# Patient Record
Sex: FEMALE | Race: WHITE | NOT HISPANIC OR LATINO | Employment: FULL TIME | URBAN - METROPOLITAN AREA
[De-identification: names, ages, dates, MRNs, and addresses within clinical notes are randomized per-mention and may not be internally consistent; named-entity substitution may affect disease eponyms.]

---

## 2017-02-08 ENCOUNTER — OFFICE VISIT (OUTPATIENT)
Dept: INTERNAL MEDICINE | Facility: CLINIC | Age: 37
End: 2017-02-08
Payer: COMMERCIAL

## 2017-02-08 VITALS
HEART RATE: 62 BPM | TEMPERATURE: 98 F | WEIGHT: 115.5 LBS | RESPIRATION RATE: 16 BRPM | SYSTOLIC BLOOD PRESSURE: 110 MMHG | BODY MASS INDEX: 22.68 KG/M2 | DIASTOLIC BLOOD PRESSURE: 70 MMHG | HEIGHT: 60 IN

## 2017-02-08 DIAGNOSIS — H92.03 OTALGIA OF BOTH EARS: ICD-10-CM

## 2017-02-08 DIAGNOSIS — Z00.00 ANNUAL PHYSICAL EXAM: Primary | ICD-10-CM

## 2017-02-08 DIAGNOSIS — R05.9 COUGH: ICD-10-CM

## 2017-02-08 DIAGNOSIS — K21.9 LARYNGOPHARYNGEAL REFLUX (LPR): ICD-10-CM

## 2017-02-08 PROCEDURE — 99999 PR PBB SHADOW E&M-EST. PATIENT-LVL III: CPT | Mod: PBBFAC,,, | Performed by: INTERNAL MEDICINE

## 2017-02-08 PROCEDURE — 99385 PREV VISIT NEW AGE 18-39: CPT | Mod: S$GLB,,, | Performed by: INTERNAL MEDICINE

## 2017-02-08 RX ORDER — OMEPRAZOLE 40 MG/1
40 CAPSULE, DELAYED RELEASE ORAL
Qty: 30 CAPSULE | Refills: 6 | Status: SHIPPED | OUTPATIENT
Start: 2017-02-08 | End: 2019-02-05

## 2017-02-08 NOTE — PROGRESS NOTES
Subjective:       Patient ID: Tasha Cornejo is a 37 y.o. female.    Chief Complaint: Annual Exam and Otalgia (only with certains sounds, started a month ago)    Patient is a 37 y.o.female who presents today for annual.    Cholesterol: due now  Vaccines: Influenza done); Tetanus (up to date) ;   Gyn exam: dr. Perea; [facility]; pap smear up to date  Exercise: 3-4 times per week  Diet: healthy  LMP: regular      For one month, if she hears a certain noise, it sounds like she is underwater; muffled noise. Notices when  speaks loudly; possible more consistent with deeper tones; more so with male voices; happens on a daily basis; also noticeable when inverted during yoga poses    Consistent cough; she was advised to avoid caffeine. The cough is dry; occurs throughout. She has seen ent in the past and was diagnosed with silent reflux.    History reviewed. No pertinent past medical history.  History reviewed. No pertinent past surgical history.  Social History     Social History    Marital status:      Spouse name: N/A    Number of children: N/A    Years of education: N/A     Occupational History          Social History Main Topics    Smoking status: Never Smoker    Smokeless tobacco: Not on file    Alcohol use Yes      Comment: 3-5 glasses per week    Drug use: No    Sexual activity: Not on file     Other Topics Concern    Not on file     Social History Narrative    No narrative on file     Review of patient's allergies indicates:   Allergen Reactions    Aleve [naproxen sodium] Other (See Comments)     Throat swelling     Ms. Cornejo had no medications administered during this visit.    Review of Systems   Constitutional: Negative for appetite change, chills, diaphoresis, fatigue and fever.   HENT: Positive for ear pain. Negative for congestion, dental problem, ear discharge, hearing loss, postnasal drip, sinus pressure and sore throat.    Eyes: Negative for discharge,  redness and itching.   Respiratory: Positive for cough. Negative for chest tightness, shortness of breath and wheezing.    Cardiovascular: Negative for chest pain, palpitations and leg swelling.   Gastrointestinal: Negative for abdominal pain, constipation, diarrhea, nausea and vomiting.   Endocrine: Negative for cold intolerance and heat intolerance.   Genitourinary: Negative for difficulty urinating, frequency, hematuria and urgency.   Musculoskeletal: Negative for arthralgias, back pain, gait problem, myalgias and neck pain.   Skin: Negative for color change and rash.   Neurological: Negative for dizziness, syncope and headaches.   Hematological: Negative for adenopathy.   Psychiatric/Behavioral: Negative for behavioral problems and sleep disturbance. The patient is not nervous/anxious.        Objective:      Physical Exam   Constitutional: She is oriented to person, place, and time. She appears well-developed and well-nourished. No distress.   HENT:   Head: Normocephalic and atraumatic.   Right Ear: Tympanic membrane and external ear normal.   Left Ear: Tympanic membrane and external ear normal.   Nose: Nose normal. No mucosal edema or rhinorrhea.   Mouth/Throat: Uvula is midline, oropharynx is clear and moist and mucous membranes are normal. No oropharyngeal exudate, posterior oropharyngeal edema, posterior oropharyngeal erythema or tonsillar abscesses.   Eyes: Conjunctivae and EOM are normal. Pupils are equal, round, and reactive to light. Right eye exhibits no discharge. Left eye exhibits no discharge. No scleral icterus.   Neck: Normal range of motion. Neck supple. No JVD present. No thyromegaly present.   Cardiovascular: Normal rate, regular rhythm, normal heart sounds and intact distal pulses.  Exam reveals no gallop and no friction rub.    No murmur heard.  Pulmonary/Chest: Effort normal and breath sounds normal. No stridor. No respiratory distress. She has no wheezes. She has no rales. She exhibits no  tenderness.   Abdominal: Soft. Bowel sounds are normal. She exhibits no distension. There is no tenderness. There is no rebound.   Musculoskeletal: Normal range of motion. She exhibits no edema or tenderness.   Lymphadenopathy:     She has no cervical adenopathy.   Neurological: She is alert and oriented to person, place, and time.   Skin: Skin is warm. No rash noted. She is not diaphoretic. No erythema.   Psychiatric: She has a normal mood and affect. Her behavior is normal.   Nursing note and vitals reviewed.      Assessment and Plan:       1. Annual physical exam  - CBC auto differential; Future  - Comprehensive metabolic panel; Future  - TSH; Future  - Lipid panel; Future  - Vitamin D; Future  - Urinalysis; Future    2. Cough  - asthma vs gerd vs allergies  - trial of omeprazole 40 mg po daily x 2weeks  - if that does not improve her symptom, will start an anti- histamine daily    3. Otalgia of both ears  - Ambulatory consult to ENT    4. Laryngopharyngeal reflux (LPR)  - omeprazole (PRILOSEC) 40 MG capsule; Take 1 capsule (40 mg total) by mouth before dinner.  Dispense: 30 capsule; Refill: 6        No Follow-up on file.

## 2017-02-09 ENCOUNTER — LAB VISIT (OUTPATIENT)
Dept: LAB | Facility: HOSPITAL | Age: 37
End: 2017-02-09
Attending: INTERNAL MEDICINE
Payer: COMMERCIAL

## 2017-02-09 DIAGNOSIS — Z00.00 ANNUAL PHYSICAL EXAM: ICD-10-CM

## 2017-02-09 LAB
25(OH)D3+25(OH)D2 SERPL-MCNC: 39 NG/ML
ALBUMIN SERPL BCP-MCNC: 4 G/DL
ALP SERPL-CCNC: 42 U/L
ALT SERPL W/O P-5'-P-CCNC: 16 U/L
ANION GAP SERPL CALC-SCNC: 7 MMOL/L
AST SERPL-CCNC: 30 U/L
BASOPHILS # BLD AUTO: 0.04 K/UL
BASOPHILS NFR BLD: 0.8 %
BILIRUB SERPL-MCNC: 0.3 MG/DL
BUN SERPL-MCNC: 15 MG/DL
CALCIUM SERPL-MCNC: 9.2 MG/DL
CHLORIDE SERPL-SCNC: 105 MMOL/L
CHOLEST/HDLC SERPL: 2.9 {RATIO}
CO2 SERPL-SCNC: 28 MMOL/L
CREAT SERPL-MCNC: 0.8 MG/DL
DIFFERENTIAL METHOD: NORMAL
EOSINOPHIL # BLD AUTO: 0.1 K/UL
EOSINOPHIL NFR BLD: 2.1 %
ERYTHROCYTE [DISTWIDTH] IN BLOOD BY AUTOMATED COUNT: 13.2 %
EST. GFR  (AFRICAN AMERICAN): >60 ML/MIN/1.73 M^2
EST. GFR  (NON AFRICAN AMERICAN): >60 ML/MIN/1.73 M^2
GLUCOSE SERPL-MCNC: 89 MG/DL
HCT VFR BLD AUTO: 40.1 %
HDL/CHOLESTEROL RATIO: 34.6 %
HDLC SERPL-MCNC: 179 MG/DL
HDLC SERPL-MCNC: 62 MG/DL
HGB BLD-MCNC: 13.2 G/DL
LDLC SERPL CALC-MCNC: 105.4 MG/DL
LYMPHOCYTES # BLD AUTO: 1.7 K/UL
LYMPHOCYTES NFR BLD: 33 %
MCH RBC QN AUTO: 29.9 PG
MCHC RBC AUTO-ENTMCNC: 32.9 %
MCV RBC AUTO: 91 FL
MONOCYTES # BLD AUTO: 0.5 K/UL
MONOCYTES NFR BLD: 10.1 %
NEUTROPHILS # BLD AUTO: 2.8 K/UL
NEUTROPHILS NFR BLD: 54 %
NONHDLC SERPL-MCNC: 117 MG/DL
PLATELET # BLD AUTO: 279 K/UL
PMV BLD AUTO: 10.6 FL
POTASSIUM SERPL-SCNC: 4 MMOL/L
PROT SERPL-MCNC: 7.2 G/DL
RBC # BLD AUTO: 4.42 M/UL
SODIUM SERPL-SCNC: 140 MMOL/L
TRIGL SERPL-MCNC: 58 MG/DL
TSH SERPL DL<=0.005 MIU/L-ACNC: 2.55 UIU/ML
WBC # BLD AUTO: 5.25 K/UL

## 2017-02-09 PROCEDURE — 80053 COMPREHEN METABOLIC PANEL: CPT

## 2017-02-09 PROCEDURE — 82306 VITAMIN D 25 HYDROXY: CPT

## 2017-02-09 PROCEDURE — 84443 ASSAY THYROID STIM HORMONE: CPT

## 2017-02-09 PROCEDURE — 36415 COLL VENOUS BLD VENIPUNCTURE: CPT | Mod: PO

## 2017-02-09 PROCEDURE — 80061 LIPID PANEL: CPT

## 2017-02-09 PROCEDURE — 85025 COMPLETE CBC W/AUTO DIFF WBC: CPT

## 2017-02-15 ENCOUNTER — CLINICAL SUPPORT (OUTPATIENT)
Dept: AUDIOLOGY | Facility: CLINIC | Age: 37
End: 2017-02-15
Payer: COMMERCIAL

## 2017-02-15 ENCOUNTER — OFFICE VISIT (OUTPATIENT)
Dept: OTOLARYNGOLOGY | Facility: CLINIC | Age: 37
End: 2017-02-15
Payer: COMMERCIAL

## 2017-02-15 VITALS
DIASTOLIC BLOOD PRESSURE: 88 MMHG | WEIGHT: 116.38 LBS | HEART RATE: 68 BPM | BODY MASS INDEX: 22.73 KG/M2 | TEMPERATURE: 99 F | SYSTOLIC BLOOD PRESSURE: 138 MMHG

## 2017-02-15 DIAGNOSIS — K21.9 LARYNGOPHARYNGEAL REFLUX: Primary | ICD-10-CM

## 2017-02-15 DIAGNOSIS — H93.13 TINNITUS OF BOTH EARS: Primary | ICD-10-CM

## 2017-02-15 DIAGNOSIS — J30.9 ALLERGIC RHINITIS, UNSPECIFIED ALLERGIC RHINITIS TRIGGER, UNSPECIFIED RHINITIS SEASONALITY: ICD-10-CM

## 2017-02-15 PROCEDURE — 92557 COMPREHENSIVE HEARING TEST: CPT | Mod: S$GLB,,, | Performed by: AUDIOLOGIST

## 2017-02-15 PROCEDURE — 1160F RVW MEDS BY RX/DR IN RCRD: CPT | Mod: S$GLB,,, | Performed by: OTOLARYNGOLOGY

## 2017-02-15 PROCEDURE — 99999 PR PBB SHADOW E&M-EST. PATIENT-LVL III: CPT | Mod: PBBFAC,,, | Performed by: OTOLARYNGOLOGY

## 2017-02-15 PROCEDURE — 92567 TYMPANOMETRY: CPT | Mod: S$GLB,,, | Performed by: AUDIOLOGIST

## 2017-02-15 PROCEDURE — 99213 OFFICE O/P EST LOW 20 MIN: CPT | Mod: S$GLB,,, | Performed by: OTOLARYNGOLOGY

## 2017-02-15 NOTE — PROGRESS NOTES
2/15/2017    AUDIOLOGICAL EVALUATION:    Tasha Cornejo was seen for an audiological evaluation on 2/15/2017 with a complaint of noise in her ears.    Pure tone threshold testing revealed normal hearing sensitivity bilaterally.  Speech reception thresholds were obtained at 5dBHL for the right ear and 5dBHL for the left ear.  Speech discrimination scores were obtained at 100% for the right ear and 100% for the left ear.    Tympanometry was within normal limits bilaterally indicating normal middle ear function.    Recommend:  1.  Otologic evaluation.  2.  Follow up audio as needed.

## 2017-02-15 NOTE — PROGRESS NOTES
"Subjective:       Patient ID: Tasha Cornejo is a 37 y.o. female.    Chief Complaint: Consult (muffling sounds in both ears at times, itchy throat, feels swollen)    HPI     Tasha Cornejo is a 37 year old female who [facility] complaints. 1 month ago, she began to feel like she was "underwater." The problem occurs with change in positions or when listening deeper voice tones (such as on TV).  The sensation will self resolve.There is no dizziness,vertigo, or tinnitus. She also reports a globus sensation, swollen/itchy throat, and itchy eyes. She does have a history of seasonal allergies. She recently increased her Flonase to BID with no relied of symptoms. She has not tried any OTC decongestants. She recently started Prilosec for LPR.    History reviewed. No pertinent past medical history.    History reviewed. No pertinent past surgical history.      Current Outpatient Prescriptions:     fluticasone (FLONASE) 50 mcg/actuation nasal spray, SPRAY TWICE IN EACH NOSTRIL EVERY DAY, Disp: 1 Bottle, Rfl: 11    MAGNESIUM GLYCINATE ORAL, Take 400 mg by mouth once daily., Disp: , Rfl:     omeprazole (PRILOSEC) 40 MG capsule, Take 1 capsule (40 mg total) by mouth before dinner., Disp: 30 capsule, Rfl: 6    Review of patient's allergies indicates:   Allergen Reactions    Aleve [naproxen sodium] Other (See Comments)     Throat swelling       Social History     Social History    Marital status:      Spouse name: N/A    Number of children: N/A    Years of education: N/A     Occupational History          Social History Main Topics    Smoking status: Never Smoker    Smokeless tobacco: Not on file    Alcohol use Yes      Comment: 3-5 glasses per week    Drug use: No    Sexual activity: Not on file     Other Topics Concern    Not on file     Social History Narrative       Family History   Problem Relation Age of Onset    Heart failure Paternal Grandmother     " Albinism Paternal Grandmother     Alzheimer's disease Paternal Grandmother     Diabetes Paternal Grandfather     Heart failure Paternal Grandfather        Review of Systems   Constitutional: Negative for appetite change, chills, diaphoresis, fatigue, fever and unexpected weight change.   HENT: Positive for congestion. Negative for dental problem, drooling, ear discharge, ear pain, facial swelling, hearing loss, mouth sores, nosebleeds, postnasal drip, rhinorrhea, sinus pressure, sneezing, sore throat, tinnitus, trouble swallowing and voice change.         Ear pressure   Eyes: Positive for itching. Negative for pain, discharge and redness.   Respiratory: Positive for cough. Negative for shortness of breath.    Cardiovascular: Negative for chest pain.   Gastrointestinal: Negative for abdominal distention, abdominal pain, diarrhea, nausea and vomiting.   Endocrine: Negative for cold intolerance and heat intolerance.   Genitourinary: Negative for difficulty urinating.   Musculoskeletal: Negative for neck pain and neck stiffness.   Skin: Negative for rash.   Neurological: Negative for dizziness, weakness and headaches.   Hematological: Negative for adenopathy.       Objective:      Physical Exam   Constitutional: She is oriented to person, place, and time. She appears well-developed and well-nourished. She is cooperative. She does not appear ill. No distress.   HENT:   Head: Normocephalic and atraumatic.   Right Ear: Hearing, tympanic membrane, external ear and ear canal normal.   Left Ear: Hearing, external ear and ear canal normal. A middle ear effusion (serous) is present.   Nose: Mucosal edema and rhinorrhea present. No nasal deformity. No epistaxis.  No foreign bodies. Right sinus exhibits no maxillary sinus tenderness and no frontal sinus tenderness. Left sinus exhibits no maxillary sinus tenderness and no frontal sinus tenderness.   Mouth/Throat: Uvula is midline, oropharynx is clear and moist and mucous  membranes are normal. Mucous membranes are not pale, not dry and not cyanotic. She does not have dentures. No oral lesions. No trismus in the jaw. Normal dentition. No uvula swelling or dental caries. No oropharyngeal exudate, posterior oropharyngeal edema, posterior oropharyngeal erythema or tonsillar abscesses.   Eyes: Conjunctivae are normal. Right eye exhibits no discharge. Left eye exhibits no discharge. No scleral icterus.   Neck: Trachea normal, normal range of motion and phonation normal. Neck supple. No JVD present. No tracheal tenderness present. No tracheal deviation present. No thyroid mass and no thyromegaly present.   Salivary glands - there are no lesions or asymmetric findings in the submandibular or parotid glands     Cardiovascular: Normal rate.    Pulmonary/Chest: Effort normal. No stridor. No respiratory distress.   Lymphadenopathy:        Head (right side): No submental, no submandibular, no tonsillar and no preauricular adenopathy present.        Head (left side): No submental, no submandibular, no tonsillar and no preauricular adenopathy present.     She has no cervical adenopathy.   Neurological: She is alert and oriented to person, place, and time. No cranial nerve deficit.   Skin: Skin is warm and dry. No rash noted. She is not diaphoretic. No erythema. No pallor.   Psychiatric: She has a normal mood and affect. Her behavior is normal. Thought content normal.   Vitals reviewed.      Studies reviewed:   Audio today 2/15/17:  WNL AU, tymps normal bilaterally    Assessment:       1. Laryngopharyngeal reflux    2. Allergic rhinitis, unspecified allergic rhinitis trigger, unspecified rhinitis seasonality        Plan:       Ms. Cornejo is a 37 year old female with allergic rhinitis and LPR, both can be causing nasopharyngeal irritation and subsequent eustachian tube dysfunction. Recommend she continue current regimen of Flonase and Prilosec. Audio normal today. Also recommend nasal saline spray  for improved nasal mucociliary clearance and prn Afrin (no longer than 3 days) for nasal congestion.

## 2017-02-15 NOTE — LETTER
February 21, 2017      Dinah Arias, DO  [address] [facility] [place] [address]           [facility]  [address]  [facility] [address]  Phone: [phone]  Fax: [phone]          Patient: Tasha Cornejo   MR Number: [id number]   YOB: 1980   Date of Visit: 2/15/2017       Dear Dr. Dinah Arias:    Thank you for referring Tasha Cornejo to me for evaluation. Attached you will find relevant portions of my assessment and plan of care.    If you have questions, please do not hesitate to call me. I look forward to following Tasha Cornejo along with you.    Sincerely,    Shonna Krishnamurthy MD    Enclosure  CC:  No Recipients    If you would like to receive this communication electronically, please contact [email or web address] or [phone] to request more information on [employer] access.    For providers and/or their staff who would like to refer a patient to [facility], please contact us through our one-stop-shop provider referral line, [facility], at [phone].    If you feel you have received this communication in error or would no longer like to receive these types of communications, please e-mail [email or web address]

## 2017-09-11 ENCOUNTER — OFFICE VISIT (OUTPATIENT)
Dept: OPTOMETRY | Facility: CLINIC | Age: 37
End: 2017-09-11
Payer: COMMERCIAL

## 2017-09-11 DIAGNOSIS — H00.12 CHALAZION OF RIGHT LOWER EYELID: Primary | ICD-10-CM

## 2017-09-11 PROCEDURE — 92004 COMPRE OPH EXAM NEW PT 1/>: CPT | Mod: S$GLB,,, | Performed by: OPTOMETRIST

## 2017-09-11 PROCEDURE — 99999 PR PBB SHADOW E&M-EST. PATIENT-LVL II: CPT | Mod: PBBFAC,,, | Performed by: OPTOMETRIST

## 2017-09-11 RX ORDER — LORATADINE 10 MG/1
10 TABLET ORAL DAILY
COMMUNITY
End: 2019-02-05

## 2017-09-11 NOTE — PROGRESS NOTES
HPI     Concerns About Ocular Health    Additional comments: swelling RLL           Comments   Patient's last dilated exam was: apprx 2 years   Pt c/o swelling RLL x 14 days. Treating with wc x 1 week for apprx 10-20   minutes TID. Has decreased in size minimally. No change in vision, however   she can see it when she squints        Last edited by LATOYA Tolentino on 9/11/2017  1:32 PM.   (History)            Assessment /Plan     For exam results, see Encounter Report.    Chalazion of right lower eyelid          1.  Educated pt on chalazion.  Warm compresses with digital massage 3x/day.  If condition does not resolve in the the next 3-4 weeks will refer to Dr. Dias.

## 2017-10-09 RX ORDER — FLUTICASONE PROPIONATE 50 MCG
SPRAY, SUSPENSION (ML) NASAL
Qty: 1 BOTTLE | Refills: 0 | Status: SHIPPED | OUTPATIENT
Start: 2017-10-09 | End: 2019-02-05

## 2019-02-04 NOTE — PROGRESS NOTES
Subjective:       Patient ID: Tasha Cornejo is a 39 y.o. female.    Chief Complaint: Cough and Fatigue    Patient is a 39 y.o.female who presents today for cough. She started with symptoms in November. She was treated by an ENT twice with antibiotics and steroids in nov and December. Not sure which abx it was but will find out.    Cough: lingering; sometimes productive; generalized fatigue, heaviness in chest. She notes sob with walking/ normal daily activity. When the cough is productive, it is white and thick. No fever or chills. No wheezing. No chest pain.    She is sleeping okay.    She is currently taking flonase off and on and a multivitamin 2 days ago.    Review of Systems   Constitutional: Negative for appetite change, chills, diaphoresis, fatigue and fever.   HENT: Negative for congestion, dental problem, ear discharge, ear pain, hearing loss, postnasal drip, sinus pressure and sore throat.    Eyes: Negative for discharge, redness and itching.   Respiratory: Negative for cough, chest tightness, shortness of breath and wheezing.    Cardiovascular: Negative for chest pain, palpitations and leg swelling.   Gastrointestinal: Negative for abdominal pain, constipation, diarrhea, nausea and vomiting.   Endocrine: Negative for cold intolerance and heat intolerance.   Genitourinary: Negative for difficulty urinating, frequency, hematuria and urgency.   Musculoskeletal: Negative for arthralgias, back pain, gait problem, myalgias and neck pain.   Skin: Negative for color change and rash.   Neurological: Negative for dizziness, syncope and headaches.   Hematological: Negative for adenopathy.   Psychiatric/Behavioral: Negative for behavioral problems and sleep disturbance. The patient is not nervous/anxious.        Objective:      Physical Exam   Constitutional: She is oriented to person, place, and time. She appears well-developed and well-nourished. No distress.   HENT:   Head: Normocephalic and atraumatic.   Right  Ear: External ear normal.   Left Ear: External ear normal.   Nose: Nose normal.   Mouth/Throat: Oropharynx is clear and moist. No oropharyngeal exudate.   Eyes: Conjunctivae and EOM are normal. Pupils are equal, round, and reactive to light. Right eye exhibits no discharge. Left eye exhibits no discharge. No scleral icterus.   Neck: Normal range of motion. Neck supple. No JVD present. No thyromegaly present.   Cardiovascular: Normal rate, regular rhythm, normal heart sounds and intact distal pulses. Exam reveals no gallop and no friction rub.   No murmur heard.  Pulmonary/Chest: Effort normal and breath sounds normal. No stridor. No respiratory distress. She has no wheezes. She has no rales. She exhibits no tenderness.   Abdominal: Soft. Bowel sounds are normal. She exhibits no distension. There is no tenderness. There is no rebound.   Musculoskeletal: Normal range of motion. She exhibits no edema or tenderness.   Lymphadenopathy:     She has no cervical adenopathy.   Neurological: She is alert and oriented to person, place, and time. No cranial nerve deficit.   Skin: Skin is warm and dry. No rash noted. She is not diaphoretic. No erythema.   Psychiatric: She has a normal mood and affect. Her behavior is normal.   Nursing note and vitals reviewed.      Assessment and Plan:       1. Cough  - urine pregnancy prior to xray  - X-Ray Chest PA And Lateral; Future  - CBC auto differential; Future  - Comprehensive metabolic panel; Future  - HETEROPHILE AB SCREEN; Future    2. SOB (shortness of breath)    - X-Ray Chest PA And Lateral; Future  - CBC auto differential; Future  - Comprehensive metabolic panel; Future  - HETEROPHILE AB SCREEN; Future  - EKG 12-lead    3. Fatigue, unspecified type  - X-Ray Chest PA And Lateral; Future  - CBC auto differential; Future  - Comprehensive metabolic panel; Future  - HETEROPHILE AB SCREEN; Future      Notify clinic if symptoms change, worsen or do not improve        No Follow-up on  file.

## 2019-02-05 ENCOUNTER — HOSPITAL ENCOUNTER (OUTPATIENT)
Dept: RADIOLOGY | Facility: HOSPITAL | Age: 39
Discharge: HOME | End: 2019-02-05
Attending: INTERNAL MEDICINE
Payer: COMMERCIAL

## 2019-02-05 ENCOUNTER — OFFICE VISIT (OUTPATIENT)
Dept: INTERNAL MEDICINE | Facility: CLINIC | Age: 39
End: 2019-02-05
Payer: COMMERCIAL

## 2019-02-05 ENCOUNTER — PATIENT MESSAGE (OUTPATIENT)
Dept: INTERNAL MEDICINE | Facility: CLINIC | Age: 39
End: 2019-02-05

## 2019-02-05 VITALS
WEIGHT: 123 LBS | TEMPERATURE: 98 F | HEART RATE: 69 BPM | OXYGEN SATURATION: 98 % | RESPIRATION RATE: 16 BRPM | DIASTOLIC BLOOD PRESSURE: 88 MMHG | HEIGHT: 60 IN | SYSTOLIC BLOOD PRESSURE: 140 MMHG | BODY MASS INDEX: 24.15 KG/M2

## 2019-02-05 DIAGNOSIS — R05.9 COUGH: ICD-10-CM

## 2019-02-05 DIAGNOSIS — R06.02 SOB (SHORTNESS OF BREATH): ICD-10-CM

## 2019-02-05 DIAGNOSIS — R53.83 FATIGUE, UNSPECIFIED TYPE: ICD-10-CM

## 2019-02-05 DIAGNOSIS — R05.9 COUGH: Primary | ICD-10-CM

## 2019-02-05 PROCEDURE — 71046 XR CHEST PA AND LATERAL: ICD-10-PCS | Mod: 26,,, | Performed by: RADIOLOGY

## 2019-02-05 PROCEDURE — 71046 X-RAY EXAM CHEST 2 VIEWS: CPT | Mod: TC,PO

## 2019-02-05 PROCEDURE — 99999 PR PBB SHADOW E&M-EST. PATIENT-LVL III: ICD-10-PCS | Mod: PBBFAC,,, | Performed by: INTERNAL MEDICINE

## 2019-02-05 PROCEDURE — 99214 PR OFFICE/OUTPT VISIT, EST, LEVL IV, 30-39 MIN: ICD-10-PCS | Mod: S$GLB,,, | Performed by: INTERNAL MEDICINE

## 2019-02-05 PROCEDURE — 93000 ELECTROCARDIOGRAM COMPLETE: CPT | Mod: S$GLB,,, | Performed by: INTERNAL MEDICINE

## 2019-02-05 PROCEDURE — 93000 EKG 12-LEAD: ICD-10-PCS | Mod: S$GLB,,, | Performed by: INTERNAL MEDICINE

## 2019-02-05 PROCEDURE — 3008F BODY MASS INDEX DOCD: CPT | Mod: CPTII,S$GLB,, | Performed by: INTERNAL MEDICINE

## 2019-02-05 PROCEDURE — 3008F PR BODY MASS INDEX (BMI) DOCUMENTED: ICD-10-PCS | Mod: CPTII,S$GLB,, | Performed by: INTERNAL MEDICINE

## 2019-02-05 PROCEDURE — 99999 PR PBB SHADOW E&M-EST. PATIENT-LVL III: CPT | Mod: PBBFAC,,, | Performed by: INTERNAL MEDICINE

## 2019-02-05 PROCEDURE — 71046 X-RAY EXAM CHEST 2 VIEWS: CPT | Mod: 26,,, | Performed by: RADIOLOGY

## 2019-02-05 PROCEDURE — 99214 OFFICE O/P EST MOD 30 MIN: CPT | Mod: S$GLB,,, | Performed by: INTERNAL MEDICINE

## 2019-02-06 ENCOUNTER — PATIENT MESSAGE (OUTPATIENT)
Dept: INTERNAL MEDICINE | Facility: CLINIC | Age: 39
End: 2019-02-06

## 2019-02-06 DIAGNOSIS — R05.3 COUGH, PERSISTENT: ICD-10-CM

## 2019-02-07 NOTE — TELEPHONE ENCOUNTER
CT chest ordered; pt will need to contact finance to determine her copay. Number is [phone]. If too expensive, we can refer her to pulmonology

## 2019-02-07 NOTE — TELEPHONE ENCOUNTER
Pt is concerned about cost of CT. Needs CPT code to find out cost. Need to know what kind of CT will be ordered first.

## 2019-04-18 NOTE — PROGRESS NOTES
Subjective:       Patient ID: Tasha Cornejo is a 39 y.o. female.    Chief Complaint: Annual Exam (needs labs, pt fasting)    Patient is a 39 y.o.female who presents today for annual    Cholesterol: due now  Vaccines: Influenza done); Tetanus (up to date) ;   Gyn exam: dr. Perea; [facility]; pap smear up to date  Exercise: 3-4 times per week  Diet: healthy  LMP: regular    After running, she had an episode of BRBPR. It happened a while back when she would run long distances.    She notes some recent indigestion at times and bloating during her period   Review of Systems   Constitutional: Negative for appetite change, chills, diaphoresis, fatigue and fever.   HENT: Negative for congestion, dental problem, ear discharge, ear pain, hearing loss, postnasal drip, sinus pressure and sore throat.    Eyes: Negative for discharge, redness and itching.   Respiratory: Negative for cough, chest tightness, shortness of breath and wheezing.    Cardiovascular: Negative for chest pain, palpitations and leg swelling.   Gastrointestinal: Negative for abdominal pain, constipation, diarrhea, nausea and vomiting.   Endocrine: Negative for cold intolerance and heat intolerance.   Genitourinary: Negative for difficulty urinating, frequency, hematuria and urgency.   Musculoskeletal: Negative for arthralgias, back pain, gait problem, myalgias and neck pain.   Skin: Negative for color change and rash.   Neurological: Negative for dizziness, syncope and headaches.   Hematological: Negative for adenopathy.   Psychiatric/Behavioral: Negative for behavioral problems and sleep disturbance. The patient is not nervous/anxious.        Objective:      Physical Exam   Constitutional: She is oriented to person, place, and time. She appears well-developed and well-nourished. No distress.   HENT:   Head: Normocephalic and atraumatic.   Right Ear: External ear normal.   Left Ear: External ear normal.   Nose: Nose normal.   Mouth/Throat: Oropharynx is  clear and moist. No oropharyngeal exudate.   Eyes: Pupils are equal, round, and reactive to light. Conjunctivae and EOM are normal. Right eye exhibits no discharge. Left eye exhibits no discharge. No scleral icterus.   Neck: Normal range of motion. Neck supple. No JVD present. No thyromegaly present.   Cardiovascular: Normal rate, regular rhythm, normal heart sounds and intact distal pulses. Exam reveals no gallop and no friction rub.   No murmur heard.  Pulmonary/Chest: Effort normal and breath sounds normal. No stridor. No respiratory distress. She has no wheezes. She has no rales. She exhibits no tenderness.   Abdominal: Soft. Bowel sounds are normal. She exhibits no distension. There is no tenderness. There is no rebound.   Musculoskeletal: Normal range of motion. She exhibits no edema or tenderness.   Lymphadenopathy:     She has no cervical adenopathy.   Neurological: She is alert and oriented to person, place, and time. No cranial nerve deficit.   Skin: Skin is warm and dry. No rash noted. She is not diaphoretic. No erythema.   Psychiatric: She has a normal mood and affect. Her behavior is normal.   Nursing note and vitals reviewed.      Assessment and Plan:       1. Annual physical exam  - CBC auto differential; Future  - Comprehensive metabolic panel; Future  - Lipid panel; Future  - Vitamin D; Future  - Urinalysis; Future  - TSH; Future    2. BRBPR (bright red blood per rectum)  - Fecal Immunochemical Test (iFOBT); Future  - Iron and TIBC; Future  - Ferritin; Future          No follow-ups on file.

## 2019-05-02 ENCOUNTER — OFFICE VISIT (OUTPATIENT)
Dept: INTERNAL MEDICINE | Facility: CLINIC | Age: 39
End: 2019-05-02
Payer: COMMERCIAL

## 2019-05-02 ENCOUNTER — LAB VISIT (OUTPATIENT)
Dept: LAB | Facility: HOSPITAL | Age: 39
End: 2019-05-02
Attending: INTERNAL MEDICINE
Payer: COMMERCIAL

## 2019-05-02 VITALS
WEIGHT: 120.81 LBS | DIASTOLIC BLOOD PRESSURE: 82 MMHG | SYSTOLIC BLOOD PRESSURE: 136 MMHG | RESPIRATION RATE: 18 BRPM | HEIGHT: 62 IN | OXYGEN SATURATION: 98 % | HEART RATE: 98 BPM | BODY MASS INDEX: 22.23 KG/M2 | TEMPERATURE: 98 F

## 2019-05-02 DIAGNOSIS — K62.5 BRBPR (BRIGHT RED BLOOD PER RECTUM): ICD-10-CM

## 2019-05-02 DIAGNOSIS — Z00.00 ANNUAL PHYSICAL EXAM: Primary | ICD-10-CM

## 2019-05-02 DIAGNOSIS — Z00.00 ANNUAL PHYSICAL EXAM: ICD-10-CM

## 2019-05-02 LAB
25(OH)D3+25(OH)D2 SERPL-MCNC: 37 NG/ML (ref 30–96)
ALBUMIN SERPL BCP-MCNC: 4.4 G/DL (ref 3.5–5.2)
ALP SERPL-CCNC: 51 U/L (ref 55–135)
ALT SERPL W/O P-5'-P-CCNC: 11 U/L (ref 10–44)
ANION GAP SERPL CALC-SCNC: 8 MMOL/L (ref 8–16)
AST SERPL-CCNC: 21 U/L (ref 10–40)
BASOPHILS # BLD AUTO: 0.06 K/UL (ref 0–0.2)
BASOPHILS NFR BLD: 1.2 % (ref 0–1.9)
BILIRUB SERPL-MCNC: 0.5 MG/DL (ref 0.1–1)
BUN SERPL-MCNC: 13 MG/DL (ref 6–20)
CALCIUM SERPL-MCNC: 9.7 MG/DL (ref 8.7–10.5)
CHLORIDE SERPL-SCNC: 105 MMOL/L (ref 95–110)
CHOLEST SERPL-MCNC: 189 MG/DL (ref 120–199)
CHOLEST/HDLC SERPL: 3.1 {RATIO} (ref 2–5)
CO2 SERPL-SCNC: 28 MMOL/L (ref 23–29)
CREAT SERPL-MCNC: 0.8 MG/DL (ref 0.5–1.4)
DIFFERENTIAL METHOD: ABNORMAL
EOSINOPHIL # BLD AUTO: 0.2 K/UL (ref 0–0.5)
EOSINOPHIL NFR BLD: 3.5 % (ref 0–8)
ERYTHROCYTE [DISTWIDTH] IN BLOOD BY AUTOMATED COUNT: 12.7 % (ref 11.5–14.5)
EST. GFR  (AFRICAN AMERICAN): >60 ML/MIN/1.73 M^2
EST. GFR  (NON AFRICAN AMERICAN): >60 ML/MIN/1.73 M^2
FERRITIN SERPL-MCNC: 48 NG/ML (ref 20–300)
GLUCOSE SERPL-MCNC: 81 MG/DL (ref 70–110)
HCT VFR BLD AUTO: 44.7 % (ref 37–48.5)
HDLC SERPL-MCNC: 61 MG/DL (ref 40–75)
HDLC SERPL: 32.3 % (ref 20–50)
HGB BLD-MCNC: 14 G/DL (ref 12–16)
IMM GRANULOCYTES # BLD AUTO: 0.01 K/UL (ref 0–0.04)
IMM GRANULOCYTES NFR BLD AUTO: 0.2 % (ref 0–0.5)
IRON SERPL-MCNC: 111 UG/DL (ref 30–160)
LDLC SERPL CALC-MCNC: 115 MG/DL (ref 63–159)
LYMPHOCYTES # BLD AUTO: 1.6 K/UL (ref 1–4.8)
LYMPHOCYTES NFR BLD: 33.1 % (ref 18–48)
MCH RBC QN AUTO: 28.8 PG (ref 27–31)
MCHC RBC AUTO-ENTMCNC: 31.3 G/DL (ref 32–36)
MCV RBC AUTO: 92 FL (ref 82–98)
MONOCYTES # BLD AUTO: 0.5 K/UL (ref 0.3–1)
MONOCYTES NFR BLD: 11.1 % (ref 4–15)
NEUTROPHILS # BLD AUTO: 2.5 K/UL (ref 1.8–7.7)
NEUTROPHILS NFR BLD: 50.9 % (ref 38–73)
NONHDLC SERPL-MCNC: 128 MG/DL
NRBC BLD-RTO: 0 /100 WBC
PLATELET # BLD AUTO: 300 K/UL (ref 150–350)
PMV BLD AUTO: 10.6 FL (ref 9.2–12.9)
POTASSIUM SERPL-SCNC: 4.5 MMOL/L (ref 3.5–5.1)
PROT SERPL-MCNC: 7.8 G/DL (ref 6–8.4)
RBC # BLD AUTO: 4.86 M/UL (ref 4–5.4)
SATURATED IRON: 31 % (ref 20–50)
SODIUM SERPL-SCNC: 141 MMOL/L (ref 136–145)
TOTAL IRON BINDING CAPACITY: 357 UG/DL (ref 250–450)
TRANSFERRIN SERPL-MCNC: 241 MG/DL (ref 200–375)
TRIGL SERPL-MCNC: 65 MG/DL (ref 30–150)
TSH SERPL DL<=0.005 MIU/L-ACNC: 1.75 UIU/ML (ref 0.4–4)
WBC # BLD AUTO: 4.86 K/UL (ref 3.9–12.7)

## 2019-05-02 PROCEDURE — 85025 COMPLETE CBC W/AUTO DIFF WBC: CPT

## 2019-05-02 PROCEDURE — 36415 COLL VENOUS BLD VENIPUNCTURE: CPT | Mod: PO

## 2019-05-02 PROCEDURE — 99999 PR PBB SHADOW E&M-EST. PATIENT-LVL III: CPT | Mod: PBBFAC,,, | Performed by: INTERNAL MEDICINE

## 2019-05-02 PROCEDURE — 84443 ASSAY THYROID STIM HORMONE: CPT

## 2019-05-02 PROCEDURE — 99395 PREV VISIT EST AGE 18-39: CPT | Mod: S$GLB,,, | Performed by: INTERNAL MEDICINE

## 2019-05-02 PROCEDURE — 82728 ASSAY OF FERRITIN: CPT

## 2019-05-02 PROCEDURE — 83540 ASSAY OF IRON: CPT

## 2019-05-02 PROCEDURE — 99395 PR PREVENTIVE VISIT,EST,18-39: ICD-10-PCS | Mod: S$GLB,,, | Performed by: INTERNAL MEDICINE

## 2019-05-02 PROCEDURE — 80053 COMPREHEN METABOLIC PANEL: CPT

## 2019-05-02 PROCEDURE — 80061 LIPID PANEL: CPT

## 2019-05-02 PROCEDURE — 82306 VITAMIN D 25 HYDROXY: CPT

## 2019-05-02 PROCEDURE — 99999 PR PBB SHADOW E&M-EST. PATIENT-LVL III: ICD-10-PCS | Mod: PBBFAC,,, | Performed by: INTERNAL MEDICINE

## 2019-05-02 RX ORDER — FLUTICASONE PROPIONATE 50 MCG
1 SPRAY, SUSPENSION (ML) NASAL DAILY PRN
COMMUNITY

## 2019-05-06 ENCOUNTER — LAB VISIT (OUTPATIENT)
Dept: LAB | Facility: HOSPITAL | Age: 39
End: 2019-05-06
Attending: INTERNAL MEDICINE
Payer: COMMERCIAL

## 2019-05-06 DIAGNOSIS — K62.5 BRBPR (BRIGHT RED BLOOD PER RECTUM): ICD-10-CM

## 2019-05-06 PROCEDURE — 82274 ASSAY TEST FOR BLOOD FECAL: CPT

## 2019-05-09 LAB — HEMOCCULT STL QL IA: NEGATIVE

## 2021-01-04 ENCOUNTER — PATIENT MESSAGE (OUTPATIENT)
Dept: ADMINISTRATIVE | Facility: HOSPITAL | Age: 41
End: 2021-01-04

## 2021-01-20 ENCOUNTER — TELEPHONE (OUTPATIENT)
Dept: INTERNAL MEDICINE | Facility: CLINIC | Age: 41
End: 2021-01-20

## 2021-01-20 DIAGNOSIS — Z00.00 ANNUAL PHYSICAL EXAM: Primary | ICD-10-CM

## 2021-02-03 DIAGNOSIS — Z12.31 OTHER SCREENING MAMMOGRAM: ICD-10-CM

## 2021-04-05 ENCOUNTER — PATIENT MESSAGE (OUTPATIENT)
Dept: ADMINISTRATIVE | Facility: HOSPITAL | Age: 41
End: 2021-04-05

## 2021-06-03 ENCOUNTER — OFFICE VISIT (OUTPATIENT)
Dept: OPTOMETRY | Facility: CLINIC | Age: 41
End: 2021-06-03
Payer: COMMERCIAL

## 2021-06-03 DIAGNOSIS — H52.4 PRESBYOPIA: ICD-10-CM

## 2021-06-03 DIAGNOSIS — H02.886 MEIBOMIAN GLAND DYSFUNCTION (MGD) OF BOTH EYES: Primary | ICD-10-CM

## 2021-06-03 DIAGNOSIS — H02.883 MEIBOMIAN GLAND DYSFUNCTION (MGD) OF BOTH EYES: Primary | ICD-10-CM

## 2021-06-03 PROCEDURE — 99999 PR PBB SHADOW E&M-EST. PATIENT-LVL II: ICD-10-PCS | Mod: PBBFAC,,, | Performed by: OPTOMETRIST

## 2021-06-03 PROCEDURE — 1126F PR PAIN SEVERITY QUANTIFIED, NO PAIN PRESENT: ICD-10-PCS | Mod: S$GLB,,, | Performed by: OPTOMETRIST

## 2021-06-03 PROCEDURE — 92004 COMPRE OPH EXAM NEW PT 1/>: CPT | Mod: S$GLB,,, | Performed by: OPTOMETRIST

## 2021-06-03 PROCEDURE — 1126F AMNT PAIN NOTED NONE PRSNT: CPT | Mod: S$GLB,,, | Performed by: OPTOMETRIST

## 2021-06-03 PROCEDURE — 92004 PR EYE EXAM, NEW PATIENT,COMPREHESV: ICD-10-PCS | Mod: S$GLB,,, | Performed by: OPTOMETRIST

## 2021-06-03 PROCEDURE — 99999 PR PBB SHADOW E&M-EST. PATIENT-LVL II: CPT | Mod: PBBFAC,,, | Performed by: OPTOMETRIST

## 2021-06-03 RX ORDER — VALERIAN ROOT 500 MG
CAPSULE ORAL DAILY PRN
COMMUNITY
End: 2024-01-03

## 2021-06-03 RX ORDER — LORATADINE 10 MG/1
10 TABLET ORAL
COMMUNITY
End: 2021-06-03

## 2021-06-03 RX ORDER — ST. JOHN'S WORT 300 MG
CAPSULE ORAL
COMMUNITY
End: 2023-02-08

## 2021-06-03 RX ORDER — MINERAL OIL
180 ENEMA (ML) RECTAL DAILY
Status: ON HOLD | COMMUNITY
End: 2023-06-02 | Stop reason: HOSPADM

## 2021-06-17 ENCOUNTER — PATIENT MESSAGE (OUTPATIENT)
Dept: OPTOMETRY | Facility: CLINIC | Age: 41
End: 2021-06-17

## 2021-07-09 ENCOUNTER — LAB VISIT (OUTPATIENT)
Dept: LAB | Facility: HOSPITAL | Age: 41
End: 2021-07-09
Attending: INTERNAL MEDICINE
Payer: COMMERCIAL

## 2021-07-09 DIAGNOSIS — Z00.00 ANNUAL PHYSICAL EXAM: ICD-10-CM

## 2021-07-09 LAB
25(OH)D3+25(OH)D2 SERPL-MCNC: 25 NG/ML (ref 30–96)
ALBUMIN SERPL BCP-MCNC: 3.9 G/DL (ref 3.5–5.2)
ALP SERPL-CCNC: 39 U/L (ref 55–135)
ALT SERPL W/O P-5'-P-CCNC: 12 U/L (ref 10–44)
ANION GAP SERPL CALC-SCNC: 8 MMOL/L (ref 8–16)
AST SERPL-CCNC: 22 U/L (ref 10–40)
BASOPHILS # BLD AUTO: 0.07 K/UL (ref 0–0.2)
BASOPHILS NFR BLD: 1.2 % (ref 0–1.9)
BILIRUB SERPL-MCNC: 0.5 MG/DL (ref 0.1–1)
BUN SERPL-MCNC: 13 MG/DL (ref 6–20)
CALCIUM SERPL-MCNC: 9 MG/DL (ref 8.7–10.5)
CHLORIDE SERPL-SCNC: 108 MMOL/L (ref 95–110)
CHOLEST SERPL-MCNC: 162 MG/DL (ref 120–199)
CHOLEST/HDLC SERPL: 2.6 {RATIO} (ref 2–5)
CO2 SERPL-SCNC: 24 MMOL/L (ref 23–29)
CREAT SERPL-MCNC: 0.9 MG/DL (ref 0.5–1.4)
DIFFERENTIAL METHOD: NORMAL
EOSINOPHIL # BLD AUTO: 0.2 K/UL (ref 0–0.5)
EOSINOPHIL NFR BLD: 4 % (ref 0–8)
ERYTHROCYTE [DISTWIDTH] IN BLOOD BY AUTOMATED COUNT: 13.3 % (ref 11.5–14.5)
EST. GFR  (AFRICAN AMERICAN): >60 ML/MIN/1.73 M^2
EST. GFR  (NON AFRICAN AMERICAN): >60 ML/MIN/1.73 M^2
GLUCOSE SERPL-MCNC: 85 MG/DL (ref 70–110)
HCT VFR BLD AUTO: 40.5 % (ref 37–48.5)
HDLC SERPL-MCNC: 62 MG/DL (ref 40–75)
HDLC SERPL: 38.3 % (ref 20–50)
HGB BLD-MCNC: 13.1 G/DL (ref 12–16)
IMM GRANULOCYTES # BLD AUTO: 0.01 K/UL (ref 0–0.04)
IMM GRANULOCYTES NFR BLD AUTO: 0.2 % (ref 0–0.5)
LDLC SERPL CALC-MCNC: 91.2 MG/DL (ref 63–159)
LYMPHOCYTES # BLD AUTO: 1.7 K/UL (ref 1–4.8)
LYMPHOCYTES NFR BLD: 29.2 % (ref 18–48)
MCH RBC QN AUTO: 28.9 PG (ref 27–31)
MCHC RBC AUTO-ENTMCNC: 32.3 G/DL (ref 32–36)
MCV RBC AUTO: 89 FL (ref 82–98)
MONOCYTES # BLD AUTO: 0.7 K/UL (ref 0.3–1)
MONOCYTES NFR BLD: 11.1 % (ref 4–15)
NEUTROPHILS # BLD AUTO: 3.2 K/UL (ref 1.8–7.7)
NEUTROPHILS NFR BLD: 54.3 % (ref 38–73)
NONHDLC SERPL-MCNC: 100 MG/DL
NRBC BLD-RTO: 0 /100 WBC
PLATELET # BLD AUTO: 257 K/UL (ref 150–450)
PMV BLD AUTO: 10.5 FL (ref 9.2–12.9)
POTASSIUM SERPL-SCNC: 4.5 MMOL/L (ref 3.5–5.1)
PROT SERPL-MCNC: 7.1 G/DL (ref 6–8.4)
RBC # BLD AUTO: 4.53 M/UL (ref 4–5.4)
SODIUM SERPL-SCNC: 140 MMOL/L (ref 136–145)
TRIGL SERPL-MCNC: 44 MG/DL (ref 30–150)
TSH SERPL DL<=0.005 MIU/L-ACNC: 2.48 UIU/ML (ref 0.4–4)
WBC # BLD AUTO: 5.93 K/UL (ref 3.9–12.7)

## 2021-07-09 PROCEDURE — 82306 VITAMIN D 25 HYDROXY: CPT | Performed by: INTERNAL MEDICINE

## 2021-07-09 PROCEDURE — 80061 LIPID PANEL: CPT | Performed by: INTERNAL MEDICINE

## 2021-07-09 PROCEDURE — 80053 COMPREHEN METABOLIC PANEL: CPT | Performed by: INTERNAL MEDICINE

## 2021-07-09 PROCEDURE — 84443 ASSAY THYROID STIM HORMONE: CPT | Performed by: INTERNAL MEDICINE

## 2021-07-09 PROCEDURE — 36415 COLL VENOUS BLD VENIPUNCTURE: CPT | Mod: PO | Performed by: INTERNAL MEDICINE

## 2021-07-09 PROCEDURE — 85025 COMPLETE CBC W/AUTO DIFF WBC: CPT | Performed by: INTERNAL MEDICINE

## 2021-07-15 ENCOUNTER — OFFICE VISIT (OUTPATIENT)
Dept: INTERNAL MEDICINE | Facility: CLINIC | Age: 41
End: 2021-07-15
Payer: COMMERCIAL

## 2021-07-15 VITALS
HEART RATE: 64 BPM | BODY MASS INDEX: 22.42 KG/M2 | SYSTOLIC BLOOD PRESSURE: 116 MMHG | TEMPERATURE: 98 F | HEIGHT: 60 IN | WEIGHT: 114.19 LBS | RESPIRATION RATE: 16 BRPM | DIASTOLIC BLOOD PRESSURE: 74 MMHG

## 2021-07-15 DIAGNOSIS — Z00.00 ANNUAL PHYSICAL EXAM: Primary | ICD-10-CM

## 2021-07-15 DIAGNOSIS — L65.9 HAIR LOSS: ICD-10-CM

## 2021-07-15 DIAGNOSIS — Z12.11 SCREEN FOR COLON CANCER: ICD-10-CM

## 2021-07-15 DIAGNOSIS — E55.9 VITAMIN D DEFICIENCY: ICD-10-CM

## 2021-07-15 PROCEDURE — 3008F PR BODY MASS INDEX (BMI) DOCUMENTED: ICD-10-PCS | Mod: CPTII,S$GLB,, | Performed by: INTERNAL MEDICINE

## 2021-07-15 PROCEDURE — 99999 PR PBB SHADOW E&M-EST. PATIENT-LVL III: ICD-10-PCS | Mod: PBBFAC,,, | Performed by: INTERNAL MEDICINE

## 2021-07-15 PROCEDURE — 99396 PREV VISIT EST AGE 40-64: CPT | Mod: S$GLB,,, | Performed by: INTERNAL MEDICINE

## 2021-07-15 PROCEDURE — 3008F BODY MASS INDEX DOCD: CPT | Mod: CPTII,S$GLB,, | Performed by: INTERNAL MEDICINE

## 2021-07-15 PROCEDURE — 1126F PR PAIN SEVERITY QUANTIFIED, NO PAIN PRESENT: ICD-10-PCS | Mod: S$GLB,,, | Performed by: INTERNAL MEDICINE

## 2021-07-15 PROCEDURE — 1126F AMNT PAIN NOTED NONE PRSNT: CPT | Mod: S$GLB,,, | Performed by: INTERNAL MEDICINE

## 2021-07-15 PROCEDURE — 99396 PR PREVENTIVE VISIT,EST,40-64: ICD-10-PCS | Mod: S$GLB,,, | Performed by: INTERNAL MEDICINE

## 2021-07-15 PROCEDURE — 99999 PR PBB SHADOW E&M-EST. PATIENT-LVL III: CPT | Mod: PBBFAC,,, | Performed by: INTERNAL MEDICINE

## 2021-07-16 ENCOUNTER — PATIENT MESSAGE (OUTPATIENT)
Dept: INTERNAL MEDICINE | Facility: CLINIC | Age: 41
End: 2021-07-16

## 2021-10-04 ENCOUNTER — PATIENT MESSAGE (OUTPATIENT)
Dept: ADMINISTRATIVE | Facility: HOSPITAL | Age: 41
End: 2021-10-04

## 2021-10-06 ENCOUNTER — PATIENT OUTREACH (OUTPATIENT)
Dept: ADMINISTRATIVE | Facility: HOSPITAL | Age: 41
End: 2021-10-06

## 2022-07-11 NOTE — PROGRESS NOTES
Subjective:       Patient ID: Tasha Cornejo is a 42 y.o. female.    Chief Complaint: Annual Exam    Patient is a 42 y.o.female who presents today for annual  Cholesterol: due now  Vaccines: Influenza done); Tetanus (up to date) ;   Gyn exam: dr. Perea; [facility]; pap smear up to date  Mammogram: scheduled in august; will swap to [facility] from [facility]  Exercise: 3-4 times per week  Diet: healthy  LMP: regular       Increase concern over BP; will check BP when anxious and diastolic is higher 80s and systolic is over 120.   Review of Systems   Constitutional: Negative for appetite change, chills, diaphoresis and fever.   HENT: Negative for congestion, ear discharge, ear pain, postnasal drip, tinnitus, trouble swallowing and voice change.    Eyes: Negative for discharge, redness and itching.   Respiratory: Negative for cough, chest tightness, shortness of breath and wheezing.    Cardiovascular: Negative for chest pain, palpitations and leg swelling.   Gastrointestinal: Negative for abdominal pain, constipation, diarrhea, nausea and vomiting.   Endocrine: Negative for cold intolerance and heat intolerance.   Genitourinary: Negative for difficulty urinating, flank pain, hematuria and urgency.   Musculoskeletal: Negative for arthralgias, gait problem, myalgias and neck stiffness.   Skin: Negative for color change and rash.   Neurological: Negative for dizziness, seizures, syncope and headaches.   Hematological: Negative for adenopathy.   Psychiatric/Behavioral: Negative for agitation, behavioral problems, confusion and sleep disturbance.       Objective:      Physical Exam  Vitals and nursing note reviewed.   Constitutional:       General: She is not in acute distress.     Appearance: She is well-developed. She is not diaphoretic.   HENT:      Head: Normocephalic and atraumatic.      Right Ear: External ear normal.      Left Ear: External ear normal.      Nose: Nose normal.      Mouth/Throat:      Pharynx: No  oropharyngeal exudate.   Eyes:      General: No scleral icterus.        Right eye: No discharge.         Left eye: No discharge.      Conjunctiva/sclera: Conjunctivae normal.      Pupils: Pupils are equal, round, and reactive to light.   Neck:      Thyroid: No thyromegaly.      Vascular: No JVD.      Trachea: No tracheal deviation.   Cardiovascular:      Rate and Rhythm: Normal rate.      Heart sounds: Normal heart sounds. No murmur heard.    No friction rub. No gallop.   Pulmonary:      Effort: Pulmonary effort is normal. No respiratory distress.      Breath sounds: Normal breath sounds. No stridor. No wheezing or rales.   Chest:      Chest wall: No tenderness.   Abdominal:      General: Bowel sounds are normal. There is no distension.      Palpations: Abdomen is soft.      Tenderness: There is no abdominal tenderness. There is no rebound.   Musculoskeletal:         General: No tenderness.      Cervical back: Neck supple.   Lymphadenopathy:      Cervical: No cervical adenopathy.   Skin:     General: Skin is warm and dry.      Findings: No erythema or rash.   Neurological:      Mental Status: She is alert and oriented to person, place, and time.   Psychiatric:         Behavior: Behavior normal.         Assessment and Plan:       1. Annual physical exam    - CBC Auto Differential; Future  - Comprehensive Metabolic Panel; Future  - Hemoglobin A1C; Future  - Lipid Panel; Future  - TSH; Future  - Urinalysis; Future  - Vitamin D; Future    2. Visit for screening mammogram  Pt tearful as at previous location her mammograms were painful; will change location to [facility] to see if she has a better experience  - Mammo Digital Screening Bilat w/ [facility]; Future    3. Situational anxiety  Monitor BP for 1-2 weeks at rest  Trial of increased exercise, meditation, breathing techniques; pt prefers to not take medication          No follow-ups on file.

## 2022-07-21 ENCOUNTER — LAB VISIT (OUTPATIENT)
Dept: LAB | Facility: HOSPITAL | Age: 42
End: 2022-07-21
Attending: INTERNAL MEDICINE
Payer: COMMERCIAL

## 2022-07-21 ENCOUNTER — OFFICE VISIT (OUTPATIENT)
Dept: INTERNAL MEDICINE | Facility: CLINIC | Age: 42
End: 2022-07-21
Payer: COMMERCIAL

## 2022-07-21 VITALS
HEIGHT: 60 IN | TEMPERATURE: 98 F | BODY MASS INDEX: 23.32 KG/M2 | DIASTOLIC BLOOD PRESSURE: 72 MMHG | HEART RATE: 60 BPM | OXYGEN SATURATION: 99 % | SYSTOLIC BLOOD PRESSURE: 126 MMHG | WEIGHT: 118.81 LBS | RESPIRATION RATE: 16 BRPM

## 2022-07-21 DIAGNOSIS — Z00.00 ANNUAL PHYSICAL EXAM: ICD-10-CM

## 2022-07-21 DIAGNOSIS — Z12.31 VISIT FOR SCREENING MAMMOGRAM: ICD-10-CM

## 2022-07-21 DIAGNOSIS — F41.8 SITUATIONAL ANXIETY: ICD-10-CM

## 2022-07-21 DIAGNOSIS — Z00.00 ANNUAL PHYSICAL EXAM: Primary | ICD-10-CM

## 2022-07-21 LAB
25(OH)D3+25(OH)D2 SERPL-MCNC: 37 NG/ML (ref 30–96)
ALBUMIN SERPL BCP-MCNC: 4.4 G/DL (ref 3.5–5.2)
ALP SERPL-CCNC: 43 U/L (ref 55–135)
ALT SERPL W/O P-5'-P-CCNC: 17 U/L (ref 10–44)
ANION GAP SERPL CALC-SCNC: 7 MMOL/L (ref 8–16)
AST SERPL-CCNC: 22 U/L (ref 10–40)
BASOPHILS # BLD AUTO: 0.06 K/UL (ref 0–0.2)
BASOPHILS NFR BLD: 1.4 % (ref 0–1.9)
BILIRUB SERPL-MCNC: 0.5 MG/DL (ref 0.1–1)
BUN SERPL-MCNC: 14 MG/DL (ref 6–20)
CALCIUM SERPL-MCNC: 9.4 MG/DL (ref 8.7–10.5)
CHLORIDE SERPL-SCNC: 105 MMOL/L (ref 95–110)
CHOLEST SERPL-MCNC: 177 MG/DL (ref 120–199)
CHOLEST/HDLC SERPL: 2.9 {RATIO} (ref 2–5)
CO2 SERPL-SCNC: 26 MMOL/L (ref 23–29)
CREAT SERPL-MCNC: 0.8 MG/DL (ref 0.5–1.4)
DIFFERENTIAL METHOD: ABNORMAL
EOSINOPHIL # BLD AUTO: 0.2 K/UL (ref 0–0.5)
EOSINOPHIL NFR BLD: 4.7 % (ref 0–8)
ERYTHROCYTE [DISTWIDTH] IN BLOOD BY AUTOMATED COUNT: 13.2 % (ref 11.5–14.5)
EST. GFR  (AFRICAN AMERICAN): >60 ML/MIN/1.73 M^2
EST. GFR  (NON AFRICAN AMERICAN): >60 ML/MIN/1.73 M^2
ESTIMATED AVG GLUCOSE: 103 MG/DL (ref 68–131)
GLUCOSE SERPL-MCNC: 86 MG/DL (ref 70–110)
HBA1C MFR BLD: 5.2 % (ref 4–5.6)
HCT VFR BLD AUTO: 42.7 % (ref 37–48.5)
HDLC SERPL-MCNC: 62 MG/DL (ref 40–75)
HDLC SERPL: 35 % (ref 20–50)
HGB BLD-MCNC: 13.4 G/DL (ref 12–16)
IMM GRANULOCYTES # BLD AUTO: 0 K/UL (ref 0–0.04)
IMM GRANULOCYTES NFR BLD AUTO: 0 % (ref 0–0.5)
LDLC SERPL CALC-MCNC: 102.2 MG/DL (ref 63–159)
LYMPHOCYTES # BLD AUTO: 1.5 K/UL (ref 1–4.8)
LYMPHOCYTES NFR BLD: 33.9 % (ref 18–48)
MCH RBC QN AUTO: 28.8 PG (ref 27–31)
MCHC RBC AUTO-ENTMCNC: 31.4 G/DL (ref 32–36)
MCV RBC AUTO: 92 FL (ref 82–98)
MONOCYTES # BLD AUTO: 0.5 K/UL (ref 0.3–1)
MONOCYTES NFR BLD: 10.2 % (ref 4–15)
NEUTROPHILS # BLD AUTO: 2.2 K/UL (ref 1.8–7.7)
NEUTROPHILS NFR BLD: 49.8 % (ref 38–73)
NONHDLC SERPL-MCNC: 115 MG/DL
NRBC BLD-RTO: 0 /100 WBC
PLATELET # BLD AUTO: 295 K/UL (ref 150–450)
PMV BLD AUTO: 10.2 FL (ref 9.2–12.9)
POTASSIUM SERPL-SCNC: 4.3 MMOL/L (ref 3.5–5.1)
PROT SERPL-MCNC: 7.6 G/DL (ref 6–8.4)
RBC # BLD AUTO: 4.66 M/UL (ref 4–5.4)
SODIUM SERPL-SCNC: 138 MMOL/L (ref 136–145)
TRIGL SERPL-MCNC: 64 MG/DL (ref 30–150)
TSH SERPL DL<=0.005 MIU/L-ACNC: 2.15 UIU/ML (ref 0.4–4)
WBC # BLD AUTO: 4.43 K/UL (ref 3.9–12.7)

## 2022-07-21 PROCEDURE — 80053 COMPREHEN METABOLIC PANEL: CPT | Performed by: INTERNAL MEDICINE

## 2022-07-21 PROCEDURE — 99999 PR PBB SHADOW E&M-EST. PATIENT-LVL IV: ICD-10-PCS | Mod: PBBFAC,,, | Performed by: INTERNAL MEDICINE

## 2022-07-21 PROCEDURE — 3078F PR MOST RECENT DIASTOLIC BLOOD PRESSURE < 80 MM HG: ICD-10-PCS | Mod: CPTII,S$GLB,, | Performed by: INTERNAL MEDICINE

## 2022-07-21 PROCEDURE — 80061 LIPID PANEL: CPT | Performed by: INTERNAL MEDICINE

## 2022-07-21 PROCEDURE — 1159F MED LIST DOCD IN RCRD: CPT | Mod: CPTII,S$GLB,, | Performed by: INTERNAL MEDICINE

## 2022-07-21 PROCEDURE — 1159F PR MEDICATION LIST DOCUMENTED IN MEDICAL RECORD: ICD-10-PCS | Mod: CPTII,S$GLB,, | Performed by: INTERNAL MEDICINE

## 2022-07-21 PROCEDURE — 3008F BODY MASS INDEX DOCD: CPT | Mod: CPTII,S$GLB,, | Performed by: INTERNAL MEDICINE

## 2022-07-21 PROCEDURE — 83036 HEMOGLOBIN GLYCOSYLATED A1C: CPT | Performed by: INTERNAL MEDICINE

## 2022-07-21 PROCEDURE — 3078F DIAST BP <80 MM HG: CPT | Mod: CPTII,S$GLB,, | Performed by: INTERNAL MEDICINE

## 2022-07-21 PROCEDURE — 84443 ASSAY THYROID STIM HORMONE: CPT | Performed by: INTERNAL MEDICINE

## 2022-07-21 PROCEDURE — 3074F PR MOST RECENT SYSTOLIC BLOOD PRESSURE < 130 MM HG: ICD-10-PCS | Mod: CPTII,S$GLB,, | Performed by: INTERNAL MEDICINE

## 2022-07-21 PROCEDURE — 85025 COMPLETE CBC W/AUTO DIFF WBC: CPT | Performed by: INTERNAL MEDICINE

## 2022-07-21 PROCEDURE — 82306 VITAMIN D 25 HYDROXY: CPT | Performed by: INTERNAL MEDICINE

## 2022-07-21 PROCEDURE — 3074F SYST BP LT 130 MM HG: CPT | Mod: CPTII,S$GLB,, | Performed by: INTERNAL MEDICINE

## 2022-07-21 PROCEDURE — 3008F PR BODY MASS INDEX (BMI) DOCUMENTED: ICD-10-PCS | Mod: CPTII,S$GLB,, | Performed by: INTERNAL MEDICINE

## 2022-07-21 PROCEDURE — 1160F RVW MEDS BY RX/DR IN RCRD: CPT | Mod: CPTII,S$GLB,, | Performed by: INTERNAL MEDICINE

## 2022-07-21 PROCEDURE — 1160F PR REVIEW ALL MEDS BY PRESCRIBER/CLIN PHARMACIST DOCUMENTED: ICD-10-PCS | Mod: CPTII,S$GLB,, | Performed by: INTERNAL MEDICINE

## 2022-07-21 PROCEDURE — 99999 PR PBB SHADOW E&M-EST. PATIENT-LVL IV: CPT | Mod: PBBFAC,,, | Performed by: INTERNAL MEDICINE

## 2022-07-21 PROCEDURE — 99396 PREV VISIT EST AGE 40-64: CPT | Mod: S$GLB,,, | Performed by: INTERNAL MEDICINE

## 2022-07-21 PROCEDURE — 99396 PR PREVENTIVE VISIT,EST,40-64: ICD-10-PCS | Mod: S$GLB,,, | Performed by: INTERNAL MEDICINE

## 2022-07-21 PROCEDURE — 36415 COLL VENOUS BLD VENIPUNCTURE: CPT | Mod: PO | Performed by: INTERNAL MEDICINE

## 2022-07-21 RX ORDER — MULTIVITAMIN
1 TABLET ORAL DAILY
COMMUNITY

## 2022-08-05 ENCOUNTER — PATIENT MESSAGE (OUTPATIENT)
Dept: INTERNAL MEDICINE | Facility: CLINIC | Age: 42
End: 2022-08-05
Payer: COMMERCIAL

## 2022-08-25 ENCOUNTER — HOSPITAL ENCOUNTER (OUTPATIENT)
Dept: RADIOLOGY | Facility: HOSPITAL | Age: 42
Discharge: HOME | End: 2022-08-25
Attending: INTERNAL MEDICINE
Payer: COMMERCIAL

## 2022-08-25 DIAGNOSIS — Z12.31 VISIT FOR SCREENING MAMMOGRAM: ICD-10-CM

## 2022-08-25 PROCEDURE — 77067 SCR MAMMO BI INCL CAD: CPT | Mod: TC

## 2022-08-25 PROCEDURE — 77067 MAMMO DIGITAL SCREENING BILAT WITH TOMO: ICD-10-PCS | Mod: 26,,, | Performed by: RADIOLOGY

## 2022-08-25 PROCEDURE — 77063 BREAST TOMOSYNTHESIS BI: CPT | Mod: TC

## 2022-08-25 PROCEDURE — 77063 BREAST TOMOSYNTHESIS BI: CPT | Mod: 26,,, | Performed by: RADIOLOGY

## 2022-08-25 PROCEDURE — 77063 MAMMO DIGITAL SCREENING BILAT WITH TOMO: ICD-10-PCS | Mod: 26,,, | Performed by: RADIOLOGY

## 2022-08-25 PROCEDURE — 77067 SCR MAMMO BI INCL CAD: CPT | Mod: 26,,, | Performed by: RADIOLOGY

## 2023-02-08 ENCOUNTER — OFFICE VISIT (OUTPATIENT)
Dept: INTERNAL MEDICINE | Facility: CLINIC | Age: 43
End: 2023-02-08
Payer: COMMERCIAL

## 2023-02-08 VITALS
HEIGHT: 60 IN | WEIGHT: 117.5 LBS | OXYGEN SATURATION: 97 % | DIASTOLIC BLOOD PRESSURE: 58 MMHG | TEMPERATURE: 98 F | HEART RATE: 67 BPM | BODY MASS INDEX: 23.07 KG/M2 | SYSTOLIC BLOOD PRESSURE: 110 MMHG

## 2023-02-08 DIAGNOSIS — J01.90 ACUTE SINUSITIS, RECURRENCE NOT SPECIFIED, UNSPECIFIED LOCATION: Primary | ICD-10-CM

## 2023-02-08 PROCEDURE — 3074F PR MOST RECENT SYSTOLIC BLOOD PRESSURE < 130 MM HG: ICD-10-PCS | Mod: CPTII,S$GLB,, | Performed by: INTERNAL MEDICINE

## 2023-02-08 PROCEDURE — 3078F PR MOST RECENT DIASTOLIC BLOOD PRESSURE < 80 MM HG: ICD-10-PCS | Mod: CPTII,S$GLB,, | Performed by: INTERNAL MEDICINE

## 2023-02-08 PROCEDURE — 1160F RVW MEDS BY RX/DR IN RCRD: CPT | Mod: CPTII,S$GLB,, | Performed by: INTERNAL MEDICINE

## 2023-02-08 PROCEDURE — 1160F PR REVIEW ALL MEDS BY PRESCRIBER/CLIN PHARMACIST DOCUMENTED: ICD-10-PCS | Mod: CPTII,S$GLB,, | Performed by: INTERNAL MEDICINE

## 2023-02-08 PROCEDURE — 3078F DIAST BP <80 MM HG: CPT | Mod: CPTII,S$GLB,, | Performed by: INTERNAL MEDICINE

## 2023-02-08 PROCEDURE — 99213 PR OFFICE/OUTPT VISIT, EST, LEVL III, 20-29 MIN: ICD-10-PCS | Mod: S$GLB,,, | Performed by: INTERNAL MEDICINE

## 2023-02-08 PROCEDURE — 3074F SYST BP LT 130 MM HG: CPT | Mod: CPTII,S$GLB,, | Performed by: INTERNAL MEDICINE

## 2023-02-08 PROCEDURE — 1159F PR MEDICATION LIST DOCUMENTED IN MEDICAL RECORD: ICD-10-PCS | Mod: CPTII,S$GLB,, | Performed by: INTERNAL MEDICINE

## 2023-02-08 PROCEDURE — 99999 PR PBB SHADOW E&M-EST. PATIENT-LVL III: CPT | Mod: PBBFAC,,, | Performed by: INTERNAL MEDICINE

## 2023-02-08 PROCEDURE — 1159F MED LIST DOCD IN RCRD: CPT | Mod: CPTII,S$GLB,, | Performed by: INTERNAL MEDICINE

## 2023-02-08 PROCEDURE — 99213 OFFICE O/P EST LOW 20 MIN: CPT | Mod: S$GLB,,, | Performed by: INTERNAL MEDICINE

## 2023-02-08 PROCEDURE — 3008F BODY MASS INDEX DOCD: CPT | Mod: CPTII,S$GLB,, | Performed by: INTERNAL MEDICINE

## 2023-02-08 PROCEDURE — 3008F PR BODY MASS INDEX (BMI) DOCUMENTED: ICD-10-PCS | Mod: CPTII,S$GLB,, | Performed by: INTERNAL MEDICINE

## 2023-02-08 PROCEDURE — 99999 PR PBB SHADOW E&M-EST. PATIENT-LVL III: ICD-10-PCS | Mod: PBBFAC,,, | Performed by: INTERNAL MEDICINE

## 2023-02-08 RX ORDER — AZITHROMYCIN 250 MG/1
TABLET, FILM COATED ORAL
Qty: 6 TABLET | Refills: 0 | Status: SHIPPED | OUTPATIENT
Start: 2023-02-08 | End: 2023-02-13

## 2023-02-08 NOTE — PROGRESS NOTES
CC:  Runny nose, chest congestion and sore throat  HPI:  The patient is a 43 y.o. year old female who presents to the office for runny nose, chest congestion and sore throat.  Her symptoms started last Friday.  She also reports sore throat resolved after 3 days.  Cough is productive of thick sputum.  She has been doing nasal irrigation with mostly clear drainage, but some yellow- green drainage as well.   The patient has taken Sudafed and mucinex, as well as her normal allegra and flonase.  She has also taken ibuprofen for headache.  COVID test was negative.    PAST MEDICAL HISTORY:  Past Medical History:   Diagnosis Date    [facility]        SURGICAL HISTORY:  No past surgical history on file.    MEDS:  Medcard reviewed and updated    ALLERGIES: Allergy Card reviewed and updated    SOCIAL HISTORY:   The patient is a nonsmoker.    PE:   APPEARANCE: Well nourished, well developed, in no acute distress.    EYES: Sclerae anicteric. PERRL. EOMI.      EARS: TM's intact. No retraction or perforation.    NOSE: Mucosa pink. Airway clear.  MOUTH & THROAT: No tonsillar enlargement. No pharyngeal erythema or exudate. No stridor.  NODES: Positive left submandibular lymph node enlargement.  CHEST: Lungs clear to auscultation with unlabored respirations.  CARDIOVASCULAR: Normal S1, S2. No murmurs. No carotid bruits. No pedal edema.  ABDOMEN: Bowel sounds normal. Not distended. Soft. No tenderness or masses.   PSYCHIATRIC: The patient is oriented to person, place, and time and has a pleasant affect.        ASSESSMENT/PLAN:  Tasha was seen today for sore throat, cough, nasal congestion and headache.    Diagnoses and all orders for this visit:    Acute sinusitis, recurrence not specified, unspecified location  -     prescribe Z-Shannan     Other orders  -     azithromycin (Z-SHANNAN) 250 MG tablet; Take 2 tablets by mouth on day 1; Take 1 tablet by mouth on days 2-5

## 2023-05-22 ENCOUNTER — OFFICE VISIT (OUTPATIENT)
Dept: GASTROENTEROLOGY | Facility: CLINIC | Age: 43
End: 2023-05-22
Payer: COMMERCIAL

## 2023-05-22 ENCOUNTER — LAB VISIT (OUTPATIENT)
Dept: LAB | Facility: HOSPITAL | Age: 43
End: 2023-05-22
Attending: INTERNAL MEDICINE
Payer: COMMERCIAL

## 2023-05-22 ENCOUNTER — TELEPHONE (OUTPATIENT)
Dept: ENDOSCOPY | Facility: HOSPITAL | Age: 43
End: 2023-05-22
Payer: COMMERCIAL

## 2023-05-22 VITALS
WEIGHT: 120.13 LBS | SYSTOLIC BLOOD PRESSURE: 142 MMHG | BODY MASS INDEX: 23.56 KG/M2 | WEIGHT: 120 LBS | HEART RATE: 75 BPM | HEIGHT: 60 IN | HEIGHT: 60 IN | DIASTOLIC BLOOD PRESSURE: 93 MMHG | BODY MASS INDEX: 23.58 KG/M2

## 2023-05-22 DIAGNOSIS — Z12.11 COLON CANCER SCREENING: Primary | ICD-10-CM

## 2023-05-22 DIAGNOSIS — R13.10 DYSPHAGIA, UNSPECIFIED TYPE: Primary | ICD-10-CM

## 2023-05-22 DIAGNOSIS — R13.14 DYSPHAGIA, PHARYNGOESOPHAGEAL PHASE: ICD-10-CM

## 2023-05-22 DIAGNOSIS — K62.5 RECTAL BLEEDING: ICD-10-CM

## 2023-05-22 DIAGNOSIS — K62.5 RECTAL BLEEDING: Primary | ICD-10-CM

## 2023-05-22 LAB
BASOPHILS # BLD AUTO: 0.08 K/UL (ref 0–0.2)
BASOPHILS NFR BLD: 1.2 % (ref 0–1.9)
DIFFERENTIAL METHOD: NORMAL
EOSINOPHIL # BLD AUTO: 0.3 K/UL (ref 0–0.5)
EOSINOPHIL NFR BLD: 3.9 % (ref 0–8)
ERYTHROCYTE [DISTWIDTH] IN BLOOD BY AUTOMATED COUNT: 12.5 % (ref 11.5–14.5)
HCT VFR BLD AUTO: 41.4 % (ref 37–48.5)
HGB BLD-MCNC: 13.4 G/DL (ref 12–16)
IMM GRANULOCYTES # BLD AUTO: 0.01 K/UL (ref 0–0.04)
IMM GRANULOCYTES NFR BLD AUTO: 0.2 % (ref 0–0.5)
LYMPHOCYTES # BLD AUTO: 2 K/UL (ref 1–4.8)
LYMPHOCYTES NFR BLD: 30.4 % (ref 18–48)
MCH RBC QN AUTO: 29.3 PG (ref 27–31)
MCHC RBC AUTO-ENTMCNC: 32.4 G/DL (ref 32–36)
MCV RBC AUTO: 91 FL (ref 82–98)
MONOCYTES # BLD AUTO: 0.9 K/UL (ref 0.3–1)
MONOCYTES NFR BLD: 13.8 % (ref 4–15)
NEUTROPHILS # BLD AUTO: 3.3 K/UL (ref 1.8–7.7)
NEUTROPHILS NFR BLD: 50.5 % (ref 38–73)
NRBC BLD-RTO: 0 /100 WBC
PLATELET # BLD AUTO: 304 K/UL (ref 150–450)
PMV BLD AUTO: 9.8 FL (ref 9.2–12.9)
RBC # BLD AUTO: 4.57 M/UL (ref 4–5.4)
WBC # BLD AUTO: 6.47 K/UL (ref 3.9–12.7)

## 2023-05-22 PROCEDURE — 99204 PR OFFICE/OUTPT VISIT, NEW, LEVL IV, 45-59 MIN: ICD-10-PCS | Mod: S$GLB,,, | Performed by: INTERNAL MEDICINE

## 2023-05-22 PROCEDURE — 1159F PR MEDICATION LIST DOCUMENTED IN MEDICAL RECORD: ICD-10-PCS | Mod: CPTII,S$GLB,, | Performed by: INTERNAL MEDICINE

## 2023-05-22 PROCEDURE — 3008F BODY MASS INDEX DOCD: CPT | Mod: CPTII,S$GLB,, | Performed by: INTERNAL MEDICINE

## 2023-05-22 PROCEDURE — 3008F PR BODY MASS INDEX (BMI) DOCUMENTED: ICD-10-PCS | Mod: CPTII,S$GLB,, | Performed by: INTERNAL MEDICINE

## 2023-05-22 PROCEDURE — 1159F MED LIST DOCD IN RCRD: CPT | Mod: CPTII,S$GLB,, | Performed by: INTERNAL MEDICINE

## 2023-05-22 PROCEDURE — 85025 COMPLETE CBC W/AUTO DIFF WBC: CPT | Performed by: INTERNAL MEDICINE

## 2023-05-22 PROCEDURE — 99999 PR PBB SHADOW E&M-EST. PATIENT-LVL III: ICD-10-PCS | Mod: PBBFAC,,, | Performed by: INTERNAL MEDICINE

## 2023-05-22 PROCEDURE — 3080F DIAST BP >= 90 MM HG: CPT | Mod: CPTII,S$GLB,, | Performed by: INTERNAL MEDICINE

## 2023-05-22 PROCEDURE — 3080F PR MOST RECENT DIASTOLIC BLOOD PRESSURE >= 90 MM HG: ICD-10-PCS | Mod: CPTII,S$GLB,, | Performed by: INTERNAL MEDICINE

## 2023-05-22 PROCEDURE — 36415 COLL VENOUS BLD VENIPUNCTURE: CPT | Performed by: INTERNAL MEDICINE

## 2023-05-22 PROCEDURE — 3077F PR MOST RECENT SYSTOLIC BLOOD PRESSURE >= 140 MM HG: ICD-10-PCS | Mod: CPTII,S$GLB,, | Performed by: INTERNAL MEDICINE

## 2023-05-22 PROCEDURE — 99204 OFFICE O/P NEW MOD 45 MIN: CPT | Mod: S$GLB,,, | Performed by: INTERNAL MEDICINE

## 2023-05-22 PROCEDURE — 99999 PR PBB SHADOW E&M-EST. PATIENT-LVL III: CPT | Mod: PBBFAC,,, | Performed by: INTERNAL MEDICINE

## 2023-05-22 PROCEDURE — 3077F SYST BP >= 140 MM HG: CPT | Mod: CPTII,S$GLB,, | Performed by: INTERNAL MEDICINE

## 2023-05-22 NOTE — TELEPHONE ENCOUNTER
"Fabio Rice MD  P [facility]  Procedure: EGD/Colon     Diagnosis: Dysphagia/Rectal bleeding     Procedure Timin-4 weeks     *If within 4 weeks selected, please leticia as high priority*     *If greater than 12 weeks, please select "4-12 weeks" and delay sending until 2 months prior to requested date*     Provider: Myself     Location: No Preference     Additional Scheduling Information: No scheduling concerns     Prep Specifications:N/A     Have you attached a patient to this message: yes     "

## 2023-05-22 NOTE — PROGRESS NOTES
[facility] Consultation Note    Reason for Consult:  The primary encounter diagnosis was Rectal bleeding. A diagnosis of Dysphagia, pharyngoesophageal phase was also pertinent to this visit.    PCP:   Dinah Arias       Referring MD:  Aaareferral Self  No address on file    Initial History of Present Illness (HPI):  This is a 43 y.o. female here for evaluation of sour stomach after salad, but     Carrots and rice get stuck with eating over last 6 months    and bleeding after running, worse with longer runs, but still happening  Some grainy stools for a few months    Abdominal pain - no  Reflux - no  Dysphagia - to solids  Bowel habits - normal  GI bleeding - with running  NSAID usage - none        ROS:  Constitutional: No fevers, chills, No weight loss  ENT: No allergies  CV: No chest pain  Pulm: + cough for years, No shortness of breath  Ophtho: No vision changes  GI: see HPI  Derm: No rash  Heme: No lymphadenopathy, No bruising  MSK: No arthritis  : No dysuria, No hematuria  Endo: No hot or cold intolerance  Neuro: No syncope, No seizure  Psych: No anxiety, No depression    Medical History:  has a past medical history of Chalazion.    Surgical History:  has no past surgical history on file.    Family History: family history includes Albinism in her paternal grandmother; Alzheimer's disease in her paternal grandmother; Breast cancer in an other family member; Diabetes in her paternal grandfather; Heart failure in her paternal grandfather and paternal grandmother..     Social History:  reports that she has never smoked. She has never used smokeless tobacco. She reports current alcohol use. She reports that she does not use drugs.    Review of patient's allergies indicates:   Allergen Reactions    Aleve [naproxen sodium] Other (See Comments)     Throat swelling       Medication List with Changes/Refills   Current Medications    FEXOFENADINE (ALLEGRA) 180 MG TABLET    Take 180 mg by  mouth once daily.    FLUTICASONE PROPIONATE (FLONASE) 50 MCG/ACTUATION NASAL SPRAY    1 spray by Each Nare route daily as needed for Rhinitis.    MULTIVITAMIN (THERAGRAN) PER TABLET    Take 1 tablet by mouth once daily.    VALERIAN ROOT 500 MG CAP    daily as needed.         Objective Findings:    Vital Signs:  BP (!) 142/93   Pulse 75   Ht 5' (1.524 m)   Wt 54.5 kg (120 lb 2.4 oz)   LMP 05/20/2023   BMI 23.47 kg/m²   Body mass index is 23.47 kg/m².    Physical Exam:  General Appearance: Well appearing in no acute distress  Head:   Normocephalic, without obvious abnormality  Eyes:    No scleral icterus, EOMI  ENT: Neck supple, Lips, mucosa, and tongue normal; teeth and gums normal  Lungs: CTA bilaterally in anterior and posterior fields, no wheezes, no crackles.  Heart:  Regular rate and rhythm, S1, S2 normal, no murmurs heard  Abdomen: Soft, non tender, non distended with positive bowel sounds in all four quadrants. No hepatosplenomegaly, ascites, or mass  Extremities: 2+ pulses, no clubbing, cyanosis or edema  Skin: No rash  Neurologic: CN II-XII intact      Labs:  Lab Results   Component Value Date    WBC 4.43 07/21/2022    HGB 13.4 07/21/2022    HCT 42.7 07/21/2022     07/21/2022    CHOL 177 07/21/2022    TRIG 64 07/21/2022    HDL 62 07/21/2022    ALT 17 07/21/2022    AST 22 07/21/2022     07/21/2022    K 4.3 07/21/2022     07/21/2022    CREATININE 0.8 07/21/2022    BUN 14 07/21/2022    CO2 26 07/21/2022    TSH 2.145 07/21/2022    HGBA1C 5.2 07/21/2022       No results found for: HPYLORINTERP  No results found for: HPYLORIANTIG        Imaging:    Endoscopy:          Assessment:  1. Rectal bleeding    2. Dysphagia, pharyngoesophageal phase           Recommendations:  1. Needs EGD for dysphagia, check out upset stomach - consider biopsies if anything abnormal  2. Colon for rectal bleeding - possible ischemic colitis with running history. If suspicious proceed to CTA  3. Update CBC  today    No follow-ups on file.      Order summary:  Orders Placed This Encounter    CBC Auto Differential         Thank you so much for allowing me to participate in the care of Tasha Rice MD

## 2023-05-22 NOTE — TELEPHONE ENCOUNTER
"----- Message from Fabio Rice MD sent at 2023  4:20 PM CDT -----  Procedure: EGD/Colon    Diagnosis: Dysphagia/Rectal bleeding    Procedure Timin-4 weeks    #If within 4 weeks selected, please leticia as high priority#    #If greater than 12 weeks, please select "4-12 weeks" and delay sending until 2 months prior to requested date#     Provider: Myself    Location: No Preference    Additional Scheduling Information: No scheduling concerns    Prep Specifications:N/A    Have you attached a patient to this message: yes      "

## 2023-05-23 ENCOUNTER — PATIENT MESSAGE (OUTPATIENT)
Dept: GASTROENTEROLOGY | Facility: CLINIC | Age: 43
End: 2023-05-23
Payer: COMMERCIAL

## 2023-05-26 ENCOUNTER — TELEPHONE (OUTPATIENT)
Dept: ENDOSCOPY | Facility: HOSPITAL | Age: 43
End: 2023-05-26
Payer: COMMERCIAL

## 2023-06-02 ENCOUNTER — ANESTHESIA EVENT (OUTPATIENT)
Dept: ENDOSCOPY | Facility: HOSPITAL | Age: 43
End: 2023-06-02
Payer: COMMERCIAL

## 2023-06-02 ENCOUNTER — HOSPITAL ENCOUNTER (OUTPATIENT)
Facility: HOSPITAL | Age: 43
Discharge: HOME | End: 2023-06-02
Attending: INTERNAL MEDICINE | Admitting: INTERNAL MEDICINE
Payer: COMMERCIAL

## 2023-06-02 ENCOUNTER — ANESTHESIA (OUTPATIENT)
Dept: ENDOSCOPY | Facility: HOSPITAL | Age: 43
End: 2023-06-02
Payer: COMMERCIAL

## 2023-06-02 VITALS
TEMPERATURE: 98 F | OXYGEN SATURATION: 100 % | RESPIRATION RATE: 16 BRPM | SYSTOLIC BLOOD PRESSURE: 122 MMHG | HEIGHT: 60 IN | BODY MASS INDEX: 23.56 KG/M2 | WEIGHT: 120 LBS | DIASTOLIC BLOOD PRESSURE: 75 MMHG | HEART RATE: 50 BPM

## 2023-06-02 DIAGNOSIS — R13.10 DYSPHAGIA: ICD-10-CM

## 2023-06-02 DIAGNOSIS — R13.19 OTHER DYSPHAGIA: Primary | ICD-10-CM

## 2023-06-02 LAB
B-HCG UR QL: NEGATIVE
CTP QC/QA: YES

## 2023-06-02 PROCEDURE — 25000003 PHARM REV CODE 250: Performed by: NURSE ANESTHETIST, CERTIFIED REGISTERED

## 2023-06-02 PROCEDURE — 43239 EGD BIOPSY SINGLE/MULTIPLE: CPT | Performed by: INTERNAL MEDICINE

## 2023-06-02 PROCEDURE — 37000008 HC ANESTHESIA 1ST 15 MINUTES: Performed by: INTERNAL MEDICINE

## 2023-06-02 PROCEDURE — 88305 TISSUE EXAM BY PATHOLOGIST: CPT | Mod: 26,,, | Performed by: PATHOLOGY

## 2023-06-02 PROCEDURE — 45378 PR COLONOSCOPY,DIAGNOSTIC: ICD-10-PCS | Mod: ,,, | Performed by: INTERNAL MEDICINE

## 2023-06-02 PROCEDURE — 43239 EGD BIOPSY SINGLE/MULTIPLE: CPT | Mod: 51,,, | Performed by: INTERNAL MEDICINE

## 2023-06-02 PROCEDURE — 27202410 HC FORCEPS, WIRE-GUIDED: Performed by: INTERNAL MEDICINE

## 2023-06-02 PROCEDURE — 45378 DIAGNOSTIC COLONOSCOPY: CPT | Mod: ,,, | Performed by: INTERNAL MEDICINE

## 2023-06-02 PROCEDURE — 88305 TISSUE EXAM BY PATHOLOGIST: CPT | Mod: 59 | Performed by: PATHOLOGY

## 2023-06-02 PROCEDURE — 37000009 HC ANESTHESIA EA ADD 15 MINS: Performed by: INTERNAL MEDICINE

## 2023-06-02 PROCEDURE — E9220 PRA ENDO ANESTHESIA: ICD-10-PCS | Mod: ,,, | Performed by: NURSE ANESTHETIST, CERTIFIED REGISTERED

## 2023-06-02 PROCEDURE — 45378 DIAGNOSTIC COLONOSCOPY: CPT | Performed by: INTERNAL MEDICINE

## 2023-06-02 PROCEDURE — 81025 URINE PREGNANCY TEST: CPT | Performed by: INTERNAL MEDICINE

## 2023-06-02 PROCEDURE — E9220 PRA ENDO ANESTHESIA: HCPCS | Mod: ,,, | Performed by: NURSE ANESTHETIST, CERTIFIED REGISTERED

## 2023-06-02 PROCEDURE — 88305 TISSUE EXAM BY PATHOLOGIST: ICD-10-PCS | Mod: 26,,, | Performed by: PATHOLOGY

## 2023-06-02 PROCEDURE — 63600175 PHARM REV CODE 636 W HCPCS: Performed by: NURSE ANESTHETIST, CERTIFIED REGISTERED

## 2023-06-02 PROCEDURE — 43239 PR EGD, FLEX, W/BIOPSY, SGL/MULTI: ICD-10-PCS | Mod: 51,,, | Performed by: INTERNAL MEDICINE

## 2023-06-02 RX ORDER — SODIUM CHLORIDE 9 MG/ML
INJECTION, SOLUTION INTRAVENOUS CONTINUOUS
Status: DISCONTINUED | OUTPATIENT
Start: 2023-06-02 | End: 2023-06-02 | Stop reason: HOSPADM

## 2023-06-02 RX ORDER — LIDOCAINE HYDROCHLORIDE 20 MG/ML
INJECTION INTRAVENOUS
Status: DISCONTINUED | OUTPATIENT
Start: 2023-06-02 | End: 2023-06-02

## 2023-06-02 RX ORDER — PROPOFOL 10 MG/ML
VIAL (ML) INTRAVENOUS CONTINUOUS PRN
Status: DISCONTINUED | OUTPATIENT
Start: 2023-06-02 | End: 2023-06-02

## 2023-06-02 RX ORDER — PROPOFOL 10 MG/ML
VIAL (ML) INTRAVENOUS
Status: DISCONTINUED | OUTPATIENT
Start: 2023-06-02 | End: 2023-06-02

## 2023-06-02 RX ADMIN — Medication 100 MG: at 01:06

## 2023-06-02 RX ADMIN — PROPOFOL 200 MCG/KG/MIN: 10 INJECTION, EMULSION INTRAVENOUS at 01:06

## 2023-06-02 RX ADMIN — SODIUM CHLORIDE: 0.9 INJECTION, SOLUTION INTRAVENOUS at 01:06

## 2023-06-02 RX ADMIN — LIDOCAINE HYDROCHLORIDE 75 MG: 20 INJECTION INTRAVENOUS at 01:06

## 2023-06-02 NOTE — PROVATION PATIENT INSTRUCTIONS
Discharge Summary/Instructions after an Endoscopic Procedure  Patient Name: Tasha Cornejo  Patient MRN: [id number]  Patient YOB: 1980 Friday, June 2, 2023  Fabio Rice MD  Dear patient,  As a result of recent federal legislation (The Federal Cures Act), you may   receive lab or pathology results from your procedure in your [facility]   account before your physician is able to contact you. Your physician or   their representative will relay the results to you with their   recommendations at their soonest availability.  Thank you,  RESTRICTIONS:  During your procedure today, you received medications for sedation.  These   medications may affect your judgment, balance and coordination.  Therefore,   for 24 hours, you have the following restrictions:   - DO NOT drive a car, operate machinery, make legal/financial decisions,   sign important papers or drink alcohol.    ACTIVITY:  Today: no heavy lifting, straining or running due to procedural   sedation/anesthesia.  The following day: return to full activity including work.  DIET:  Eat and drink normally unless instructed otherwise.     TREATMENT FOR COMMON SIDE EFFECTS:  - Mild abdominal pain, nausea, belching, bloating or excessive gas:  rest,   eat lightly and use a heating pad.  - Sore Throat: treat with throat lozenges and/or gargle with warm salt   water.  - Because air was used during the procedure, expelling large amounts of air   from your rectum or belching is normal.  - If a bowel prep was taken, you may not have a bowel movement for 1-3 days.    This is normal.  SYMPTOMS TO WATCH FOR AND REPORT TO YOUR PHYSICIAN:  1. Abdominal pain or bloating, other than gas cramps.  2. Chest pain.  3. Back pain.  4. Signs of infection such as: chills or fever occurring within 24 hours   after the procedure.  5. Rectal bleeding, which would show as bright red, maroon, or black stools.   (A tablespoon of blood from the rectum is not serious, especially if   hemorrhoids  are present.)  6. Vomiting.  7. Weakness or dizziness.  GO DIRECTLY TO THE NEAREST EMERGENCY ROOM IF YOU HAVE ANY OF THE FOLLOWING:      Difficulty breathing              Chills and/or fever over 101 F   Persistent vomiting and/or vomiting blood   Severe abdominal pain   Severe chest pain   Black, tarry stools   Bleeding- more than one tablespoon   Any other symptom or condition that you feel may need urgent attention  Your doctor recommends these additional instructions:  If any biopsies were taken, your doctors clinic will contact you in 1 to 2   weeks with any results.  - Patient has a contact number available for emergencies.  The signs and   symptoms of potential delayed complications were discussed with the   patient.  Return to normal activities tomorrow.  Written discharge   instructions were provided to the patient.   - Discharge patient to home.   - The findings and recommendations were discussed with the designated   responsible adult.   - Repeat colonoscopy in 7 years for screening purposes.   For questions, problems or results please call your physician - Fabio Rice MD at Work:  [phone].  [facility], EMERGENCY ROOM PHONE NUMBER: [phone]  IF A COMPLICATION OR EMERGENCY SITUATION ARISES AND YOU ARE UNABLE TO REACH   YOUR PHYSICIAN - GO DIRECTLY TO THE EMERGENCY ROOM.  Fabio Rice MD  6/2/2023 2:22:34 PM  This report has been verified and signed electronically.  Dear patient,  As a result of recent federal legislation (The Federal Cures Act), you may   receive lab or pathology results from your procedure in your [facility]   account before your physician is able to contact you. Your physician or   their representative will relay the results to you with their   recommendations at their soonest availability.  Thank you,  PROVATION

## 2023-06-02 NOTE — ANESTHESIA POSTPROCEDURE EVALUATION
Anesthesia Post Evaluation    Patient: Tasha Cornejo    Procedure(s) Performed: Procedure(s) (LRB):  EGD (ESOPHAGOGASTRODUODENOSCOPY) (N/A)  COLONOSCOPY (N/A)    Final Anesthesia Type: general      Patient location during evaluation: GI PACU  Patient participation: Yes- Able to Participate  Level of consciousness: awake and alert  Post-procedure vital signs: reviewed and stable  Pain management: adequate  Airway patency: patent    PONV status at discharge: No PONV  Anesthetic complications: no      Cardiovascular status: blood pressure returned to baseline  Respiratory status: spontaneous ventilation  Hydration status: euvolemic  Follow-up not needed.          Vitals Value Taken Time   /75 06/02/23 1457   Temp 36.6 °C (97.9 °F) 06/02/23 1427   Pulse 50 06/02/23 1457   Resp 16 06/02/23 1457   SpO2 100 % 06/02/23 1457         Event Time   Out of Recovery 15:15:12         Pain/Andre Score: Andre Score: 10 (6/2/2023  2:57 PM)

## 2023-06-02 NOTE — H&P
Short Stay Endoscopy History and Physical    PCP - Dinah Arias, DO    Procedure - EGD/Colonoscopy  ASA - per anesthesia  Mallampati - per anesthesia  History of Anesthesia problems - no  Family history Anesthesia problems -  no   Plan of anesthesia - MAC    HPI:  This is a 43 y.o. female here for evaluation of :     EGD - dysphagia  Colon - rectal bleeding    ROS:  Constitutional: No fevers, chills, No weight loss  CV: No chest pain  Pulm: No cough, No shortness of breath  Ophtho: No vision changes  GI: see HPI  Derm: No rash    Medical History:  has a past medical history of Chalazion.    Surgical History:  has no past surgical history on file.    Family History: family history includes Albinism in her paternal grandmother; Alzheimer's disease in her paternal grandmother; Breast cancer in an other family member; Diabetes in her paternal grandfather; Heart failure in her paternal grandfather and paternal grandmother.. Otherwise no colon cancer, inflammatory bowel disease, or GI malignancies.    Social History:  reports that she has never smoked. She has never used smokeless tobacco. She reports current alcohol use. She reports that she does not use drugs.    Review of patient's allergies indicates:   Allergen Reactions    Aleve [naproxen sodium] Other (See Comments)     Throat swelling       Medications:   Medications Prior to Admission   Medication Sig Dispense Refill Last Dose    multivitamin (THERAGRAN) per tablet Take 1 tablet by mouth once daily.   Past Week    valerian root 500 mg Cap daily as needed.   Past Month    fexofenadine (ALLEGRA) 180 MG tablet Take 180 mg by mouth once daily.       fluticasone propionate (FLONASE) 50 mcg/actuation nasal spray 1 spray by Each Nare route daily as needed for Rhinitis.   More than a month       Physical Exam:    Vital Signs:   Vitals:    06/02/23 1252   BP: (!) 167/80   Pulse:    Resp:    Temp:        General Appearance: Well appearing in no acute distress  Eyes:     No scleral icterus  ENT: Neck supple, Lips, mucosa, and tongue normal; teeth and gums normal  Abdomen: Soft, non tender, non distended with normal bowel sounds. No hepatosplenomegaly, ascites, or mass.  Extremities: No edema  Skin: No rash    Labs:  Lab Results   Component Value Date    WBC 6.47 05/22/2023    HGB 13.4 05/22/2023    HCT 41.4 05/22/2023     05/22/2023    CHOL 177 07/21/2022    TRIG 64 07/21/2022    HDL 62 07/21/2022    ALT 17 07/21/2022    AST 22 07/21/2022     07/21/2022    K 4.3 07/21/2022     07/21/2022    CREATININE 0.8 07/21/2022    BUN 14 07/21/2022    CO2 26 07/21/2022    TSH 2.145 07/21/2022    HGBA1C 5.2 07/21/2022       I have explained the risks and benefits of endoscopy procedures to the patient including but not limited to bleeding, perforation, infection, and death.  The patient was asked if they understand and allowed to ask any further questions to their satisfaction.    Fabio Rice MD

## 2023-06-02 NOTE — PROVATION PATIENT INSTRUCTIONS
Discharge Summary/Instructions after an Endoscopic Procedure  Patient Name: Tasha Cornejo  Patient MRN: [id number]  Patient YOB: 1980 Friday, June 2, 2023  Fabio Rice MD  Dear patient,  As a result of recent federal legislation (The Federal Cures Act), you may   receive lab or pathology results from your procedure in your [facility]   account before your physician is able to contact you. Your physician or   their representative will relay the results to you with their   recommendations at their soonest availability.  Thank you,  RESTRICTIONS:  During your procedure today, you received medications for sedation.  These   medications may affect your judgment, balance and coordination.  Therefore,   for 24 hours, you have the following restrictions:   - DO NOT drive a car, operate machinery, make legal/financial decisions,   sign important papers or drink alcohol.    ACTIVITY:  Today: no heavy lifting, straining or running due to procedural   sedation/anesthesia.  The following day: return to full activity including work.  DIET:  Eat and drink normally unless instructed otherwise.     TREATMENT FOR COMMON SIDE EFFECTS:  - Mild abdominal pain, nausea, belching, bloating or excessive gas:  rest,   eat lightly and use a heating pad.  - Sore Throat: treat with throat lozenges and/or gargle with warm salt   water.  - Because air was used during the procedure, expelling large amounts of air   from your rectum or belching is normal.  - If a bowel prep was taken, you may not have a bowel movement for 1-3 days.    This is normal.  SYMPTOMS TO WATCH FOR AND REPORT TO YOUR PHYSICIAN:  1. Abdominal pain or bloating, other than gas cramps.  2. Chest pain.  3. Back pain.  4. Signs of infection such as: chills or fever occurring within 24 hours   after the procedure.  5. Rectal bleeding, which would show as bright red, maroon, or black stools.   (A tablespoon of blood from the rectum is not serious, especially if   hemorrhoids  are present.)  6. Vomiting.  7. Weakness or dizziness.  GO DIRECTLY TO THE NEAREST EMERGENCY ROOM IF YOU HAVE ANY OF THE FOLLOWING:      Difficulty breathing              Chills and/or fever over 101 F   Persistent vomiting and/or vomiting blood   Severe abdominal pain   Severe chest pain   Black, tarry stools   Bleeding- more than one tablespoon   Any other symptom or condition that you feel may need urgent attention  Your doctor recommends these additional instructions:  If any biopsies were taken, your doctors clinic will contact you in 1 to 2   weeks with any results.  - Discharge patient to home.   - Await pathology results.   - Perform a colonoscopy today.  For questions, problems or results please call your physician - Fabio Rice MD at Work:  [phone].  [facility], EMERGENCY ROOM PHONE NUMBER: [phone]  IF A COMPLICATION OR EMERGENCY SITUATION ARISES AND YOU ARE UNABLE TO REACH   YOUR PHYSICIAN - GO DIRECTLY TO THE EMERGENCY ROOM.  Fabio Rice MD  6/2/2023 2:04:02 PM  This report has been verified and signed electronically.  Dear patient,  As a result of recent federal legislation (The Federal Cures Act), you may   receive lab or pathology results from your procedure in your [facility]   account before your physician is able to contact you. Your physician or   their representative will relay the results to you with their   recommendations at their soonest availability.  Thank you,  PROVATION

## 2023-06-02 NOTE — ANESTHESIA PREPROCEDURE EVALUATION
06/02/2023  Tasha Cornejo is a 43 y.o., female with dysphagia and rectal bleeding scheduled for EGD/colonoscopy. Very anxious. Otherwise healthy    Past Medical History:   Diagnosis Date    Chalazion      Lab Results   Component Value Date    WBC 6.47 05/22/2023    HGB 13.4 05/22/2023    HCT 41.4 05/22/2023    MCV 91 05/22/2023     05/22/2023             Pre-op Assessment    I have reviewed the Patient Summary Reports.     I have reviewed the Nursing Notes. I have reviewed the NPO Status.      Review of Systems  Social:  Non-Smoker    Cardiovascular:   Exercise tolerance: good    Pulmonary:  Pulmonary Normal        Physical Exam  General: Anxious    Airway:  Mallampati: II   Mouth Opening: Normal  TM Distance: Normal  Neck ROM: Normal ROM    Dental:  Intact        Anesthesia Plan  Type of Anesthesia, risks & benefits discussed:    Anesthesia Type: Gen Natural Airway  Intra-op Monitoring Plan: Standard ASA Monitors  Post Op Pain Control Plan: multimodal analgesia  Induction:  IV  Informed Consent: Informed consent signed with the Patient and all parties understand the risks and agree with anesthesia plan.  All questions answered.   ASA Score: 1    Ready For Surgery From Anesthesia Perspective.     .

## 2023-06-02 NOTE — TRANSFER OF CARE
Anesthesia Transfer of Care Note    Patient: Tasha Cornejo    Procedure(s) Performed: Procedure(s) (LRB):  EGD (ESOPHAGOGASTRODUODENOSCOPY) (N/A)  COLONOSCOPY (N/A)    Patient location: PACU    Anesthesia Type: general    Transport from OR: Transported from OR on room air with adequate spontaneous ventilation    Post pain: adequate analgesia    Post assessment: no apparent anesthetic complications and tolerated procedure well    Post vital signs: stable    Level of consciousness: awake    Nausea/Vomiting: no nausea/vomiting    Complications: none    Transfer of care protocol was followed      Last vitals:   Visit Vitals  BP (!) 167/80   Pulse 64   Temp 37 °C (98.6 °F) (Temporal)   Resp 20   Ht 5' (1.524 m)   Wt 54.4 kg (120 lb)   LMP 05/20/2023   SpO2 100%   Breastfeeding No   BMI 23.44 kg/m²

## 2023-06-09 LAB
FINAL PATHOLOGIC DIAGNOSIS: NORMAL
GROSS: NORMAL
Lab: NORMAL

## 2023-06-11 ENCOUNTER — PATIENT MESSAGE (OUTPATIENT)
Dept: GASTROENTEROLOGY | Facility: CLINIC | Age: 43
End: 2023-06-11
Payer: COMMERCIAL

## 2023-06-11 RX ORDER — OMEPRAZOLE 40 MG/1
40 CAPSULE, DELAYED RELEASE ORAL EVERY MORNING
Qty: 30 CAPSULE | Refills: 3 | Status: SHIPPED | OUTPATIENT
Start: 2023-06-11 | End: 2023-10-19 | Stop reason: SDUPTHER

## 2023-06-23 ENCOUNTER — PATIENT MESSAGE (OUTPATIENT)
Dept: OPTOMETRY | Facility: CLINIC | Age: 43
End: 2023-06-23
Payer: COMMERCIAL

## 2023-08-30 ENCOUNTER — OFFICE VISIT (OUTPATIENT)
Dept: INTERNAL MEDICINE | Facility: CLINIC | Age: 43
End: 2023-08-30
Payer: COMMERCIAL

## 2023-08-30 ENCOUNTER — LAB VISIT (OUTPATIENT)
Dept: LAB | Facility: HOSPITAL | Age: 43
End: 2023-08-30
Attending: INTERNAL MEDICINE
Payer: COMMERCIAL

## 2023-08-30 VITALS
HEIGHT: 60 IN | TEMPERATURE: 98 F | BODY MASS INDEX: 22.81 KG/M2 | HEART RATE: 65 BPM | SYSTOLIC BLOOD PRESSURE: 122 MMHG | DIASTOLIC BLOOD PRESSURE: 84 MMHG | WEIGHT: 116.19 LBS | OXYGEN SATURATION: 100 %

## 2023-08-30 DIAGNOSIS — Z00.00 ANNUAL PHYSICAL EXAM: ICD-10-CM

## 2023-08-30 DIAGNOSIS — Z00.00 ANNUAL PHYSICAL EXAM: Primary | ICD-10-CM

## 2023-08-30 DIAGNOSIS — Z12.31 VISIT FOR SCREENING MAMMOGRAM: ICD-10-CM

## 2023-08-30 LAB
BILIRUB UR QL STRIP: NEGATIVE
CLARITY UR REFRACT.AUTO: CLEAR
COLOR UR AUTO: NORMAL
GLUCOSE UR QL STRIP: NEGATIVE
HGB UR QL STRIP: NEGATIVE
KETONES UR QL STRIP: NEGATIVE
LEUKOCYTE ESTERASE UR QL STRIP: NEGATIVE
NITRITE UR QL STRIP: NEGATIVE
PH UR STRIP: 7 [PH] (ref 5–8)
PROT UR QL STRIP: NEGATIVE
SP GR UR STRIP: 1 (ref 1–1.03)
URN SPEC COLLECT METH UR: NORMAL

## 2023-08-30 PROCEDURE — 3008F BODY MASS INDEX DOCD: CPT | Mod: CPTII,S$GLB,, | Performed by: INTERNAL MEDICINE

## 2023-08-30 PROCEDURE — 99999 PR PBB SHADOW E&M-EST. PATIENT-LVL IV: ICD-10-PCS | Mod: PBBFAC,,, | Performed by: INTERNAL MEDICINE

## 2023-08-30 PROCEDURE — 3074F PR MOST RECENT SYSTOLIC BLOOD PRESSURE < 130 MM HG: ICD-10-PCS | Mod: CPTII,S$GLB,, | Performed by: INTERNAL MEDICINE

## 2023-08-30 PROCEDURE — 1160F PR REVIEW ALL MEDS BY PRESCRIBER/CLIN PHARMACIST DOCUMENTED: ICD-10-PCS | Mod: CPTII,S$GLB,, | Performed by: INTERNAL MEDICINE

## 2023-08-30 PROCEDURE — 3044F HG A1C LEVEL LT 7.0%: CPT | Mod: CPTII,S$GLB,, | Performed by: INTERNAL MEDICINE

## 2023-08-30 PROCEDURE — 99999 PR PBB SHADOW E&M-EST. PATIENT-LVL IV: CPT | Mod: PBBFAC,,, | Performed by: INTERNAL MEDICINE

## 2023-08-30 PROCEDURE — 3079F PR MOST RECENT DIASTOLIC BLOOD PRESSURE 80-89 MM HG: ICD-10-PCS | Mod: CPTII,S$GLB,, | Performed by: INTERNAL MEDICINE

## 2023-08-30 PROCEDURE — 99396 PR PREVENTIVE VISIT,EST,40-64: ICD-10-PCS | Mod: S$GLB,,, | Performed by: INTERNAL MEDICINE

## 2023-08-30 PROCEDURE — 1160F RVW MEDS BY RX/DR IN RCRD: CPT | Mod: CPTII,S$GLB,, | Performed by: INTERNAL MEDICINE

## 2023-08-30 PROCEDURE — 3074F SYST BP LT 130 MM HG: CPT | Mod: CPTII,S$GLB,, | Performed by: INTERNAL MEDICINE

## 2023-08-30 PROCEDURE — 99396 PREV VISIT EST AGE 40-64: CPT | Mod: S$GLB,,, | Performed by: INTERNAL MEDICINE

## 2023-08-30 PROCEDURE — 3044F PR MOST RECENT HEMOGLOBIN A1C LEVEL <7.0%: ICD-10-PCS | Mod: CPTII,S$GLB,, | Performed by: INTERNAL MEDICINE

## 2023-08-30 PROCEDURE — 3008F PR BODY MASS INDEX (BMI) DOCUMENTED: ICD-10-PCS | Mod: CPTII,S$GLB,, | Performed by: INTERNAL MEDICINE

## 2023-08-30 PROCEDURE — 1159F PR MEDICATION LIST DOCUMENTED IN MEDICAL RECORD: ICD-10-PCS | Mod: CPTII,S$GLB,, | Performed by: INTERNAL MEDICINE

## 2023-08-30 PROCEDURE — 1159F MED LIST DOCD IN RCRD: CPT | Mod: CPTII,S$GLB,, | Performed by: INTERNAL MEDICINE

## 2023-08-30 PROCEDURE — 3079F DIAST BP 80-89 MM HG: CPT | Mod: CPTII,S$GLB,, | Performed by: INTERNAL MEDICINE

## 2023-08-30 PROCEDURE — 81003 URINALYSIS AUTO W/O SCOPE: CPT | Performed by: INTERNAL MEDICINE

## 2023-08-30 RX ORDER — KETOCONAZOLE 20 MG/G
CREAM TOPICAL
COMMUNITY
Start: 2023-06-27 | End: 2023-11-20

## 2023-08-30 RX ORDER — MUPIROCIN 20 MG/G
OINTMENT TOPICAL 2 TIMES DAILY
Qty: 15 G | Refills: 1 | Status: SHIPPED | OUTPATIENT
Start: 2023-08-30 | End: 2023-11-20

## 2023-08-30 RX ORDER — HYDROCORTISONE 25 MG/G
OINTMENT TOPICAL 2 TIMES DAILY PRN
COMMUNITY
Start: 2023-06-27 | End: 2023-11-20

## 2023-08-30 NOTE — PROGRESS NOTES
The patient is a 43 y.o. old female who presents to the office for a physical.    PAST MEDICAL HISTORY  Past Medical History:   Diagnosis Date    Chalazion        SURGICAL HISTORY:  Past Surgical History:   Procedure Laterality Date    COLONOSCOPY N/A 2023    Procedure: COLONOSCOPY;  Surgeon: Fabio Rice MD;  Location: [facility] ([address] [place] [address]);  Service: Endoscopy;  Laterality: N/A;  pt confirmed earlier time  EB    ESOPHAGOGASTRODUODENOSCOPY N/A 2023    Procedure: EGD (ESOPHAGOGASTRODUODENOSCOPY);  Surgeon: Fabio Rice MD;  Location: [facility] ([address] [place] [address]);  Service: Endoscopy;  Laterality: N/A;  Procedure Timin-4 weeks    referred by Dr. Rice/Danita instructions handed to patient and to portal.  Patient called did not answer- DB         MEDS:  Medcard reviewed and updated    ALLERGIES: Allergy Card reviewed and updated    SOCIAL HISTORY:   The patient is a nonsmoker, rare alcohol, denies illicit drug use.    ROS:  GENERAL: No fever, chills, fatigability or weight loss.  SKIN: No rashes.  Crack on the corner of her mouth.  HEAD: No headaches or recent head trauma.  EYES: No photophobia, ocular pain or diplopia.  Intermittent light flashes, about 1 month ago, 2-3 episodes in that time frame, and lasted about an hour..  EARS: Denies ear pain, discharge or vertigo.  NOSE: No epistaxis or postnasal drip.  MOUTH & THROAT: No hoarseness or change in voice.   NODES: Denies swollen glands.  CHEST: Denies shortness of breath, wheezing, cough and sputum production.  CARDIOVASCULAR: Denies chest pain or palpitations.  ABDOMEN: Appetite fine. Denies diarrhea, abdominal pain, constipation or blood in stool.  URINARY: No dysuria or hematuria.  MUSCULOSKELETAL: No joint stiffness or swelling. Denies back pain.  NEUROLOGIC: No history of seizures.  ENDOCRINE: Denies polyuria or polydipsia.  PSYCHIATRIC: Denies mood swings, depression, anxiety, homicidal or suicidal thoughts.    SCREENINGS:  Last cholesterol: .  Last  colonoscopy: 2023  Last mammogram: 2022  Last gyn exam: 2022  Last tetanus: unknown  Last Pneumovax: 2014  Last eye exam: about 2 years ago  Last bone density: none  Last menstrual period: August 6, 2023    PE:   Vitals:  Vitals:    08/30/23 0804   BP: 122/84   Pulse: 65   Temp: 98.2 °F (36.8 °C)       APPEARANCE: Well nourished, well developed, in no acute distress.    EYES: Sclerae anicteric. PERRL. EOMI.      EARS: TM's intact. No retraction or perforation.    NOSE: Mucosa pink. Airway clear.  MOUTH & THROAT: No tonsillar enlargement. No pharyngeal erythema or exudate. No stridor.  NECK: Supple, no thyromegaly.  CHEST: Lungs clear to auscultation with unlabored respirations.  CARDIOVASCULAR: Normal S1, S2. No murmurs. No carotid bruits. No pedal edema.  ABDOMEN: Bowel sounds normal. Not distended. Soft. No tenderness or masses.   MUSCULOSKELETAL:  Normal gait, no cyanosis or clubbing.   SKIN: Normal skin turgor, warm and dry.  NEUROLOGIC: Cranial Nerves: Intact.  PSYCHIATRIC: The patient is oriented to person, place, and time and has a pleasant affect.        ASSESSMENT/PLAN:  Tasha was seen today for annual exam.    Diagnoses and all orders for this visit:    Annual physical exam  -     CBC Auto Differential; Future  -     Comprehensive Metabolic Panel; Future  -     Lipid Panel; Future  -     TSH; Future  -     Hemoglobin A1C; Future  -     Urinalysis; Future    Visit for screening mammogram  -     Mammo Digital Screening Bilat w/ Camacho; Future    Other orders  -     mupirocin (BACTROBAN) 2 % ointment; Apply topically 2 (two) times daily.        Answers submitted by the patient for this visit:  Review of Systems Questionnaire (Submitted on 8/26/2023)  activity change: No  unexpected weight change: No  neck pain: No  hearing loss: No  rhinorrhea: No  trouble swallowing: No  eye discharge: No  visual disturbance: No  chest tightness: No  wheezing: No  chest pain: No  palpitations: No  blood in stool:  No  constipation: No  vomiting: No  diarrhea: No  polydipsia: No  polyuria: No  difficulty urinating: No  hematuria: No  menstrual problem: No  dysuria: No  joint swelling: No  arthralgias: No  headaches: No  weakness: No  confusion: No  dysphoric mood: No

## 2023-09-06 ENCOUNTER — PATIENT MESSAGE (OUTPATIENT)
Dept: INTERNAL MEDICINE | Facility: CLINIC | Age: 43
End: 2023-09-06
Payer: COMMERCIAL

## 2023-09-18 ENCOUNTER — TELEPHONE (OUTPATIENT)
Dept: ENDOSCOPY | Facility: HOSPITAL | Age: 43
End: 2023-09-18
Payer: COMMERCIAL

## 2023-09-18 ENCOUNTER — OFFICE VISIT (OUTPATIENT)
Dept: GASTROENTEROLOGY | Facility: CLINIC | Age: 43
End: 2023-09-18
Payer: COMMERCIAL

## 2023-09-18 VITALS
HEIGHT: 60 IN | BODY MASS INDEX: 23.16 KG/M2 | WEIGHT: 117.94 LBS | WEIGHT: 117 LBS | SYSTOLIC BLOOD PRESSURE: 131 MMHG | HEART RATE: 83 BPM | HEIGHT: 60 IN | BODY MASS INDEX: 22.97 KG/M2 | DIASTOLIC BLOOD PRESSURE: 89 MMHG

## 2023-09-18 DIAGNOSIS — K20.0 EOSINOPHILIC ESOPHAGITIS: Primary | ICD-10-CM

## 2023-09-18 DIAGNOSIS — K62.5 RECTAL BLEEDING: ICD-10-CM

## 2023-09-18 DIAGNOSIS — D72.10 EOSINOPHILIA, UNSPECIFIED TYPE: ICD-10-CM

## 2023-09-18 DIAGNOSIS — R13.10 DYSPHAGIA, UNSPECIFIED TYPE: Primary | ICD-10-CM

## 2023-09-18 PROCEDURE — 3075F SYST BP GE 130 - 139MM HG: CPT | Mod: CPTII,S$GLB,, | Performed by: INTERNAL MEDICINE

## 2023-09-18 PROCEDURE — 3075F PR MOST RECENT SYSTOLIC BLOOD PRESS GE 130-139MM HG: ICD-10-PCS | Mod: CPTII,S$GLB,, | Performed by: INTERNAL MEDICINE

## 2023-09-18 PROCEDURE — 3044F HG A1C LEVEL LT 7.0%: CPT | Mod: CPTII,S$GLB,, | Performed by: INTERNAL MEDICINE

## 2023-09-18 PROCEDURE — 3008F PR BODY MASS INDEX (BMI) DOCUMENTED: ICD-10-PCS | Mod: CPTII,S$GLB,, | Performed by: INTERNAL MEDICINE

## 2023-09-18 PROCEDURE — 99999 PR PBB SHADOW E&M-EST. PATIENT-LVL III: ICD-10-PCS | Mod: PBBFAC,,, | Performed by: INTERNAL MEDICINE

## 2023-09-18 PROCEDURE — 99999 PR PBB SHADOW E&M-EST. PATIENT-LVL III: CPT | Mod: PBBFAC,,, | Performed by: INTERNAL MEDICINE

## 2023-09-18 PROCEDURE — 3008F BODY MASS INDEX DOCD: CPT | Mod: CPTII,S$GLB,, | Performed by: INTERNAL MEDICINE

## 2023-09-18 PROCEDURE — 99214 PR OFFICE/OUTPT VISIT, EST, LEVL IV, 30-39 MIN: ICD-10-PCS | Mod: S$GLB,,, | Performed by: INTERNAL MEDICINE

## 2023-09-18 PROCEDURE — 3044F PR MOST RECENT HEMOGLOBIN A1C LEVEL <7.0%: ICD-10-PCS | Mod: CPTII,S$GLB,, | Performed by: INTERNAL MEDICINE

## 2023-09-18 PROCEDURE — 3079F DIAST BP 80-89 MM HG: CPT | Mod: CPTII,S$GLB,, | Performed by: INTERNAL MEDICINE

## 2023-09-18 PROCEDURE — 3079F PR MOST RECENT DIASTOLIC BLOOD PRESSURE 80-89 MM HG: ICD-10-PCS | Mod: CPTII,S$GLB,, | Performed by: INTERNAL MEDICINE

## 2023-09-18 PROCEDURE — 1159F MED LIST DOCD IN RCRD: CPT | Mod: CPTII,S$GLB,, | Performed by: INTERNAL MEDICINE

## 2023-09-18 PROCEDURE — 1159F PR MEDICATION LIST DOCUMENTED IN MEDICAL RECORD: ICD-10-PCS | Mod: CPTII,S$GLB,, | Performed by: INTERNAL MEDICINE

## 2023-09-18 PROCEDURE — 99214 OFFICE O/P EST MOD 30 MIN: CPT | Mod: S$GLB,,, | Performed by: INTERNAL MEDICINE

## 2023-09-18 NOTE — TELEPHONE ENCOUNTER
Spoke to Tasha to schedule procedure(s) Upper Endoscopy (EGD)       Physician to perform procedure(s) Dr. RADHA Rice  Date of Procedure (s) 10/18/2023  Arrival Time 3:30 PM  Time of Procedure(s) 4:30 PM   Location of Procedure(s) [address]  Type of Rx Prep sent to patient: Other  Instructions provided to patient via [facility]    Patient was informed on the following information and verbalized understanding. Screening questionnaire reviewed with patient and complete. If procedure requires anesthesia, a responsible adult needs to be present to accompany the patient home, patient cannot drive after receiving anesthesia. Appointment details are tentative, especially check-in time. Patient will receive a prep-op call 4 days prior to confirm check-in time for procedure. If applicable the patient should contact their pharmacy to verify Rx for procedure prep is ready for pick-up. Patient was advised to call the scheduling department at [phone] if pharmacy states no Rx is available. Patient was advised to call the endoscopy scheduling department if any questions or concerns arise.      [facility] Scheduling Department

## 2023-09-18 NOTE — TELEPHONE ENCOUNTER
"----- Message from Fabio Rice MD sent at 2023  3:27 PM CDT -----  Procedure: EGD    Diagnosis: Dysphagia    Procedure Timin-12 weeks    #If within 4 weeks selected, please leticia as high priority#    #If greater than 12 weeks, please select "4-12 weeks" and delay sending until 2 months prior to requested date#     Provider: Myself    Location: No Preference    Additional Scheduling Information: No scheduling concerns    Prep Specifications:N/A    Have you attached a patient to this message: yes      "

## 2023-09-18 NOTE — PROGRESS NOTES
GENERAL GI PATIENT INTAKE:    COVID symptoms in the last 7 days (runny nose, sore throat, congestion, cough, fever): No  PCP: Julia Sandoval MD  If not PCP-  number given to establish [phone]: N/A    ALLERGIES REVIEWED:  Yes    CHIEF COMPLAINT:    Chief Complaint   Patient presents with    Follow-up     3 month clinic followup reminder for possible EOE       VITAL SIGNS:  Ht 5' (1.524 m)   Wt 53.5 kg (117 lb 15.1 oz)   LMP 09/02/2023 (Exact Date)   BMI 23.03 kg/m²      Change in medical, surgical, family or social history: No      REVIEWED MEDICATION LIST RECONCILED INCLUDING ABOVE MEDS:  Yes

## 2023-09-18 NOTE — PATIENT INSTRUCTIONS
Lactose Intolerance Tips    The testing for lactose intolerance is not very reliable. The best indicator if you are lactose intolerant is by cutting all lactose out of your diet for 2 weeks and noting if you see less gas, bloating, cramps, and diarrhea.    Alternatives: Lactose free milk (lactaid), almond milk, coconut milk, rice milk, hemp milk lactose free cottage cheese. Yogurt with live and active cultures may slowly be reintroduced after all lactose has been eliminated if tolerated.    Avoid: Ice cream, milk, condensed milk, soft cheeses (cottage cheese), butter, buttermilk, cream, whey products    Low lactose cheeses: Swiss, Parmesan, Gouda, Micah, Provolone, Cheddar, [place], [place], and Kareem Pizarro.         More detailed information courtesy of [employer] Website:    [facility]   General Guidelines for Managing Lactose Intolerance   ? Lactose is a sugar found in certain dairy products. Many adults have trouble digesting foods containing lactose. This is often due to low levels of the enzyme (lactase) in the intestine that is needed to break down lactose. Patients with intestinal disease or injury may also experience lactose intolerance. Symptoms of lactose intolerance include nausea, bloating, gas, diarrhea and/or abdominal pain/cramping. These symptoms generally occur 30 minutes to 2 hours after eating a food containing lactose.     ? The amount of lactose an individual can tolerate varies from person to person. Many people with lactose intolerance can tolerate some lactose-containing foods by adjusting the type, amount and timing of these foods. Some patients may need to (or may choose to) limit or eliminate these foods completely. If you wish to include lactose-containing foods in your diet, the following suggestions may help. Always talk with your doctor before making changes to your prescribed diet.   ??Add new foods one at a time; decrease the  amount, or eliminate the food, if symptoms occur.   ??Most people with lactose intolerance do not need to avoid all dairy products, for example:   o Cultured yogurt contains live cultures that naturally help digest lactose. Many people with lactose intolerance tolerate cultured yogurt well. Check labels to see if a yogurt contains live cultures.   o Hard cheese is low in lactose and is usually well tolerated   ??If you wish to drink milk, try taking small amounts (1/2 cup at a time). Many people can tolerate up to 2 cups of milk per day when taken in smaller servings spread out over the course of the day.   ??Foods that contain lactose may be better tolerated if they are eaten with a meal.     ? For those who need to limit or eliminate lactose, the [facility] handout on Lactose Content of Common Foods (available at [email or web address]) can help identify sources of lactose. Note that many people with lactose intolerance can tolerate 12 grams or more of lactose per day, particularly if the suggestions above are followed.     ? Foods made from certain dairy products (such as pudding, cream soups, cream or cheese sauces, etc.) also contain lactose. The amount of lactose in a product will depend on the amount of dairy products used. Other foods such as baked items, instant mixes, salad dressings, etc. may also contain lactose. The following ingredients suggest a product contains lactose:   Butter   Caseinates   Cheese   Cream  Curds   Dry milk solids   Lactose   Milk  Milk by-products   Milk solids   Milk sugar   Non-fat dry milk powder  Skim milk solids   Whey   Yogurt        ??Lactose can also be found in medications. Check for lactose on the label, although it does not have to be listed; if you are very sensitive to lactose and have persistent symptoms, ask your pharmacist to help you. Ask your doctor to prescribe a lactose-free alternative if one exists.     ? Specialty Products: If you are not able to tolerate  lactose-containing foods using the above suggestions, special products are available. Keep in mind, not everyone with lactose intolerance needs special products; many people can tolerate regular dairy products by adjusting the type and amount consumed. You may want to try the tips provided in this handout before trying the more costly specialty products.   100% Lactose Reduced Milk   ? 100% lactose reduced milk is available in the dairy section of most grocery stores.   ? Available in nonfat, 1%, 2%, and whole milk varieties.   ? Lactose reduced milk contains the same nutrition, including calcium and vitamin D as regular milk.   ? Lactose reduced milk does cost a bit more than regular milk.   ? Lactose reduced milk may taste sweeter than regular milk.     Lactase Enzyme Supplements   ? These products contain the enzyme lactase, which is needed for the digestion of lactose.   ? Available in caplet or chewable form.   ? Lactase enzyme supplements may not be needed with some dairy products, such as cultured yogurt and cheese.   ? Most products recommend a dose of 9000 lactase units be taken with each dairy product. This amount may not always be needed; you may want to start with a smaller dose and increase only if symptoms persist.   ? Both brand name and store brand varieties are available. Store brands are often more cost-effective than name brands, however, prices will vary depending on store, location, and quantity purchased.     Other Products   ? Soy milk, rice milk and almond milk are lactose free. If you plan to use these products as an alternative calcium and/or vitamin D source, read labels carefully and choose a brand which specifically states it contains these nutrients and in what amounts.     Calcium and Vitamin D   ? If you are on a low lactose diet, discuss your calcium and vitamin D intake with your physician or dietitian. Studies have shown that individuals with lactose intolerance often do not take  in enough of these nutrients. Inadequate calcium and vitamin D intake increases the risk of osteoporosis. A dietitian can help you determine whether you are getting enough of these nutrients in your diet.

## 2023-09-18 NOTE — PROGRESS NOTES
[facility] Consultation Note    Reason for Consult:  The primary encounter diagnosis was Eosinophilic esophagitis. Diagnoses of Eosinophilia, unspecified type and Rectal bleeding were also pertinent to this visit.    PCP:   Dinah Arias       Referring MD:  No referring provider defined for this encounter.    Initial History of Present Illness (HPI):  This is a 43 y.o. female here for evaluation of sour stomach after salad, but     Carrots and rice get stuck with eating over last 6 months    and bleeding after running, worse with longer runs, but still happening  Some grainy stools for a few months    Abdominal pain - no  Reflux - no  Dysphagia - to solids  Bowel habits - normal  GI bleeding - with running  NSAID usage - none    Interval History 09/18/2023  Less dysphagia and no imp[actions  Does have some burping after meals  Does a fair amount of dairy products on a daily basis    Still having some rectal bleeding with runs, not running as long distance    ROS:  Constitutional: No fevers, chills, No weight loss  ENT: No allergies  CV: No chest pain  Pulm: + cough for years, No shortness of breath  Ophtho: No vision changes  GI: see HPI  Derm: No rash  Heme: No lymphadenopathy, No bruising  MSK: No arthritis  : No dysuria, No hematuria  Endo: No hot or cold intolerance  Neuro: No syncope, No seizure  Psych: No anxiety, No depression    Medical History:  has a past medical history of Chalazion.    Surgical History:  has a past surgical history that includes Esophagogastroduodenoscopy (N/A, 6/2/2023) and Colonoscopy (N/A, 6/2/2023).    Family History: family history includes Albinism in her paternal grandmother; Alzheimer's disease in her paternal grandmother; Breast cancer in an other family member; Diabetes in her paternal grandfather; Heart failure in her paternal grandfather and paternal grandmother..     Social History:  reports that she has never smoked. She has never used  smokeless tobacco. She reports current alcohol use. She reports that she does not use drugs.    Review of patient's allergies indicates:   Allergen Reactions    Aleve [naproxen sodium] Other (See Comments)     Throat swelling       Medication List with Changes/Refills   Current Medications    FLUTICASONE PROPIONATE (FLONASE) 50 MCG/ACTUATION NASAL SPRAY    1 spray by Each Nare route daily as needed for Rhinitis.    HYDROCORTISONE 2.5 % OINTMENT    Apply topically 2 (two) times daily as needed.    KETOCONAZOLE (NIZORAL) 2 % CREAM    SMARTSI Application Topical 1 to 2 Times Daily    MULTIVITAMIN (THERAGRAN) PER TABLET    Take 1 tablet by mouth once daily.    MUPIROCIN (BACTROBAN) 2 % OINTMENT    Apply topically 2 (two) times daily.    OMEPRAZOLE (PRILOSEC) 40 MG CAPSULE    Take 1 capsule (40 mg total) by mouth every morning.    VALERIAN ROOT 500 MG CAP    daily as needed.         Objective Findings:    Vital Signs:  /89   Pulse 83   Ht 5' (1.524 m)   Wt 53.5 kg (117 lb 15.1 oz)   LMP 2023 (Exact Date)   BMI 23.03 kg/m²   Body mass index is 23.03 kg/m².    Physical Exam:  General Appearance: Well appearing in no acute distress  Head:   Normocephalic, without obvious abnormality  Eyes:    No scleral icterus, EOMI  ENT: Neck supple, Lips, mucosa, and tongue normal; teeth and gums normal  Lungs: CTA bilaterally in anterior and posterior fields, no wheezes, no crackles.  Heart:  Regular rate and rhythm, S1, S2 normal, no murmurs heard  Abdomen: Soft, non tender, non distended with positive bowel sounds in all four quadrants. No hepatosplenomegaly, ascites, or mass  Extremities: 2+ pulses, no clubbing, cyanosis or edema  Skin: No rash  Neurologic: CN II-XII intact      Labs:  Lab Results   Component Value Date    WBC 6.07 2023    HGB 13.3 2023    HCT 41.6 2023     2023    CHOL 166 2023    TRIG 43 2023    HDL 52 2023    ALT 11 2023    AST 19  "08/30/2023     08/30/2023    K 4.7 08/30/2023     08/30/2023    CREATININE 0.8 08/30/2023    BUN 8 08/30/2023    CO2 23 08/30/2023    TSH 1.662 08/30/2023    HGBA1C 5.2 08/30/2023       No results found for: "HPYLORINTERP"  No results found for: "HPYLORIANTIG"    Eos% 12.5, 8.2    Imaging:    Endoscopy:    reviewed      Assessment:  1. Eosinophilic esophagitis    2. Eosinophilia, unspecified type    3. Rectal bleeding             Recommendations:  1. Needs repeat EGD with biopsies and would biopsy stomach as well  2. Start low dairy diet  3. If persistent eos on biopsy refer back to allergy  4. Fiber and prep H for internal hemorrhoids    Order summary:           Thank you so much for allowing me to participate in the care of Tasha Rice MD        "

## 2023-09-26 ENCOUNTER — TELEPHONE (OUTPATIENT)
Dept: ENDOSCOPY | Facility: HOSPITAL | Age: 43
End: 2023-09-26
Payer: COMMERCIAL

## 2023-09-26 NOTE — TELEPHONE ENCOUNTER
----- Message from Alton Pierson sent at 9/26/2023 11:54 AM CDT -----  Regarding: adv  Contact: @[phone]  Pt calling In regards to questions she has states has a flu event her employer is hosting on 10/11 and would like to know if its okay for her to get it because her proc is on 10/18 ... Pls call and [phone]  or use portal

## 2023-10-05 ENCOUNTER — HOSPITAL ENCOUNTER (OUTPATIENT)
Dept: RADIOLOGY | Facility: HOSPITAL | Age: 43
Discharge: HOME | End: 2023-10-05
Attending: INTERNAL MEDICINE
Payer: COMMERCIAL

## 2023-10-05 DIAGNOSIS — Z12.31 VISIT FOR SCREENING MAMMOGRAM: ICD-10-CM

## 2023-10-05 PROCEDURE — 77067 SCR MAMMO BI INCL CAD: CPT | Mod: 26,,, | Performed by: RADIOLOGY

## 2023-10-05 PROCEDURE — 77067 SCR MAMMO BI INCL CAD: CPT | Mod: TC

## 2023-10-05 PROCEDURE — 77067 MAMMO DIGITAL SCREENING BILAT WITH TOMO: ICD-10-PCS | Mod: 26,,, | Performed by: RADIOLOGY

## 2023-10-05 PROCEDURE — 77063 MAMMO DIGITAL SCREENING BILAT WITH TOMO: ICD-10-PCS | Mod: 26,,, | Performed by: RADIOLOGY

## 2023-10-05 PROCEDURE — 77063 BREAST TOMOSYNTHESIS BI: CPT | Mod: 26,,, | Performed by: RADIOLOGY

## 2023-10-09 ENCOUNTER — TELEPHONE (OUTPATIENT)
Dept: RADIOLOGY | Facility: HOSPITAL | Age: 43
End: 2023-10-09
Payer: COMMERCIAL

## 2023-10-09 NOTE — TELEPHONE ENCOUNTER
Spoke with patient and explained mammogram findings.Patient expressed understanding of results. Patient scheduled abnormal mammogram follow up appointment at [facility] [place] on 10/12/2023.

## 2023-10-11 ENCOUNTER — TELEPHONE (OUTPATIENT)
Dept: ENDOSCOPY | Facility: HOSPITAL | Age: 43
End: 2023-10-11
Payer: COMMERCIAL

## 2023-10-12 ENCOUNTER — HOSPITAL ENCOUNTER (OUTPATIENT)
Dept: RADIOLOGY | Facility: HOSPITAL | Age: 43
Discharge: HOME | End: 2023-10-12
Attending: INTERNAL MEDICINE
Payer: COMMERCIAL

## 2023-10-12 DIAGNOSIS — R92.8 ABNORMAL FINDING ON BREAST IMAGING: ICD-10-CM

## 2023-10-12 PROCEDURE — 77061 BREAST TOMOSYNTHESIS UNI: CPT | Mod: 26,RT,, | Performed by: RADIOLOGY

## 2023-10-12 PROCEDURE — 77065 DX MAMMO INCL CAD UNI: CPT | Mod: 26,RT,, | Performed by: RADIOLOGY

## 2023-10-12 PROCEDURE — 77061 MAMMO DIGITAL DIAGNOSTIC RIGHT WITH TOMO: ICD-10-PCS | Mod: 26,RT,, | Performed by: RADIOLOGY

## 2023-10-12 PROCEDURE — 77065 MAMMO DIGITAL DIAGNOSTIC RIGHT WITH TOMO: ICD-10-PCS | Mod: 26,RT,, | Performed by: RADIOLOGY

## 2023-10-12 PROCEDURE — 77065 DX MAMMO INCL CAD UNI: CPT | Mod: TC,RT

## 2023-10-13 ENCOUNTER — TELEPHONE (OUTPATIENT)
Dept: RADIOLOGY | Facility: HOSPITAL | Age: 43
End: 2023-10-13
Payer: COMMERCIAL

## 2023-10-13 NOTE — TELEPHONE ENCOUNTER
Spoke with patient. Reviewed breast biopsy procedure and reviewed instructions for breast biopsy. Patient expressed understanding and all questions were answered. Provided patient with my phone number to call for any further concerns or questions.   Patient scheduled breast biopsy at the [facility] on 10/26/2023.

## 2023-10-18 ENCOUNTER — HOSPITAL ENCOUNTER (OUTPATIENT)
Facility: HOSPITAL | Age: 43
Discharge: HOME | End: 2023-10-18
Attending: INTERNAL MEDICINE | Admitting: INTERNAL MEDICINE
Payer: COMMERCIAL

## 2023-10-18 ENCOUNTER — ANESTHESIA EVENT (OUTPATIENT)
Dept: ENDOSCOPY | Facility: HOSPITAL | Age: 43
End: 2023-10-18
Payer: COMMERCIAL

## 2023-10-18 ENCOUNTER — ANESTHESIA (OUTPATIENT)
Dept: ENDOSCOPY | Facility: HOSPITAL | Age: 43
End: 2023-10-18
Payer: COMMERCIAL

## 2023-10-18 VITALS
RESPIRATION RATE: 17 BRPM | OXYGEN SATURATION: 100 % | BODY MASS INDEX: 21.53 KG/M2 | WEIGHT: 117 LBS | HEIGHT: 62 IN | HEART RATE: 65 BPM | TEMPERATURE: 98 F | DIASTOLIC BLOOD PRESSURE: 78 MMHG | SYSTOLIC BLOOD PRESSURE: 115 MMHG

## 2023-10-18 DIAGNOSIS — K20.0 EOSINOPHILIC ESOPHAGITIS: ICD-10-CM

## 2023-10-18 LAB
B-HCG UR QL: NEGATIVE
CTP QC/QA: YES

## 2023-10-18 PROCEDURE — 81025 URINE PREGNANCY TEST: CPT | Performed by: INTERNAL MEDICINE

## 2023-10-18 PROCEDURE — E9220 PRA ENDO ANESTHESIA: ICD-10-PCS | Mod: ,,, | Performed by: NURSE ANESTHETIST, CERTIFIED REGISTERED

## 2023-10-18 PROCEDURE — E9220 PRA ENDO ANESTHESIA: HCPCS | Mod: ,,, | Performed by: NURSE ANESTHETIST, CERTIFIED REGISTERED

## 2023-10-18 PROCEDURE — 88342 IMHCHEM/IMCYTCHM 1ST ANTB: CPT | Mod: 26,,, | Performed by: STUDENT IN AN ORGANIZED HEALTH CARE EDUCATION/TRAINING PROGRAM

## 2023-10-18 PROCEDURE — 88312 SPECIAL STAINS GROUP 1: CPT | Mod: 26,,, | Performed by: STUDENT IN AN ORGANIZED HEALTH CARE EDUCATION/TRAINING PROGRAM

## 2023-10-18 PROCEDURE — 43239 EGD BIOPSY SINGLE/MULTIPLE: CPT | Mod: ,,, | Performed by: INTERNAL MEDICINE

## 2023-10-18 PROCEDURE — 88341 IMHCHEM/IMCYTCHM EA ADD ANTB: CPT | Mod: 26,,, | Performed by: STUDENT IN AN ORGANIZED HEALTH CARE EDUCATION/TRAINING PROGRAM

## 2023-10-18 PROCEDURE — 88305 TISSUE EXAM BY PATHOLOGIST: CPT | Performed by: STUDENT IN AN ORGANIZED HEALTH CARE EDUCATION/TRAINING PROGRAM

## 2023-10-18 PROCEDURE — 88341 PR IHC OR ICC EACH ADD'L SINGLE ANTIBODY  STAINPR: ICD-10-PCS | Mod: 26,,, | Performed by: STUDENT IN AN ORGANIZED HEALTH CARE EDUCATION/TRAINING PROGRAM

## 2023-10-18 PROCEDURE — 88342 CHG IMMUNOCYTOCHEMISTRY: ICD-10-PCS | Mod: 26,,, | Performed by: STUDENT IN AN ORGANIZED HEALTH CARE EDUCATION/TRAINING PROGRAM

## 2023-10-18 PROCEDURE — 88342 IMHCHEM/IMCYTCHM 1ST ANTB: CPT | Performed by: STUDENT IN AN ORGANIZED HEALTH CARE EDUCATION/TRAINING PROGRAM

## 2023-10-18 PROCEDURE — 88312 SPECIAL STAINS GROUP 1: CPT | Performed by: STUDENT IN AN ORGANIZED HEALTH CARE EDUCATION/TRAINING PROGRAM

## 2023-10-18 PROCEDURE — 63600175 PHARM REV CODE 636 W HCPCS: Performed by: NURSE ANESTHETIST, CERTIFIED REGISTERED

## 2023-10-18 PROCEDURE — 37000009 HC ANESTHESIA EA ADD 15 MINS: Performed by: INTERNAL MEDICINE

## 2023-10-18 PROCEDURE — 88305 TISSUE EXAM BY PATHOLOGIST: ICD-10-PCS | Mod: 26,,, | Performed by: STUDENT IN AN ORGANIZED HEALTH CARE EDUCATION/TRAINING PROGRAM

## 2023-10-18 PROCEDURE — 88312 PR  SPECIAL STAINS,GROUP I: ICD-10-PCS | Mod: 26,,, | Performed by: STUDENT IN AN ORGANIZED HEALTH CARE EDUCATION/TRAINING PROGRAM

## 2023-10-18 PROCEDURE — 43239 PR EGD, FLEX, W/BIOPSY, SGL/MULTI: ICD-10-PCS | Mod: ,,, | Performed by: INTERNAL MEDICINE

## 2023-10-18 PROCEDURE — 88341 IMHCHEM/IMCYTCHM EA ADD ANTB: CPT | Performed by: STUDENT IN AN ORGANIZED HEALTH CARE EDUCATION/TRAINING PROGRAM

## 2023-10-18 PROCEDURE — 37000008 HC ANESTHESIA 1ST 15 MINUTES: Performed by: INTERNAL MEDICINE

## 2023-10-18 PROCEDURE — 25000003 PHARM REV CODE 250: Performed by: INTERNAL MEDICINE

## 2023-10-18 PROCEDURE — 88305 TISSUE EXAM BY PATHOLOGIST: CPT | Mod: 26,,, | Performed by: STUDENT IN AN ORGANIZED HEALTH CARE EDUCATION/TRAINING PROGRAM

## 2023-10-18 PROCEDURE — 25000003 PHARM REV CODE 250: Performed by: NURSE ANESTHETIST, CERTIFIED REGISTERED

## 2023-10-18 PROCEDURE — 43239 EGD BIOPSY SINGLE/MULTIPLE: CPT | Performed by: INTERNAL MEDICINE

## 2023-10-18 PROCEDURE — 27201012 HC FORCEPS, HOT/COLD, DISP: Performed by: INTERNAL MEDICINE

## 2023-10-18 RX ORDER — LIDOCAINE HYDROCHLORIDE 20 MG/ML
INJECTION INTRAVENOUS
Status: DISCONTINUED | OUTPATIENT
Start: 2023-10-18 | End: 2023-10-18

## 2023-10-18 RX ORDER — SODIUM CHLORIDE 9 MG/ML
INJECTION, SOLUTION INTRAVENOUS CONTINUOUS
Status: DISCONTINUED | OUTPATIENT
Start: 2023-10-18 | End: 2023-10-18 | Stop reason: HOSPADM

## 2023-10-18 RX ORDER — PROPOFOL 10 MG/ML
VIAL (ML) INTRAVENOUS
Status: DISCONTINUED | OUTPATIENT
Start: 2023-10-18 | End: 2023-10-18

## 2023-10-18 RX ORDER — PROPOFOL 10 MG/ML
VIAL (ML) INTRAVENOUS CONTINUOUS PRN
Status: DISCONTINUED | OUTPATIENT
Start: 2023-10-18 | End: 2023-10-18

## 2023-10-18 RX ADMIN — PROPOFOL 70 MG: 10 INJECTION, EMULSION INTRAVENOUS at 03:10

## 2023-10-18 RX ADMIN — PROPOFOL 50 MG: 10 INJECTION, EMULSION INTRAVENOUS at 03:10

## 2023-10-18 RX ADMIN — PROPOFOL 200 MCG/KG/MIN: 10 INJECTION, EMULSION INTRAVENOUS at 03:10

## 2023-10-18 RX ADMIN — LIDOCAINE HYDROCHLORIDE 50 MG: 20 INJECTION INTRAVENOUS at 03:10

## 2023-10-18 RX ADMIN — PROPOFOL 50 MG: 10 INJECTION, EMULSION INTRAVENOUS at 04:10

## 2023-10-18 RX ADMIN — PROPOFOL 30 MG: 10 INJECTION, EMULSION INTRAVENOUS at 03:10

## 2023-10-18 RX ADMIN — SODIUM CHLORIDE: 0.9 INJECTION, SOLUTION INTRAVENOUS at 03:10

## 2023-10-18 NOTE — TRANSFER OF CARE
"Anesthesia Transfer of Care Note    Patient: Tasha Cornejo    Procedure(s) Performed: Procedure(s) (LRB):  EGD (ESOPHAGOGASTRODUODENOSCOPY) (N/A)    Patient location: GI    Anesthesia Type: general    Transport from OR: Transported from OR on room air with adequate spontaneous ventilation    Post pain: adequate analgesia    Post assessment: no apparent anesthetic complications and tolerated procedure well    Post vital signs: stable    Level of consciousness: awake, alert and oriented    Nausea/Vomiting: no nausea/vomiting    Complications: none    Transfer of care protocol was followed      Last vitals:   Visit Vitals  /63 (BP Location: Left arm, Patient Position: Lying)   Pulse 80   Temp 36.7 °C (98 °F) (Temporal)   Resp 18   Ht 5' 2" (1.575 m)   Wt 53.1 kg (117 lb)   LMP 09/02/2023 (Exact Date)   SpO2 98%   Breastfeeding No   BMI 21.40 kg/m²     "

## 2023-10-18 NOTE — ANESTHESIA POSTPROCEDURE EVALUATION
Anesthesia Post Evaluation    Patient: Tasha Cornejo    Procedure(s) Performed: Procedure(s) (LRB):  EGD (ESOPHAGOGASTRODUODENOSCOPY) (N/A)    Final Anesthesia Type: general      Patient location during evaluation: GI PACU  Patient participation: Yes- Able to Participate  Level of consciousness: awake and alert  Post-procedure vital signs: reviewed and stable  Pain management: adequate  Airway patency: patent    PONV status at discharge: No PONV  Anesthetic complications: no      Cardiovascular status: blood pressure returned to baseline  Respiratory status: unassisted and spontaneous ventilation  Hydration status: euvolemic  Follow-up not needed.          Vitals Value Taken Time   /63 10/18/23 1609   Temp 36.7 °C (98 °F) 10/18/23 1609   Pulse 80 10/18/23 1609   Resp 18 10/18/23 1609   SpO2 98 % 10/18/23 1609         No case tracking events are documented in the log.      Pain/Andre Score: Andre Score: 6 (10/18/2023  4:09 PM)

## 2023-10-18 NOTE — PROVATION PATIENT INSTRUCTIONS
Discharge Summary/Instructions after an Endoscopic Procedure  Patient Name: Tasha Cornejo  Patient MRN: [id number]  Patient YOB: 1980 Wednesday, October 18, 2023  Fabio Rice MD  Dear patient,  As a result of recent federal legislation (The Federal Cures Act), you may   receive lab or pathology results from your procedure in your [facility]   account before your physician is able to contact you. Your physician or   their representative will relay the results to you with their   recommendations at their soonest availability.  Thank you,  RESTRICTIONS:  During your procedure today, you received medications for sedation.  These   medications may affect your judgment, balance and coordination.  Therefore,   for 24 hours, you have the following restrictions:   - DO NOT drive a car, operate machinery, make legal/financial decisions,   sign important papers or drink alcohol.    ACTIVITY:  Today: no heavy lifting, straining or running due to procedural   sedation/anesthesia.  The following day: return to full activity including work.  DIET:  Eat and drink normally unless instructed otherwise.     TREATMENT FOR COMMON SIDE EFFECTS:  - Mild abdominal pain, nausea, belching, bloating or excessive gas:  rest,   eat lightly and use a heating pad.  - Sore Throat: treat with throat lozenges and/or gargle with warm salt   water.  - Because air was used during the procedure, expelling large amounts of air   from your rectum or belching is normal.  - If a bowel prep was taken, you may not have a bowel movement for 1-3 days.    This is normal.  SYMPTOMS TO WATCH FOR AND REPORT TO YOUR PHYSICIAN:  1. Abdominal pain or bloating, other than gas cramps.  2. Chest pain.  3. Back pain.  4. Signs of infection such as: chills or fever occurring within 24 hours   after the procedure.  5. Rectal bleeding, which would show as bright red, maroon, or black stools.   (A tablespoon of blood from the rectum is not serious, especially if    hemorrhoids are present.)  6. Vomiting.  7. Weakness or dizziness.  GO DIRECTLY TO THE NEAREST EMERGENCY ROOM IF YOU HAVE ANY OF THE FOLLOWING:      Difficulty breathing              Chills and/or fever over 101 F   Persistent vomiting and/or vomiting blood   Severe abdominal pain   Severe chest pain   Black, tarry stools   Bleeding- more than one tablespoon   Any other symptom or condition that you feel may need urgent attention  Your doctor recommends these additional instructions:  If any biopsies were taken, your doctors clinic will contact you in 1 to 2   weeks with any results.  - Patient has a contact number available for emergencies.  The signs and   symptoms of potential delayed complications were discussed with the   patient.  Return to normal activities tomorrow.  Written discharge   instructions were provided to the patient.   - Discharge patient to home.   - Await pathology results.   - The findings and recommendations were discussed with the designated   responsible adult.  For questions, problems or results please call your physician - Fabio Rice MD at Work:  [phone].  [facility] [place] [facility], EMERGENCY ROOM PHONE NUMBER: [phone]  IF A COMPLICATION OR EMERGENCY SITUATION ARISES AND YOU ARE UNABLE TO REACH   YOUR PHYSICIAN - GO DIRECTLY TO THE EMERGENCY ROOM.  Fabio Rice MD  10/18/2023 4:06:50 PM  This report has been verified and signed electronically.  Dear patient,  As a result of recent federal legislation (The Federal Cures Act), you may   receive lab or pathology results from your procedure in your [facility]   account before your physician is able to contact you. Your physician or   their representative will relay the results to you with their   recommendations at their soonest availability.  Thank you,  PROVATION

## 2023-10-18 NOTE — H&P
Short Stay Endoscopy History and Physical    PCP - Dinah Arias, DO     Procedure - EGD  ASA - per anesthesia  Mallampati - per anesthesia  History of Anesthesia problems - no  Family history Anesthesia problems -  no   Plan of anesthesia - General    HPI:  This is a 43 y.o. 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    Surgical History:  has a past surgical history that includes Esophagogastroduodenoscopy (N/A, 2023) and Colonoscopy (N/A, 2023).    Family History: family history includes Albinism in her paternal grandmother; Alzheimer's disease in her paternal grandmother; Breast cancer in an other family member; Diabetes in her paternal grandfather; Heart failure in her paternal grandfather and paternal grandmother.. Otherwise no colon cancer, inflammatory bowel disease, or GI malignancies.    Social History:  reports that she has never smoked. She has never used smokeless tobacco. She reports current alcohol use. She reports that she does not use drugs.    Review of patient's allergies indicates:   Allergen Reactions    Aleve [naproxen sodium] Other (See Comments)     Throat swelling       Medications:   Medications Prior to Admission   Medication Sig Dispense Refill Last Dose    fluticasone propionate (FLONASE) 50 mcg/actuation nasal spray 1 spray by Each Nare route daily as needed for Rhinitis.       hydrocortisone 2.5 % ointment Apply topically 2 (two) times daily as needed.       ketoconazole (NIZORAL) 2 % cream SMARTSI Application Topical 1 to 2 Times Daily       multivitamin (THERAGRAN) per tablet Take 1 tablet by mouth once daily.       mupirocin (BACTROBAN) 2 % ointment Apply topically 2 (two) times daily. 15 g 1     omeprazole (PRILOSEC)  40 MG capsule Take 1 capsule (40 mg total) by mouth every morning. 30 capsule 3     valerian root 500 mg Cap daily as needed.          Physical Exam:    Vital Signs: There were no vitals filed for this visit.    General Appearance: Well appearing in no acute distress  Eyes:    No scleral icterus  ENT: Neck supple, Lips, mucosa, and tongue normal; teeth and gums normal  Abdomen: Soft, non tender, non distended.  Extremities: No edema  Skin: No rash    Labs:  Lab Results   Component Value Date    WBC 6.07 08/30/2023    HGB 13.3 08/30/2023    HCT 41.6 08/30/2023     08/30/2023    CHOL 166 08/30/2023    TRIG 43 08/30/2023    HDL 52 08/30/2023    ALT 11 08/30/2023    AST 19 08/30/2023     08/30/2023    K 4.7 08/30/2023     08/30/2023    CREATININE 0.8 08/30/2023    BUN 8 08/30/2023    CO2 23 08/30/2023    TSH 1.662 08/30/2023    HGBA1C 5.2 08/30/2023       I have explained the risks and benefits of endoscopy procedures to the patient including but not limited to bleeding, perforation, infection, and death.  The patient was asked if they understand and allowed to ask any further questions to their satisfaction.      Poornima Smith MD

## 2023-10-19 RX ORDER — OMEPRAZOLE 40 MG/1
40 CAPSULE, DELAYED RELEASE ORAL EVERY MORNING
Qty: 90 CAPSULE | Refills: 3 | Status: SHIPPED | OUTPATIENT
Start: 2023-10-19

## 2023-10-26 ENCOUNTER — HOSPITAL ENCOUNTER (OUTPATIENT)
Dept: RADIOLOGY | Facility: HOSPITAL | Age: 43
Discharge: HOME | End: 2023-10-26
Attending: INTERNAL MEDICINE
Payer: COMMERCIAL

## 2023-10-26 DIAGNOSIS — R92.8 ABNORMAL FINDING ON BREAST IMAGING: Primary | ICD-10-CM

## 2023-10-26 LAB
FINAL PATHOLOGIC DIAGNOSIS: NORMAL
GROSS: NORMAL
Lab: NORMAL

## 2023-10-26 PROCEDURE — 88360 PR  TUMOR IMMUNOHISTOCHEM/MANUAL: ICD-10-PCS | Mod: 26,,, | Performed by: PATHOLOGY

## 2023-10-26 PROCEDURE — 88360 TUMOR IMMUNOHISTOCHEM/MANUAL: CPT | Mod: 26,,, | Performed by: PATHOLOGY

## 2023-10-26 PROCEDURE — 19081 MAMMO BREAST STEREOTACTIC BREAST BIOPSY RIGHT: ICD-10-PCS | Mod: RT,,, | Performed by: RADIOLOGY

## 2023-10-26 PROCEDURE — 19081 BX BREAST 1ST LESION STRTCTC: CPT | Mod: RT,,, | Performed by: RADIOLOGY

## 2023-10-26 PROCEDURE — 27200939 MAMMO BREAST STEREOTACTIC BREAST BIOPSY RIGHT

## 2023-10-26 PROCEDURE — 88305 TISSUE EXAM BY PATHOLOGIST: CPT | Performed by: PATHOLOGY

## 2023-10-26 PROCEDURE — 88305 TISSUE EXAM BY PATHOLOGIST: ICD-10-PCS | Mod: 26,,, | Performed by: PATHOLOGY

## 2023-10-26 PROCEDURE — 77065 DX MAMMO INCL CAD UNI: CPT | Mod: 26,RT,, | Performed by: RADIOLOGY

## 2023-10-26 PROCEDURE — 25000003 PHARM REV CODE 250: Performed by: INTERNAL MEDICINE

## 2023-10-26 PROCEDURE — 88360 TUMOR IMMUNOHISTOCHEM/MANUAL: CPT | Mod: 59 | Performed by: PATHOLOGY

## 2023-10-26 PROCEDURE — 88305 TISSUE EXAM BY PATHOLOGIST: CPT | Mod: 26,,, | Performed by: PATHOLOGY

## 2023-10-26 PROCEDURE — 77065 DX MAMMO INCL CAD UNI: CPT | Mod: TC,RT

## 2023-10-26 PROCEDURE — 77065 MAMMO DIGITAL DIAGNOSTIC RIGHT: ICD-10-PCS | Mod: 26,RT,, | Performed by: RADIOLOGY

## 2023-10-26 PROCEDURE — A4648 IMPLANTABLE TISSUE MARKER: HCPCS

## 2023-10-26 RX ORDER — LIDOCAINE HYDROCHLORIDE 10 MG/ML
3 INJECTION INFILTRATION; PERINEURAL ONCE
Status: COMPLETED | OUTPATIENT
Start: 2023-10-26 | End: 2023-10-26

## 2023-10-26 RX ORDER — LIDOCAINE HYDROCHLORIDE AND EPINEPHRINE 20; 10 MG/ML; UG/ML
20 INJECTION, SOLUTION INFILTRATION; PERINEURAL ONCE
Status: COMPLETED | OUTPATIENT
Start: 2023-10-26 | End: 2023-10-26

## 2023-10-26 RX ADMIN — LIDOCAINE HYDROCHLORIDE 3 ML: 10 INJECTION, SOLUTION INFILTRATION; PERINEURAL at 02:10

## 2023-10-26 RX ADMIN — LIDOCAINE HYDROCHLORIDE,EPINEPHRINE BITARTRATE 20 ML: 20; .01 INJECTION, SOLUTION INFILTRATION; PERINEURAL at 02:10

## 2023-10-27 ENCOUNTER — PATIENT MESSAGE (OUTPATIENT)
Dept: GASTROENTEROLOGY | Facility: CLINIC | Age: 43
End: 2023-10-27
Payer: COMMERCIAL

## 2023-10-31 ENCOUNTER — TELEPHONE (OUTPATIENT)
Dept: SURGERY | Facility: CLINIC | Age: 43
End: 2023-10-31
Payer: COMMERCIAL

## 2023-10-31 ENCOUNTER — DOCUMENTATION ONLY (OUTPATIENT)
Dept: SURGERY | Facility: CLINIC | Age: 43
End: 2023-10-31
Payer: COMMERCIAL

## 2023-10-31 DIAGNOSIS — C50.919 BREAST CANCER IN FEMALE: Primary | ICD-10-CM

## 2023-10-31 LAB
COMMENT: ABNORMAL
FINAL PATHOLOGIC DIAGNOSIS: ABNORMAL
GROSS: ABNORMAL
Lab: ABNORMAL
SUPPLEMENTAL DIAGNOSIS: ABNORMAL

## 2023-10-31 NOTE — TELEPHONE ENCOUNTER
----- Message from Austyn Ivy MD sent at 10/31/2023  7:17 AM CDT -----  Malignant and concordant.    Thank you,    Austyn Ivy M.D.

## 2023-10-31 NOTE — PROGRESS NOTES
Oncology Navigation   Intake  Date of Diagnosis: 10/26/23  Cancer Type: Breast  Date of Referral: 10/13/23  Initial Nurse Navigator Contact: 10/31/23  Referral to Initial Contact Timeline (days): 18  First Appointment Available: 11/06/23  Appointment Date: 11/06/23  First Available Date vs. Scheduled Date (days): 0     Treatment  Current Status: Staging work-up    Surgical Oncologist: Harvey  Consult Date: 11/06/23    Medical Oncologist: Rae  Consult Date: 11/06/23       Procedures: Biopsy; Screening Mammogram; Diagnostic Mammogram; MRI  Biopsy Schedule Date: 10/26/23  Diagnostic Mammo Schedule Date: 10/12/23  MRI Schedule Date: 11/09/23       ER: Positive  Her2: Postive       Support Systems: Spouse/significant other     Acuity  Treatment Tolerability: Has not started treatment yet/treatment fully completed and side effects resolved  Hospitalization Within the Past Month: 0   Needed: 0  Support: 0  Verbalizes Financial Concerns: 0  Transportation: 0  History of noncompliance/frequent no shows and cancellations: 0  Verbalizes the need for more education: 0  Navigation Acuity: 0     Follow Up  No follow-ups on file.

## 2023-10-31 NOTE — TELEPHONE ENCOUNTER
Called Patient with results of breast biopsy from 10/26/23.  Explained that the biopsy showed IDC . Discussed what this means and that the next step is to meet with a breast surgeon. An appt was made for 11/6/23 with Dr. Gabriel and Dr Mcbride.  Reviewed location of breast center. Patient verbalized understanding.       ----- Message from Austyn Ivy MD sent at 10/31/2023  7:17 AM CDT -----  Malignant and concordant.    Thank you,    Austyn Ivy M.D.

## 2023-11-01 ENCOUNTER — TELEPHONE (OUTPATIENT)
Dept: SURGERY | Facility: CLINIC | Age: 43
End: 2023-11-01
Payer: COMMERCIAL

## 2023-11-06 ENCOUNTER — OFFICE VISIT (OUTPATIENT)
Dept: HEMATOLOGY/ONCOLOGY | Facility: CLINIC | Age: 43
End: 2023-11-06
Payer: COMMERCIAL

## 2023-11-06 ENCOUNTER — OFFICE VISIT (OUTPATIENT)
Dept: SURGERY | Facility: CLINIC | Age: 43
End: 2023-11-06
Payer: COMMERCIAL

## 2023-11-06 VITALS
DIASTOLIC BLOOD PRESSURE: 90 MMHG | BODY MASS INDEX: 21.54 KG/M2 | SYSTOLIC BLOOD PRESSURE: 153 MMHG | BODY MASS INDEX: 21.53 KG/M2 | SYSTOLIC BLOOD PRESSURE: 153 MMHG | HEIGHT: 62 IN | HEART RATE: 73 BPM | TEMPERATURE: 98 F | OXYGEN SATURATION: 100 % | WEIGHT: 117 LBS | HEIGHT: 62 IN | HEART RATE: 73 BPM | RESPIRATION RATE: 17 BRPM | WEIGHT: 117.06 LBS | DIASTOLIC BLOOD PRESSURE: 90 MMHG

## 2023-11-06 DIAGNOSIS — Z17.0 MALIGNANT NEOPLASM OF LOWER-INNER QUADRANT OF RIGHT BREAST OF FEMALE, ESTROGEN RECEPTOR POSITIVE: Primary | ICD-10-CM

## 2023-11-06 DIAGNOSIS — C50.311 MALIGNANT NEOPLASM OF LOWER-INNER QUADRANT OF RIGHT BREAST OF FEMALE, ESTROGEN RECEPTOR POSITIVE: Primary | ICD-10-CM

## 2023-11-06 DIAGNOSIS — C50.911 INVASIVE DUCTAL CARCINOMA OF BREAST, RIGHT: Primary | ICD-10-CM

## 2023-11-06 PROCEDURE — 99999 PR PBB SHADOW E&M-EST. PATIENT-LVL III: ICD-10-PCS | Mod: PBBFAC,,, | Performed by: INTERNAL MEDICINE

## 2023-11-06 PROCEDURE — 1159F MED LIST DOCD IN RCRD: CPT | Mod: CPTII,S$GLB,, | Performed by: SURGERY

## 2023-11-06 PROCEDURE — 99205 PR OFFICE/OUTPT VISIT, NEW, LEVL V, 60-74 MIN: ICD-10-PCS | Mod: S$GLB,,, | Performed by: SURGERY

## 2023-11-06 PROCEDURE — 99999 PR PBB SHADOW E&M-EST. PATIENT-LVL III: CPT | Mod: PBBFAC,,, | Performed by: INTERNAL MEDICINE

## 2023-11-06 PROCEDURE — 99205 OFFICE O/P NEW HI 60 MIN: CPT | Mod: S$GLB,,, | Performed by: SURGERY

## 2023-11-06 PROCEDURE — 3044F PR MOST RECENT HEMOGLOBIN A1C LEVEL <7.0%: ICD-10-PCS | Mod: CPTII,S$GLB,, | Performed by: SURGERY

## 2023-11-06 PROCEDURE — 3077F SYST BP >= 140 MM HG: CPT | Mod: CPTII,S$GLB,, | Performed by: SURGERY

## 2023-11-06 PROCEDURE — 3077F SYST BP >= 140 MM HG: CPT | Mod: CPTII,S$GLB,, | Performed by: INTERNAL MEDICINE

## 2023-11-06 PROCEDURE — 3008F PR BODY MASS INDEX (BMI) DOCUMENTED: ICD-10-PCS | Mod: CPTII,S$GLB,, | Performed by: SURGERY

## 2023-11-06 PROCEDURE — 3080F DIAST BP >= 90 MM HG: CPT | Mod: CPTII,S$GLB,, | Performed by: INTERNAL MEDICINE

## 2023-11-06 PROCEDURE — 1159F PR MEDICATION LIST DOCUMENTED IN MEDICAL RECORD: ICD-10-PCS | Mod: CPTII,S$GLB,, | Performed by: SURGERY

## 2023-11-06 PROCEDURE — 3008F PR BODY MASS INDEX (BMI) DOCUMENTED: ICD-10-PCS | Mod: CPTII,S$GLB,, | Performed by: INTERNAL MEDICINE

## 2023-11-06 PROCEDURE — 99999 PR PBB SHADOW E&M-EST. PATIENT-LVL III: ICD-10-PCS | Mod: PBBFAC,,, | Performed by: SURGERY

## 2023-11-06 PROCEDURE — 3080F PR MOST RECENT DIASTOLIC BLOOD PRESSURE >= 90 MM HG: ICD-10-PCS | Mod: CPTII,S$GLB,, | Performed by: INTERNAL MEDICINE

## 2023-11-06 PROCEDURE — 99999 PR PBB SHADOW E&M-EST. PATIENT-LVL III: CPT | Mod: PBBFAC,,, | Performed by: SURGERY

## 2023-11-06 PROCEDURE — 99205 PR OFFICE/OUTPT VISIT, NEW, LEVL V, 60-74 MIN: ICD-10-PCS | Mod: S$GLB,,, | Performed by: INTERNAL MEDICINE

## 2023-11-06 PROCEDURE — 3077F PR MOST RECENT SYSTOLIC BLOOD PRESSURE >= 140 MM HG: ICD-10-PCS | Mod: CPTII,S$GLB,, | Performed by: INTERNAL MEDICINE

## 2023-11-06 PROCEDURE — 99205 OFFICE O/P NEW HI 60 MIN: CPT | Mod: S$GLB,,, | Performed by: INTERNAL MEDICINE

## 2023-11-06 PROCEDURE — 1160F PR REVIEW ALL MEDS BY PRESCRIBER/CLIN PHARMACIST DOCUMENTED: ICD-10-PCS | Mod: CPTII,S$GLB,, | Performed by: SURGERY

## 2023-11-06 PROCEDURE — 3044F HG A1C LEVEL LT 7.0%: CPT | Mod: CPTII,S$GLB,, | Performed by: SURGERY

## 2023-11-06 PROCEDURE — 3044F HG A1C LEVEL LT 7.0%: CPT | Mod: CPTII,S$GLB,, | Performed by: INTERNAL MEDICINE

## 2023-11-06 PROCEDURE — 3008F BODY MASS INDEX DOCD: CPT | Mod: CPTII,S$GLB,, | Performed by: SURGERY

## 2023-11-06 PROCEDURE — 3044F PR MOST RECENT HEMOGLOBIN A1C LEVEL <7.0%: ICD-10-PCS | Mod: CPTII,S$GLB,, | Performed by: INTERNAL MEDICINE

## 2023-11-06 PROCEDURE — 3008F BODY MASS INDEX DOCD: CPT | Mod: CPTII,S$GLB,, | Performed by: INTERNAL MEDICINE

## 2023-11-06 PROCEDURE — 3080F PR MOST RECENT DIASTOLIC BLOOD PRESSURE >= 90 MM HG: ICD-10-PCS | Mod: CPTII,S$GLB,, | Performed by: SURGERY

## 2023-11-06 PROCEDURE — 1160F RVW MEDS BY RX/DR IN RCRD: CPT | Mod: CPTII,S$GLB,, | Performed by: SURGERY

## 2023-11-06 PROCEDURE — 3080F DIAST BP >= 90 MM HG: CPT | Mod: CPTII,S$GLB,, | Performed by: SURGERY

## 2023-11-06 PROCEDURE — 3077F PR MOST RECENT SYSTOLIC BLOOD PRESSURE >= 140 MM HG: ICD-10-PCS | Mod: CPTII,S$GLB,, | Performed by: SURGERY

## 2023-11-06 NOTE — PLAN OF CARE
START ON PATHWAY REGIMEN - Breast    [id number]        Pertuzumab (Perjeta)       Trastuzumab-xxxx       Docetaxel (Taxotere)       Carboplatin       Pertuzumab (Perjeta)       Trastuzumab-xxxx       Docetaxel (Taxotere)       Carboplatin           Additional Orders: Assess LVEF prior to initiation of trastuzumab and   pertuzumab and as clinically indicated during and after treatment. See PI for   details. Refer to PI for instructions regarding delayed or missed doses of   pertuzumab and trastuzumab.    **Always confirm dose/schedule in your pharmacy ordering system**    Patient Characteristics:  Preoperative or Nonsurgical Candidate (Clinical Staging), Neoadjuvant Therapy   followed by Surgery, Invasive Disease, Chemotherapy, HER2 Positive, ER Positive  Therapeutic Status: Preoperative or Nonsurgical Candidate (Clinical Staging)  AJCC M Category: cM0  AJCC Grade: G3  Breast Surgical Plan: Neoadjuvant Therapy followed by Surgery  ER Status: Positive (+)  AJCC 8 Stage Grouping: IIA  HER2 Status: Positive (+)  AJCC T Category: cT2  AJCC N Category: cN0  LA Status: Negative (-)  Intent of Therapy:  Curative Intent, Discussed with Patient

## 2023-11-06 NOTE — PROGRESS NOTES
[facility]/ [facility]   at [facility] Note:      Chief Complaint:   Encounter Diagnosis   Name Primary?    Malignant neoplasm of lower-inner quadrant of right breast of female, estrogen receptor positive Yes       Cancer Staging   Malignant neoplasm of lower-inner quadrant of right breast of female, estrogen receptor positive  Staging form: Breast, AJCC 8th Edition  - Clinical stage from 10/26/2023: Stage IIA (cT2, cN0, cM0, G3, ER+, MS-, HER2+) - Signed by Linda Mcbride MD on 2023      HPI:  Tasha Cornejo is a 43 y.o. female who presents today for evaluation of newly diagnosed breast cancer.     Oncology History  Screening mammogram on 10/5/2023 identified coarse heterogeneous calcifications in a linear distribution seen in the lower outer quadrant of the right breast, spanning 1.5 cm.  Diagnostic mammogram on 10/12/2023 showed coarse heterogeneous calcifications in a regional distribution spanning 3.2 cm long axis   Biopsy 10/26/2023 with pathology revealing infiltrating ductal carcinoma of the breast, ER 70%/ MS-/HER2 3+; Ki67 60%       GYN History:  Age of menarche was 9.   Age of menopause n/a.  Patient denies hormonal therapy.   Patient is .         Social History     Tobacco Use    Smoking status: Never    Smokeless tobacco: Never   Substance Use Topics    Alcohol use: Yes     Comment: rarely    Drug use: No     Family History   Problem Relation Age of Onset    Heart failure Paternal Grandmother     Albinism Paternal Grandmother     Alzheimer's disease Paternal Grandmother     Diabetes Paternal Grandfather     Heart failure Paternal Grandfather     Breast cancer Other     Cataracts Neg Hx     Glaucoma Neg Hx     Macular degeneration Neg Hx     Colon cancer Neg Hx     Esophageal cancer Neg Hx      Past Medical History:   Diagnosis Date    Chalazion     Malignant neoplasm of lower-inner quadrant of right breast of female, estrogen  receptor positive 2023     Past Surgical History:   Procedure Laterality Date    COLONOSCOPY N/A 2023    Procedure: COLONOSCOPY;  Surgeon: Fabio Rice MD;  Location: [facility] (4TH FLR);  Service: Endoscopy;  Laterality: N/A;  pt confirmed earlier time  EB    ESOPHAGOGASTRODUODENOSCOPY N/A 2023    Procedure: EGD (ESOPHAGOGASTRODUODENOSCOPY);  Surgeon: Fabio Rice MD;  Location: [facility] (4TH FLR);  Service: Endoscopy;  Laterality: N/A;  Procedure Timin-4 weeks    referred by Dr. Rice/Prep instructions handed to patient and to portal.  Patient called did not answer- DB    ESOPHAGOGASTRODUODENOSCOPY N/A 10/18/2023    Procedure: EGD (ESOPHAGOGASTRODUODENOSCOPY);  Surgeon: Fabio Rice MD;  [facility]  Service: Endoscopy;  Laterality: N/A;  ref Dwayne  timeframe 5-12 weeks   Dr. Rice patient  portal instruction and given to patient   10/11-precall complete-MS       Patient Active Problem List   Diagnosis    Rhinitis, allergic    Allergic conjunctivitis    Vitamin D deficiency    Malignant neoplasm of lower-inner quadrant of right breast of female, estrogen receptor positive       Current Outpatient Medications   Medication Instructions    fluticasone propionate (FLONASE) 50 mcg/actuation nasal spray 1 spray, Each Nostril, Daily PRN    hydrocortisone 2.5 % ointment Topical (Top), 2 times daily PRN    ketoconazole (NIZORAL) 2 % cream SMARTSI Application Topical 1 to 2 Times Daily    multivitamin (THERAGRAN) per tablet 1 tablet, Oral, Daily    mupirocin (BACTROBAN) 2 % ointment Topical (Top), 2 times daily    omeprazole (PRILOSEC) 40 mg, Oral, Every morning    valerian root 500 mg Cap Daily PRN       Review of Systems:   Review of Systems   Respiratory:  Positive for cough. Negative for shortness of breath.    Cardiovascular:  Negative for chest pain.   Gastrointestinal:  Positive for diarrhea. Negative for abdominal pain.   Genitourinary:  Negative for frequency.  "  Musculoskeletal:  Negative for back pain.   Skin:  Negative for rash.   Neurological:  Negative for headaches.   Psychiatric/Behavioral:  The patient is nervous/anxious.    All other systems reviewed and are negative.      PHYSICAL EXAM:  BP (!) 153/90   Pulse 73   Temp 98 °F (36.7 °C) (Oral)   Resp 17   Ht 5' 2" (1.575 m)   Wt 53.1 kg (117 lb 1 oz)   LMP 10/03/2023 (Exact Date)   SpO2 100%   BMI 21.41 kg/m²     General Appearance:    Alert, cooperative, no distress, appears stated age   Lungs:     Clear to auscultation bilaterally, respirations unlabored    Heart:    Regular rate and rhythm, S1 and S2 normal   Breast Exam:    R breast with ecchymosis: L breast normal. No masses, no tenderness, no skin or nipple abnormalities.  No lymphadenopathy.    Abdomen:     Soft, non-tender, bowel sounds active, no masses, no organomegaly   Extremities:   Extremities normal, atraumatic, no cyanosis or edema   Pulses:   2+ and symmetric all extremities   Skin:   Skin color, texture, turgor normal, no rashes or lesions   Lymph nodes:   Cervical, supraclavicular, and axillary nodes normal   Neurologic:   CNII-XII intact, normal gait           Pertinent Labs & Imaging:  Specimen (730h ago, onward)      None            No results found for this or any previous visit (from the past 24 hour(s)).    Assessment & Plan:    1. Malignant neoplasm of lower-inner quadrant of right breast of female, estrogen receptor positive      Reviewed patients referring notes, imaging and pathology. Discussed diagnosis, staging, and treatment in detail with patient.   Given >2cm in calcifications, would recommend NACT.   We have recommended TCHP that is Docetaxel at 75 mg/meter squared), Carboplatin (at an AUC of 6) given every 21 days (3 weeks) with Herceptin (Trastuzumab) and Perjeta (Pertuzumab). I have recommended a total of 6 cycles of therapy. After completion of the first 6 cycles, a determination will be made of adjuvant targeted " therapy.     First Infusion Loading Dose of Perjeta is 840 mg, followed by 420 mg every 3 weeks for 5 cycles, (with chemo). First loading dose of Trastuzumab will be 8mg/Kg over 90 minutes followed 3 weeks later by the second Infusion of Trastuzumab 6mg/kg over 60 minutes Q 3 weeks and if tolerated, all subsequent Infusions of Trastuzumab but may be over 30 minutes until completion of 18 total cycles.     Overall side effects of chemotherapy were discussed including alopecia, immunosuppression, Neutropenia (low white count), anemia (low hemoglobin), thrombocytopenia (lowered platelets), and neuropathy from taxotere. Other side effects such as nail changes, dry eyes, mouth sores, and bone pain were discussed. The need for growth factor support was also discussed. I have recommended Neulasta 24 to 48 hours status post chemotherapy. Perjeta can be associated with a mild to severe skin rash. Rare but serious side effects such as increased risk of cardiac toxicity were discussed with Herceptin and Perjeta. Monitoring of her cardiac function with MUGAs or an echocardiogram is strongly recommended every 6-9 weeks by NCCN guidelines while on louis-adjuvant Perjeta and Herceptin.       Route Chart for Scheduling    Med Onc Chart Routing      Follow up with physician . Follow up for first day of chemo   Follow up with BHARGAVI    Infusion scheduling note    Injection scheduling note    Labs    Imaging    Pharmacy appointment    Other referrals                     MDM includes  :    - Acute or chronic illness or injury that poses a threat to life or bodily function  - Review of prior external notes from unique source  - Independent review and explanation of 3+ results from unique tests  - Discussion of management and ordering 3+ unique tests  - Extensive discussion of treatment and management  - Drug therapy requiring intensive monitoring for toxicity        Linda Mcbride MD   11/06/2023

## 2023-11-06 NOTE — PROGRESS NOTES
Breast Surgery  [facility]  Department of Surgery      REFERRING PROVIDER: Julia Sandoval MD   [facility]  [place]  [place] [address]    Chief Complaint: Breast Cancer (Multi-d HER2+)      Subjective:      Patient ID: Tasha Cornejo is a 43 y.o. female who presents with right breast Invasive Ductal Carcinoma.     She presented for screening mammogram on 10/5/2023 for yearly scheduled screening. This identified arse heterogeneous calcifications in a linear distribution seen in the lower outer quadrant of the right breast, spanning 1.5 cm. . Follow-up mammogram on 10/12/2023 showed  coarse heterogeneous calcifications in a regional distribution spanning 3.2 cm long axis . A stereotactic biopsy was performed on 10/26/2023 with pathology revealing infiltrating ductal carcinoma of the breast.     Findings at that time were the following:   Lesion 1: Invasive ductal carcinoma   Location: Right breast lower outer quadrant anterior depth    Clip:x marker, in expected position  Tumor size: 3.2 cm   Tumor stgstrstastdstest:st st1st Estrogen Receptor: +   Progesterone Receptor: -   Her-2 mark: +   Lymph node status: clinically negative.     Patient has not noted a change on breast exam.  Patient denies nipple discharge. Patient denies previous breast biopsy. Patient denies a personal history of breast cancer. Family history includes breast cancer in paternal great aunt, prostate cancer in father and lung cancer in father.     GYN History:  Age of menarche was 9. premenopausal.  Patient denies hormonal therapy. Patient is .     Past Medical History:   Diagnosis Date    [place]     Malignant neoplasm of lower-inner quadrant of right breast of female, estrogen receptor positive 2023     Past Surgical History:   Procedure Laterality Date    COLONOSCOPY N/A 2023    Procedure: COLONOSCOPY;  Surgeon: Fabio Rice MD;  Location: [address];  Service: Endoscopy;  Laterality: N/A;  pt confirmed earlier  time  EB    ESOPHAGOGASTRODUODENOSCOPY N/A 2023    Procedure: EGD (ESOPHAGOGASTRODUODENOSCOPY);  Surgeon: Fabio Rice MD;  [facility]  Service: Endoscopy;  Laterality: N/A;  Procedure Timin-4 weeks    referred by Dr. Rice/Prep instructions handed to patient and to portal.  Patient called did not answer- DB    ESOPHAGOGASTRODUODENOSCOPY N/A 10/18/2023    Procedure: EGD (ESOPHAGOGASTRODUODENOSCOPY);  Surgeon: Fabio Rice MD;  Location[facility]  Service: Endoscopy;  Laterality: N/A;  ref Dwayne  timeframe 5-12 weeks   Dr. Rice patient  portal instruction and given to patient jm  10/11-preca complete-MS     Current Outpatient Medications on File Prior to Visit   Medication Sig Dispense Refill    fluticasone propionate (FLONASE) 50 mcg/actuation nasal spray 1 spray by Each Nare route daily as needed for Rhinitis.      hydrocortisone 2.5 % ointment Apply topically 2 (two) times daily as needed.      ketoconazole (NIZORAL) 2 % cream SMARTSI Application Topical 1 to 2 Times Daily      multivitamin (THERAGRAN) per tablet Take 1 tablet by mouth once daily.      mupirocin (BACTROBAN) 2 % ointment Apply topically 2 (two) times daily. 15 g 1    omeprazole (PRILOSEC) 40 MG capsule Take 1 capsule (40 mg total) by mouth every morning. 90 capsule 3    valerian root 500 mg Cap daily as needed.       No current facility-administered medications on file prior to visit.     Social History     Socioeconomic History    Marital status:    Occupational History    Occupation:    Tobacco Use    Smoking status: Never    Smokeless tobacco: Never   Substance and Sexual Activity    Alcohol use: Yes     Comment: rarely    Drug use: No     Social Determinants of Health     Financial Resource Strain: Low Risk  (2023)    Overall Financial Resource Strain (CARDIA)     Difficulty of Paying Living Expenses: Not hard at all   Food Insecurity: No Food Insecurity (2023)     "Hunger Vital Sign     Worried About Running Out of Food in the Last Year: Never true     Ran Out of Food in the Last Year: Never true   Transportation Needs: No Transportation Needs (11/2/2023)    PRAPARE - Transportation     Lack of Transportation (Medical): No     Lack of Transportation (Non-Medical): No   Physical Activity: Insufficiently Active (11/2/2023)    Exercise Vital Sign     Days of Exercise per Week: 3 days     Minutes of Exercise per Session: 40 min   Stress: Stress Concern Present (11/2/2023)    [employer] [place] [employer] - Occupational Stress Questionnaire     Feeling of Stress : To some extent   Social Connections: Unknown (11/2/2023)    Social Connection and Isolation Panel [NHANES]     Frequency of Communication with Friends and Family: Once a week     Frequency of Social Gatherings with Friends and Family: Patient refused     Active Member of Clubs or Organizations: No     Attends Club or Organization Meetings: Patient refused     Marital Status:    Housing Stability: Low Risk  (11/2/2023)    Housing Stability Vital Sign     Unable to Pay for Housing in the Last Year: No     Number of Places Lived in the Last Year: 1     Unstable Housing in the Last Year: No     Family History   Problem Relation Age of Onset    Heart failure Paternal Grandmother     Albinism Paternal Grandmother     Alzheimer's disease Paternal Grandmother     Diabetes Paternal Grandfather     Heart failure Paternal Grandfather     Breast cancer Other     Cataracts Neg Hx     Glaucoma Neg Hx     Macular degeneration Neg Hx     Colon cancer Neg Hx     Esophageal cancer Neg Hx         Review of Systems   All other systems reviewed and are negative.    Objective:   BP (!) 153/90 (BP Location: Right arm, Patient Position: Sitting, BP Method: Medium (Automatic))   Pulse 73   Ht 5' 2" (1.575 m)   Wt 53.1 kg (117 lb)   LMP 10/03/2023 (Exact Date)   BMI 21.40 kg/m²     Physical Exam   Constitutional: She is " oriented to person, place, and time. No distress.   Cardiovascular:  Normal rate.            Pulmonary/Chest: Effort normal. No respiratory distress. Right breast exhibits no inverted nipple, no mass, no nipple discharge, no skin change and no tenderness. Left breast exhibits no inverted nipple, no mass, no nipple discharge, no skin change and no tenderness. No breast swelling or bleeding.   Genitourinary: No breast swelling or bleeding.   Musculoskeletal: Lymphadenopathy:      Cervical: No cervical adenopathy.      Upper Body:      Right upper body: No supraclavicular or axillary adenopathy.      Left upper body: No supraclavicular or axillary adenopathy.     Neurological: She is oriented to person, place, and time.   Skin: Skin is warm and dry. She is not diaphoretic.     Psychiatric: Her behavior is normal. Mood normal.       Radiology review: Images personally reviewed by me in the clinic and shown to the patient during the consultation.     Assessment:       1. Malignant neoplasm of lower-inner quadrant of right breast of female, estrogen receptor positive        Plan:     Multidisciplinary nature of breast cancer care was discussed in detail at today's visit.    MRI pending to further evaluate extent of disease.     We also discussed the role of systemic therapy in the treatment of breast cancer. We discussed that neoadjuvant chemotherapy is utilized for certain patients. The benefits of chemotherapy given in the neoadjuvant setting as opposed to the adjuvant setting are multiple.  The tumour response to chemotherapy can help limit the extent of breast and axillary surgery. The tumor may decrease in size so the tumor can be removed with a smaller portion of the breast. Lymph nodes that have tumor in them prior to chemotherapy may have no remaining tumor after chemotherapy which may change axillary surgery recommendations. Neoadjuvant chemotherapy also allows us to evaluate the tumor response to treatment based  on the pathology findings at surgery. She will be referred to medical oncology to discuss options for neoadjuvant chemotherapy.     We discussed that surgical options would include a lumpectomy versus a mastectomy.  We discussed there is no survival benefit to undergoing a mastectomy compared to lumpectomy.  Based on clinical exam and imaging, she would  be a good candidate for breast conservation prior to neoadjuvant therapy.  Surgical technique and rationale was discussed with the patient.  We discussed that this would be done as a reflector localized excision of the primary tumor along with a surrounding margin of normal breast tissue.  The concept of local control was explained to the patient.  She understands that we would aim to achieve negative margins at the time of initial surgery.  There is however an approximately 15% risk of a positive margin that would require take back for reexcision.  Risks and benefits of the procedure were discussed.  Risks include but are not limited to infection, bleeding, poor cosmesis, positive margins requiring reexcision, and local and distant recurrence. Clips will be placed in the tumor bed to leticia the location for radiation, future imaging and/ or re-excisions.     We discussed the option for mastectomy.  We discussed the risks and benefits of mastectomy. Risks include but are not limited to risk of bleeding, infection, poor cosmesis, need for additional surgery, clip placement, scaring, pain including phantom pain, fluid collections, shoulder stiffness, prolonged healing, and recurrence. Reconstruction after mastectomy was discussed.  Reconstructive generally include implant or autologous tissue reconstruction. There are certain health qualifications that the patient must meet in order to be a candidate for reconstruction. We discussed the option for contralateral prophylactic mastectomy.  Undergoing a contralateral prophylactic mastectomy does not improve the cure rate  for the known cancer or reduce the risk of the that cancer returning.  Overall most women diagnosed with breast cancer have a 2-6% risk of developing a breast cancer in the opposite breast in the next 10 years.  Contralateral prophylactic mastectomy is not 100% protected against development of a new breast cancer.  Undergoing surgery on the contralateral breast has the risk of surgical site complications which could delay cancer treatments.  Most women who proceed with contralateral prophylactic mastectomy do so due to symmetry concerns or for peace of mind.      We also discussed axillary staging using sentinel node biopsy.  [place] lymph node biopsies performed utilizing the injection of blue and radioactive dye.  This dye travels to the 1st few lymph nodes that drain the breast.  Lymph nodes that uptake the blue or radioactive dye or are palpable are surgically removed and sent to pathology.  Typically 1-5 lymph nodes are removed during this procedure although exact numbers vary depending on the patient.  This procedure allows sampling of the lymph nodes most at risk for metastasis.  Recent studies have shown the utilization of this procedure in patients  who have undergone neoadjuvant chemotherapy with a good clinical response.   I will send the lymph nodes to pathology for frozen section at the time of surgery and if no cancer is identified then no additional axillary surgery will be performed.  However if residual metastatic disease is identified in the sentinel lymph node biopsy then standard of care recommends proceeding with completion axillary lymph node dissection.  We discussed that axillary lymph node dissection involved removing all the level 1 and 2 axillary lymph nodes.  This procedure has increased risk of lymphedema upwards of 30% and increased risk of nerve injury when compared to sentinel lymph node biopsy. Risks include but are not limited to lymphedema, bleeding, infection, poor cosmesis,  numbness of the incision site or arm, injury to nerves involved in movement of the arm and shoulder, seroma, failure of dye to map, and need for additional surgery.       Based on her medical history she is a low risk of complications. Materials utilized in the surgery include surgical metal clips, suture material, and skin adhesives. These materials can lead to allergic reactions, skin irration, and other complications. Medications utilized in surgery include but are not limited to antibiotics, isosulfan blue dye, and technetium sulfa colloid.  Given the patient's allergy history  there is no expected allergic reaction.     The role of adjuvant radiation therapy following breast conservation surgery was also discussed with the patient. We discussed that when looking at all patient populations risk of local recurrence with lumpectomy alone is high and radiation therapy is recommended in most cases. We discussed that radiation is also typically recommended after mastectomy for patients with large tumors and certain patients with positive lymph nodes. We discussed that final recommendations on radiation would be based on final surgical pathology. The duration and treatment side effects were discussed with the patient.  She'll be referred to radiation oncology post-operatively for further discussion of her options.     She does meet NCCN guidelines for genetic testing based on her family history. We discussed genetic testing at length and she will like to proceed.     Following her discussion today,  Final plan will be pending response to chemotherapy. Plan for repeat breast imaging and clinic follow up after completion of chemotherapy.     Patient was given patient information binder including [facility] breast cancer treatment brochure.  All her questions were answered.    Total time spent with the patient: 60 minutes.  45 minutes of face to face consultation and 15 minutes of chart review and coordination of care.

## 2023-11-07 ENCOUNTER — PATIENT MESSAGE (OUTPATIENT)
Dept: SURGERY | Facility: CLINIC | Age: 43
End: 2023-11-07
Payer: COMMERCIAL

## 2023-11-07 ENCOUNTER — DOCUMENTATION ONLY (OUTPATIENT)
Dept: SURGERY | Facility: CLINIC | Age: 43
End: 2023-11-07
Payer: COMMERCIAL

## 2023-11-07 DIAGNOSIS — C50.311 MALIGNANT NEOPLASM OF LOWER-INNER QUADRANT OF RIGHT BREAST OF FEMALE, ESTROGEN RECEPTOR POSITIVE: Primary | ICD-10-CM

## 2023-11-07 DIAGNOSIS — Z17.0 MALIGNANT NEOPLASM OF LOWER-INNER QUADRANT OF RIGHT BREAST OF FEMALE, ESTROGEN RECEPTOR POSITIVE: Primary | ICD-10-CM

## 2023-11-07 NOTE — NURSING
Nurse Navigator Note:     Met with patient during her consult with Dr. Gabriel.  Patient and I reviewed the information she discussed with Dr. Gabriel, including treatment options, diagnosis, and future plans for workup. Patient and I went through the new patient booklet, explained some of the information and why it is provided.     Also offered patient consults with our other specialty clinics: Integrative Oncology, Survivorship and/or Women's Gynecologic needs, our breast physical therapy department for pre-op and post-operative assessments, Oncologic Psychology for psychological support, and Oncologic Nutrition for nutritional counseling. Explained to patient that all of these support services are completely optional. Discussed that physical therapy may call patient to offer pre-op appt, and what that appt would entail.     Patient was given a copy of her appointments, Dr. Gabriel's card, and my card. Encouraged her to call me if she has any questions or concerns or would like to schedule any additional appointments. Verbalized understanding of all information.    Genetics to be drawn 11/9/2023. Referrals placed for PT and integrative oncology. E mail added to support group. Echo and chemo education scheduled.  Oncology Navigation   Intake  Date of Diagnosis: 10/26/23  Cancer Type: Breast  Date of Referral: 10/13/23  Initial Nurse Navigator Contact: 10/31/23  Referral to Initial Contact Timeline (days): 18  Date Worked: 11/07/23  First Appointment Available: 11/06/23  Appointment Date: 11/06/23  First Available Date vs. Scheduled Date (days): 0     Treatment  Current Status: Staging work-up    Surgical Oncologist: Harvey  Consult Date: 11/06/23    Medical Oncologist: Rae  Consult Date: 11/06/23  Chemotherapy: Planned  Chemotherapy Regimen: TCHP       Procedures: Genetic test; Port / PICC; Echo  Biopsy Schedule Date: 10/26/23  Echo Schedule Date: 11/10/23  Genetic Testing Date Sent: 11/09/23  Diagnostic Mammo Schedule  Date: 10/12/23  MRI Schedule Date: 11/09/23  Port / PICC Schedule Date:  (referral placed 11/7/2023)    Physical Therapy Referral Date: 11/07/23    ER: Positive  Her2: Postive       Support Systems: Spouse/significant other     Acuity  Treatment Tolerability: Has not started treatment yet/treatment fully completed and side effects resolved  Hospitalization Within the Past Month: 0   Needed: 0  Support: 0  Verbalizes Financial Concerns: 0  Transportation: 0  History of noncompliance/frequent no shows and cancellations: 0  Verbalizes the need for more education: 0  Navigation Acuity: 0     Follow Up  Follow up in about 3 days (around 11/10/2023) for f/u on auth.

## 2023-11-08 ENCOUNTER — DOCUMENTATION ONLY (OUTPATIENT)
Dept: SURGERY | Facility: CLINIC | Age: 43
End: 2023-11-08
Payer: COMMERCIAL

## 2023-11-08 ENCOUNTER — TELEPHONE (OUTPATIENT)
Dept: INTERVENTIONAL RADIOLOGY/VASCULAR | Facility: CLINIC | Age: 43
End: 2023-11-08
Payer: COMMERCIAL

## 2023-11-08 DIAGNOSIS — C50.311 MALIGNANT NEOPLASM OF LOWER-INNER QUADRANT OF RIGHT BREAST OF FEMALE, ESTROGEN RECEPTOR POSITIVE: Primary | ICD-10-CM

## 2023-11-08 DIAGNOSIS — Z17.0 MALIGNANT NEOPLASM OF LOWER-INNER QUADRANT OF RIGHT BREAST OF FEMALE, ESTROGEN RECEPTOR POSITIVE: Primary | ICD-10-CM

## 2023-11-08 NOTE — PROGRESS NOTES
Genetics Lay Navigator Note:    Nurse Navigator : CORA Hernandez RN    Met with patient at her consult with Dr. Gabriel (11/6/2023), to facilitate genetic testing. Set patient up with [employer] genetic counselor over the phone to complete counseling prior to testing. Patient verbalized understanding to all counseling information. [employer] brochure with number to call with questions or concerns provided to patient as well as my card. Encouraged patient to call me or [employer] at any time.     Lab appointment scheduled for Thursday 11/9/2023 at [facility], [employer] kit given to the patient to bring to the lab for specimen draw and processing. Patient instructed that results will be provided as soon as they are available. No questions or concerns from patient about plan of care.     [employer] Genetic Pedigree & TRF scanned in media and attached to this documentation note.    [employer] Tracking # [id number]

## 2023-11-09 ENCOUNTER — HOSPITAL ENCOUNTER (OUTPATIENT)
Dept: RADIOLOGY | Facility: OTHER | Age: 43
Discharge: HOME | End: 2023-11-09
Attending: SURGERY
Payer: COMMERCIAL

## 2023-11-09 DIAGNOSIS — C50.919 BREAST CANCER IN FEMALE: ICD-10-CM

## 2023-11-09 PROCEDURE — 77049 MRI BREAST C-+ W/CAD BI: CPT | Mod: TC

## 2023-11-09 PROCEDURE — A9577 INJ MULTIHANCE: HCPCS | Performed by: SURGERY

## 2023-11-09 PROCEDURE — 25500020 PHARM REV CODE 255: Performed by: SURGERY

## 2023-11-09 PROCEDURE — 77049 MRI BREAST W/WO CONTRAST, W/CAD, BILATERAL: ICD-10-PCS | Mod: 26,,, | Performed by: RADIOLOGY

## 2023-11-09 PROCEDURE — 77049 MRI BREAST C-+ W/CAD BI: CPT | Mod: 26,,, | Performed by: RADIOLOGY

## 2023-11-09 RX ADMIN — GADOBENATE DIMEGLUMINE 10 ML: 529 INJECTION, SOLUTION INTRAVENOUS at 01:11

## 2023-11-10 ENCOUNTER — TELEPHONE (OUTPATIENT)
Dept: HEMATOLOGY/ONCOLOGY | Facility: CLINIC | Age: 43
End: 2023-11-10
Payer: COMMERCIAL

## 2023-11-10 ENCOUNTER — HOSPITAL ENCOUNTER (OUTPATIENT)
Dept: CARDIOLOGY | Facility: HOSPITAL | Age: 43
Discharge: HOME | End: 2023-11-10
Attending: INTERNAL MEDICINE
Payer: COMMERCIAL

## 2023-11-10 ENCOUNTER — OFFICE VISIT (OUTPATIENT)
Dept: OPTOMETRY | Facility: CLINIC | Age: 43
End: 2023-11-10
Payer: COMMERCIAL

## 2023-11-10 DIAGNOSIS — G43.801 OCULAR MIGRAINE WITH STATUS MIGRAINOSUS: Primary | ICD-10-CM

## 2023-11-10 DIAGNOSIS — H52.533 ACCOMMODATION SPASM, BILATERAL: ICD-10-CM

## 2023-11-10 DIAGNOSIS — H52.4 PRESBYOPIA: ICD-10-CM

## 2023-11-10 DIAGNOSIS — Z17.0 MALIGNANT NEOPLASM OF LOWER-INNER QUADRANT OF RIGHT BREAST OF FEMALE, ESTROGEN RECEPTOR POSITIVE: ICD-10-CM

## 2023-11-10 DIAGNOSIS — C50.311 MALIGNANT NEOPLASM OF LOWER-INNER QUADRANT OF RIGHT BREAST OF FEMALE, ESTROGEN RECEPTOR POSITIVE: Primary | ICD-10-CM

## 2023-11-10 DIAGNOSIS — Z17.0 MALIGNANT NEOPLASM OF LOWER-INNER QUADRANT OF RIGHT BREAST OF FEMALE, ESTROGEN RECEPTOR POSITIVE: Primary | ICD-10-CM

## 2023-11-10 DIAGNOSIS — C50.311 MALIGNANT NEOPLASM OF LOWER-INNER QUADRANT OF RIGHT BREAST OF FEMALE, ESTROGEN RECEPTOR POSITIVE: ICD-10-CM

## 2023-11-10 DIAGNOSIS — H52.03 HYPEROPIA, BILATERAL: ICD-10-CM

## 2023-11-10 PROCEDURE — 92015 DETERMINE REFRACTIVE STATE: CPT | Mod: S$GLB,,, | Performed by: OPTOMETRIST

## 2023-11-10 PROCEDURE — 99999 PR PBB SHADOW E&M-EST. PATIENT-LVL II: CPT | Mod: PBBFAC,,, | Performed by: OPTOMETRIST

## 2023-11-10 PROCEDURE — 3044F HG A1C LEVEL LT 7.0%: CPT | Mod: CPTII,S$GLB,, | Performed by: OPTOMETRIST

## 2023-11-10 PROCEDURE — 99999 PR PBB SHADOW E&M-EST. PATIENT-LVL II: ICD-10-PCS | Mod: PBBFAC,,, | Performed by: OPTOMETRIST

## 2023-11-10 PROCEDURE — 92015 PR REFRACTION: ICD-10-PCS | Mod: S$GLB,,, | Performed by: OPTOMETRIST

## 2023-11-10 PROCEDURE — 3044F PR MOST RECENT HEMOGLOBIN A1C LEVEL <7.0%: ICD-10-PCS | Mod: CPTII,S$GLB,, | Performed by: OPTOMETRIST

## 2023-11-10 PROCEDURE — 92014 COMPRE OPH EXAM EST PT 1/>: CPT | Mod: S$GLB,,, | Performed by: OPTOMETRIST

## 2023-11-10 PROCEDURE — 1159F PR MEDICATION LIST DOCUMENTED IN MEDICAL RECORD: ICD-10-PCS | Mod: CPTII,S$GLB,, | Performed by: OPTOMETRIST

## 2023-11-10 PROCEDURE — 1159F MED LIST DOCD IN RCRD: CPT | Mod: CPTII,S$GLB,, | Performed by: OPTOMETRIST

## 2023-11-10 PROCEDURE — 92014 PR EYE EXAM, EST PATIENT,COMPREHESV: ICD-10-PCS | Mod: S$GLB,,, | Performed by: OPTOMETRIST

## 2023-11-10 NOTE — TELEPHONE ENCOUNTER
Left pt. a voicemail in regards to referral placed by Dr. Mcbride for overall health. MA instructed pt. in voicemail to call the office for scheduling.     [place], [place] Ext. [phone]

## 2023-11-11 NOTE — PROGRESS NOTES
LETICIA    MEG: 6/03/2021 - Dr. Bills    CC: Pt is here today for a routine eye exam. Pt states that the episode of   flashing lights/colors around peripheral vision only happen twice and   hasn't occurred again. Pt states that near vision and computer distance   has decline since last exam. Pt feels like she is having more difficulty   focusing at those distances.    (-)Dryness  (-)Burning  (-)Itchiness  (-)Tearing  (-)Flashes  (-)Floaters   (-)Photophobia  (-)Eye Pain      Diabetic: no    Contact Lens: no    Eye Meds: no  Last edited by Rosa Long MA on 11/10/2023  3:51 PM.            Assessment /Plan     For exam results, see Encounter Report.    Ocular migraine with status migrainosus   Good internal ocular health, monitor yearly    Presbyopia  Hyperopia, bilateral  Accommodation spasm, bilateral     Rx specs, discussed near only for now and need for PAL in the future    RTC 1 year

## 2023-11-13 ENCOUNTER — TELEPHONE (OUTPATIENT)
Dept: HEMATOLOGY/ONCOLOGY | Facility: CLINIC | Age: 43
End: 2023-11-13
Payer: COMMERCIAL

## 2023-11-13 ENCOUNTER — PATIENT MESSAGE (OUTPATIENT)
Dept: ADMINISTRATIVE | Facility: OTHER | Age: 43
End: 2023-11-13
Payer: COMMERCIAL

## 2023-11-13 ENCOUNTER — CLINICAL SUPPORT (OUTPATIENT)
Dept: HEMATOLOGY/ONCOLOGY | Facility: CLINIC | Age: 43
End: 2023-11-13
Payer: COMMERCIAL

## 2023-11-13 VITALS
SYSTOLIC BLOOD PRESSURE: 153 MMHG | HEART RATE: 61 BPM | TEMPERATURE: 98 F | OXYGEN SATURATION: 99 % | WEIGHT: 117.81 LBS | HEIGHT: 62 IN | DIASTOLIC BLOOD PRESSURE: 81 MMHG | RESPIRATION RATE: 20 BRPM | BODY MASS INDEX: 21.68 KG/M2

## 2023-11-13 DIAGNOSIS — R11.0 CHEMOTHERAPY-INDUCED NAUSEA: Primary | ICD-10-CM

## 2023-11-13 DIAGNOSIS — Z17.0 MALIGNANT NEOPLASM OF LOWER-INNER QUADRANT OF RIGHT BREAST OF FEMALE, ESTROGEN RECEPTOR POSITIVE: Primary | ICD-10-CM

## 2023-11-13 DIAGNOSIS — Z17.0 MALIGNANT NEOPLASM OF LOWER-INNER QUADRANT OF RIGHT BREAST OF FEMALE, ESTROGEN RECEPTOR POSITIVE: ICD-10-CM

## 2023-11-13 DIAGNOSIS — C50.311 MALIGNANT NEOPLASM OF LOWER-INNER QUADRANT OF RIGHT BREAST OF FEMALE, ESTROGEN RECEPTOR POSITIVE: Primary | ICD-10-CM

## 2023-11-13 DIAGNOSIS — T45.1X5A CHEMOTHERAPY-INDUCED NAUSEA: Primary | ICD-10-CM

## 2023-11-13 DIAGNOSIS — R45.89 ANXIETY ABOUT HEALTH: ICD-10-CM

## 2023-11-13 DIAGNOSIS — C50.311 MALIGNANT NEOPLASM OF LOWER-INNER QUADRANT OF RIGHT BREAST OF FEMALE, ESTROGEN RECEPTOR POSITIVE: ICD-10-CM

## 2023-11-13 PROCEDURE — 99215 PR OFFICE/OUTPT VISIT, EST, LEVL V, 40-54 MIN: ICD-10-PCS | Mod: S$GLB,,, | Performed by: NURSE PRACTITIONER

## 2023-11-13 PROCEDURE — 99999 PR PBB SHADOW E&M-EST. PATIENT-LVL IV: CPT | Mod: PBBFAC,,, | Performed by: NURSE PRACTITIONER

## 2023-11-13 PROCEDURE — 99215 OFFICE O/P EST HI 40 MIN: CPT | Mod: S$GLB,,, | Performed by: NURSE PRACTITIONER

## 2023-11-13 PROCEDURE — 99999 PR PBB SHADOW E&M-EST. PATIENT-LVL IV: ICD-10-PCS | Mod: PBBFAC,,, | Performed by: NURSE PRACTITIONER

## 2023-11-13 RX ORDER — DEXAMETHASONE 4 MG/1
TABLET ORAL
Qty: 6 TABLET | Refills: 5 | Status: SHIPPED | OUTPATIENT
End: 2024-02-08

## 2023-11-13 RX ORDER — OLANZAPINE 5 MG/1
TABLET ORAL
Qty: 4 TABLET | Refills: 5 | Status: SHIPPED | OUTPATIENT
End: 2024-02-08

## 2023-11-13 RX ORDER — LIDOCAINE AND PRILOCAINE 25; 25 MG/G; MG/G
CREAM TOPICAL
Qty: 30 G | Refills: 0 | Status: SHIPPED | OUTPATIENT
Start: 2023-11-13 | End: 2024-01-17

## 2023-11-13 RX ORDER — LORAZEPAM 1 MG/1
1 TABLET ORAL EVERY 12 HOURS PRN
Qty: 60 TABLET | Refills: 0 | Status: SHIPPED | OUTPATIENT
Start: 2023-11-13 | End: 2024-02-08

## 2023-11-13 RX ORDER — PROCHLORPERAZINE MALEATE 5 MG
10 TABLET ORAL EVERY 6 HOURS PRN
Qty: 20 TABLET | Refills: 5 | Status: SHIPPED | OUTPATIENT
Start: 2023-11-13

## 2023-11-13 NOTE — TELEPHONE ENCOUNTER
Returning pt.'s phone call and left pt. a voicemail in regards to referral placed by Dr. Mcbride's office for overall health. MA instructed pt. in voicemail to call the office number for scheduling.     [place], [place] Ext. [phone]

## 2023-11-13 NOTE — PROGRESS NOTES
Tasha Cornejo was consented for NA chemotherapy to treat      Cancer Staging   Malignant neoplasm of lower-inner quadrant of right breast of female, estrogen receptor positive  Staging form: Breast, AJCC 8th Edition  - Clinical stage from 10/26/2023: Stage IIA (cT2, cN0, cM0, G3, ER+, NH-, HER2+) - Signed by Linda Mcbride MD on 11/6/2023. Tasha Cornejo     was consented to receive:  TCHP that is Docetaxel at 75 mg/meter squared), Carboplatin (at an AUC of 6) given every 21 days (3 weeks) with Herceptin (Trastuzumab) and Perjeta (Pertuzumab). Day 2 growth factor. .she will have a  total of 6 cycles of therapy. After completion of the first 6 cycles, a determination will be made of adjuvant targeted therapy. .   The consent was discussed and reviewed with patient and signed by third party witness 11/13/2023.     2. Patient was education on what to expect when receiving chemotherapy including: checking in, receiving an ID band, 1 guest allowed in the infusion suite during infusion, can alternate people as well, pole going with you once you are hooked up, warm blankets are available, you may bring lunch and snacks, minimal snacks are available, take medications as regularly unless told otherwise, warm blankets, will be available. Education about what to expect during their chemo cycle and how often their regimen is given.     3. An extensive discussion was had which included a thorough discussion of the risk and benefits of treatment and alternatives.  Risks, including but not limited to, possible hair loss, bone marrow damage (anemia, thrombocytopenia, immune suppression, neutropenia), damage to body organs (brain, heart, liver, kidney, lungs, nervous system, skin, and others), allergic reactions, sterility, nausea/vomiting, constipation/diarrhea, sores in the mouth, secondary cancers, local damage at possible injection sites, and rarely death were all discussed. Specific side effects pertaining to their  chemotherapy/immunotherapy medications were discussed as well.The patient agrees with the plan, and all questions and their support system's questions have been answered to their satisfaction. Contraindications and potential side effects discussed as listed in micromedx.     4. Patient was given binder which includes: contact information for the [facility], [place], an immunotherapy side effect guide (if applicable to patient), resources including, but not limited to: women's wellness, acupuncture, physical therapy, , urgent care within oncology and financial assistance.     5. Patient was educated on when to call (and given the numbers to call and knows to message via [facility] if possible) or notify the provider including, but not limited to:   Persistent Nausea and/or Vomiting  Dehydration  Persistent Diarrhea  Fever of 100.4 > 1 hour in duration or any isolated fever > 101   Rash (while on active chemotherapy or immunotherapy)   Severe pain or new onset pain not controlled by current medication regimen  Or any other symptom you feel is related to your current hematology or oncology treatment    6. Patient was provided with additional resources that [facility] offers including, but not limited to: financial counseling, , psychologist, palliative care, support groups, transportation, dietician, rehab services, women's wellness and urgent care visits within the oncology department.     7. Patient was offered and signed up for chemo care companion. Educated on daily vital signs and daily questionnaire.     8. Patient has an established PCP and is aware of diagnosis.      9. Patient was offered a virtual visit 1 week post their Cycle 1 Day 1 chemotherapy and agreed.    10. Advance Care Planning   During this visit, I engaged the patient and family  in the voluntary advance care planning process.  Booklet given.         -ECHO scheduled for tomorrow  -Port placement scheduled for  11/21/2023  -discussed and Rx's sent - Zyprexa, decadron, compazine.   -Rx ativan and Emla cream     Patient will Proceed with cycle 1 day 1 of tbd. Patient will be seen ~1 week for a post chemo visit with BHARG[facility]I.     Route Chart for Scheduling    Med Onc Chart Routing  Urgent    Follow up with physician . Needs an EP with Dr. Mcbride first day of chemo   Follow up with BH[facility] . See me 1 week after first cycle as a virtual urgent care at 4 or 4:30 pm   Infusion scheduling note    Injection scheduling note    Labs    Imaging    Pharmacy appointment    Other referrals                  Treatment Plan Information   OP BREAST TCHP (pertuzumab trastuzumab DOCEtaxel CARBOplatin) Q3W   Linda Mcbride MD   Upcoming Treatment Dates - OP BREAST TCHP (pertuzumab trastuzumab DOCEtaxel CARBOplatin) Q3W    11/7/2023       Pre-Medications       palonosetron 0.25mg/dexAMETHasone 12mg in NS IVPB 0.25 mg 50 mL       Chemotherapy       pertuzumab (PERJETA) 840 mg in sodium chloride 0.9% 278 mL infusion       trastuzumab-anns (KANJINTI) 425 mg in sodium chloride 0.9% 250 mL chemo infusion       DOCEtaxel (TAXOTERE) 75 mg/m2 = 114 mg in sodium chloride 0.9% 255.7 mL chemo infusion       CARBOplatin (PARAPLATIN) in sodium chloride 0.9% 250 mL chemo infusion       Supportive Care       prochlorperazine injection 10 mg       meperidine (PF) injection Soln 25 mg       Antiemetics       aprepitant (CINVANTI) injection 130 mg  11/8/2023       Growth Factor       pegfilgrastim-cbqv (UDENYCA) injection 6 mg  11/28/2023       Chemotherapy       pertuzumab (PERJETA) 420 mg in sodium chloride 0.9% 264 mL infusion       trastuzumab-anns (KANJINTI) 319 mg in sodium chloride 0.9% 250 mL chemo infusion       DOCEtaxel (TAXOTERE) 75 mg/m2 = 114 mg in sodium chloride 0.9% 255.7 mL chemo infusion       CARBOplatin (PARAPLATIN) in sodium chloride 0.9% 250 mL chemo infusion       Supportive Care       prochlorperazine injection 10 mg       meperidine injection  25 mg       Antiemetics       APREPITANT 7.2 MG/ML IV EMUL       PALONOSETRON 0.25MG/DEXAMETHASONE 12MG ORDERABLE  11/29/2023       Growth Factor       pegfilgrastim-cbqv (UDENYCA) injection 6 mg      Patient is in agreement with the proposed treatment plan. All questions were answered to the patient's satisfaction. Pt knows to call clinic for any new or worsening symptoms and if anything is needed before the next clinic visit.    Josue Marinelli, MSN, APRN, FNP-C  Nurse Practitioner to Dr. Oriana Pak  Lead [facility] for [facility]  Lead [facility] for [facility]  Hematology & Medical Oncology  [address] [place] [id number]  ph. [phone] ext [phone]  Fax. [phone]              60 minutes of total time spent on the encounter, which includes face to face time and non-face to face time preparing to see the patient (eg, review of tests), Obtaining and/or reviewing separately obtained history, Documenting clinical information in the electronic or other health record, Independently interpreting results (not separately reported) and communicating results to the patient/family/caregiver, or Care coordination (not separately reported).

## 2023-11-14 ENCOUNTER — HOSPITAL ENCOUNTER (OUTPATIENT)
Dept: CARDIOLOGY | Facility: OTHER | Age: 43
Discharge: HOME | End: 2023-11-14
Attending: INTERNAL MEDICINE
Payer: COMMERCIAL

## 2023-11-14 ENCOUNTER — PATIENT MESSAGE (OUTPATIENT)
Dept: HEMATOLOGY/ONCOLOGY | Facility: CLINIC | Age: 43
End: 2023-11-14
Payer: COMMERCIAL

## 2023-11-14 ENCOUNTER — TELEPHONE (OUTPATIENT)
Dept: SURGERY | Facility: CLINIC | Age: 43
End: 2023-11-14
Payer: COMMERCIAL

## 2023-11-14 VITALS
DIASTOLIC BLOOD PRESSURE: 90 MMHG | WEIGHT: 117 LBS | HEIGHT: 62 IN | SYSTOLIC BLOOD PRESSURE: 153 MMHG | BODY MASS INDEX: 21.53 KG/M2

## 2023-11-14 DIAGNOSIS — C50.311 MALIGNANT NEOPLASM OF LOWER-INNER QUADRANT OF RIGHT BREAST OF FEMALE, ESTROGEN RECEPTOR POSITIVE: ICD-10-CM

## 2023-11-14 DIAGNOSIS — Z17.0 MALIGNANT NEOPLASM OF LOWER-INNER QUADRANT OF RIGHT BREAST OF FEMALE, ESTROGEN RECEPTOR POSITIVE: ICD-10-CM

## 2023-11-14 LAB
AORTIC ROOT ANNULUS: 2.47 CM
ASCENDING AORTA: 3.28 CM
AV INDEX (PROSTH): 0.77
AV MEAN GRADIENT: 3 MMHG
AV PEAK GRADIENT: 7 MMHG
AV VALVE AREA BY VELOCITY RATIO: 1.87 CM²
AV VALVE AREA: 1.74 CM²
AV VELOCITY RATIO: 0.82
BSA FOR ECHO PROCEDURE: 1.52 M2
CV ECHO LV RWT: 0.29 CM
DOP CALC AO PEAK VEL: 1.3 M/S
DOP CALC AO VTI: 28.9 CM
DOP CALC LVOT AREA: 2.3 CM2
DOP CALC LVOT DIAMETER: 1.7 CM
DOP CALC LVOT PEAK VEL: 1.07 M/S
DOP CALC LVOT STROKE VOLUME: 50.36 CM3
DOP CALCLVOT PEAK VEL VTI: 22.2 CM
E WAVE DECELERATION TIME: 214.29 MSEC
E/A RATIO: 1.23
E/E' RATIO: 5.19 M/S
ECHO LV POSTERIOR WALL: 0.66 CM (ref 0.6–1.1)
FRACTIONAL SHORTENING: 43 % (ref 28–44)
INTERVENTRICULAR SEPTUM: 0.74 CM (ref 0.6–1.1)
IVC DIAMETER: 1.68 CM
IVRT: 65.65 MSEC
LA MAJOR: 3.82 CM
LA MINOR: 4.46 CM
LA WIDTH: 3.2 CM
LAAS-AP2: 12 CM2
LAAS-AP4: 12 CM2
LEFT ATRIUM SIZE: 2.18 CM
LEFT ATRIUM VOLUME INDEX MOD: 19.8 ML/M2
LEFT ATRIUM VOLUME INDEX: 16.1 ML/M2
LEFT ATRIUM VOLUME MOD: 30.17 CM3
LEFT ATRIUM VOLUME: 24.4 CM3
LEFT INTERNAL DIMENSION IN SYSTOLE: 2.57 CM (ref 2.1–4)
LEFT VENTRICLE DIASTOLIC VOLUME INDEX: 60.72 ML/M2
LEFT VENTRICLE DIASTOLIC VOLUME: 92.29 ML
LEFT VENTRICLE MASS INDEX: 63 G/M2
LEFT VENTRICLE SYSTOLIC VOLUME INDEX: 15.7 ML/M2
LEFT VENTRICLE SYSTOLIC VOLUME: 23.82 ML
LEFT VENTRICULAR INTERNAL DIMENSION IN DIASTOLE: 4.5 CM (ref 3.5–6)
LEFT VENTRICULAR MASS: 95.66 G
LV LATERAL E/E' RATIO: 4.67 M/S
LV SEPTAL E/E' RATIO: 5.83 M/S
LVOT MG: 2.21 MMHG
LVOT MV: 0.69 CM/S
MV PEAK A VEL: 0.57 M/S
MV PEAK E VEL: 0.7 M/S
PV MV: 0.55 M/S
PV PEAK GRADIENT: 2 MMHG
PV PEAK VELOCITY: 0.73 M/S
RA MAJOR: 3.17 CM
RA PRESSURE ESTIMATED: 3 MMHG
RA WIDTH: 2.5 CM
RV TISSUE DOPPLER FREE WALL SYSTOLIC VELOCITY 1 (APICAL 4 CHAMBER VIEW): 15.22 CM/S
SINUS: 2.9 CM
STJ: 3.01 CM
TDI LATERAL: 0.15 M/S
TDI SEPTAL: 0.12 M/S
TDI: 0.14 M/S
Z-SCORE OF LEFT VENTRICULAR DIMENSION IN END DIASTOLE: 0.11
Z-SCORE OF LEFT VENTRICULAR DIMENSION IN END SYSTOLE: -0.57

## 2023-11-14 PROCEDURE — 93306 TTE W/DOPPLER COMPLETE: CPT

## 2023-11-14 PROCEDURE — 93306 ECHO (CUPID ONLY): ICD-10-PCS | Mod: 26,,, | Performed by: INTERNAL MEDICINE

## 2023-11-14 PROCEDURE — 93306 TTE W/DOPPLER COMPLETE: CPT | Mod: 26,,, | Performed by: INTERNAL MEDICINE

## 2023-11-15 ENCOUNTER — TELEPHONE (OUTPATIENT)
Dept: HEMATOLOGY/ONCOLOGY | Facility: CLINIC | Age: 43
End: 2023-11-15
Payer: COMMERCIAL

## 2023-11-15 ENCOUNTER — TELEPHONE (OUTPATIENT)
Dept: GASTROENTEROLOGY | Facility: CLINIC | Age: 43
End: 2023-11-15
Payer: COMMERCIAL

## 2023-11-15 ENCOUNTER — PATIENT MESSAGE (OUTPATIENT)
Dept: HEMATOLOGY/ONCOLOGY | Facility: CLINIC | Age: 43
End: 2023-11-15
Payer: COMMERCIAL

## 2023-11-15 NOTE — TELEPHONE ENCOUNTER
Pt. called back in regards to scheduling an appt. with an Integrative Oncology provider per Dr. Mcbride's referral. MA gave guidance on integrative oncology and asked pt. if she was interested in any specific area. Pt. was unsure. MA acknowledged and advised that it was ok to be unsure, for the provider will also go over the entirety on her appt. date. Pt. acknowledged and approved scheduling. MA offered an in-person visit with Joanna Dubinsky, PA-C on Monday, November 20th at 1:00 PM. Pt. approved and confirmed.     BLD, MA Ext. [phone]

## 2023-11-15 NOTE — TELEPHONE ENCOUNTER
Spoke w/pt and scheduled virtual visit for egd follow up with Dr. Rice on 3/11/2024 at 1 pm. Pt verbalize understand, confirmed appt and thank me.    ----- Message from Vanessa lAegre MA sent at 11/14/2023  8:40 AM CST -----  Regarding: FW: follow up  Contact: pt [phone]    ----- Message -----  From: Melissa Guerin  Sent: 11/14/2023   8:28 AM CST  To: [facility] Staff  Subject: follow up                                        PATIENTCALL     Pt is calling to speak with someone in provider office regarding she had a procedure on 10/18 and she states she needs to know when is she suppose to schedule a follow up appt to see Dr Rice she  is asking for a return call please call pt at [phone]  If possible can you just schedule her appt and put in the portal

## 2023-11-16 ENCOUNTER — CLINICAL SUPPORT (OUTPATIENT)
Dept: REHABILITATION | Facility: HOSPITAL | Age: 43
End: 2023-11-16
Payer: COMMERCIAL

## 2023-11-16 DIAGNOSIS — Z17.0 MALIGNANT NEOPLASM OF LOWER-INNER QUADRANT OF RIGHT BREAST OF FEMALE, ESTROGEN RECEPTOR POSITIVE: ICD-10-CM

## 2023-11-16 DIAGNOSIS — Z91.89 AT RISK FOR LYMPHEDEMA: Primary | ICD-10-CM

## 2023-11-16 DIAGNOSIS — C50.311 MALIGNANT NEOPLASM OF LOWER-INNER QUADRANT OF RIGHT BREAST OF FEMALE, ESTROGEN RECEPTOR POSITIVE: ICD-10-CM

## 2023-11-16 PROCEDURE — 97535 SELF CARE MNGMENT TRAINING: CPT

## 2023-11-16 PROCEDURE — 97110 THERAPEUTIC EXERCISES: CPT

## 2023-11-16 PROCEDURE — 97162 PT EVAL MOD COMPLEX 30 MIN: CPT

## 2023-11-16 NOTE — PATIENT INSTRUCTIONS
PRE OP PATIENT EDUCATION    Post Operative Instructions     Range of Motion/lifting Precautions post surgery  The following activities should be avoided until your drain(s) have been removed  - do not lift affected arm above 90 degrees of shoulder flexion  - do not lift over 5 lbs  - do not pull or push heavy objects  - do not sleep on your stomach or surgery side       After surgery, you may begin self-care tasks including grooming, dressing, feeding and simple hygiene as soon as you feel up to it.    Schedule your post-op therapy follow-up after your drains have been removed or after your first post-surgery doctor visit.    When to call your doctor   - if any part of your affected arm or axilla feels hot, is reddened or has increased swelling   - if you develop a temperature over 101 degrees Fahrenheit      Lymphedema - Identification and Prevention     Lymphedema - is the swelling of a body area or extremity caused by the accumulation of lymphatic fluid.  There is a risk for lymphedema with the removal of lymph nodes, trauma or radiation therapy.  Treatment of breast cancer often involves surgery: mastectomy or lumpectomy. Some of the lymph nodes in the underarm (called axillary lymph nodes) may be removed and checked to see if they contain cancer cells.     During breast surgery when axillary lymph nodes are removed (with sentinel node biopsy or axillary dissection) or are treated with radiation therapy, the lymphatic system may become impaired. This may prevent lymphatic fluid from leaving the area therefore, causing lymphedema.     Lymphatic fluid is a normal part of the circulatory system. Its function is to remove waste products and to produce cells vital to fighting infection. Swelling occurs when the vessels become restricted and the lymphatic fluid is unable to freely flow through them.  If lymphedema is left untreated, the affected limb could progressively become  more swollen, which could lead to hardening of the skin, bulkiness in the limb, infection and impaired wound healing.         There are things you can do to decrease the chance of developing lymphedema.                                          [email or web address]                                                                            The information presented is intended for general information and educational purposes. It is not intended to replace the  advice of your health care provider. Contact your health care provider if you believe you have a health problem.                                                    POST OP EXERCISES - SAFE TO DO THE FIRST 2 WEEKS AFTER SURGERY UNTIL YOUR FOLLOW UP APPOINTMENT WITH PHYSICAL/OCCUPATIONAL THERAPY    Scapular Retraction (Standing)    With arms at sides, squeeze shoulder blades together. Do not shrug shoulders and do not hold your breath. Hold 5 seconds. Repeat 10 times 1sessions 1-2 x day.       Exaggerated Breathing and Relaxation      Practice deep breathing frequently in the first few days following surgery even before you begin exercising. This exercise helps with tissue extensibility in the chest wall.  Inhale slowly and deeply through the nose and exhale through pursed lips. Concentrate on relaxing as you let the air out of your lungs. Repeat three (3) to four (4) times, remembering to breath in deeply and then relaxing. This exercise helps to ease the sensation of pulling and discomfort that may be experienced while exercising.      Ball Squeeze OR Hand pumps       Perform this exercise three (3) times a day for 10 reps.    The ball squeeze or hand pumps helps to prevent or reduce temporary swelling that may occur in the affected arm. This exercise may be performed standing, sitting or while lying in bed. During this exercise the affected arm should be slightly bent and held upward. Support your arm with a pillow if you are uncomfortable holding it  up.            AROM: Elbow Flexion / Extension        With right hand palm up, gently bend elbow as far as possible. Then straighten arm as far as possible. Do this in standing.   Repeat 10 times per set. Do 1 sets per session. Do 1-3 sessions per day.      Radiation Position    Place right arm, elbow bent, beside head on pillow. Use 1-3 pillows to be comfortable.  REST AND RELAX.  Do 1-2 times per day.

## 2023-11-16 NOTE — PLAN OF CARE
OUTPATIENT PHYSICAL THERAPY   PRE-OP EVALUATION    Name: Tasha Cornejo  Clinic Number: [id number]    Therapy Diagnosis:   Encounter Diagnoses   Name Primary?    Malignant neoplasm of lower-inner quadrant of right breast of female, estrogen receptor positive     At risk for lymphedema Yes        Physician: Linda Mcbride MD    Physician Orders: PT Eval and Treat   Medical Diagnosis from Referral: C50.311,Z17.0 (ICD-10-CM) - Malignant neoplasm of lower-inner quadrant of right breast of female, estrogen receptor positive   Evaluation Date: 11/16/2023  Authorization Period Expiration: 12/29/2023  Plan of Care Expiration: 11/16/2023  Progress Note Due: N/A  Visit # / Visits authorized: 1/ 1   FOTO: 1/3    Precautions: Standard and cancer     Time In: 8:45 am  Time Out: 9:15 am  Total Appointment Time (timed & untimed codes): 45 minutes    History   History of Present Illness: Tasha is a 43 y.o. female that presents to  [facility] at the [facility] secondary to dx of right breast cancer.    Dx:  Invasive ductal carcinoma   Surgery date: TBD      Pt presents today for baseline measurements to aid in the early detection of lymphedema, UE muscle testing, postural and ROM assessment along with education of risk of lymphedema and surgical precautions post surgery. Circumferential measurements will be taken today of BL UEs for early detection of lymphedema post surgery. Pt will also be instructed in exercises to perform pre and post-surgery to insure best outcomes.     Past Medical History:   Past Medical History:   Diagnosis Date    [place]alazion     Malignant neoplasm of lower-inner quadrant of right breast of female, estrogen receptor positive 11/6/2023       Past Surgical History:   Tasha Cornejo  has a past surgical history that includes Esophagogastroduodenoscopy (N/A, 6/2/2023); Colonoscopy (N/A, 6/2/2023); and Esophagogastroduodenoscopy (N/A,  "10/18/2023).    Medications:  Tasha has a current medication list which includes the following prescription(s): dexamethasone, fluticasone propionate, hydrocortisone, ketoconazole, lidocaine-prilocaine, lorazepam, multivitamin, mupirocin, olanzapine, omeprazole, prochlorperazine, and valerian root.    Allergies:  Review of patient's allergies indicates:   Allergen Reactions    Aleve [naproxen sodium] Other (See Comments)     Throat swelling          Hand dominance: right  Prior Therapy: none    Social History: lives with spouse  Place of Residence (Steps/Adaptations): single story home  DME owned: none  Current functional status:  independent  Exercise routine prior to onset : running 3-4x week, riding bike, rowing  Work:                           Subjective   Pt states: no pain at this time  Pain: 0/10 on VAS.     Objective   Mental status: alert & oriented x 3    Posture/Alignment   Postural examination/scapular alignment: rounded shoulders, forward head    Nutrition:  Normal    Skin integrity: intact  Edema: none noted    Sensation: Light Touch: Intact           Proprioception: Intact  Appearance: well groomed     ROM:   UPPER EXTREMITY--AROM/PROM  (R) UE: WNLs  (L) UE: WNLs     Shoulder Range of Motion:   ACTIVE ROM RIGHT LEFT   Flexion 170 170   Abduction 180 180   Extension 75 75   IR/90deg 80 80   ER/90deg 90 85     Strength: manual muscle test grades below   Upper Extremity Strength   RIGHT UE LEFT UE   Shoulder flexion: 5/5 5/5   Shoulder Abduction: 5/5 5/5   Shoulder IR 5/5 5/5   Shoulder ER 5/5 5/5   Elbow flexion: 5/5 5/5   Elbow extension: 5/5 5/5   Wrist flexion: 4+/5 4+/5   Wrist extension: 4+/5 4+/5    53.5 lbs 40.1 lbs       Baseline measurements of bilateral upper extremities for early detection of lymphedema:     LANDMARK RIGHT UPPER EXTREMITY LEFT UPPER EXTREMITY DIFFERENCE   E + 8" 30.5 cm 30.5 cm 0 cm   E + 6" 28.5 cm 28.5 cm 0 cm   E + 4" 25 cm 26 cm 1.0 cm   E + 2" 24 cm 24 " "cm 0 cm   Elbow 23.5 cm 23 cm 0.5 cm   W+ 8" 23.5 cm 22 cm 1.5 cm   W +  6" 23 cm 22.0 cm 1.0 cm   W + 4" 20.5 cm 19.5 cm 1.0 cm   Wrist  15 cm 14.5 cm 0.5 cm   DPC 19 cm 18.5 cm 0.5 cm   IP Thumb 5.5 cm 5.5 cm 0 cm       Endurance Assessment:   Evaluation   30 second Chair Rise  (adults > 61 y/o) 17 completed with no arms       Coordination:   - fine motor: within functional limits  - UE coordination: intact     - LE coordination:  Not tested     Functional Mobility (Bed mobility, transfers)  Bed mobility: I =  independent   Roll to left: I  Roll to right: I  Supine to prone: I  Scooting to edge of bed: I  Supine to sit: I  Sit to supine: I  Transfers to bed: I  Transfers to toilet: I  Sit to stand:  I  Stand pivot:  I  Car transfers: I      ADL's:  Feeding: I = independent   Grooming: I  Hygiene: I  UB Dressing: I  LB Dressing: I  Toileting: I  Bathing: I    Gait Assessment:   - Assistive device used: none  - Assistance: independent  - Distance: community distances       Functional Limitations Reporting        Intake Outcome Measure for FOTO Shoulder Survey    Therapist reviewed FOTO scores for Tasha Cornejo on 11/16/2023.   FOTO documents entered into [employer] - see Media section.    Intake Score: 75%           Pt has no cultural, educational or language barriers to learning provided.    Treatment and Patient Education     Total Time Separate from Evaluation: 16 minutes    Patient participated in self care/home management for 8 minutes including the following:      - Role of PT in multi - disciplinary team, goals for PT  - Pt was educated in lymphedema etiology and management plans.    - Pt was provided with written risk reductions and precautions for managing lymphedema.   - Reviewed YAN drain care instructions.     ROM/lifting Precautions post surgery discussed -  until drains have been removed:  - do not lift affected arm above 90 degrees of shoulder flexion  - do not lift over 5 lbs  - do not pull or push heavy " objects  - do not sleep on your stomach or surgery side     Pt was instructed in and performed therapeutic exercise for 8 minutes for postural correction and alignment, stretching and soft tissue mobility.   Exercises included:   - exaggerated deep breathing and relaxation  - scapular retractions  - fist making  - elbow flexion/extension    Pt was able to demonstrate and report understanding and performance    Written Home Exercises Provided: yes.  Exercises were reviewed and Tasha was able to demonstrate them prior to the end of the session.  Tasha demonstrated good  understanding of the education provided.     See EMR under Patient Instructions for exercises provided 11/16/2023.      Assessment   This is a 43 y.o. female referred to outpatient physical therapy and presents with a medical diagnosis of right breast cancer and was seen today pre-operatively to assess strength and ROM of BL UEs, to take baseline circumferential measurements of BL UEs to aid in the early detection of lymphedema and provide pt education on exercises/precations post breast surgery. Pt does not exhibit any ROM impairments  Pt educated in lymphedema risks/precautions as well as ROM/lifting precautions post surgery - pt demonstrated/verbalized understanding. No goals established this visit as goals for PT will be established post surgery at follow up.      Anticipated barriers to physical therapy: none anticipated     Pt's spiritual, cultural and educational needs considered and pt agreeable to plan of care and goals as stated below:     Medical Necessity is demonstrated by the following  History  Co-morbidities and personal factors that may impact the plan of care [] LOW: no personal factors / co-morbidities  [x] MODERATE: 1-2 personal factors / co-morbidities  [] HIGH: 3+ personal factors / co-morbidities    Moderate / High Support Documentation:   Co-morbidities affecting plan of care: history of cancer    Personal Factors:   no deficits      Examination  Body Structures and Functions, activity limitations and participation restrictions that may impact the plan of care [x] LOW: addressing 1-2 elements  [] MODERATE: 3+ elements  [] HIGH: 4+ elements (please support below)    Moderate / High Support Documentation: N/A     Clinical Presentation [] LOW: stable  [x] MODERATE: Evolving  [] HIGH: Unstable     Decision Making/ Complexity Score: moderate           Plan   Schedule patient for follow up with Physical therapy post surgery. Goals for therapy post surgery will be established at that time.     Therapist: Lauren Camarena, PT  11/16/2023

## 2023-11-17 ENCOUNTER — TELEPHONE (OUTPATIENT)
Dept: HEMATOLOGY/ONCOLOGY | Facility: CLINIC | Age: 43
End: 2023-11-17
Payer: COMMERCIAL

## 2023-11-20 ENCOUNTER — OFFICE VISIT (OUTPATIENT)
Dept: HEMATOLOGY/ONCOLOGY | Facility: CLINIC | Age: 43
End: 2023-11-20
Payer: COMMERCIAL

## 2023-11-20 VITALS
HEIGHT: 60 IN | BODY MASS INDEX: 23.07 KG/M2 | DIASTOLIC BLOOD PRESSURE: 83 MMHG | SYSTOLIC BLOOD PRESSURE: 138 MMHG | OXYGEN SATURATION: 95 % | WEIGHT: 117.5 LBS | TEMPERATURE: 98 F | HEART RATE: 73 BPM

## 2023-11-20 DIAGNOSIS — Z17.0 MALIGNANT NEOPLASM OF LOWER-INNER QUADRANT OF RIGHT BREAST OF FEMALE, ESTROGEN RECEPTOR POSITIVE: ICD-10-CM

## 2023-11-20 DIAGNOSIS — C50.311 MALIGNANT NEOPLASM OF LOWER-INNER QUADRANT OF RIGHT BREAST OF FEMALE, ESTROGEN RECEPTOR POSITIVE: ICD-10-CM

## 2023-11-20 PROCEDURE — 3079F PR MOST RECENT DIASTOLIC BLOOD PRESSURE 80-89 MM HG: ICD-10-PCS | Mod: CPTII,S$GLB,, | Performed by: PHYSICIAN ASSISTANT

## 2023-11-20 PROCEDURE — 3044F PR MOST RECENT HEMOGLOBIN A1C LEVEL <7.0%: ICD-10-PCS | Mod: CPTII,S$GLB,, | Performed by: PHYSICIAN ASSISTANT

## 2023-11-20 PROCEDURE — 1159F MED LIST DOCD IN RCRD: CPT | Mod: CPTII,S$GLB,, | Performed by: PHYSICIAN ASSISTANT

## 2023-11-20 PROCEDURE — 1160F PR REVIEW ALL MEDS BY PRESCRIBER/CLIN PHARMACIST DOCUMENTED: ICD-10-PCS | Mod: CPTII,S$GLB,, | Performed by: PHYSICIAN ASSISTANT

## 2023-11-20 PROCEDURE — 99999 PR PBB SHADOW E&M-EST. PATIENT-LVL V: CPT | Mod: PBBFAC,,, | Performed by: PHYSICIAN ASSISTANT

## 2023-11-20 PROCEDURE — 3008F PR BODY MASS INDEX (BMI) DOCUMENTED: ICD-10-PCS | Mod: CPTII,S$GLB,, | Performed by: PHYSICIAN ASSISTANT

## 2023-11-20 PROCEDURE — 99215 OFFICE O/P EST HI 40 MIN: CPT | Mod: S$GLB,,, | Performed by: PHYSICIAN ASSISTANT

## 2023-11-20 PROCEDURE — 3008F BODY MASS INDEX DOCD: CPT | Mod: CPTII,S$GLB,, | Performed by: PHYSICIAN ASSISTANT

## 2023-11-20 PROCEDURE — 3044F HG A1C LEVEL LT 7.0%: CPT | Mod: CPTII,S$GLB,, | Performed by: PHYSICIAN ASSISTANT

## 2023-11-20 PROCEDURE — 99999 PR PBB SHADOW E&M-EST. PATIENT-LVL V: ICD-10-PCS | Mod: PBBFAC,,, | Performed by: PHYSICIAN ASSISTANT

## 2023-11-20 PROCEDURE — 3075F PR MOST RECENT SYSTOLIC BLOOD PRESS GE 130-139MM HG: ICD-10-PCS | Mod: CPTII,S$GLB,, | Performed by: PHYSICIAN ASSISTANT

## 2023-11-20 PROCEDURE — 3079F DIAST BP 80-89 MM HG: CPT | Mod: CPTII,S$GLB,, | Performed by: PHYSICIAN ASSISTANT

## 2023-11-20 PROCEDURE — 1160F RVW MEDS BY RX/DR IN RCRD: CPT | Mod: CPTII,S$GLB,, | Performed by: PHYSICIAN ASSISTANT

## 2023-11-20 PROCEDURE — 3075F SYST BP GE 130 - 139MM HG: CPT | Mod: CPTII,S$GLB,, | Performed by: PHYSICIAN ASSISTANT

## 2023-11-20 PROCEDURE — 1159F PR MEDICATION LIST DOCUMENTED IN MEDICAL RECORD: ICD-10-PCS | Mod: CPTII,S$GLB,, | Performed by: PHYSICIAN ASSISTANT

## 2023-11-20 PROCEDURE — 99215 PR OFFICE/OUTPT VISIT, EST, LEVL V, 40-54 MIN: ICD-10-PCS | Mod: S$GLB,,, | Performed by: PHYSICIAN ASSISTANT

## 2023-11-20 NOTE — PROGRESS NOTES
Integrative Health and Medicine Initial Visit      Chief Complaint:  I want to promote my overall well-being through cancer.        Cancer/Stage/TNM:    Cancer Staging   Malignant neoplasm of lower-inner quadrant of right breast of female, estrogen receptor positive  Staging form: Breast, AJCC 8th Edition  - Clinical stage from 10/26/2023: Stage IIA (cT2, cN0, cM0, G3, ER+, VT-, HER2+) - Signed by Linda Mcbride MD on 11/6/2023       Oncology History   Malignant neoplasm of lower-inner quadrant of right breast of female, estrogen receptor positive   10/26/2023 Cancer Staged    Staging form: Breast, AJCC 8th Edition  - Clinical stage from 10/26/2023: Stage IIA (cT2, cN0, cM0, G3, ER+, VT-, HER2+)     11/6/2023 Initial Diagnosis    Malignant neoplasm of lower-inner quadrant of right breast of female, estrogen receptor positive     11/7/2023 -  Chemotherapy    Treatment Summary   Plan Name: OP BREAST TCHP (pertuzumab trastuzumab DOCEtaxel CARBOplatin) Q3W  Treatment Goal: Curative  Status: Active  Start Date: 11/7/2023 (Planned)  End Date: 10/8/2024 (Planned)  Provider: Linda Mcbride MD  Chemotherapy: CARBOplatin (PARAPLATIN) in sodium chloride 0.9% 250 mL chemo infusion, , Intravenous, Clinic/HOD 1 time, 0 of 6 cycles  DOCEtaxel (TAXOTERE) 75 mg/m2 = 114 mg in sodium chloride 0.9% 255.7 mL chemo infusion, 75 mg/m2, Intravenous, Clinic/HOD 1 time, 0 of 6 cycles  pertuzumab (PERJETA) 840 mg in sodium chloride 0.9% 278 mL infusion, 840 mg, Intravenous, Clinic/HOD 1 time, 0 of 17 cycles  trastuzumab-anns (KANJINTI) 425 mg in sodium chloride 0.9% 250 mL chemo infusion, 8 mg/kg, Intravenous, Clinic/HOD 1 time, 0 of 17 cycles           Past Medical History:   Diagnosis Date    [place]     Malignant neoplasm of lower-inner quadrant of right breast of female, estrogen receptor positive 11/6/2023        Current Outpatient Medications   Medication Instructions    dexAMETHasone (DECADRON) 4 MG Tab Take 2 tablets by  mouth daily on days 2-4 of each chemotherapy cycle.    fluticasone propionate (FLONASE) 50 mcg/actuation nasal spray 1 spray, Each Nostril, Daily PRN    hydrocortisone 2.5 % ointment Topical (Top), 2 times daily PRN    ketoconazole (NIZORAL) 2 % cream SMARTSI Application Topical 1 to 2 Times Daily    LIDOcaine-prilocaine (EMLA) cream Topical (Top), As needed (PRN)    LORazepam (ATIVAN) 1 mg, Oral, Every 12 hours PRN    multivitamin (THERAGRAN) per tablet 1 tablet, Oral, Daily    mupirocin (BACTROBAN) 2 % ointment Topical (Top), 2 times daily    OLANZapine (ZYPREXA) 5 MG tablet Take 1 tablet by mouth nightly on days 1-4 of each chemotherapy cycle.    omeprazole (PRILOSEC) 40 mg, Oral, Every morning    prochlorperazine (COMPAZINE) 10 mg, Oral, Every 6 hours PRN    valerian root 500 mg Cap Daily PRN        Past Surgical History:   Procedure Laterality Date    COLONOSCOPY N/A 2023    Procedure: COLONOSCOPY;  Surgeon: Fabio Rice MD;  Location: [address]);  Service: Endoscopy;  Laterality: N/A;  pt confirmed earlier time  EB    ESOPHAGOGASTRODUODENOSCOPY N/A 2023    Procedure: EGD (ESOPHAGOGASTRODUODENOSCOPY);  Surgeon: Fabio Rice MD;  Location: [address]);  Service: Endoscopy;  Laterality: N/A;  Procedure Timin-4 weeks    referred by Dr. Rice/Prep instructions handed to patient and to portal.  Patient called did not answer- DB    ESOPHAGOGASTRODUODENOSCOPY N/A 10/18/2023    Procedure: EGD (ESOPHAGOGASTRODUODENOSCOPY);  Surgeon: Fabio Rice MD;  Location: [address]);  Service: Endoscopy;  Laterality: N/A;  ref [place]  timeframe 5-12 weeks   Dr. Rice patient  portal instruction and given to patient jm  10/11-precall complete-MS        HPI:  Tasha Cornejo is a 43 y.o. female who presents today for Integrative Oncology evaluation of newly diagnosed breast cancer to plan integrative treatment alongside surgery and chemo. Oncology history includes screening mammogram on 10/5/2023  identified coarse heterogeneous calcifications in a linear distribution seen in the lower outer quadrant of the right breast, spanning 1.5 cm.  Diagnostic mammogram on 10/12/2023 showed coarse heterogeneous calcifications in a regional distribution spanning 3.2 cm long axis, biopsy 10/26/2023 with pathology revealing infiltrating ductal carcinoma of the breast, ER 70%/ AL-/HER2 3+; Ki67 60%.  Patient was initially set up for chemo prior to surgery but then this was switched.  Her surgery is pending scheduling but patient hopes to be scheduled in January.  Patient exercises regularly, running almost daily.  She had done yoga in the past, but not recently.  She is interested in doing that again.  She has no pain in her body currently.  She is concerned about her anxiety regarding treatment.  She had a bad experience with Psychiatry in the distant past (as a teenager) and she is not interested in Psychology but would consider it.  She would like to work with OT/yoga and with nutrition.  She eats mostly healthily but not too many fruits and thinks she could make improvement and is overall concerned about eating during chemo.  She is interested in acupuncture.  She sleeps mostly well, sometimes takes valarian root to help her sleep.       Distress Score:   Distress Score: 3    Today the patient described the following:  Nutrition: eats vegetables but limited fruits   Exercise: more than 30 minutes per day  Sleep: more than 6 hours  Spiritual health:  haresh in self  Emotional health: no change  Alcohol: No  Smoking: No      7 Pillars Assessment      Sleep  How many hours of sleep per night? 6-7 hours  Do you have trouble falling asleep, staying asleep or waking up earlier than you need to? yes--trouble falling asleep   Do you have daytime fatigue? no  Do you need medication for sleep? no  Do you use any supplements or other interventions for sleep? Yes--sometimes valarian root     Resilience  Rate your current level of  stress- high  How do you manage stress? Exercise--running     Purpose  Do you feel you have a vision or a life purpose? Yes    Environment  Any exposures:no known exposures    Spirituality- no formal [employer] practice    Nutrition   Food allergies or sensitivities: yes  Do you adhere to a particular type of diet? yes  What type of diet do you follow? No dairy   Do you have any concerns with your eating habits? yes  Are you concerned with your level of alcohol intake? no    Exercise  How would you describe your physical activity level? Run every day   Do you work at a sedentary job? yes  What do you do for physical activity? Running, have done yoga in past      Physical Exam   /83 (BP Location: Left arm, Patient Position: Sitting, BP Method: Medium (Automatic))   Pulse 73   Temp 98.2 °F (36.8 °C) (Oral)   Ht 5' (1.524 m)   Wt 53.3 kg (117 lb 8.1 oz)   LMP 11/19/2023 (Exact Date)   SpO2 95%   BMI 22.95 kg/m²    Wt Readings from Last 3 Encounters:   11/20/23 53.3 kg (117 lb 8.1 oz)   11/14/23 53.1 kg (117 lb)   11/13/23 53.4 kg (117 lb 13.4 oz)     Temp Readings from Last 3 Encounters:   11/20/23 98.2 °F (36.8 °C) (Oral)   11/13/23 98 °F (36.7 °C) (Oral)   11/06/23 98 °F (36.7 °C) (Oral)     BP Readings from Last 3 Encounters:   11/20/23 138/83   11/14/23 (!) 153/90   11/13/23 (!) 153/81     Pulse Readings from Last 3 Encounters:   11/20/23 73   11/13/23 61   11/06/23 73       Body mass index is Body mass index is 22.95 kg/m².    Vitals reviewed.   Constitutional:       General: Patient is not in acute distress.     Appearance: Normal appearance.   HENT:      Head: Normocephalic and atraumatic.      Nose: Nose normal.      Mouth/Throat:      Mouth: Mucous membranes are moist.   Eyes:      Extraocular Movements: Extraocular movements intact.      Pupils: Pupils are equal, round, and reactive to light.   Cardiovascular:      Rate and Rhythm: Normal rate and regular rhythm.      Heart sounds: Normal heart  sounds.   Pulses: Normal pulses.  Pulmonary:      Effort: Pulmonary effort is normal.      Breath sounds: Normal breath sounds. No wheezing or rails.   Abdominal:       General: Bowel sounds are normal. There is no distension.      Palpations: Abdomen is soft.  Musculoskeletal:         General: Normal range of motion.      Cervical back: Normal range of motion and neck supple.   Skin:     General: Skin is warm and dry.   Neurological:      General: No focal deficit present.       Mental Status: Alert and oriented to person, place, and time. Mental status is at baseline.      Gait: Gait normal.   Psychiatric:         Mood and Affect: Mood normal.         Behavior: Behavior normal.         Thought Content: Thought content normal.         Judgment: Judgment normal.      Review of Systems:   Cardiac:           No SOB, chest pain with exertion,edema, orthopnea  Distress:          No excessive sadness, no hopelessness, no anhedonia, no excessive worry or nervousness  Cognitive:        No trouble with memory, no difficulty paying attention, no brain fog, no trouble functioning with work or home life  Fatigue:           Energy level adequate, performing ADL's, no morning fatigue                           Fatigue  1  / 10  ( Scale 0 - 10)   Hormonal:       No hot flashes, no night sweats  Pain:                Has no pain,  location:                          Pain 0   / 10 (Scale 0 - 10)    Neuropathy:    No numbness, no tingling, no paresthesia   Sleep:              No difficulty falling asleep, no waking up in night, no daytime sleepiness, no snoring  Altered function:          No problems with money management,  no problems with daily organization & planning  Weight:           No concerns with weight, wants to lose a little and maintain healthy        Labs:   Lab Results   Component Value Date    WBC 6.07 08/30/2023    HGB 13.3 08/30/2023    HCT 41.6 08/30/2023    MCV 89 08/30/2023     08/30/2023           Hemoglobin  A1C   Date Value Ref Range Status   08/30/2023 5.2 4.0 - 5.6 % Final     Comment:     ADA Screening Guidelines:  5.7-6.4%  Consistent with prediabetes  >or=6.5%  Consistent with diabetes    High levels of fetal hemoglobin interfere with the HbA1C  assay. Heterozygous hemoglobin variants (HbS, HgC, etc)do  not significantly interfere with this assay.   However, presence of multiple variants may affect accuracy.     07/21/2022 5.2 4.0 - 5.6 % Final     Comment:     ADA Screening Guidelines:  5.7-6.4%  Consistent with prediabetes  >or=6.5%  Consistent with diabetes    High levels of fetal hemoglobin interfere with the HbA1C  assay. Heterozygous hemoglobin variants (HbS, HgC, etc)do  not significantly interfere with this assay.   However, presence of multiple variants may affect accuracy.            Assessment:   Patient is a 43 y.o.female who arrived to Integrative Oncology for initial consult with hx of malignant neoplasm of R breast, ER +.         Plan   1. Nutrition: Continue to encourage plant based diet; increase fruits and vegetables and protein, patient will meet with nutritionist   2. Sleep: Recommend 6-8 hours of restful sleep nightly; recommend strong sleep hygiene routine, can try melatonin and/or magnesium glycinate. Patient has difficulty falling asleep so melatonin can be helpful with this.  3. Mind Body Medicine: Restart yoga with OT  4. Exercise: Recommend 60 minutes of gentle movement/light activity per day (cleaning, walking, cooking, gardening, stationary bike). Recommend some type of cardiovascular activity daily if well. Can continue running as can tolerate.   5. Recommend a positive thought process through affirmations and visualizations.  6. Refer to Acupuncture to prevent Neuropathyand for anxiety, advised patient may not be covered by insurance   7. OT Yoga  mobility, strength, anxiety  8. Discussed psychology for anxiety, depression, situational adjustment disorder--patient will reach out if  she would like to schedule  9. Recommended Vitamins/Supplements:  Vitamin D, magnesium and melatonin for sleep   10. Follow-Up: PRN--patient encouraged to reach out through portal    Face to Face Time With Patient: 40 min   I spent a total of 60 minutes on the day of the visit.This includes face to face time and non-face to face time preparing to see the patient (eg, review of tests), obtaining and/or reviewing separately obtained history, documenting clinical information in the electronic or other health record, independently interpreting results and communicating results to the patient/family/caregiver, or care coordinator.

## 2023-11-21 ENCOUNTER — TELEPHONE (OUTPATIENT)
Dept: HEMATOLOGY/ONCOLOGY | Facility: CLINIC | Age: 43
End: 2023-11-21
Payer: COMMERCIAL

## 2023-11-21 ENCOUNTER — PATIENT MESSAGE (OUTPATIENT)
Dept: SURGERY | Facility: CLINIC | Age: 43
End: 2023-11-21
Payer: COMMERCIAL

## 2023-11-21 NOTE — TELEPHONE ENCOUNTER
"Spoke what pharmacy to relay information.     ----- Message from Josue Marinelli NP sent at 11/21/2023 11:18 AM CST -----  Regarding: RE: Rx  Apply to port 30-40 minutes prior to treatment"  thanks  ----- Message -----  From: Tamica Nassar, Patient Care Assistant  Sent: 11/21/2023  10:35 AM CST  To: Josue Marinelli NP  Subject: FW: Rx                                           Rx just says apply as needed. How do you want to word it for them? 1 hour prior to infusion?      ----- Message -----  From: Autumn Parikh  Sent: 11/21/2023  10:31 AM CST  To: Yves Calderon  Subject: Rx                                                   Name Of Caller:    Meri w/ [facility]      Contact Preference:    Phone #: [phone]      Nature of call:   [facility] needs a new script with detailed directions for the pt's LIDOcaine-prilocaine (EMLA) cream.               "

## 2023-11-21 NOTE — TELEPHONE ENCOUNTER
Spoke with pharmacy.  30-40 minutes prior to infusion.    ----- Message from Autumn Parikh sent at 11/21/2023 10:21 AM CST -----  Regarding: Rx      Name Of Caller:    Meri w/ [facility]      Contact Preference:    Phone #: [phone]      Nature of call:   [facility] needs a new script with detailed directions for the pt's LIDOcaine-prilocaine (EMLA) cream.

## 2023-11-28 ENCOUNTER — SURGICAL CONSULT (OUTPATIENT)
Dept: PLASTIC SURGERY | Facility: CLINIC | Age: 43
End: 2023-11-28
Attending: PLASTIC SURGERY
Payer: COMMERCIAL

## 2023-11-28 ENCOUNTER — OFFICE VISIT (OUTPATIENT)
Dept: SURGERY | Facility: CLINIC | Age: 43
End: 2023-11-28
Payer: COMMERCIAL

## 2023-11-28 VITALS
TEMPERATURE: 97 F | HEART RATE: 63 BPM | HEIGHT: 60 IN | DIASTOLIC BLOOD PRESSURE: 80 MMHG | WEIGHT: 117 LBS | SYSTOLIC BLOOD PRESSURE: 147 MMHG | BODY MASS INDEX: 22.97 KG/M2

## 2023-11-28 DIAGNOSIS — Z01.818 PRE-OP TESTING: ICD-10-CM

## 2023-11-28 DIAGNOSIS — C50.311 MALIGNANT NEOPLASM OF LOWER-INNER QUADRANT OF RIGHT BREAST OF FEMALE, ESTROGEN RECEPTOR POSITIVE: Primary | ICD-10-CM

## 2023-11-28 DIAGNOSIS — Z17.0 MALIGNANT NEOPLASM OF LOWER-INNER QUADRANT OF RIGHT BREAST OF FEMALE, ESTROGEN RECEPTOR POSITIVE: Primary | ICD-10-CM

## 2023-11-28 DIAGNOSIS — C50.919 MALIGNANT NEOPLASM OF FEMALE BREAST, UNSPECIFIED ESTROGEN RECEPTOR STATUS, UNSPECIFIED LATERALITY, UNSPECIFIED SITE OF BREAST: Primary | ICD-10-CM

## 2023-11-28 PROCEDURE — 99499 UNLISTED E&M SERVICE: CPT | Mod: S$GLB,,, | Performed by: SURGERY

## 2023-11-28 PROCEDURE — 99202 OFFICE O/P NEW SF 15 MIN: CPT | Mod: S$GLB,,, | Performed by: PLASTIC SURGERY

## 2023-11-28 PROCEDURE — 99499 NO LOS: ICD-10-PCS | Mod: S$GLB,,, | Performed by: SURGERY

## 2023-11-28 PROCEDURE — 99202 PR OFFICE/OUTPT VISIT, NEW, LEVL II, 15-29 MIN: ICD-10-PCS | Mod: S$GLB,,, | Performed by: PLASTIC SURGERY

## 2023-11-28 NOTE — PROGRESS NOTES
Breast Surgery  [facility]  Department of Surgery      REFERRING PROVIDER: No referring provider defined for this encounter.    Chief Complaint: Follow up       Subjective:      Update 2023:  Discussed MRI showing cancer <2 cm. Discussed genetics showing BARD1 mutation.     Patient ID: Tasha Cornejo is a 43 y.o. female who presents with right breast Invasive Ductal Carcinoma.     She presented for screening mammogram on 10/5/2023 for yearly scheduled screening. This identified arse heterogeneous calcifications in a linear distribution seen in the lower outer quadrant of the right breast, spanning 1.5 cm. . Follow-up mammogram on 10/12/2023 showed  coarse heterogeneous calcifications in a regional distribution spanning 3.2 cm long axis . A stereotactic biopsy was performed on 10/26/2023 with pathology revealing infiltrating ductal carcinoma of the breast.     Findings at that time were the following:   Lesion 1: Invasive ductal carcinoma   Location: Right breast lower outer quadrant anterior depth    Clip:x marker, in expected position  Tumor size: 3.2 cm   Tumor rdgrdrrdarddrderd:rd rd3rd Estrogen Receptor: +   Progesterone Receptor: -   Her-2 mark: +   Lymph node status: clinically negative.     Patient has not noted a change on breast exam.  Patient denies nipple discharge. Patient denies previous breast biopsy. Patient denies a personal history of breast cancer. Family history includes breast cancer in paternal great aunt, prostate cancer in father and lung cancer in father.     GYN History:  Age of menarche was 9. premenopausal.  Patient denies hormonal therapy. Patient is .     Past Medical History:   Diagnosis Date    Chalazion     Malignant neoplasm of lower-inner quadrant of right breast of female, estrogen receptor positive 2023     Past Surgical History:   Procedure Laterality Date    COLONOSCOPY N/A 2023    Procedure: COLONOSCOPY;  Surgeon: Fabio Rice MD;  Location: [facility] ([address] [place] [address]);   Service: Endoscopy;  Laterality: N/A;  pt confirmed earlier time  EB    ESOPHAGOGASTRODUODENOSCOPY N/A 2023    Procedure: EGD (ESOPHAGOGASTRODUODENOSCOPY);  Surgeon: Fabio Rice MD;  Location: [facility] ([facility]  Service: Endoscopy;  Laterality: N/A;  Procedure Timin-4 weeks    referred by Dr. Rice/Prep instructions handed to patient and to portal.  Patient called did not answer- DB    ESOPHAGOGASTRODUODENOSCOPY N/A 10/18/2023    Procedure: EGD (ESOPHAGOGASTRODUODENOSCOPY);  Surgeon: Fabio Rice MD;  Location: [facility] ([address] [place] [address]);  Service: Endoscopy;  Laterality: N/A;  ref Dwayne  timeframe 5-12 weeks   Dr. Rice patient  portal instruction and given to patient jm  10/11-precall complete-MS     Current Outpatient Medications on File Prior to Visit   Medication Sig Dispense Refill    multivitamin (THERAGRAN) per tablet Take 1 tablet by mouth once daily.      omeprazole (PRILOSEC) 40 MG capsule Take 1 capsule (40 mg total) by mouth every morning. 90 capsule 3    valerian root 500 mg Cap daily as needed.      dexAMETHasone (DECADRON) 4 MG Tab Take 2 tablets by mouth daily on days 2-4 of each chemotherapy cycle. (Patient not taking: Reported on 2023) 6 tablet 5    fluticasone propionate (FLONASE) 50 mcg/actuation nasal spray 1 spray by Each Nare route daily as needed for Rhinitis.      LIDOcaine-prilocaine (EMLA) cream Apply topically as needed. (Patient not taking: Reported on 2023) 30 g 0    LORazepam (ATIVAN) 1 MG tablet Take 1 tablet (1 mg total) by mouth every 12 (twelve) hours as needed for Anxiety (sleep or nausea). (Patient not taking: Reported on 2023) 60 tablet 0    OLANZapine (ZYPREXA) 5 MG tablet Take 1 tablet by mouth nightly on days 1-4 of each chemotherapy cycle. (Patient not taking: Reported on 2023) 4 tablet 5    prochlorperazine (COMPAZINE) 5 MG tablet Take 2 tablets (10 mg total) by mouth every 6 (six) hours as needed for Nausea. (Patient not taking: Reported on  11/20/2023) 20 tablet 5     No current facility-administered medications on file prior to visit.     Social History     Socioeconomic History    Marital status:    Occupational History    Occupation:    Tobacco Use    Smoking status: Never    Smokeless tobacco: Never   Substance and Sexual Activity    Alcohol use: Yes     Comment: rarely    Drug use: No     Social Determinants of Health     Financial Resource Strain: Low Risk  (11/17/2023)    Overall Financial Resource Strain (CARDIA)     Difficulty of Paying Living Expenses: Not hard at all   Food Insecurity: No Food Insecurity (11/17/2023)    Hunger Vital Sign     Worried About Running Out of Food in the Last Year: Never true     Ran Out of Food in the Last Year: Never true   Transportation Needs: No Transportation Needs (11/17/2023)    PRAPARE - Transportation     Lack of Transportation (Medical): No     Lack of Transportation (Non-Medical): No   Physical Activity: Insufficiently Active (11/17/2023)    Exercise Vital Sign     Days of Exercise per Week: 3 days     Minutes of Exercise per Session: 40 min   Stress: Stress Concern Present (11/17/2023)    [place] [place] [employer] - Occupational Stress Questionnaire     Feeling of Stress : To some extent   Social Connections: Unknown (11/17/2023)    Social Connection and Isolation Panel [NHANES]     Frequency of Communication with Friends and Family: Once a week     Frequency of Social Gatherings with Friends and Family: Never     Active Member of Clubs or Organizations: No     Attends Club or Organization Meetings: Patient refused     Marital Status:    Housing Stability: Low Risk  (11/17/2023)    Housing Stability Vital Sign     Unable to Pay for Housing in the Last Year: No     Number of Places Lived in the Last Year: 1     Unstable Housing in the Last Year: No     Family History   Problem Relation Age of Onset    Heart failure Paternal Grandmother     Albinism Paternal  Grandmother     Alzheimer's disease Paternal Grandmother     Diabetes Paternal Grandfather     Heart failure Paternal Grandfather     Breast cancer Other     Cataracts Neg Hx     Glaucoma Neg Hx     Macular degeneration Neg Hx     Colon cancer Neg Hx     Esophageal cancer Neg Hx         Review of Systems   All other systems reviewed and are negative.    Objective:   BP (!) 147/80 (BP Location: Left arm, Patient Position: Sitting, BP Method: Medium (Automatic))   Pulse 63   Temp 96.8 °F (36 °C) (Oral)   Ht 5' (1.524 m)   Wt 53.1 kg (117 lb)   LMP 11/19/2023 (Exact Date)   BMI 22.85 kg/m²     Physical Exam   Constitutional: She is oriented to person, place, and time. No distress.   Cardiovascular:  Normal rate.            Pulmonary/Chest: Effort normal. No respiratory distress. Right breast exhibits no inverted nipple, no mass, no nipple discharge, no skin change and no tenderness. Left breast exhibits no inverted nipple, no mass, no nipple discharge, no skin change and no tenderness. No breast swelling or bleeding.   Genitourinary: No breast swelling or bleeding.   Musculoskeletal: Lymphadenopathy:      Cervical: No cervical adenopathy.      Upper Body:      Right upper body: No supraclavicular or axillary adenopathy.      Left upper body: No supraclavicular or axillary adenopathy.     Neurological: She is oriented to person, place, and time.   Skin: Skin is warm and dry. She is not diaphoretic.     Psychiatric: Her behavior is normal. Mood normal.       Radiology review: Images personally reviewed by me in the clinic and shown to the patient during the consultation.     Assessment:       1. Malignant neoplasm of lower-inner quadrant of right breast of female, estrogen receptor positive        Plan:     Multidisciplinary nature of breast cancer care was discussed in detail at today's visit.    MRI pending to further evaluate extent of disease.     We also discussed the role of systemic therapy in the treatment  of breast cancer. We discussed that neoadjuvant chemotherapy is utilized for certain patients. The benefits of chemotherapy given in the neoadjuvant setting as opposed to the adjuvant setting are multiple.  The tumour response to chemotherapy can help limit the extent of breast and axillary surgery. The tumor may decrease in size so the tumor can be removed with a smaller portion of the breast. Lymph nodes that have tumor in them prior to chemotherapy may have no remaining tumor after chemotherapy which may change axillary surgery recommendations. Neoadjuvant chemotherapy also allows us to evaluate the tumor response to treatment based on the pathology findings at surgery. She will be referred to medical oncology to discuss options for neoadjuvant chemotherapy.     We discussed that surgical options would include a lumpectomy versus a mastectomy.  We discussed there is no survival benefit to undergoing a mastectomy compared to lumpectomy.  Based on clinical exam and imaging, she would  be a good candidate for breast conservation prior to neoadjuvant therapy.  Surgical technique and rationale was discussed with the patient.  We discussed that this would be done as a reflector localized excision of the primary tumor along with a surrounding margin of normal breast tissue.  The concept of local control was explained to the patient.  She understands that we would aim to achieve negative margins at the time of initial surgery.  There is however an approximately 15% risk of a positive margin that would require take back for reexcision.  Risks and benefits of the procedure were discussed.  Risks include but are not limited to infection, bleeding, poor cosmesis, positive margins requiring reexcision, and local and distant recurrence. Clips will be placed in the tumor bed to leticia the location for radiation, future imaging and/ or re-excisions.     We discussed the option for mastectomy.  We discussed the risks and benefits  of mastectomy. Risks include but are not limited to risk of bleeding, infection, poor cosmesis, need for additional surgery, clip placement, scaring, pain including phantom pain, fluid collections, shoulder stiffness, prolonged healing, and recurrence. Reconstruction after mastectomy was discussed.  Reconstructive generally include implant or autologous tissue reconstruction. There are certain health qualifications that the patient must meet in order to be a candidate for reconstruction. We discussed the option for contralateral prophylactic mastectomy.  Undergoing a contralateral prophylactic mastectomy does not improve the cure rate for the known cancer or reduce the risk of the that cancer returning.  Overall most women diagnosed with breast cancer have a 2-6% risk of developing a breast cancer in the opposite breast in the next 10 years.  Contralateral prophylactic mastectomy is not 100% protected against development of a new breast cancer.  Undergoing surgery on the contralateral breast has the risk of surgical site complications which could delay cancer treatments.  Most women who proceed with contralateral prophylactic mastectomy do so due to symmetry concerns or for peace of mind.      We also discussed axillary staging using sentinel node biopsy.  [place] lymph node biopsies performed utilizing the injection of blue and radioactive dye.  This dye travels to the 1st few lymph nodes that drain the breast.  Lymph nodes that uptake the blue or radioactive dye or are palpable are surgically removed and sent to pathology.  Typically 1-5 lymph nodes are removed during this procedure although exact numbers vary depending on the patient.  This procedure allows sampling of the lymph nodes most at risk for metastasis.  Recent studies have shown the utilization of this procedure in patients  who have undergone neoadjuvant chemotherapy with a good clinical response.   I will send the lymph nodes to pathology for frozen  section at the time of surgery and if no cancer is identified then no additional axillary surgery will be performed.  However if residual metastatic disease is identified in the sentinel lymph node biopsy then standard of care recommends proceeding with completion axillary lymph node dissection.  We discussed that axillary lymph node dissection involved removing all the level 1 and 2 axillary lymph nodes.  This procedure has increased risk of lymphedema upwards of 30% and increased risk of nerve injury when compared to sentinel lymph node biopsy. Risks include but are not limited to lymphedema, bleeding, infection, poor cosmesis, numbness of the incision site or arm, injury to nerves involved in movement of the arm and shoulder, seroma, failure of dye to map, and need for additional surgery.       Based on her medical history she is a low risk of complications. Materials utilized in the surgery include surgical metal clips, suture material, and skin adhesives. These materials can lead to allergic reactions, skin irration, and other complications. Medications utilized in surgery include but are not limited to antibiotics, isosulfan blue dye, and technetium sulfa colloid.  Given the patient's allergy history  there is no expected allergic reaction.     The role of adjuvant radiation therapy following breast conservation surgery was also discussed with the patient. We discussed that when looking at all patient populations risk of local recurrence with lumpectomy alone is high and radiation therapy is recommended in most cases. We discussed that radiation is also typically recommended after mastectomy for patients with large tumors and certain patients with positive lymph nodes. We discussed that final recommendations on radiation would be based on final surgical pathology. The duration and treatment side effects were discussed with the patient.  She'll be referred to radiation oncology post-operatively for further  discussion of her options.     She does meet NCCN guidelines for genetic testing based on her family history. We discussed genetic testing at length and she will like to proceed.     Following her discussion today,  Final plan will be pending response to chemotherapy. Plan for repeat breast imaging and clinic follow up after completion of chemotherapy.       Update 11/28/2023:  Discussed MRI showing cancer <2 cm. Dr. Mcbride and I discussed and are now recommending upfront surgery.   Discussed port placement at surgery.  Discussed BARD1 mutation.   Considering lumpectomy vs mastectomy vs bilateral mastectomy with right sentinel lymph node biopsy.   Meeting with plastic surgery today.

## 2023-11-28 NOTE — PROGRESS NOTES
Patient presents for evaluation.  She was recently diagnosed with right breast cancer.  She also has a genetic mutation increased lifetime risk for breast cancer.    Past Medical History:   Diagnosis Date    Chalazion     Malignant neoplasm of lower-inner quadrant of right breast of female, estrogen receptor positive 11/6/2023       Scheduled Meds:  Continuous Infusions:  PRN Meds:.    Review of patient's allergies indicates:   Allergen Reactions    Aleve [naproxen sodium] Other (See Comments)     Throat swelling       On exam:  Sternal notch to nipple is 21 cm bilaterally     Imf to nipple is 6 cm bilaterally    Base width is 11 cm bilaterally     Minimal amount of infraumbilical adipose tissue     Possibly the only adequate donor site would be the gluteal area     Assessment:  Right breast cancer     Plan we discussed extensively restorative options.  Patient would like to have autogenous reconstruction ultimately     Dr. Gabriel would like to have tissue expander was upfront because of the nature of the disease     We will proceed with scheduling her     Patient is still undecided about Uni versus bilateral

## 2023-11-29 ENCOUNTER — CLINICAL SUPPORT (OUTPATIENT)
Dept: REHABILITATION | Facility: HOSPITAL | Age: 43
End: 2023-11-29
Attending: PHYSICIAN ASSISTANT
Payer: COMMERCIAL

## 2023-11-29 DIAGNOSIS — R53.81 PHYSICAL DECONDITIONING: ICD-10-CM

## 2023-11-29 DIAGNOSIS — Z17.0 MALIGNANT NEOPLASM OF LOWER-INNER QUADRANT OF RIGHT BREAST OF FEMALE, ESTROGEN RECEPTOR POSITIVE: ICD-10-CM

## 2023-11-29 DIAGNOSIS — C50.311 MALIGNANT NEOPLASM OF LOWER-INNER QUADRANT OF RIGHT BREAST OF FEMALE, ESTROGEN RECEPTOR POSITIVE: ICD-10-CM

## 2023-11-29 DIAGNOSIS — C50.919 MALIGNANT NEOPLASM OF FEMALE BREAST, UNSPECIFIED ESTROGEN RECEPTOR STATUS, UNSPECIFIED LATERALITY, UNSPECIFIED SITE OF BREAST: Primary | ICD-10-CM

## 2023-11-29 DIAGNOSIS — F43.29 STRESS AND ADJUSTMENT REACTION: Primary | ICD-10-CM

## 2023-11-29 PROCEDURE — 97165 OT EVAL LOW COMPLEX 30 MIN: CPT

## 2023-11-29 PROCEDURE — 97110 THERAPEUTIC EXERCISES: CPT

## 2023-11-29 NOTE — PROGRESS NOTES
[facility]   Occupational Therapy Initial Evaluation - Therapeutic Yoga    Date: 11/29/2023   Name: Tasha Cornejo  Clinic Number: [id number]    Therapy Diagnosis: physical deconditioning; stress related to diagnosis  Physician: Dubinsky, Joanna L., PA*    Physician Orders: Eval and Treat   Medical Diagnosis from Referral: Malignant neoplasm of lower-inner quadrant of right breast of female, estrogen receptor positive [C50.311, Z17.0]   Evaluation Date: 11/29/2023  Authorization Period Expiration: 12/31/23  Plan of Care Expiration: 04/29/23  Progress Note Due: 02/29/23  Visit # / Visits authorized: 1/ 1     Precautions: Standard and cancer     Time In: 1:45  Time Out: 2:30  Total Appointment Time (timed & untimed codes): 45 minutes      SUBJECTIVE     Date of onset: 10/26/23    History of current condition: Tasha reports: Following an abnormal mammogram and  biopsy she was diagnosed with right Malignant  breast CA.  She is tentatively scheduled for mastectomy in the near future followed by a plan for chemotherapy.    Falls: none    Prior Therapy: none  Social History:  lives with their spouse  Occupation:  working from home.  Also going to school for  advanced graphic design studies.  Prior Level of Function: working full time and going to school.  Running 7 days a week for 3040 minutes. Socializing with friends and maintaining her home.    Current Level of Function: Significant anxiety and stress affecting sleep and motivation for ADL's.  She has a h/o depression and is fearful that this may return.  She has the contact for the counselor at the [facility] if she needs assistance.        Pain:  Current 0/10,     Patients goals:  1. improve my sleep.  2. Learn ways to manage my stress.  3. Learn how to do yoga again.     Medical History:   Past Medical History:   Diagnosis Date    Chalazion     Malignant neoplasm of lower-inner quadrant of right breast of female,  estrogen receptor positive 11/6/2023       Surgical History:   Tasha Cornejo  has a past surgical history that includes Esophagogastroduodenoscopy (N/A, 6/2/2023); Colonoscopy (N/A, 6/2/2023); and Esophagogastroduodenoscopy (N/A, 10/18/2023).    Medications:   Tasha has a current medication list which includes the following prescription(s): dexamethasone, fluticasone propionate, lidocaine-prilocaine, lorazepam, multivitamin, olanzapine, omeprazole, prochlorperazine, and valerian root.    Allergies:   Review of patient's allergies indicates:   Allergen Reactions    Aleve [naproxen sodium] Other (See Comments)     Throat swelling          OBJECTIVE       Emotional Stress Response: anxious,nervous and on edge, frustrated  Physical Stress Response: increased BP, clench teeth, clasp hands, shallow breathing,   Behavioral Stress Response: poor sleep, eat more than usual, talk to my , get outside, running,       Patient Specific Functional Scale:         Activity 11/29/23       Anxiety and stress 4       2.    sleep 3       3.   Motivation due depression  5       4.        5.        6.        SCORE    4.0         Total Score = Sum of activity scores / number of activities  Minimum Detectable Change (90% CII) for average score = 2 points  Minimum Detectable Change (90% CI) for single activity score = 3 points    Lifestyle Questionnaire:      Lifestyle Response   Emotional Response to Health Status fair   Patient's Communication Skills good   Reported Eating Habits good   Reported Sleeping Patterns fair, difficulty falling asleep and frequent awakwning   Reported Energy Level good   Knowledge of Exercises & Fitness good   Frequency of Exercise Sessions/Week 3 runs 30 minutes       TREATMENT     Total Treatment time (time-based codes) separate from Evaluation: 45 minutes      Tasha received the treatments listed below:      therapeutic exercises to develop strength, mobility and  posture for 15 minutes including:  scapular and pectoralis stretch using yoga blocks, corner stretch to release pects, yellow t-band for scapular retraction,(strengthening).       PATIENT EDUCATION AND HOME EXERCISES     Education provided:   - - physiology of yoga/meditation and immune health    Written Home Exercises Provided: yes. Exercises were reviewed and Tasha was able to demonstrate them prior to the end of the session.  Tasha demonstrated good  understanding of the education provided. See EMR under Patient Instructions for exercises provided during therapy sessions.    ASSESSMENT     Tasha is a 43 y.o. female referred to outpatient Occupational Therapy with a medical diagnosis of Malignant neoplasm of lower-inner quadrant of right breast of female, estrogen receptor positive [C50.311, Z17.0] . Patient presents with the following impairments:      Self-Care Limitations, Deconditioning, Sleep Disturbance, and Poor Stress Coping Skills    Following medical record review, it is determined that Tasha will benefit from skilled outpatient Occupational Therapy to address the impairments stated above and in the chart below in order to maximize functional activities. The following goals were discussed with the patient and patient is in agreement with them as addressed in the treatment plan. The patient's rehab potential is Malignant neoplasm of lower-inner quadrant of right breast of female, estrogen receptor positive [C50.311, Z17.0] . Plan of care discussed with patient: Yes  Patient's spiritual, cultural and educational needs considered and patient is agreeable to the plan of care and goals as stated below:     Anticipated Barriers for therapy: none    Medical Necessity is demonstrated by the following    Profile and History Assessment of Occupational Performance Level of Clinical Decision Making Complexity Score   Occupational Profile:   Tasha Cornejo is a 43 y.o. female who lives with their spouse and is currently employed. Tasha has difficulty  with  ADLs and IADLs as listed previously, which  Affecting herdaily functional abilities.      Comorbidities:    has a past medical history of Chalazion and Malignant neoplasm of lower-inner quadrant of right breast of female, estrogen receptor positive.    Medical and Therapy History Review:   Brief               Performance Deficits    Physical:  Joint Mobility  Joint Stability  Muscle Power/Strength  Muscle Endurance    Cognitive:  No Deficits    Psychosocial:    No Deficits     Clinical Decision Making:  low    Assessment Process:  Problem-Focused Assessments    Modification/Need for Assistance:  Not Necessary    Intervention Selection:  Multiple Treatment Options       low  Based on PMHX, co morbidities , data from assessments and functional level of assistance required with task and clinical presentation directly impacting function.         Goals:  Short Term Goals: 6 weeks      Goal # Goal Status   1 Patient will demonstrate independence with diaphragmatic breathing in sit and supine. Progressing   2 Pt. will identify resource for audio body scan to assist with stress management/immune health    3 Patient will identify 2 new stress coping skills for stress management/immune health. Progressing   4 Patient will identify activity pacing problems.  Patient will then implement new plan for daily activity to increase endurance for ADL's. Progressing      Long Term Goals: 12 weeks      Goal # Goal Status   1 Patient will demonstrate independence with yoga Home Exercise Program to increase strength and endurance for ADL's. Progressing   2 Patient will demonstrate independence with relaxation techniques to manage stress for immune health. Progressing   3 Patient will verbalize good understanding of stress/immune health relationship.  Progressing   4              PLAN   Plan of care Certification: 11/29/2023 to 4/29/23.    Outpatient Occupational Therapy 1 times weekly for 12 weeks to include the following  interventions: Neuromuscular Re-ed, Patient Education, Self Care, Therapeutic Activities, and Therapeutic Exercise.     Ernestina Romo, OT      I

## 2023-11-30 DIAGNOSIS — Z17.0 MALIGNANT NEOPLASM OF LOWER-INNER QUADRANT OF RIGHT BREAST OF FEMALE, ESTROGEN RECEPTOR POSITIVE: ICD-10-CM

## 2023-11-30 DIAGNOSIS — Z15.01 BARD1 GENE MUTATION POSITIVE: Primary | ICD-10-CM

## 2023-11-30 DIAGNOSIS — C50.311 MALIGNANT NEOPLASM OF LOWER-INNER QUADRANT OF RIGHT BREAST OF FEMALE, ESTROGEN RECEPTOR POSITIVE: ICD-10-CM

## 2023-12-01 ENCOUNTER — PATIENT MESSAGE (OUTPATIENT)
Dept: HEMATOLOGY/ONCOLOGY | Facility: CLINIC | Age: 43
End: 2023-12-01
Payer: COMMERCIAL

## 2023-12-04 ENCOUNTER — HOSPITAL ENCOUNTER (OUTPATIENT)
Dept: RADIOLOGY | Facility: OTHER | Age: 43
Discharge: HOME | End: 2023-12-04
Attending: PLASTIC SURGERY
Payer: COMMERCIAL

## 2023-12-04 DIAGNOSIS — C50.919 MALIGNANT NEOPLASM OF FEMALE BREAST, UNSPECIFIED ESTROGEN RECEPTOR STATUS, UNSPECIFIED LATERALITY, UNSPECIFIED SITE OF BREAST: ICD-10-CM

## 2023-12-04 PROCEDURE — 74174 CTA ABD&PLVS W/CONTRAST: CPT | Mod: TC

## 2023-12-04 PROCEDURE — 74174 CTA ABD&PLVS W/CONTRAST: CPT | Mod: 26,,, | Performed by: RADIOLOGY

## 2023-12-04 PROCEDURE — 74174: ICD-10-PCS | Mod: 26,,, | Performed by: RADIOLOGY

## 2023-12-04 PROCEDURE — 73706 CT ANGIO LWR EXTR W/O&W/DYE: CPT | Mod: 26,,, | Performed by: RADIOLOGY

## 2023-12-04 PROCEDURE — 25500020 PHARM REV CODE 255: Performed by: PLASTIC SURGERY

## 2023-12-04 PROCEDURE — 73706 CTA PAP FLAP (BILATERAL LOWER EXTREMITY): ICD-10-PCS | Mod: 26,,, | Performed by: RADIOLOGY

## 2023-12-04 PROCEDURE — 73706 CT ANGIO LWR EXTR W/O&W/DYE: CPT | Mod: TC,50

## 2023-12-04 RX ADMIN — IOHEXOL 100 ML: 350 INJECTION, SOLUTION INTRAVENOUS at 03:12

## 2023-12-05 ENCOUNTER — OFFICE VISIT (OUTPATIENT)
Dept: PLASTIC SURGERY | Facility: CLINIC | Age: 43
End: 2023-12-05
Attending: PLASTIC SURGERY
Payer: COMMERCIAL

## 2023-12-05 DIAGNOSIS — C50.919 MALIGNANT NEOPLASM OF FEMALE BREAST, UNSPECIFIED ESTROGEN RECEPTOR STATUS, UNSPECIFIED LATERALITY, UNSPECIFIED SITE OF BREAST: Primary | ICD-10-CM

## 2023-12-05 PROCEDURE — 99211 OFF/OP EST MAY X REQ PHY/QHP: CPT | Mod: S$GLB,,, | Performed by: PLASTIC SURGERY

## 2023-12-05 PROCEDURE — 3044F PR MOST RECENT HEMOGLOBIN A1C LEVEL <7.0%: ICD-10-PCS | Mod: CPTII,S$GLB,, | Performed by: PLASTIC SURGERY

## 2023-12-05 PROCEDURE — 99211 PR OFFICE/OUTPT VISIT, EST, LEVL I: ICD-10-PCS | Mod: S$GLB,,, | Performed by: PLASTIC SURGERY

## 2023-12-05 PROCEDURE — 1159F MED LIST DOCD IN RCRD: CPT | Mod: CPTII,S$GLB,, | Performed by: PLASTIC SURGERY

## 2023-12-05 PROCEDURE — 1159F PR MEDICATION LIST DOCUMENTED IN MEDICAL RECORD: ICD-10-PCS | Mod: CPTII,S$GLB,, | Performed by: PLASTIC SURGERY

## 2023-12-05 PROCEDURE — 3044F HG A1C LEVEL LT 7.0%: CPT | Mod: CPTII,S$GLB,, | Performed by: PLASTIC SURGERY

## 2023-12-05 NOTE — H&P (VIEW-ONLY)
Subjective:       Patient ID: Tasha Cornejo is a 43 y.o. female.    Chief Complaint: No chief complaint on file.      Past Medical History:   Diagnosis Date    Chalazion     Malignant neoplasm of lower-inner quadrant of right breast of female, estrogen receptor positive 2023     Past Surgical History:   Procedure Laterality Date    COLONOSCOPY N/A 2023    Procedure: COLONOSCOPY;  Surgeon: Faibo Rice MD;  [facility]  Service: Endoscopy;  Laterality: N/A;  pt confirmed earlier time  EB    ESOPHAGOGASTRODUODENOSCOPY N/A 2023    Procedure: EGD (ESOPHAGOGASTRODUODENOSCOPY);  Surgeon: Fabio Rice MD;  [facility]  Service: Endoscopy;  Laterality: N/A;  Procedure Timin-4 weeks    referred by Dr. Rice/Prep instructions handed to patient and to portal.  Patient called did not answer- DB    ESOPHAGOGASTRODUODENOSCOPY N/A 10/18/2023    Procedure: EGD (ESOPHAGOGASTRODUODENOSCOPY);  Surgeon: Fabio Rice MD;  [facility]  Service: Endoscopy;  Laterality: N/A;  ref Ray  timeframe 5-12 weeks   Dr. Rice patient  portal instruction and given to patient jm  10/11-precall complete-MS     N/A  Family history is reviewed and has not changed   Social History     Socioeconomic History    Marital status:    Occupational History    Occupation:    Tobacco Use    Smoking status: Never    Smokeless tobacco: Never   Substance and Sexual Activity    Alcohol use: Yes     Comment: rarely    Drug use: No     Social Determinants of Health     Financial Resource Strain: Low Risk  (2023)    Overall Financial Resource Strain (CARDIA)     Difficulty of Paying Living Expenses: Not hard at all   Food Insecurity: No Food Insecurity (2023)    Hunger Vital Sign     Worried About Running Out of Food in the Last Year: Never true     Ran Out of Food in the Last Year: Never true   Transportation Needs: No Transportation Needs (2023)     PRAPARE - Transportation     Lack of Transportation (Medical): No     Lack of Transportation (Non-Medical): No   Physical Activity: Insufficiently Active (11/17/2023)    Exercise Vital Sign     Days of Exercise per Week: 3 days     Minutes of Exercise per Session: 40 min   Stress: Stress Concern Present (11/17/2023)    [employer] [place] [employer] - Occupational Stress Questionnaire     Feeling of Stress : To some extent   Social Connections: Unknown (11/17/2023)    Social Connection and Isolation Panel [NHANES]     Frequency of Communication with Friends and Family: Once a week     Frequency of Social Gatherings with Friends and Family: Never     Active Member of Clubs or Organizations: No     Attends Club or Organization Meetings: Patient refused     Marital Status:    Housing Stability: Low Risk  (11/17/2023)    Housing Stability Vital Sign     Unable to Pay for Housing in the Last Year: No     Number of Places Lived in the Last Year: 1     Unstable Housing in the Last Year: No       Current Outpatient Medications   Medication Sig Dispense Refill    dexAMETHasone (DECADRON) 4 MG Tab Take 2 tablets by mouth daily on days 2-4 of each chemotherapy cycle. (Patient not taking: Reported on 11/20/2023) 6 tablet 5    fluticasone propionate (FLONASE) 50 mcg/actuation nasal spray 1 spray by Each Nare route daily as needed for Rhinitis.      LIDOcaine-prilocaine (EMLA) cream Apply topically as needed. (Patient not taking: Reported on 11/20/2023) 30 g 0    LORazepam (ATIVAN) 1 MG tablet Take 1 tablet (1 mg total) by mouth every 12 (twelve) hours as needed for Anxiety (sleep or nausea). (Patient not taking: Reported on 11/20/2023) 60 tablet 0    multivitamin (THERAGRAN) per tablet Take 1 tablet by mouth once daily.      OLANZapine (ZYPREXA) 5 MG tablet Take 1 tablet by mouth nightly on days 1-4 of each chemotherapy cycle. (Patient not taking: Reported on 11/20/2023) 4 tablet 5    omeprazole (PRILOSEC) 40 MG  capsule Take 1 capsule (40 mg total) by mouth every morning. 90 capsule 3    prochlorperazine (COMPAZINE) 5 MG tablet Take 2 tablets (10 mg total) by mouth every 6 (six) hours as needed for Nausea. (Patient not taking: Reported on 11/20/2023) 20 tablet 5    valerian root 500 mg Cap daily as needed.       No current facility-administered medications for this visit.     Review of patient's allergies indicates:   Allergen Reactions    Aleve [naproxen sodium] Other (See Comments)     Throat swelling       Review of Systems   All other systems reviewed and are negative.      Objective:      There were no vitals filed for this visit.  Physical Exam  Sternal notch to nipple is 21 cm bilaterally      Imf to nipple is 6 cm bilaterally     Base width is 11 cm bilaterally      Minimal amount of infraumbilical adipose tissue      Possibly the only adequate donor site would be the gluteal area     Lab Review: CBC:   Lab Results   Component Value Date    WBC 6.07 08/30/2023    RBC 4.66 08/30/2023    HGB 13.3 08/30/2023    HCT 41.6 08/30/2023     08/30/2023     Diagnostics Review: CT: Reviewed     Assessment:       Breast cancer in increased lifetime risk for breast cancer  Plan:       To OR for bilateral placement of tissue expanders after nipple sparing mastectomy     Eventually we will undergo gap flap

## 2023-12-05 NOTE — H&P
Subjective:       Patient ID: Tasha Cornejo is a 43 y.o. female.    Chief Complaint: No chief complaint on file.      Past Medical History:   Diagnosis Date    Chalazion     Malignant neoplasm of lower-inner quadrant of right breast of female, estrogen receptor positive 2023     Past Surgical History:   Procedure Laterality Date    COLONOSCOPY N/A 2023    Procedure: COLONOSCOPY;  Surgeon: Fabio Rice MD;  [facility]  Service: Endoscopy;  Laterality: N/A;  pt confirmed earlier time  EB    ESOPHAGOGASTRODUODENOSCOPY N/A 2023    Procedure: EGD (ESOPHAGOGASTRODUODENOSCOPY);  Surgeon: Fabio Rice MD;  [facility]  Service: Endoscopy;  Laterality: N/A;  Procedure Timin-4 weeks    referred by Dr. Rice/Prep instructions handed to patient and to portal.  Patient called did not answer- DB    ESOPHAGOGASTRODUODENOSCOPY N/A 10/18/2023    Procedure: EGD (ESOPHAGOGASTRODUODENOSCOPY);  Surgeon: Fabio Rice MD;  [facility]  Service: Endoscopy;  Laterality: N/A;  ref Ray  timeframe 5-12 weeks   Dr. Rice patient  portal instruction and given to patient jm  10/11-precall complete-MS     N/A  Family history is reviewed and has not changed   Social History     Socioeconomic History    Marital status:    Occupational History    Occupation:    Tobacco Use    Smoking status: Never    Smokeless tobacco: Never   Substance and Sexual Activity    Alcohol use: Yes     Comment: rarely    Drug use: No     Social Determinants of Health     Financial Resource Strain: Low Risk  (2023)    Overall Financial Resource Strain (CARDIA)     Difficulty of Paying Living Expenses: Not hard at all   Food Insecurity: No Food Insecurity (2023)    Hunger Vital Sign     Worried About Running Out of Food in the Last Year: Never true     Ran Out of Food in the Last Year: Never true   Transportation Needs: No Transportation Needs (2023)     PRAPARE - Transportation     Lack of Transportation (Medical): No     Lack of Transportation (Non-Medical): No   Physical Activity: Insufficiently Active (11/17/2023)    Exercise Vital Sign     Days of Exercise per Week: 3 days     Minutes of Exercise per Session: 40 min   Stress: Stress Concern Present (11/17/2023)    [place] [employer] - Occupational Stress Questionnaire     Feeling of Stress : To some extent   Social Connections: Unknown (11/17/2023)    Social Connection and Isolation Panel [NHANES]     Frequency of Communication with Friends and Family: Once a week     Frequency of Social Gatherings with Friends and Family: Never     Active Member of Clubs or Organizations: No     Attends Club or Organization Meetings: Patient refused     Marital Status:    Housing Stability: Low Risk  (11/17/2023)    Housing Stability Vital Sign     Unable to Pay for Housing in the Last Year: No     Number of Places Lived in the Last Year: 1     Unstable Housing in the Last Year: No       Current Outpatient Medications   Medication Sig Dispense Refill    dexAMETHasone (DECADRON) 4 MG Tab Take 2 tablets by mouth daily on days 2-4 of each chemotherapy cycle. (Patient not taking: Reported on 11/20/2023) 6 tablet 5    fluticasone propionate (FLONASE) 50 mcg/actuation nasal spray 1 spray by Each Nare route daily as needed for Rhinitis.      LIDOcaine-prilocaine (EMLA) cream Apply topically as needed. (Patient not taking: Reported on 11/20/2023) 30 g 0    LORazepam (ATIVAN) 1 MG tablet Take 1 tablet (1 mg total) by mouth every 12 (twelve) hours as needed for Anxiety (sleep or nausea). (Patient not taking: Reported on 11/20/2023) 60 tablet 0    multivitamin (THERAGRAN) per tablet Take 1 tablet by mouth once daily.      OLANZapine (ZYPREXA) 5 MG tablet Take 1 tablet by mouth nightly on days 1-4 of each chemotherapy cycle. (Patient not taking: Reported on 11/20/2023) 4 tablet 5    omeprazole (PRILOSEC) 40 MG  capsule Take 1 capsule (40 mg total) by mouth every morning. 90 capsule 3    prochlorperazine (COMPAZINE) 5 MG tablet Take 2 tablets (10 mg total) by mouth every 6 (six) hours as needed for Nausea. (Patient not taking: Reported on 11/20/2023) 20 tablet 5    valerian root 500 mg Cap daily as needed.       No current facility-administered medications for this visit.     Review of patient's allergies indicates:   Allergen Reactions    Aleve [naproxen sodium] Other (See Comments)     Throat swelling       Review of Systems   All other systems reviewed and are negative.      Objective:      There were no vitals filed for this visit.  Physical Exam  Sternal notch to nipple is 21 cm bilaterally      Imf to nipple is 6 cm bilaterally     Base width is 11 cm bilaterally      Minimal amount of infraumbilical adipose tissue      Possibly the only adequate donor site would be the gluteal area     Lab Review: CBC:   Lab Results   Component Value Date    WBC 6.07 08/30/2023    RBC 4.66 08/30/2023    HGB 13.3 08/30/2023    HCT 41.6 08/30/2023     08/30/2023     Diagnostics Review: CT: Reviewed     Assessment:       Breast cancer in increased lifetime risk for breast cancer  Plan:       To OR for bilateral placement of tissue expanders after nipple sparing mastectomy     Eventually we will undergo gap flap

## 2023-12-06 ENCOUNTER — TELEPHONE (OUTPATIENT)
Dept: HEMATOLOGY/ONCOLOGY | Facility: CLINIC | Age: 43
End: 2023-12-06
Payer: COMMERCIAL

## 2023-12-06 DIAGNOSIS — C50.919 BREAST CANCER: ICD-10-CM

## 2023-12-06 DIAGNOSIS — C50.919 MALIGNANT NEOPLASM OF FEMALE BREAST, UNSPECIFIED ESTROGEN RECEPTOR STATUS, UNSPECIFIED LATERALITY, UNSPECIFIED SITE OF BREAST: Primary | ICD-10-CM

## 2023-12-06 NOTE — TELEPHONE ENCOUNTER
Genetic consult discussed and scheduled virtually for after her surgery per her request on Tuesday 1/2/24 at 11:30am with Robert Gillis.    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 Dr. Gabriel notified patient of results. Results scanned in media and referral placed. Please call patient to schedule genetics appointment.    Jay

## 2023-12-10 ENCOUNTER — ANESTHESIA EVENT (OUTPATIENT)
Dept: SURGERY | Facility: OTHER | Age: 43
End: 2023-12-10
Payer: COMMERCIAL

## 2023-12-11 ENCOUNTER — HOSPITAL ENCOUNTER (OUTPATIENT)
Dept: PREADMISSION TESTING | Facility: OTHER | Age: 43
Discharge: HOME | End: 2023-12-11
Attending: PLASTIC SURGERY
Payer: COMMERCIAL

## 2023-12-11 VITALS
BODY MASS INDEX: 22.97 KG/M2 | HEART RATE: 59 BPM | SYSTOLIC BLOOD PRESSURE: 148 MMHG | DIASTOLIC BLOOD PRESSURE: 78 MMHG | WEIGHT: 117 LBS | OXYGEN SATURATION: 99 % | TEMPERATURE: 98 F | HEIGHT: 60 IN | RESPIRATION RATE: 18 BRPM

## 2023-12-11 DIAGNOSIS — Z01.818 PRE-OP TESTING: ICD-10-CM

## 2023-12-11 LAB
ABO + RH BLD: NORMAL
ALBUMIN SERPL BCP-MCNC: 4.2 G/DL (ref 3.5–5.2)
ALP SERPL-CCNC: 45 U/L (ref 55–135)
ALT SERPL W/O P-5'-P-CCNC: 10 U/L (ref 10–44)
ANION GAP SERPL CALC-SCNC: 8 MMOL/L (ref 8–16)
AST SERPL-CCNC: 17 U/L (ref 10–40)
BASOPHILS # BLD AUTO: 0.05 K/UL (ref 0–0.2)
BASOPHILS NFR BLD: 0.9 % (ref 0–1.9)
BILIRUB SERPL-MCNC: 0.4 MG/DL (ref 0.1–1)
BLD GP AB SCN CELLS X3 SERPL QL: NORMAL
BUN SERPL-MCNC: 9 MG/DL (ref 6–20)
CALCIUM SERPL-MCNC: 9 MG/DL (ref 8.7–10.5)
CHLORIDE SERPL-SCNC: 107 MMOL/L (ref 95–110)
CO2 SERPL-SCNC: 26 MMOL/L (ref 23–29)
CREAT SERPL-MCNC: 0.8 MG/DL (ref 0.5–1.4)
DIFFERENTIAL METHOD: NORMAL
EOSINOPHIL # BLD AUTO: 0.1 K/UL (ref 0–0.5)
EOSINOPHIL NFR BLD: 1.4 % (ref 0–8)
ERYTHROCYTE [DISTWIDTH] IN BLOOD BY AUTOMATED COUNT: 12.8 % (ref 11.5–14.5)
EST. GFR  (NO RACE VARIABLE): >60 ML/MIN/1.73 M^2
GLUCOSE SERPL-MCNC: 88 MG/DL (ref 70–110)
HCT VFR BLD AUTO: 40.7 % (ref 37–48.5)
HGB BLD-MCNC: 13.1 G/DL (ref 12–16)
IMM GRANULOCYTES # BLD AUTO: 0.01 K/UL (ref 0–0.04)
IMM GRANULOCYTES NFR BLD AUTO: 0.2 % (ref 0–0.5)
LYMPHOCYTES # BLD AUTO: 1.8 K/UL (ref 1–4.8)
LYMPHOCYTES NFR BLD: 32.5 % (ref 18–48)
MCH RBC QN AUTO: 28.7 PG (ref 27–31)
MCHC RBC AUTO-ENTMCNC: 32.2 G/DL (ref 32–36)
MCV RBC AUTO: 89 FL (ref 82–98)
MONOCYTES # BLD AUTO: 0.8 K/UL (ref 0.3–1)
MONOCYTES NFR BLD: 13.4 % (ref 4–15)
NEUTROPHILS # BLD AUTO: 2.9 K/UL (ref 1.8–7.7)
NEUTROPHILS NFR BLD: 51.6 % (ref 38–73)
NRBC BLD-RTO: 0 /100 WBC
PLATELET # BLD AUTO: 249 K/UL (ref 150–450)
PMV BLD AUTO: 10.1 FL (ref 9.2–12.9)
POTASSIUM SERPL-SCNC: 4.8 MMOL/L (ref 3.5–5.1)
PROT SERPL-MCNC: 7.1 G/DL (ref 6–8.4)
RBC # BLD AUTO: 4.56 M/UL (ref 4–5.4)
RH BLD: NORMAL
SODIUM SERPL-SCNC: 141 MMOL/L (ref 136–145)
SPECIMEN OUTDATE: NORMAL
WBC # BLD AUTO: 5.6 K/UL (ref 3.9–12.7)

## 2023-12-11 PROCEDURE — 85025 COMPLETE CBC W/AUTO DIFF WBC: CPT | Performed by: SURGERY

## 2023-12-11 PROCEDURE — 36415 COLL VENOUS BLD VENIPUNCTURE: CPT | Performed by: PLASTIC SURGERY

## 2023-12-11 PROCEDURE — 86850 RBC ANTIBODY SCREEN: CPT | Performed by: PLASTIC SURGERY

## 2023-12-11 PROCEDURE — 80053 COMPREHEN METABOLIC PANEL: CPT | Performed by: SURGERY

## 2023-12-11 PROCEDURE — 86901 BLOOD TYPING SEROLOGIC RH(D): CPT | Performed by: PLASTIC SURGERY

## 2023-12-11 RX ORDER — ACETAMINOPHEN 500 MG
1000 TABLET ORAL
Status: CANCELLED | OUTPATIENT
Start: 2023-12-11 | End: 2023-12-11

## 2023-12-11 RX ORDER — SODIUM CHLORIDE, SODIUM LACTATE, POTASSIUM CHLORIDE, CALCIUM CHLORIDE 600; 310; 30; 20 MG/100ML; MG/100ML; MG/100ML; MG/100ML
INJECTION, SOLUTION INTRAVENOUS CONTINUOUS
Status: CANCELLED | OUTPATIENT
Start: 2023-12-11

## 2023-12-11 NOTE — DISCHARGE INSTRUCTIONS
Information to Prepare you for your Surgery    PRE-ADMIT TESTING   [address] [facility]       Your surgery has been scheduled at [facility]. We are pleased to have the opportunity to serve you. For Further Information please call [phone].    On the day of surgery please report to the Information Desk on the 1st floor.    CONTACT YOUR PHYSICIAN'S OFFICE THE DAY PRIOR TO YOUR SURGERY TO OBTAIN YOUR ARRIVAL TIME.     The evening before surgery do not eat anything after 9 p.m. ( this includes hard candy, chewing gum and mints).  You may only have GATORADE, POWERADE AND WATER  from 9 p.m. until you leave your home.   DO NOT DRINK ANY LIQUIDS ON THE WAY TO THE HOSPITAL.      Why does your anesthesiologist allow you to drink Gatorade/Powerade before surgery?  Gatorade/Powerade helps to increase your comfort before surgery and to decrease your nausea after surgery.   The carbohydrates in Gatorade/Powerade help reduce your body's stress response to surgery.  If you are a diabetic-drink only water prior to surgery.    Outpatient Surgery- May allow 2 adults (18 and older)/ Support Persons (1 being the designated ) for all surgical/procedural patients. A breastfeeding mother will be allowed her infant and 2 adult Support Persons. No one under the age of 18 will be allowed in the building.      SPECIAL MEDICATION INSTRUCTIONS: TAKE medications checked off by the Anesthesiologist on your Medication List.    Surgery Patients:  If you take ASPIRIN - Your PHYSICIAN/SURGEON will need to inform you IF/OR when you need to stop taking aspirin prior to your surgery.     Starting the week prior to surgery, do not take any medications containing IBUPROFEN or NSAIDS (Advil, Aleve, BC, Goody's, Ketorolac, Meloxicam, Mobic, Motrin, Naproxen, Toradol, etc).  If you are not sure if you should take a medicine please call your surgeon's office.  You may take Tylenol.    Do Not Wear any make-up  (especially eye make-up) to surgery. Please remove any false eyelashes or eyelash extensions. If you arrive the day of surgery with makeup/eyelashes on you will be required to remove prior to surgery. (There is a risk of corneal abrasions if eye makeup/eyelash extensions are not removed)    Leave all valuables at home.   Do Not wear any jewelry or watches, including any metal in body piercings. Jewelry must be removed prior to coming to the hospital.  There is a possibility that rings that are unable to be removed may be cut off if they are on the surgical extremity.    Please remove all hair extensions, wigs, clips and any other metal accessories/ ornaments from your hair.  These items may pose a flammable/fire risk in Surgery and must be removed.    Do not shave your surgical area at least 5 days prior to your surgery. The surgical prep will be performed at the hospital according to Infection Control regulations.    Contact Lens must be removed before surgery. Either do not wear the contact lens or bring a case and solution for storage.  Please bring a container for eyeglasses or dentures as required.  Bring any paperwork your physician has provided, such as consent forms,  history and physicals, doctor's orders, etc.   Bring comfortable clothes that are loose fitting to wear upon discharge. Take into consideration the type of surgery being performed.  Maintain your diet as advised per your physician the day prior to surgery.    Adequate rest the night before surgery is advised.   Park in the Parking lot behind the hospital or in the [facility] across the street from the parking lot. Parking is complimentary.  If you will be discharged the same day as your procedure, please arrange for a responsible adult to drive you home or to accompany you if traveling by taxi.   YOU WILL NOT BE PERMITTED TO DRIVE OR TO LEAVE THE HOSPITAL ALONE AFTER SURGERY.   If you are being discharged the same day, it is  strongly recommended that you arrange for someone to remain with you for the first 24 hrs following your surgery.    The Surgeon will speak to your family/visitor after your surgery regarding the outcome of your surgery and post op care.  The Surgeon may speak to you after your surgery, but there is a possibility you may not remember the details.  Please check with your family members regarding the conversation with the Surgeon.    We strongly recommend whoever is bringing you home be present for discharge instructions.  This will ensure a thorough understanding for your post op home care.              Bathing Instructions with Hibiclens  Shower the evening before and morning of your procedure with Chlorhexidine (Hibiclens)    Do not use Chlorhexidine on your face or genitals. Do not get in your eyes.  Wash your face with water and your regular face wash/soap  Use your regular shampoo  Apply Chlorhexidine (Hibiclens) directly on your skin or on a wet washcloth and wash gently. When showering: Move away from the shower stream when applying Chlorhexidine (Hibiclens) to avoid rinsing off too soon.  Rinse thoroughly with warm water  Do not dilute Chlorhexidine (Hibiclens)   Dry off as usual, do not use any deodorant, powder, body lotions, perfume, after shave or cologne.

## 2023-12-11 NOTE — ANESTHESIA PREPROCEDURE EVALUATION
12/10/2023  Tasha Cornejo is a 43 y.o., female.      Pre-op Assessment    I have reviewed the Patient Summary Reports.     I have reviewed the Nursing Notes. I have reviewed the NPO Status.   I have reviewed the Medications.     Review of Systems  Anesthesia Hx:  No problems with previous Anesthesia             Denies Family Hx of Anesthesia complications.    Denies Personal Hx of Anesthesia complications.                    Social:  Non-Smoker, Social Alcohol Use       Hematology/Oncology:  Hematology Normal                     Current/Recent Cancer.  Breast    right   chemotherapy       EENT/Dental:  chronic allergic rhinitis           Cardiovascular:  Cardiovascular Normal Exercise tolerance: good                  Normal echo 11/2023                         Pulmonary:  Pulmonary Normal                       Renal/:  Renal/ Normal                 Hepatic/GI:     GERD, well controlled             Musculoskeletal:  Musculoskeletal Normal                Neurological:  Neurology Normal                                      Endocrine:  Endocrine Normal            Psych:  Psychiatric Normal                    Physical Exam  General: Well nourished and Cooperative    Airway:  Mallampati: II   Mouth Opening: Normal  TM Distance: Normal  Neck ROM: Normal ROM    Dental:  Intact        Anesthesia Plan  Type of Anesthesia, risks & benefits discussed:    Anesthesia Type: Gen ETT, Gen Supraglottic Airway  Intra-op Monitoring Plan: Standard ASA Monitors  Post Op Pain Control Plan: multimodal analgesia  Induction:  IV  Airway Plan: Video  Informed Consent: Informed consent signed with the Patient and all parties understand the risks and agree with anesthesia plan.  All questions answered.   ASA Score: 2  Day of Surgery Review of History & Physical: H&P Update referred to the surgeon/provider.  Anesthesia Plan  Notes: Labs 8/2023; needs T&S    Left side devices    Ready For Surgery From Anesthesia Perspective.     .

## 2023-12-13 RX ORDER — MUPIROCIN 20 MG/G
OINTMENT TOPICAL
Status: CANCELLED | OUTPATIENT
Start: 2023-12-13

## 2023-12-13 RX ORDER — SODIUM CHLORIDE 9 MG/ML
INJECTION, SOLUTION INTRAVENOUS CONTINUOUS
Status: CANCELLED | OUTPATIENT
Start: 2023-12-13

## 2023-12-13 RX ORDER — CEFAZOLIN SODIUM 2 G/50ML
2 SOLUTION INTRAVENOUS
Status: CANCELLED | OUTPATIENT
Start: 2023-12-13

## 2023-12-14 ENCOUNTER — HOSPITAL ENCOUNTER (OUTPATIENT)
Dept: RADIOLOGY | Facility: OTHER | Age: 43
Discharge: HOME | End: 2023-12-14
Attending: SURGERY | Admitting: PLASTIC SURGERY
Payer: COMMERCIAL

## 2023-12-14 ENCOUNTER — HOSPITAL ENCOUNTER (OUTPATIENT)
Facility: OTHER | Age: 43
Discharge: HOME | End: 2023-12-14
Attending: PLASTIC SURGERY | Admitting: PLASTIC SURGERY
Payer: COMMERCIAL

## 2023-12-14 ENCOUNTER — ANESTHESIA (OUTPATIENT)
Dept: SURGERY | Facility: OTHER | Age: 43
End: 2023-12-14
Payer: COMMERCIAL

## 2023-12-14 VITALS
SYSTOLIC BLOOD PRESSURE: 123 MMHG | TEMPERATURE: 97 F | DIASTOLIC BLOOD PRESSURE: 65 MMHG | HEART RATE: 89 BPM | OXYGEN SATURATION: 97 % | RESPIRATION RATE: 16 BRPM

## 2023-12-14 DIAGNOSIS — C50.919 BREAST CANCER: ICD-10-CM

## 2023-12-14 DIAGNOSIS — Z17.0 MALIGNANT NEOPLASM OF LOWER-INNER QUADRANT OF RIGHT BREAST OF FEMALE, ESTROGEN RECEPTOR POSITIVE: ICD-10-CM

## 2023-12-14 DIAGNOSIS — C50.311 MALIGNANT NEOPLASM OF LOWER-INNER QUADRANT OF RIGHT BREAST OF FEMALE, ESTROGEN RECEPTOR POSITIVE: ICD-10-CM

## 2023-12-14 DIAGNOSIS — C50.919 MALIGNANT NEOPLASM OF FEMALE BREAST, UNSPECIFIED ESTROGEN RECEPTOR STATUS, UNSPECIFIED LATERALITY, UNSPECIFIED SITE OF BREAST: ICD-10-CM

## 2023-12-14 PROCEDURE — 88341 IMHCHEM/IMCYTCHM EA ADD ANTB: CPT | Mod: 26,,, | Performed by: STUDENT IN AN ORGANIZED HEALTH CARE EDUCATION/TRAINING PROGRAM

## 2023-12-14 PROCEDURE — 63600175 PHARM REV CODE 636 W HCPCS: Performed by: ANESTHESIOLOGY

## 2023-12-14 PROCEDURE — 76098 X-RAY EXAM SURGICAL SPECIMEN: CPT | Mod: TC

## 2023-12-14 PROCEDURE — 77001 CHG FLUOROGUIDE CNTRL VEN ACCESS,PLACE,REPLACE,REMOVE: ICD-10-PCS | Mod: 26,,, | Performed by: SURGERY

## 2023-12-14 PROCEDURE — A9520 TC99 TILMANOCEPT DIAG 0.5MCI: HCPCS

## 2023-12-14 PROCEDURE — 37000009 HC ANESTHESIA EA ADD 15 MINS: Performed by: PLASTIC SURGERY

## 2023-12-14 PROCEDURE — 25000003 PHARM REV CODE 250: Performed by: STUDENT IN AN ORGANIZED HEALTH CARE EDUCATION/TRAINING PROGRAM

## 2023-12-14 PROCEDURE — 88342 CHG IMMUNOCYTOCHEMISTRY: ICD-10-PCS | Mod: 26,,, | Performed by: STUDENT IN AN ORGANIZED HEALTH CARE EDUCATION/TRAINING PROGRAM

## 2023-12-14 PROCEDURE — 88341 PR IHC OR ICC EACH ADD'L SINGLE ANTIBODY  STAINPR: ICD-10-PCS | Mod: 26,,, | Performed by: STUDENT IN AN ORGANIZED HEALTH CARE EDUCATION/TRAINING PROGRAM

## 2023-12-14 PROCEDURE — C1788 PORT, INDWELLING, IMP: HCPCS | Performed by: PLASTIC SURGERY

## 2023-12-14 PROCEDURE — D9220A PRA ANESTHESIA: Mod: ANES,,, | Performed by: ANESTHESIOLOGY

## 2023-12-14 PROCEDURE — 37000008 HC ANESTHESIA 1ST 15 MINUTES: Performed by: PLASTIC SURGERY

## 2023-12-14 PROCEDURE — 38525 BIOPSY/REMOVAL LYMPH NODES: CPT | Mod: 51,RT,, | Performed by: SURGERY

## 2023-12-14 PROCEDURE — D9220A PRA ANESTHESIA: ICD-10-PCS | Mod: ANES,,, | Performed by: ANESTHESIOLOGY

## 2023-12-14 PROCEDURE — 88331 PR  PATH CONSULT IN SURG,W FRZ SEC: ICD-10-PCS | Mod: 26,,, | Performed by: STUDENT IN AN ORGANIZED HEALTH CARE EDUCATION/TRAINING PROGRAM

## 2023-12-14 PROCEDURE — 63600175 PHARM REV CODE 636 W HCPCS: Performed by: STUDENT IN AN ORGANIZED HEALTH CARE EDUCATION/TRAINING PROGRAM

## 2023-12-14 PROCEDURE — 71000033 HC RECOVERY, INTIAL HOUR: Performed by: PLASTIC SURGERY

## 2023-12-14 PROCEDURE — 19303 PR MASTECTOMY, SIMPLE, COMPLETE: ICD-10-PCS | Mod: 50,,, | Performed by: SURGERY

## 2023-12-14 PROCEDURE — 77001 FLUOROGUIDE FOR VEIN DEVICE: CPT | Mod: 26,,, | Performed by: SURGERY

## 2023-12-14 PROCEDURE — 25000003 PHARM REV CODE 250: Performed by: NURSE ANESTHETIST, CERTIFIED REGISTERED

## 2023-12-14 PROCEDURE — 88331 PATH CONSLTJ SURG 1 BLK 1SPC: CPT | Mod: 26,,, | Performed by: STUDENT IN AN ORGANIZED HEALTH CARE EDUCATION/TRAINING PROGRAM

## 2023-12-14 PROCEDURE — 19303 MAST SIMPLE COMPLETE: CPT | Mod: 50,,, | Performed by: SURGERY

## 2023-12-14 PROCEDURE — C1789 PROSTHESIS, BREAST, IMP: HCPCS | Performed by: PLASTIC SURGERY

## 2023-12-14 PROCEDURE — C1729 CATH, DRAINAGE: HCPCS | Performed by: PLASTIC SURGERY

## 2023-12-14 PROCEDURE — 88342 IMHCHEM/IMCYTCHM 1ST ANTB: CPT | Performed by: STUDENT IN AN ORGANIZED HEALTH CARE EDUCATION/TRAINING PROGRAM

## 2023-12-14 PROCEDURE — 88341 IMHCHEM/IMCYTCHM EA ADD ANTB: CPT | Performed by: STUDENT IN AN ORGANIZED HEALTH CARE EDUCATION/TRAINING PROGRAM

## 2023-12-14 PROCEDURE — 88342 IMHCHEM/IMCYTCHM 1ST ANTB: CPT | Mod: 26,,, | Performed by: STUDENT IN AN ORGANIZED HEALTH CARE EDUCATION/TRAINING PROGRAM

## 2023-12-14 PROCEDURE — D9220A PRA ANESTHESIA: ICD-10-PCS | Mod: CRNA,,, | Performed by: NURSE ANESTHETIST, CERTIFIED REGISTERED

## 2023-12-14 PROCEDURE — 71000015 HC POSTOP RECOV 1ST HR: Performed by: PLASTIC SURGERY

## 2023-12-14 PROCEDURE — 25000003 PHARM REV CODE 250: Performed by: ANESTHESIOLOGY

## 2023-12-14 PROCEDURE — 63600175 PHARM REV CODE 636 W HCPCS: Performed by: NURSE ANESTHETIST, CERTIFIED REGISTERED

## 2023-12-14 PROCEDURE — 63600175 PHARM REV CODE 636 W HCPCS: Performed by: PLASTIC SURGERY

## 2023-12-14 PROCEDURE — 27201423 OPTIME MED/SURG SUP & DEVICES STERILE SUPPLY: Performed by: PLASTIC SURGERY

## 2023-12-14 PROCEDURE — P9045 ALBUMIN (HUMAN), 5%, 250 ML: HCPCS | Mod: JZ,JG | Performed by: NURSE ANESTHETIST, CERTIFIED REGISTERED

## 2023-12-14 PROCEDURE — 36561 INSERT TUNNELED CV CATH: CPT | Mod: 51,LT,, | Performed by: SURGERY

## 2023-12-14 PROCEDURE — 36000707: Performed by: PLASTIC SURGERY

## 2023-12-14 PROCEDURE — 71000016 HC POSTOP RECOV ADDL HR: Performed by: PLASTIC SURGERY

## 2023-12-14 PROCEDURE — 36561 PR INSERT TUNNELED CV CATH WITH PORT: ICD-10-PCS | Mod: 51,LT,, | Performed by: SURGERY

## 2023-12-14 PROCEDURE — 71000039 HC RECOVERY, EACH ADD'L HOUR: Performed by: PLASTIC SURGERY

## 2023-12-14 PROCEDURE — 88331 PATH CONSLTJ SURG 1 BLK 1SPC: CPT | Performed by: STUDENT IN AN ORGANIZED HEALTH CARE EDUCATION/TRAINING PROGRAM

## 2023-12-14 PROCEDURE — 36000706: Performed by: PLASTIC SURGERY

## 2023-12-14 PROCEDURE — 25000003 PHARM REV CODE 250: Performed by: PLASTIC SURGERY

## 2023-12-14 PROCEDURE — 88307 TISSUE EXAM BY PATHOLOGIST: CPT | Performed by: STUDENT IN AN ORGANIZED HEALTH CARE EDUCATION/TRAINING PROGRAM

## 2023-12-14 PROCEDURE — D9220A PRA ANESTHESIA: Mod: CRNA,,, | Performed by: NURSE ANESTHETIST, CERTIFIED REGISTERED

## 2023-12-14 PROCEDURE — 38900 IO MAP OF SENT LYMPH NODE: CPT | Mod: RT,,, | Performed by: SURGERY

## 2023-12-14 PROCEDURE — 88307 PR  SURG PATH,LEVEL V: ICD-10-PCS | Mod: 26,,, | Performed by: STUDENT IN AN ORGANIZED HEALTH CARE EDUCATION/TRAINING PROGRAM

## 2023-12-14 PROCEDURE — 38900 PR INTRAOPERATIVE SENTINEL LYMPH NODE ID W DYE INJECTION: ICD-10-PCS | Mod: RT,,, | Performed by: SURGERY

## 2023-12-14 PROCEDURE — 38525 PR BIOPSY/REM LYMPH NODES, AXILLARY: ICD-10-PCS | Mod: 51,RT,, | Performed by: SURGERY

## 2023-12-14 PROCEDURE — 88307 TISSUE EXAM BY PATHOLOGIST: CPT | Mod: 26,,, | Performed by: STUDENT IN AN ORGANIZED HEALTH CARE EDUCATION/TRAINING PROGRAM

## 2023-12-14 DEVICE — IMPLANTABLE DEVICE: Type: IMPLANTABLE DEVICE | Site: CHEST | Status: FUNCTIONAL

## 2023-12-14 DEVICE — KIT POWERPORT SINGLE 8FR: Type: IMPLANTABLE DEVICE | Site: CHEST | Status: FUNCTIONAL

## 2023-12-14 RX ORDER — SULFAMETHOXAZOLE AND TRIMETHOPRIM 800; 160 MG/1; MG/1
1 TABLET ORAL 2 TIMES DAILY
Qty: 14 TABLET | Refills: 0 | Status: SHIPPED | OUTPATIENT
Start: 2023-12-14 | End: 2023-12-21

## 2023-12-14 RX ORDER — ONDANSETRON 2 MG/ML
INJECTION INTRAMUSCULAR; INTRAVENOUS
Status: DISCONTINUED | OUTPATIENT
Start: 2023-12-14 | End: 2023-12-14

## 2023-12-14 RX ORDER — CIPROFLOXACIN 500 MG/1
500 TABLET ORAL EVERY 12 HOURS
Qty: 14 TABLET | Refills: 0 | Status: SHIPPED | OUTPATIENT
Start: 2023-12-14 | End: 2023-12-21

## 2023-12-14 RX ORDER — DEXAMETHASONE SODIUM PHOSPHATE 4 MG/ML
INJECTION, SOLUTION INTRA-ARTICULAR; INTRALESIONAL; INTRAMUSCULAR; INTRAVENOUS; SOFT TISSUE
Status: DISCONTINUED | OUTPATIENT
Start: 2023-12-14 | End: 2023-12-14

## 2023-12-14 RX ORDER — CIPROFLOXACIN 2 MG/ML
400 INJECTION, SOLUTION INTRAVENOUS ONCE
Status: COMPLETED | OUTPATIENT
Start: 2023-12-14 | End: 2023-12-14

## 2023-12-14 RX ORDER — DIPHENHYDRAMINE HYDROCHLORIDE 50 MG/ML
12.5 INJECTION INTRAMUSCULAR; INTRAVENOUS EVERY 30 MIN PRN
Status: DISCONTINUED | OUTPATIENT
Start: 2023-12-14 | End: 2023-12-14 | Stop reason: HOSPADM

## 2023-12-14 RX ORDER — ACETAMINOPHEN 500 MG
1000 TABLET ORAL
Status: COMPLETED | OUTPATIENT
Start: 2023-12-14 | End: 2023-12-14

## 2023-12-14 RX ORDER — ALBUMIN HUMAN 50 G/1000ML
SOLUTION INTRAVENOUS
Status: DISCONTINUED | OUTPATIENT
Start: 2023-12-14 | End: 2023-12-14

## 2023-12-14 RX ORDER — OXYCODONE AND ACETAMINOPHEN 5; 325 MG/1; MG/1
1 TABLET ORAL EVERY 8 HOURS PRN
Qty: 21 TABLET | Refills: 0 | Status: SHIPPED | OUTPATIENT
Start: 2023-12-14 | End: 2023-12-21

## 2023-12-14 RX ORDER — PHENYLEPHRINE HYDROCHLORIDE 10 MG/ML
INJECTION INTRAVENOUS
Status: DISCONTINUED | OUTPATIENT
Start: 2023-12-14 | End: 2023-12-14

## 2023-12-14 RX ORDER — FENTANYL CITRATE 50 UG/ML
INJECTION, SOLUTION INTRAMUSCULAR; INTRAVENOUS
Status: DISCONTINUED | OUTPATIENT
Start: 2023-12-14 | End: 2023-12-14

## 2023-12-14 RX ORDER — PROCHLORPERAZINE EDISYLATE 5 MG/ML
5 INJECTION INTRAMUSCULAR; INTRAVENOUS EVERY 30 MIN PRN
Status: DISCONTINUED | OUTPATIENT
Start: 2023-12-14 | End: 2023-12-14 | Stop reason: HOSPADM

## 2023-12-14 RX ORDER — KETAMINE HCL IN 0.9 % NACL 50 MG/5 ML
SYRINGE (ML) INTRAVENOUS
Status: DISCONTINUED | OUTPATIENT
Start: 2023-12-14 | End: 2023-12-14

## 2023-12-14 RX ORDER — MUPIROCIN 20 MG/G
OINTMENT TOPICAL
Status: DISCONTINUED | OUTPATIENT
Start: 2023-12-14 | End: 2023-12-14 | Stop reason: HOSPADM

## 2023-12-14 RX ORDER — SODIUM CHLORIDE 0.9 % (FLUSH) 0.9 %
3 SYRINGE (ML) INJECTION
Status: DISCONTINUED | OUTPATIENT
Start: 2023-12-14 | End: 2023-12-14 | Stop reason: HOSPADM

## 2023-12-14 RX ORDER — LIDOCAINE HYDROCHLORIDE 20 MG/ML
INJECTION INTRAVENOUS
Status: DISCONTINUED | OUTPATIENT
Start: 2023-12-14 | End: 2023-12-14

## 2023-12-14 RX ORDER — PROPOFOL 10 MG/ML
VIAL (ML) INTRAVENOUS
Status: DISCONTINUED | OUTPATIENT
Start: 2023-12-14 | End: 2023-12-14

## 2023-12-14 RX ORDER — HYDROMORPHONE HYDROCHLORIDE 2 MG/ML
0.4 INJECTION, SOLUTION INTRAMUSCULAR; INTRAVENOUS; SUBCUTANEOUS EVERY 5 MIN PRN
Status: DISCONTINUED | OUTPATIENT
Start: 2023-12-14 | End: 2023-12-14 | Stop reason: HOSPADM

## 2023-12-14 RX ORDER — SODIUM CHLORIDE 9 MG/ML
INJECTION, SOLUTION INTRAVENOUS CONTINUOUS
Status: DISCONTINUED | OUTPATIENT
Start: 2023-12-14 | End: 2023-12-14 | Stop reason: HOSPADM

## 2023-12-14 RX ORDER — LIDOCAINE HYDROCHLORIDE 10 MG/ML
INJECTION, SOLUTION INTRAVENOUS
Status: DISCONTINUED | OUTPATIENT
Start: 2023-12-14 | End: 2023-12-14

## 2023-12-14 RX ORDER — CEFAZOLIN SODIUM 1 G/3ML
2 INJECTION, POWDER, FOR SOLUTION INTRAMUSCULAR; INTRAVENOUS
Status: DISCONTINUED | OUTPATIENT
Start: 2023-12-14 | End: 2023-12-14 | Stop reason: HOSPADM

## 2023-12-14 RX ORDER — SODIUM CHLORIDE, SODIUM LACTATE, POTASSIUM CHLORIDE, CALCIUM CHLORIDE 600; 310; 30; 20 MG/100ML; MG/100ML; MG/100ML; MG/100ML
INJECTION, SOLUTION INTRAVENOUS CONTINUOUS
Status: DISCONTINUED | OUTPATIENT
Start: 2023-12-14 | End: 2023-12-14 | Stop reason: HOSPADM

## 2023-12-14 RX ORDER — ROCURONIUM BROMIDE 10 MG/ML
INJECTION, SOLUTION INTRAVENOUS
Status: DISCONTINUED | OUTPATIENT
Start: 2023-12-14 | End: 2023-12-14

## 2023-12-14 RX ORDER — EPHEDRINE SULFATE 50 MG/ML
INJECTION, SOLUTION INTRAVENOUS
Status: DISCONTINUED | OUTPATIENT
Start: 2023-12-14 | End: 2023-12-14

## 2023-12-14 RX ORDER — MIDAZOLAM HYDROCHLORIDE 1 MG/ML
INJECTION INTRAMUSCULAR; INTRAVENOUS
Status: DISCONTINUED | OUTPATIENT
Start: 2023-12-14 | End: 2023-12-14

## 2023-12-14 RX ORDER — OXYCODONE HYDROCHLORIDE 5 MG/1
5 TABLET ORAL EVERY 4 HOURS PRN
Status: DISCONTINUED | OUTPATIENT
Start: 2023-12-14 | End: 2023-12-14 | Stop reason: HOSPADM

## 2023-12-14 RX ORDER — MEPERIDINE HYDROCHLORIDE 25 MG/ML
12.5 INJECTION INTRAMUSCULAR; INTRAVENOUS; SUBCUTANEOUS ONCE AS NEEDED
Status: DISCONTINUED | OUTPATIENT
Start: 2023-12-14 | End: 2023-12-14 | Stop reason: HOSPADM

## 2023-12-14 RX ORDER — HEPARIN SODIUM 5000 [USP'U]/ML
INJECTION, SOLUTION INTRAVENOUS; SUBCUTANEOUS
Status: DISCONTINUED | OUTPATIENT
Start: 2023-12-14 | End: 2023-12-14 | Stop reason: HOSPADM

## 2023-12-14 RX ORDER — HYDROMORPHONE HYDROCHLORIDE 2 MG/ML
INJECTION, SOLUTION INTRAMUSCULAR; INTRAVENOUS; SUBCUTANEOUS
Status: DISCONTINUED | OUTPATIENT
Start: 2023-12-14 | End: 2023-12-14

## 2023-12-14 RX ADMIN — SUGAMMADEX 200 MG: 100 INJECTION, SOLUTION INTRAVENOUS at 11:12

## 2023-12-14 RX ADMIN — VANCOMYCIN HYDROCHLORIDE 1000 MG: 1 INJECTION, POWDER, LYOPHILIZED, FOR SOLUTION INTRAVENOUS at 07:12

## 2023-12-14 RX ADMIN — PHENYLEPHRINE HYDROCHLORIDE 200 MCG: 10 INJECTION INTRAVENOUS at 10:12

## 2023-12-14 RX ADMIN — ALBUMIN (HUMAN) 500 ML: 12.5 SOLUTION INTRAVENOUS at 07:12

## 2023-12-14 RX ADMIN — PROPOFOL 50 MG: 10 INJECTION, EMULSION INTRAVENOUS at 08:12

## 2023-12-14 RX ADMIN — PHENYLEPHRINE HYDROCHLORIDE 200 MCG: 10 INJECTION INTRAVENOUS at 07:12

## 2023-12-14 RX ADMIN — FENTANYL CITRATE 100 MCG: 50 INJECTION, SOLUTION INTRAMUSCULAR; INTRAVENOUS at 07:12

## 2023-12-14 RX ADMIN — ONDANSETRON HYDROCHLORIDE 4 MG: 2 INJECTION INTRAMUSCULAR; INTRAVENOUS at 11:12

## 2023-12-14 RX ADMIN — PHENYLEPHRINE HYDROCHLORIDE 100 MCG: 10 INJECTION INTRAVENOUS at 09:12

## 2023-12-14 RX ADMIN — SODIUM CHLORIDE, SODIUM LACTATE, POTASSIUM CHLORIDE, AND CALCIUM CHLORIDE: 600; 310; 30; 20 INJECTION, SOLUTION INTRAVENOUS at 07:12

## 2023-12-14 RX ADMIN — PHENYLEPHRINE HYDROCHLORIDE 0.5 MCG/KG/MIN: 10 INJECTION INTRAVENOUS at 10:12

## 2023-12-14 RX ADMIN — LIDOCAINE HYDROCHLORIDE 100 MG: 20 INJECTION, SOLUTION INTRAVENOUS at 07:12

## 2023-12-14 RX ADMIN — ROCURONIUM BROMIDE 20 MG: 10 INJECTION, SOLUTION INTRAVENOUS at 08:12

## 2023-12-14 RX ADMIN — Medication 50 MG: at 07:12

## 2023-12-14 RX ADMIN — PHENYLEPHRINE HYDROCHLORIDE 200 MCG: 10 INJECTION INTRAVENOUS at 09:12

## 2023-12-14 RX ADMIN — CARBOXYMETHYLCELLULOSE SODIUM 2 DROP: 2.5 SOLUTION/ DROPS OPHTHALMIC at 07:12

## 2023-12-14 RX ADMIN — DEXAMETHASONE SODIUM PHOSPHATE 8 MG: 4 INJECTION, SOLUTION INTRAMUSCULAR; INTRAVENOUS at 07:12

## 2023-12-14 RX ADMIN — HYDROMORPHONE HYDROCHLORIDE 1 MG: 2 INJECTION INTRAMUSCULAR; INTRAVENOUS; SUBCUTANEOUS at 09:12

## 2023-12-14 RX ADMIN — SODIUM CHLORIDE, SODIUM LACTATE, POTASSIUM CHLORIDE, AND CALCIUM CHLORIDE: 600; 310; 30; 20 INJECTION, SOLUTION INTRAVENOUS at 10:12

## 2023-12-14 RX ADMIN — ACETAMINOPHEN 1000 MG: 500 TABLET ORAL at 05:12

## 2023-12-14 RX ADMIN — MUPIROCIN: 20 OINTMENT TOPICAL at 05:12

## 2023-12-14 RX ADMIN — LIDOCAINE HYDROCHLORIDE 50 MG: 10 INJECTION, SOLUTION INTRAVENOUS at 07:12

## 2023-12-14 RX ADMIN — PROCHLORPERAZINE EDISYLATE 5 MG: 5 INJECTION INTRAMUSCULAR; INTRAVENOUS at 03:12

## 2023-12-14 RX ADMIN — EPHEDRINE SULFATE 20 MG: 50 INJECTION INTRAVENOUS at 11:12

## 2023-12-14 RX ADMIN — PROPOFOL 150 MG: 10 INJECTION, EMULSION INTRAVENOUS at 07:12

## 2023-12-14 RX ADMIN — PHENYLEPHRINE HYDROCHLORIDE 200 MCG: 10 INJECTION INTRAVENOUS at 08:12

## 2023-12-14 RX ADMIN — MIDAZOLAM HYDROCHLORIDE 2 MG: 1 INJECTION, SOLUTION INTRAMUSCULAR; INTRAVENOUS at 07:12

## 2023-12-14 RX ADMIN — ROCURONIUM BROMIDE 30 MG: 10 INJECTION, SOLUTION INTRAVENOUS at 07:12

## 2023-12-14 RX ADMIN — CIPROFLOXACIN 400 MG: 2 INJECTION, SOLUTION INTRAVENOUS at 07:12

## 2023-12-14 RX ADMIN — PHENYLEPHRINE HYDROCHLORIDE 300 MCG: 10 INJECTION INTRAVENOUS at 10:12

## 2023-12-14 NOTE — DISCHARGE INSTRUCTIONS
"STARR dressing  Regularly monitor the STARR dressing. If the dressing appears full or there is fluid around the Soft Port contact your healthcare provider right away  Do not let the pump get wet.   Do not use STARR 7 if it is wet or has previously been wet.  Do not cut the tubing between the pump and dressing. Avoid pulling on the tubing or soft port.  Do not take the pump apart.   If pain, reddening, odor, sensitization or a sudden chanPico Dressing    Starr Dressing  Call your nurse or doctor immediately if you notice a change in the color or amount of fluid in the dressing,for example  -If it changes from clear to cloudy or bright red or you see the dressing fill rapidly with blood  -Your wound looks more red than usual or has a foul smell  -The skin around your wound looks reddened or irritated  -The dressing feels or appears loose  -You experience pain  -The alarm does not turn off    If you have any other questions, please call your doctor            Caring for a Closed Suction Drainage Tube  A drainage tube removes fluid from around an incision. This helps prevent infection and promotes healing. The collection bulb at the end of the tube is squeezed and plugged to create suction. The bulb should be emptied and reset when half full to maintain adequate suction. You need to empty the bulb and clean the skin around the drain as often as your healthcare provider tells you to. Follow the steps below.     What youll need  Have the following items ready:  Disposable gloves  Measuring cup  Record sheet  Gauze or paper towel  Sterile cotton swabs or 4" x 4" gauze pads  Sterile saline or soap and water       Step 1. Empty the bulb  Wash your hands and put on a new pair of disposable gloves.  Point the top of the bulb away from you and remove the stopper.  Turn the bulb upside down over a measuring cup. Squeeze the fluid into the cup. Make sure the bulb is totally empty.  Put the cup to one side. You can record the volume " "of liquid in the cup after you clean and reconnect the bulb in step 2.    Step 2. Clean and reconnect the bulb  Clean the top of the bulb with clean gauze or a paper towel, if needed.  Squeeze the bulb tight, and put the stopper back on the top.  Record the amount of fluid in the cup. Then, empty the cup as directed.    Step 3. Clean the site  Remove your disposable gloves and wash your hands before cleaning the site.  Put on a new pair of disposable gloves.  Wet a sterile cotton swab or 4" x 4" gauze pad with sterile saline or soap and water.  Gently clean the skin around the drain. Always wipe away from the incision.  Apply an antibacterial ointment if directed.   When to call your healthcare provider  Call your healthcare provider if you notice any of these changes:  The amount of fluid increases or decreases suddenly  Large amount of blood or a clot in drainage  Color, odor, or thickness of the fluid changes  Tube falls out or the incision opens  Skin around the drain is red, swollen, painful, or seeping pus  You have a fever of 100.4°F (38°C) or higher, or as directed by your healthcare provider     If the tube isn't draining  Here are tips to drain the tube:  Uncurl any kinks in the tube.  With one hand, firmly hold the base of the tube between your thumb and index finger. Do not touch the incision.  Put the thumb and index finger of your other hand on the tube, next to the first hand. Pinch your fingers together. Then pull them along the tube toward the bag. This will help push any clogged fluid through the tube. This is called "stripping the tube." You may find it helpful to hold an alcohol swab between your fingers and the tube to lubricate the tubing.  If the tube still does not drain, call your healthcare provider.   Date Last Reviewed: 12/1/2016  © 7675-4208 [employer]. [address] [place] [address], [place] [address]. All rights reserved. This information is not intended as a substitute for " professional medical care. Always follow your healthcare professional's instructions.         Your Surgeon Gave You EXPAREL® (bupivacaine liposome injectable suspension)    To help control your pain after surgery, your surgeon injected EXPAREL into your surgical incision just before the end of the procedure.   EXPAREL is a local analgesic that contains the local anesthetic bupivacaine. Local anesthetics provide pain relief by numbing the tissue  around the surgical site.   EXPAREL is specifically designed to release pain medication over time and can control pain for up to 72 hours.   In addition to EXPAREL, your surgeon may provide other pain medications to control your pain.   Each patient is different and responds differently to painmedication. Depending on how you respond to EXPAREL, you may require less  additional pain medication during your recovery.    Possible Side Effects    Side effects can occur with any medication and it is important not to ignore anything you may be experiencing. Some patients who  received EXPAREL experienced nausea, vomiting, or constipation. Rarely, patients who receive bupivacaine (the active ingredient in  EXPAREL) have experienced numbness and tingling in their mouth or lips, lightheadedness, or anxiety. Speak with your doctor right  away if you think you may be experiencing any of these sensations, or if you have other questions regarding possible side effects.    Your Recovery    When your pain is under control, your body can better focus on healing. This is not the time to test your pain tolerance, or grin and  bear it.Work with your surgeon and nurse to make your recovery as speedy and pain-free as possible.   Follow the post-op orders your nurse gave you.   Eat a healthy diet and drink plenty of water. Surgery stresses your body; your body responds by needing more energy to heal.      Important Information About EXPAREL  Products that contain bupivacaine, like EXPAREL, may  cause a temporary loss of  sensation or the ability to move in the area where bupivacaine was injected.    Date Administered: 12/14/2023  Time Administered: ***    Other Forms of Bupivicaine should not be administered within 96hrs following administration of EXPAREL.

## 2023-12-14 NOTE — OP NOTE
[facility]  Plastic Surgery  Operative Note    SUMMARY     Date of Procedure: 12/14/2023     Procedure:   Insertion of tissue expander, pre-pectoral, bilateral breasts    Surgeon(s) and Role:  Panel 1:     * Allen Damian MD - Primary  Panel 2:     * Tasha Mcleod MD - Primary    Assisting Surgeon: None    Pre-Operative Diagnosis: Malignant neoplasm of lower-inner quadrant of right female breast, unspecified estrogen receptor status [C50.311]    Post-Operative Diagnosis:  same    Anesthesia: General    Operative Findings (including complications, if any):  pre-pectoral textured expanders filled to 50cc on each side.    Description of Technical Procedures:   The patient was identified in the preoperative holding area, surgical plan was reviewed and the surgical sites were marked.  She was taken the operating room and initially placed in the supine position.  General anesthesia was administered after SCDs were placed on lower extremity and all pressure points well padded.  She was intubated successfully without any issue.  Preoperative antibiotics were given.  The chest was prepped and draped in usual sterile fashion.  A formal time-out was performed.      Dr. Mcleod and her team performed the bilateral nipple sparing mastectomies.  Please see operative report for details.    Our team entered at the completion of her portion of the procedure.  Unfortunately there was some tumor that was closed with a skin on the right side and system skin had to be excised.  The skin was inspected and there was some hyperemia on the right side but it was not debrided at this time.  For the left side the access was then inframammary fold incision and 2 mm of skin was excised and was bleeding.  Both pockets were then inspected and irrigated with sterile saline.  Hemostasis was achieved with electrocautery.  Fifteen [place] round Rafal drain was placed exiting out a remote skin site and secured with a nylon  suture.  Pectoralis 1 and 2 blocks were performed with Exparel bilaterally.  The pockets were then irrigated with Vashe solution.      On the back table the expanders were opened and handled sterilely with minimal manipulation.  The air was evacuated from the expander and then 50 cc of injectable saline was injected into each of the expanders.  The expanders were bathed in double antibiotic solution.  We exchanged our gloves for clean ones and the skin was prepped with Betadine.  The expanders were inserted with minimal touch technique into both pockets.  The medial and lateral tabs were secured to the chest wall with 2-0 PDS.      The skin was then redraped.  On the right side this was a vertical incision that extended into the fold towards the medial aspect of the breast.  Incision was closed with 2-0 for the superficial fascial layer and 3-0 Monocryl and 4-0 Monocryl.  On the left side the closure was the same power the incision was completely in the inframammary fold.  The incisions were dressed with jovanny incisional wound vacs for the nipple-areolar complex and incisions.  This was secured and set to suction without a leak.  Pads and bra were placed.    At the end the case counts were reported correct x2, the patient was awakened from anesthesia extubated and transferred to PACU in stable condition without suffering any acute complication.  Dr. Damian was present for all critical portions of the case.    Significant Surgical Tasks Conducted by the Assistant(s), if Applicable: NA    Estimated Blood Loss (EBL): 25cc           Implants:   Implant Name Type Inv. Item Serial No.  Lot No. LRB No. Used Action   KIT POWERPORT SINGLE 8FR - [id number]  KIT POWERPORT SINGLE 8FR  [employer] [id number] Left 1 Implanted   Sientra OPUS, allox2 tissue xpander   [id number] SIENTRA  Left 1 Implanted   Sientra OPUS, Allox2 tissue expander   [id number] SIENTRA  Right 1 Implanted   [id number]   [id number] ALLERGAN  [id number] Bilateral 1 Implanted       Specimens:   Specimen (24h ago, onward)       Start     Ordered    12/14/23 1112  Specimen to Pathology, Surgery General Surgery  Once        Comments: Pre-op Diagnosis: Malignant neoplasm of lower-inner quadrant of right female breast, unspecified estrogen receptor status [C50.311]Procedure(s):INSERTION, TISSUE EXPANDER, BREASTMASTECTOMY, BILATERALBIOPSY, LYMPH NODE, SENTINELINJECTION, FOR SENTINEL NODE IDENTIFICATIONINSERTION, CATHETER, TUNNELED Number of specimens: 5Name of specimens: 1. Right breast, short stitch-superior; long-lateral,looped-subareolar; skin overlies tumor;2. Right axillary sentinel lymph node hot 541;3. Right axillary sentinel lymph node hot 541;4. Right nipple core (Frozen);5. Left  breast, short stitch-superior; long-lateral, looped-sub areolar;     References:    Click here for ordering Quick Tip   Question Answer Comment   Procedure Type: General Surgery    Specimen Class: Known or suspected malignancy    Which provider would you like to cc? REA OCHOA    Release to patient Immediate        12/14/23 1112                            Condition: Stable    Disposition: PACU - hemodynamically stable.

## 2023-12-14 NOTE — BRIEF OP NOTE
[facility] Surgery ([facility]  Brief Operative Note    SUMMARY     Surgery Date: 12/14/2023     Surgeon(s) and Role:  Panel 1:     * Allen Damian MD - Primary  Panel 2:     * Tasha Mcleod MD - Primary    Assisting Surgeon: None    Pre-op Diagnosis:  Malignant neoplasm of lower-inner quadrant of right female breast, unspecified estrogen receptor status [C50.311]    Post-op Diagnosis:  Post-Op Diagnosis Codes:     * Malignant neoplasm of lower-inner quadrant of right female breast, unspecified estrogen receptor status [C50.311]    Procedure(s) (LRB):  INSERTION, TISSUE EXPANDER, BREAST (Bilateral)  MASTECTOMY, BILATERAL (Bilateral)  BIOPSY, LYMPH NODE, SENTINEL (Right)  INJECTION, FOR SENTINEL NODE IDENTIFICATION (Right)  INSERTION, CATHETER, TUNNELED (Left)    Anesthesia: General    Implants:  Implant Name Type Inv. Item Serial No.  Lot No. LRB No. Used Action   KIT POWERPORT SINGLE 8FR - [id number]  KIT POWERPORT SINGLE 8FR  C.[employer]. B[employer] [id number] Left 1 Implanted   Sientra OPUS, allox2 tissue xpander   [id number] SIENTRA  Left 1 Implanted   Sientra OPUS, Allox2 tissue expander   [id number] SIENTRA  Right 1 Implanted       Operative Findings:   Left IJ port placement under ultrasound and fluoroscopic guidance.   Bilateral nipple sparing mastectomy  Right sentinel lymph node x2 biopsy  Right core nipple biopsy sent for frozen; negative for carcinoma on frozen section     Estimated Blood Loss:<50cc    Estimated Blood Loss has been documented.         Specimens:   Specimen (24h ago, onward)      None            [id number]

## 2023-12-14 NOTE — PROGRESS NOTES
The patient location is: [place]  The chief complaint leading to consultation is: new breast cancer     Visit type: audiovisual    Face to Face time with patient: 35 minutes   45 minutes of total time spent on the encounter, which includes face to face time and non-face to face time preparing to see the patient (eg, review of tests), Obtaining and/or reviewing separately obtained history, Documenting clinical information in the electronic or other health record, Independently interpreting results (not separately reported) and communicating results to the patient/family/caregiver, or Care coordination (not separately reported).     Each patient to whom he or she provides medical services by telemedicine is:  (1) informed of the relationship between the physician and patient and the respective role of any other health care provider with respect to management of the patient; and (2) notified that he or she may decline to receive medical services by telemedicine and may withdraw from such care at any time.    Oncology Nutrition Assessment for Medical Nutrition Therapy  Initial Visit    Tasha Hansen Clemente   1980    Referring Provider:  Dubinsky, Joanna L., PA*      Reason for Visit: Pt in for education and nutrition counseling     PMHx:   Past Medical History:   Diagnosis Date    Acid reflux     Chalazion     Malignant neoplasm of lower-inner quadrant of right breast of female, estrogen receptor positive 11/06/2023       Nutrition Assessment    Patient is a 43 y.o.female with newly diagnosed breast cancer and BARD1 mutation s/p bilateral mastectomy and planned for adjuvant chemotherapy.   Referred to nutrition through integrative oncology.   She reports she is recovering well from surgery and feeling better every day. She would like to know what to eat for cancer/cancer treatment. She does avoid dairy to previous GI issues. Otherwise no restrictions or allergies. She eats out about 3 times per week. Works from  home and prepares food as much as possible. Both she and her  cook.   Drinks mostly water. No coffee since surgery. Does some herbal tea.      Weight:53.1 kg (117 lb)  Height:5' (1.524 m)  BMI:Body mass index is 22.85 kg/m².   IBW: Ideal body weight: 45.5 kg (100 lb 4.9 oz)  Adjusted ideal body weight: 48.5 kg (106 lb 15.8 oz)    Allergies: Aleve [naproxen sodium]    Current Medications:    Current Outpatient Medications:     dexAMETHasone (DECADRON) 4 MG Tab, Take 2 tablets by mouth daily on days 2-4 of each chemotherapy cycle. (Patient not taking: Reported on 11/20/2023), Disp: 6 tablet, Rfl: 5    fluticasone propionate (FLONASE) 50 mcg/actuation nasal spray, 1 spray by Each Nare route daily as needed for Rhinitis., Disp: , Rfl:     LIDOcaine-prilocaine (EMLA) cream, Apply topically as needed. (Patient not taking: Reported on 11/20/2023), Disp: 30 g, Rfl: 0    LORazepam (ATIVAN) 1 MG tablet, Take 1 tablet (1 mg total) by mouth every 12 (twelve) hours as needed for Anxiety (sleep or nausea). (Patient not taking: Reported on 11/20/2023), Disp: 60 tablet, Rfl: 0    multivitamin (THERAGRAN) per tablet, Take 1 tablet by mouth once daily., Disp: , Rfl:     OLANZapine (ZYPREXA) 5 MG tablet, Take 1 tablet by mouth nightly on days 1-4 of each chemotherapy cycle. (Patient not taking: Reported on 11/20/2023), Disp: 4 tablet, Rfl: 5    omeprazole (PRILOSEC) 40 MG capsule, Take 1 capsule (40 mg total) by mouth every morning., Disp: 90 capsule, Rfl: 3    prochlorperazine (COMPAZINE) 5 MG tablet, Take 2 tablets (10 mg total) by mouth every 6 (six) hours as needed for Nausea. (Patient not taking: Reported on 11/20/2023), Disp: 20 tablet, Rfl: 5    valerian root 500 mg Cap, daily as needed., Disp: , Rfl:   No current facility-administered medications for this visit.    Facility-Administered Medications Ordered in Other Visits:     ceFAZolin 1 g, gentamicin 80 mg in sodium chloride 0.9% 500 mL irrigation, , Irrigation, On  Call Procedure, Allen Damian MD    ceFAZolin 1 g, gentamicin 80 mg in sodium chloride 0.9% 500 mL irrigation, , Irrigation, On Call Procedure, Allen Damian MD    Vitamins/Supplements: multivitamin (does include vitamin D), valerian root, plans to take melatonin for sleep     Labs: Reviewed from 12/11  Also noted previous history of vitamin D deficiency     Nutrition Diagnosis    Problem: nutrition related knowledge deficit   Etiology (related to): lack of prior need for nutrition education  Signs/Symptoms (as evidenced by): new cancer diagnosis and self reported diet questions/concerns     Nutrition Intervention    Nutrition Prescription   1300 Kcals (25kcal/kg)  53 g protein (1g/kg)   8115-4769 mL fluid (30-35mL/kg)    Recommendations:  Whole soy foods twice daily at most   Avoid processed soy foods   Plant-based diet high in soluble fiber if experiencing diarrhea  -avocado, sweet potato, strawberries, oatmeal, bananas, applesauce, etc.  Lean proteins like chicken and fish   Nausea prophylaxis   Aim for about 2L water daily   -add liquid IV 2-3 days per week  Light physical activity (when cleared) to avoid weight gain and fatigue    Materials Provided/Reviewed   Common nutrition impact symptoms from chemotherapy  Meal and snack ideas   Food safety     Nutrition Monitoring and Evaluation    Monitor: energy intake, diet tolerance , and diet education needs     Goals: maintain weight, meet fluid goals, continue to focus on diet quality     Follow up Patient will follow up via portal as needed with any questions/concerns     Communication to referring provider/care team: note available in chart     Counseling time: 30 Minutes    Sydnee Waddell, MPH, RD, , LDN, FAND   [phone]

## 2023-12-14 NOTE — PLAN OF CARE
Tasha Cornejo has met all discharge criteria from Phase II. Vital Signs are stable, ambulating  without difficulty. Discharge instructions given, patient verbalized understanding. Discharged from facility via wheelchair in stable condition.

## 2023-12-14 NOTE — ANESTHESIA PROCEDURE NOTES
Intubation    Date/Time: 12/14/2023 7:24 AM    Performed by: Mely Maldonado CRNA  Authorized by: Rosalio Simms MD    Intubation:     Induction:  Intravenous    Intubated:  Postinduction    Mask Ventilation:  Easy mask    Attempted By:  CRNA    Method of Intubation:  Direct and video laryngoscopy    Blade:  Abdullahi 3    Laryngeal View Grade: Grade I - full view of cords      Difficult Airway Encountered?: No      Complications:  None    Airway Device:  Oral endotracheal tube    Airway Device Size:  7.0    Style/Cuff Inflation:  Cuffed    Inflation Amount (mL):  3    Tube secured:  21    Secured at:  The lips    Placement Verified By:  Capnometry    Complicating Factors:  None    Findings Post-Intubation:  BS equal bilateral

## 2023-12-14 NOTE — ANESTHESIA POSTPROCEDURE EVALUATION
Anesthesia Post Evaluation    Patient: Tasha Cornejo    Procedure(s) Performed: Procedure(s) (LRB):  INSERTION, TISSUE EXPANDER, BREAST (Bilateral)  MASTECTOMY, BILATERAL (Bilateral)  BIOPSY, LYMPH NODE, SENTINEL (Right)  INJECTION, FOR SENTINEL NODE IDENTIFICATION (Right)  INSERTION, CATHETER, TUNNELED (Left)    Final Anesthesia Type: general      Patient location during evaluation: PACU  Patient participation: Yes- Able to Participate  Level of consciousness: awake and alert  Post-procedure vital signs: reviewed and stable  Pain management: adequate  Airway patency: patent    PONV status at discharge: No PONV  Anesthetic complications: no      Cardiovascular status: blood pressure returned to baseline  Respiratory status: unassisted and spontaneous ventilation  Hydration status: euvolemic  Follow-up not needed.              Vitals Value Taken Time   /65 12/14/23 1217   Temp 36.9 °C (98.4 °F) 12/14/23 1145   Pulse 110 12/14/23 1223   Resp 16 12/14/23 1145   SpO2 96 % 12/14/23 1223   Vitals shown include unvalidated device data.      No case tracking events are documented in the log.      Pain/Andre Score: Pain Rating Prior to Med Admin: 0 (12/14/2023  5:58 AM)  Andre Score: 8 (12/14/2023 11:45 AM)

## 2023-12-14 NOTE — DISCHARGE SUMMARY
[facility] - Surgery ([place])  Discharge Note  Short Stay    Procedure(s) (LRB):  INSERTION, TISSUE EXPANDER, BREAST (Bilateral)  MASTECTOMY, BILATERAL (Bilateral)  BIOPSY, LYMPH NODE, SENTINEL (Right)  INJECTION, FOR SENTINEL NODE IDENTIFICATION (Right)  INSERTION, CATHETER, TUNNELED (Left)      OUTCOME: Patient tolerated treatment/procedure well without complication and is now ready for discharge.    DISPOSITION: Home or Self Care    FINAL DIAGNOSIS:  Malignant neoplasm of lower-inner quadrant of right breast of female, estrogen receptor positive    FOLLOWUP: In clinic    DISCHARGE INSTRUCTIONS:    Discharge Procedure Orders   Diet Adult Regular     Lifting restrictions   Order Comments: No heavy lifting, nothing heavier than 5 lbs, no strenuous activity or exercise, please limit any movements that will pull on the chest/breasts.     No dressing needed   Order Comments: Please leave dressings in place; please ensure vacuum machines are on suction with green light blinking. Please call the office with any issues. Sponge bathe only until clinic visit, wear surgical bra with abdominal pads for comfort. Please monitor and record drain outputs daily, and bring the record with you clinic.     Activity as tolerated        TIME SPENT ON DISCHARGE: 10 minutes

## 2023-12-14 NOTE — OP NOTE
Operative Note     12/14/2023    PRE-OP DIAGNOSIS: Malignant neoplasm of lower-inner quadrant of right female breast, unspecified estrogen receptor status [C50.311]      POST-OP DIAGNOSIS: Post-Op Diagnosis Codes:     * Malignant neoplasm of lower-inner quadrant of right female breast, unspecified estrogen receptor status [C50.311]    Procedure(s):  INSERTION, TISSUE EXPANDER, BREAST to be dictated separately by Dr. Damian  MASTECTOMY, BILATERAL total skin and nipple sparing  BIOPSY, LYMPH NODE, deep SENTINEL right  INJECTION, FOR SENTINEL NODE IDENTIFICATION right  Identification of sentinel node right  Surgeon interpretation of specimen radiograph  INSERTION, CATHETER, TUNNELED with subcutaneous port, left    SURGEON: Surgeon(s) and Role:  Panel 1:     * Allen Damian MD - Primary  Panel 2:     * Tasha Mcleod MD - Primary    ANESTHESIA: General     OPERATIVE FINDINGS: Healthy appearing skin and nipples at the completion of the mastectomies.   Placedo nodes without clinical suspicion.  Specimen radiograph with calcifications at the skin island that was taken overlying the nipple.  Port placed with a single stick and fluoro imaging used to check placement.    INDICATION FOR PROCEDURE: This patient presents with a history of invasive carcinoma of the right breast    PROCEDURE IN DETAIL:  Tasha Cornejo is a 43 y.o. female brought to the operating room for definitive surgery of invasive carcinoma of the left breast.  The patient has elected to undergo bilateral nipple sparing mastectomy with sentinel lymph node biopsy for souleymane assessment. The patient was informed of the possible risks and complications of the procedure, including but not limited to anesthetic risks, bleeding, infection, and need for additional surgery.  The patient concurred with the proposed plan, and has given informed consent.  The site of surgery was properly noted/marked in the preoperative holding area.    The patient was  then brought to the operating room and placed in the supine position with both upper extremities extended.  general anesthesia was administered. Perioperative antibiotics were administered consisting of vancomycin and Cipro and a time out was performed confirming the patient, site, and procedure.  The patient's right breast was injected with technetium to facilitate sentinel lymph node identification.      Patient was placed supine on the table and arms were tucked by her side. Ultrasound was used to ensure the left internal jugular vein was patent. Ultrasound was used for guidance and an 18-gauge hollow needle was used to access the vessel. Wire was inserted through the access needle and fluoroscopy and ultrasound were used to confirm the wire was in the correct position. Next, a counter incision was made.  Following this local anesthetic was injected on the anterior left chest.  A small incision was made and the port pocket was created using electrocautery. Next,  the catheter was tunneled from the anterior left chest. Then over the wire, the vessel was dilated and the catheter was passed into the vessel via the Seldinger technique under direct fluoroscopic guidance. The catheter was confirmed to be in proper position in the SVC using fluoroscopy. The catheter was aspirated and blood return was good. Next, the catheter was cut to the appropriate length and attached to the port. Again, blood return was noted, and we then flushed the catheter with heparinized saline. The port was sutured in place using a 2-0 vicryl. The port incision was closed using interuppted 3-0 vicryl followed by running 4-0 vicryl subcuticular.  All skin incisions were closed with a 4-0 vicryl. Dermabond was applied and all counts were correct at the end of the procedure. Patient tolerated the procedure well. A chest x-ray will be performed in the recovery room.    The bilateral chest and axilla was then prepped and draped in the usual sterile  fashion.    We first turned our attention to the left prophylactic breast where an inframammary incision was made, sparing the nipple areolar complex.  The incision was made with a plasmablade and deepened through the subcutaneous tissues with plasmablade.  Skin flaps were raised to the clavicle superiorly, to the lateral border of the sternum medially, to the inframammary fold inferiorly, and to the anterior border of the latissimus dorsi muscle laterally. Lighted retractors were used to assist in the creation of the skin flaps. The tissue beneath the nipple areolar complex was sharply dissected being careful to dissect to the dermis.  We were careful to ensure that the ductal tissue beneath the nipple was removed.  The breast tissue was then excised off the chest wall using plasmablade and taking care to incorporate the pectoralis fascia while leaving the serratus fascia behind.  The resulting mastectomy specimen was marked using a short stitch superiorly and a long stitch laterally, looped stitch subareolar.  The breast was sent to pathology for permanent evaluation.   The operative field was irrigated with normal saline and all bleeding points were secured with plasmablade.   The incision will be closed by the plastic surgery service.        We then turned our attention back to the right breast where an inferior radial incision was fashioned sparing the nipple areolar complex with an ellipse of skin overlying.  The incision was made with a plasmablade and extended through the subcutaneous tissues with plasmablade.  Skin flaps were raised to the clavicle superiorly.  We then proceeded to raise the remainder of the flaps to the lateral border of the sternum medially, to the inframammary fold inferiorly, and to the anterior border of the latissimus dorsi muscle laterally.  Lighted retractors were used to assist in the creation of the skin flaps. The tissue beneath the nipple areolar complex was sharply dissected  being careful to dissect to the dermis.  We were careful to ensure that the ductal tissue beneath the nipple was removed. The breast tissue was excised off the chest wall taking care to incorporate the pectoralis fascia while leaving the serratus fascia behind.  The resulting mastectomy specimen was marked using a short stitch superiorly and long stitch laterally, looped stitch subareolar.  The breast was sent to pathology for permanent evaluation.    A nipple core was sent for frozen due to the calcifications being close to the nipple on imaging.    We then turned our attention to the right axilla.   The gamma probe was used to identify an area of increased radioactivity within the lower axilla.  A small incision was made in a transverse inferior fashion just beneath the hairline in the axilla. The clavipectoral sheath was sharply incised to reveal the level I axillary lymph nodes. The probe was used to identify a single node with increased radioactivity.  This node was brought into the operative field and carefully dissected free of the surrounding lymphovascular structures.  The highest ex vivo count of the node was 541.  The node was then sent to pathology for frozen section evaluation, labeled as sentinel node #1.  A total of 2 axillary sentinel nodes and 0 axillary non-sentinel nodes were identified, excised and submitted to pathology.  Bed counts were obtained to confirm that the 10% rule had not been violated.   The wound was irrigated with normal saline, and all bleeding points were secured with cautery.  The incision was closed with an interrupted 3-0 vicryl deep dermal stitch followed by a running 4-0 vicryl subcuticular.    Frozen section souleymane evaluation revealed no evidence of nipple disease.  Therefore, the operative field was irrigated with normal saline and all bleeding points were secured with Bovie electrocautery.  The incision will be closed by the plastic surgery service.      At the end of my  portion of the operation, all sponge, instrument, and needle counts x 2 were correct.    ESTIMATED BLOOD LOSS: less than 50 mL    COMPLICATIONS: none    DISPOSITION: intraop transfer to the plastic surgery service    ATTESTATION:   I was present and scrubbed for the entire procedure.    [place] Node Biopsy for Breast Cancer  Operation performed with curative intent Yes   Tracer(s) used to identify sentinel nodes in the upfront surgery (non-neoadjuvant) setting Radioactive tracer   Tracer(s) used to identify sentinel nodes in the neoadjuvant setting N/A   All nodes (colored or non-colored) present at the end of a dye-filled lymphatic channel were removed N/A   All significantly radioactive nodes were removed Yes   All palpably suspicious nodes were removed N/A   Biopsy-proven positive nodes marked with clips prior to chemotherapy were identified and removed N/A

## 2023-12-14 NOTE — TRANSFER OF CARE
Anesthesia Transfer of Care Note    Patient: Tasha Cornejo    Procedure(s) Performed: Procedure(s) (LRB):  INSERTION, TISSUE EXPANDER, BREAST (Bilateral)  MASTECTOMY, BILATERAL (Bilateral)  BIOPSY, LYMPH NODE, SENTINEL (Right)  INJECTION, FOR SENTINEL NODE IDENTIFICATION (Right)  INSERTION, CATHETER, TUNNELED (Left)    Patient location: PACU    Anesthesia Type: general    Transport from OR: Transported from OR on 6-10 L/min O2 by face mask with adequate spontaneous ventilation    Post pain: adequate analgesia    Post assessment: no apparent anesthetic complications and tolerated procedure well    Post vital signs: stable    Level of consciousness: sedated    Nausea/Vomiting: no nausea/vomiting    Complications: none    Transfer of care protocol was followed      Last vitals: Visit Vitals  /82 (BP Location: Right arm, Patient Position: Lying)   Pulse 72   Temp 36.7 °C (98 °F) (Oral)   Resp 20   LMP 11/19/2023 (Exact Date)   SpO2 97%   Breastfeeding No

## 2023-12-17 ENCOUNTER — NURSE TRIAGE (OUTPATIENT)
Dept: ADMINISTRATIVE | Facility: CLINIC | Age: 43
End: 2023-12-17
Payer: COMMERCIAL

## 2023-12-17 NOTE — TELEPHONE ENCOUNTER
S/E rash, mostly on my neck and face and in scalp. This started on the day after surgery when started 12/15. Jaw line swelling noticed last night. Denies any S/S trouble breathing or tongue swelling. On BACTRIM, CIPRO AND PERCOCET. Has never taken these medications before. Triage done- see provider in 4 hours or PCP triage. Advised will reach out to on call    Dr. Guardado on call for plastics. Called on call number, [phone]  Message left to return call , Secure Chat status noted as available. Message sent.       Dr. Guardado called back, report given re rash and swelling of jaw line, patient never having taken Bactrim or Cipro before. Rash started on the 15 th, swelling of jaw last night. He  advised patient to stop taking ABX immediately. Take benadryl and tylenol now. Any SOB or worsening S/S present to the ED.     Patient called back and advised of report to Dr. Guardado. Advise to stop taking ABX, possibility of allergic reaction to sulfa drugs. Advised to take benadryl and tylenol. Spouse will go to store to purchase now. Patient has taken benadryl before, advised to follow normal dosage on box. Last took ABX at 6 am. Advised for any worsening or difficulty breathing to present to ED per Dr. Guardado. Reassured re nurse line 24 hours. Advised I would send message to provider and she could also via [facility] and  photo if she wished for the provider to see in am. Verb understanding.     Reason for Disposition   Face or lip swelling    Additional Information   Negative: [1] Life-threatening reaction (anaphylaxis) in the past to the same drug AND [2] < 2 hours since exposure   Negative: Difficulty breathing or wheezing   Negative: [1] Hoarseness or cough AND [2] started soon after 1st dose of drug   Negative: [1] Swollen tongue AND [2] started soon after 1st dose of drug   Negative: [1] Purple or blood-colored rash (spots or dots) AND [2] fever   Negative: Sounds like a life-threatening emergency to the  triager   Negative: Swollen tongue   Negative: [1] Widespread hives AND [2] onset < 2 hours of exposure to 1st dose of drug   Negative: Fever   Negative: Patient sounds very sick or weak to the triager   Negative: [1] Purple or blood-colored rash (spots or dots) AND [2] no fever AND [3] sounds well to triager   Negative: [1] Taking new prescription medication AND [2] rash within 4 hours of 1st dose   Negative: Large or small blisters on skin (i.e., fluid filled bubbles or sacs)   Negative: Bloody crusts on lips or sores in mouth    Protocols used: Rash - Widespread On Drugs-A-AH

## 2023-12-18 ENCOUNTER — PATIENT MESSAGE (OUTPATIENT)
Dept: HEMATOLOGY/ONCOLOGY | Facility: CLINIC | Age: 43
End: 2023-12-18
Payer: COMMERCIAL

## 2023-12-19 LAB
COMMENT: NORMAL
FINAL PATHOLOGIC DIAGNOSIS: NORMAL
FROZEN SECTION DIAGNOSIS: NORMAL
FROZEN SECTION FOOTNOTE: NORMAL
GROSS: NORMAL
Lab: NORMAL
MICROSCOPIC EXAM: NORMAL

## 2023-12-20 ENCOUNTER — OFFICE VISIT (OUTPATIENT)
Dept: PLASTIC SURGERY | Facility: CLINIC | Age: 43
End: 2023-12-20
Attending: PLASTIC SURGERY
Payer: COMMERCIAL

## 2023-12-20 VITALS — HEART RATE: 71 BPM | DIASTOLIC BLOOD PRESSURE: 81 MMHG | SYSTOLIC BLOOD PRESSURE: 141 MMHG

## 2023-12-20 DIAGNOSIS — C50.919 MALIGNANT NEOPLASM OF FEMALE BREAST, UNSPECIFIED ESTROGEN RECEPTOR STATUS, UNSPECIFIED LATERALITY, UNSPECIFIED SITE OF BREAST: Primary | ICD-10-CM

## 2023-12-20 PROCEDURE — 1159F MED LIST DOCD IN RCRD: CPT | Mod: CPTII,S$GLB,, | Performed by: PLASTIC SURGERY

## 2023-12-20 PROCEDURE — 99024 PR POST-OP FOLLOW-UP VISIT: ICD-10-PCS | Mod: S$GLB,,, | Performed by: PLASTIC SURGERY

## 2023-12-20 PROCEDURE — 3077F PR MOST RECENT SYSTOLIC BLOOD PRESSURE >= 140 MM HG: ICD-10-PCS | Mod: CPTII,S$GLB,, | Performed by: PLASTIC SURGERY

## 2023-12-20 PROCEDURE — 3044F HG A1C LEVEL LT 7.0%: CPT | Mod: CPTII,S$GLB,, | Performed by: PLASTIC SURGERY

## 2023-12-20 PROCEDURE — 3044F PR MOST RECENT HEMOGLOBIN A1C LEVEL <7.0%: ICD-10-PCS | Mod: CPTII,S$GLB,, | Performed by: PLASTIC SURGERY

## 2023-12-20 PROCEDURE — 3079F DIAST BP 80-89 MM HG: CPT | Mod: CPTII,S$GLB,, | Performed by: PLASTIC SURGERY

## 2023-12-20 PROCEDURE — 3079F PR MOST RECENT DIASTOLIC BLOOD PRESSURE 80-89 MM HG: ICD-10-PCS | Mod: CPTII,S$GLB,, | Performed by: PLASTIC SURGERY

## 2023-12-20 PROCEDURE — 1159F PR MEDICATION LIST DOCUMENTED IN MEDICAL RECORD: ICD-10-PCS | Mod: CPTII,S$GLB,, | Performed by: PLASTIC SURGERY

## 2023-12-20 PROCEDURE — 3077F SYST BP >= 140 MM HG: CPT | Mod: CPTII,S$GLB,, | Performed by: PLASTIC SURGERY

## 2023-12-20 PROCEDURE — 99024 POSTOP FOLLOW-UP VISIT: CPT | Mod: S$GLB,,, | Performed by: PLASTIC SURGERY

## 2023-12-20 NOTE — PROGRESS NOTES
Patient presents follow-up.  She is 1 week status post bilateral mastectomy and placement of tissue expanders     She did developed a rash over the weekend so was decided to stop the antibiotics     On exam: Both reconstructed breasts are soft     Incisions are well approximated     This minimal ecchymosis bilaterally     Left drain produce less than 30/24 hours was removed without problem     Assessment:  Stable     Plan continue drain care on the right    We will see her again in 1 week from now

## 2023-12-27 ENCOUNTER — CLINICAL SUPPORT (OUTPATIENT)
Dept: HEMATOLOGY/ONCOLOGY | Facility: CLINIC | Age: 43
End: 2023-12-27
Payer: COMMERCIAL

## 2023-12-27 ENCOUNTER — PATIENT MESSAGE (OUTPATIENT)
Dept: HEMATOLOGY/ONCOLOGY | Facility: CLINIC | Age: 43
End: 2023-12-27

## 2023-12-27 VITALS — WEIGHT: 117 LBS | BODY MASS INDEX: 22.97 KG/M2 | HEIGHT: 60 IN

## 2023-12-27 DIAGNOSIS — E55.9 VITAMIN D DEFICIENCY: ICD-10-CM

## 2023-12-27 DIAGNOSIS — Z71.3 NUTRITIONAL COUNSELING: Primary | ICD-10-CM

## 2023-12-27 DIAGNOSIS — C50.311 MALIGNANT NEOPLASM OF LOWER-INNER QUADRANT OF RIGHT BREAST OF FEMALE, ESTROGEN RECEPTOR POSITIVE: ICD-10-CM

## 2023-12-27 DIAGNOSIS — Z17.0 MALIGNANT NEOPLASM OF LOWER-INNER QUADRANT OF RIGHT BREAST OF FEMALE, ESTROGEN RECEPTOR POSITIVE: ICD-10-CM

## 2023-12-27 PROCEDURE — 97802 MEDICAL NUTRITION INDIV IN: CPT | Mod: 95,,, | Performed by: DIETITIAN, REGISTERED

## 2023-12-27 PROCEDURE — 97802 PR MED NUTR THER, 1ST, INDIV, EA 15 MIN: ICD-10-PCS | Mod: 95,,, | Performed by: DIETITIAN, REGISTERED

## 2023-12-28 ENCOUNTER — OFFICE VISIT (OUTPATIENT)
Dept: PLASTIC SURGERY | Facility: CLINIC | Age: 43
End: 2023-12-28
Attending: PLASTIC SURGERY
Payer: COMMERCIAL

## 2023-12-28 VITALS — SYSTOLIC BLOOD PRESSURE: 143 MMHG | HEART RATE: 75 BPM | DIASTOLIC BLOOD PRESSURE: 73 MMHG

## 2023-12-28 DIAGNOSIS — C50.919 MALIGNANT NEOPLASM OF FEMALE BREAST, UNSPECIFIED ESTROGEN RECEPTOR STATUS, UNSPECIFIED LATERALITY, UNSPECIFIED SITE OF BREAST: Primary | ICD-10-CM

## 2023-12-28 PROCEDURE — 3078F DIAST BP <80 MM HG: CPT | Mod: CPTII,S$GLB,, | Performed by: PLASTIC SURGERY

## 2023-12-28 PROCEDURE — 99024 POSTOP FOLLOW-UP VISIT: CPT | Mod: S$GLB,,, | Performed by: PLASTIC SURGERY

## 2023-12-28 PROCEDURE — 3044F PR MOST RECENT HEMOGLOBIN A1C LEVEL <7.0%: ICD-10-PCS | Mod: CPTII,S$GLB,, | Performed by: PLASTIC SURGERY

## 2023-12-28 PROCEDURE — 99024 PR POST-OP FOLLOW-UP VISIT: ICD-10-PCS | Mod: S$GLB,,, | Performed by: PLASTIC SURGERY

## 2023-12-28 PROCEDURE — 3044F HG A1C LEVEL LT 7.0%: CPT | Mod: CPTII,S$GLB,, | Performed by: PLASTIC SURGERY

## 2023-12-28 PROCEDURE — 3077F PR MOST RECENT SYSTOLIC BLOOD PRESSURE >= 140 MM HG: ICD-10-PCS | Mod: CPTII,S$GLB,, | Performed by: PLASTIC SURGERY

## 2023-12-28 PROCEDURE — 3077F SYST BP >= 140 MM HG: CPT | Mod: CPTII,S$GLB,, | Performed by: PLASTIC SURGERY

## 2023-12-28 PROCEDURE — 3078F PR MOST RECENT DIASTOLIC BLOOD PRESSURE < 80 MM HG: ICD-10-PCS | Mod: CPTII,S$GLB,, | Performed by: PLASTIC SURGERY

## 2023-12-28 NOTE — PROGRESS NOTES
Patient presents follow-up.  She is status post bilateral mastectomy with placement of tissue expanders    Last week the left drain was removed     Today the right drain is ready to be removed     On exam: Both reconstructed breasts are soft    The incisions are well healed     No erythema or edema     Assessment:  Stable     Plan the right drain was removed without problem     We will follow her up in 2 weeks from now

## 2024-01-02 ENCOUNTER — PATIENT MESSAGE (OUTPATIENT)
Dept: HEMATOLOGY/ONCOLOGY | Facility: CLINIC | Age: 44
End: 2024-01-02

## 2024-01-02 ENCOUNTER — OFFICE VISIT (OUTPATIENT)
Dept: HEMATOLOGY/ONCOLOGY | Facility: CLINIC | Age: 44
End: 2024-01-02
Payer: COMMERCIAL

## 2024-01-02 ENCOUNTER — TUMOR BOARD CONFERENCE (OUTPATIENT)
Dept: SURGERY | Facility: CLINIC | Age: 44
End: 2024-01-02
Payer: COMMERCIAL

## 2024-01-02 ENCOUNTER — OFFICE VISIT (OUTPATIENT)
Dept: SURGERY | Facility: CLINIC | Age: 44
End: 2024-01-02
Payer: COMMERCIAL

## 2024-01-02 VITALS
HEART RATE: 70 BPM | BODY MASS INDEX: 22.38 KG/M2 | SYSTOLIC BLOOD PRESSURE: 130 MMHG | DIASTOLIC BLOOD PRESSURE: 71 MMHG | HEIGHT: 60 IN | WEIGHT: 114 LBS

## 2024-01-02 DIAGNOSIS — C50.311 MALIGNANT NEOPLASM OF LOWER-INNER QUADRANT OF RIGHT BREAST OF FEMALE, ESTROGEN RECEPTOR POSITIVE: ICD-10-CM

## 2024-01-02 DIAGNOSIS — C50.311 MALIGNANT NEOPLASM OF LOWER-INNER QUADRANT OF RIGHT BREAST OF FEMALE, ESTROGEN RECEPTOR POSITIVE: Primary | ICD-10-CM

## 2024-01-02 DIAGNOSIS — Z17.0 MALIGNANT NEOPLASM OF LOWER-INNER QUADRANT OF RIGHT BREAST OF FEMALE, ESTROGEN RECEPTOR POSITIVE: Primary | ICD-10-CM

## 2024-01-02 DIAGNOSIS — Z17.0 MALIGNANT NEOPLASM OF LOWER-INNER QUADRANT OF RIGHT BREAST OF FEMALE, ESTROGEN RECEPTOR POSITIVE: ICD-10-CM

## 2024-01-02 DIAGNOSIS — Z71.83 ENCOUNTER FOR NONPROCREATIVE GENETIC COUNSELING: Primary | ICD-10-CM

## 2024-01-02 DIAGNOSIS — Z15.01 BARD1 GENE MUTATION POSITIVE: ICD-10-CM

## 2024-01-02 PROCEDURE — 99024 POSTOP FOLLOW-UP VISIT: CPT | Mod: S$GLB,,, | Performed by: SURGERY

## 2024-01-02 PROCEDURE — 1159F MED LIST DOCD IN RCRD: CPT | Mod: CPTII,S$GLB,, | Performed by: SURGERY

## 2024-01-02 PROCEDURE — 99215 OFFICE O/P EST HI 40 MIN: CPT | Mod: 95,,, | Performed by: NURSE PRACTITIONER

## 2024-01-02 PROCEDURE — 1160F RVW MEDS BY RX/DR IN RCRD: CPT | Mod: CPTII,S$GLB,, | Performed by: SURGERY

## 2024-01-02 PROCEDURE — 3078F DIAST BP <80 MM HG: CPT | Mod: CPTII,S$GLB,, | Performed by: SURGERY

## 2024-01-02 PROCEDURE — 3075F SYST BP GE 130 - 139MM HG: CPT | Mod: CPTII,S$GLB,, | Performed by: SURGERY

## 2024-01-02 PROCEDURE — 99417 PROLNG OP E/M EACH 15 MIN: CPT | Mod: 95,,, | Performed by: NURSE PRACTITIONER

## 2024-01-02 NOTE — LETTER
"2024   Dear relative of Tasha Cornejo ("Tasha",  1980):    Your relative, Tasha, has been diagnosed with a disease-causing mutation in the BARD1 gene.  This type of gene mutation runs in families, so this information is important for Tasha's blood relatives.  Both males and females can inherit, be affected by, and pass down (to their children) mutations in the BARD1 gene.    Mutations in the BARD1 gene are associated with increased risk of developing breast cancer (in individuals assigned female sex at birth).      If someone finds out that they have a BARD1 mutation, often times they can take steps to be proactive.    It is recommended that relatives of Tasha's meet with a genetics professional for genetic counseling and potentially testing, for their own health purposes and potentially also for reproductive purposes.  The testing is often done with blood work, and the results can be used to find out if you have the same gene mutation as Tasha, even if you have never had any of the conditions mentioned above.  Directly below are resources (and their phone numbers) for Tasha's relatives to find a genetic specialist.   Cancer Genetics Reproductive Genetics General Adult/Pediatric Genetics   For individuals located in [place] [facility]:  [phone] [facility]:  [phone] [facility]:  [phone]   For individuals located outside of [place] Genetic specialists nearest to your location can be found by visiting [email or web address]     Of note, it's also possible that you have gene mutations in addition to and/or other than those identified in Tasha, which is one reason why it's important to first meet with a genetics specialist prior to undergoing genetic testing.  Additionally, the genetics specialist can review with you what it would mean to you and your close blood relatives for you to have a mutation in your BARD1 gene and what your options " may be to help manage the associated risks.    The box below contains details of Tasha's genetic test and the results.  This information is important to share with your genetics specialist.  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    Sincerely,    Robert Gillis, SAYRA, APRN, FNP-BC, AOCNP, CGRA  Nurse Practitioner, Hereditary and High-Risk Clinic  Department of Hematology and Oncology  [facility]    Phone:  [phone]

## 2024-01-02 NOTE — PROGRESS NOTES
"Cancer Genetics  Department of Hematology and Oncology  [facility]  [facility]  [facility]    Date of Service:  24  Visit Provider:  Robert Gillis DNP  Collaborating Physician:  Oriana Pak MD    Patient ID  Name: Tasha Cornejo    : 1980    MRN: [id number]      Referring Provider  MARIANO Gabriel MD  [address] [place] [address]    Televisit Information  The patient location is:  [place], [place].    The chief complaint leading to consultation is:  As below.    Visit type: audiovisual.    Face-to-face time with patient:  Approximately 28 minutes.    Approximately 77 minutes in total were spent on this encounter, which includes face-to-face time and non-face-to-face time preparing to see the patient (e.g., review of tests), obtaining and/or reviewing separately obtained history, documenting clinical information in the electronic or other health record, independently interpreting results (not separately reported) and communicating results to the patient/family/caregiver, or care coordination (not separately reported).  Each patient to whom he or she provides medical services by telemedicine is:  (1) informed of the relationship between the physician and patient and the respective role of any other health care provider with respect to management of the patient; and (2) notified that he or she may decline to receive medical services by telemedicine and may withdraw from such care at any time.  Notes:  As below.    SUBJECTIVE      Chief Complaint: BARD1 mutation    History of Present Illness (HPI):  Tasha Cornejo ("Tasha"), 43 y.o., assigned female sex at birth, is established with the [facility] but new to me.  She was referred by Dr. MARIANO Gabriel ([facility]) for post-test genetic counseling given her diagnosis of a germline BARD1 gene mutation and " "diagnosis of breast cancer.    Focused Medical History  Genetic testing:  Yes, once:  This is a portion of the report:      Cancer:  Yes  Invasive ductal carcinoma of right breast, ER(+) HI(-) HER2(+), dx.age 43  10/05/2023 (age 43):  Right breast abnormality on screening mammogram  10/26/2023 (age 43):  Right breast biopsy, with pathology indicating invasive ductal carcinoma, ER(+) HI(-) HER2(+)  12/14/2023 (age 43):  Bilateral mastectomy and right axillary sentinel lymph node biopsies; pathologic stage classification pT1c pN0 (sn)  Autologous reconstruction planned for after chemotherapy, which has not yet commenced  Planning to undergo chemo and targeted therapy  Colon polyp:  No  06/02/2023 colonoscopy (age 43):  No polyps  History of colonoscopy in addition to those abovementioned?  No  Other tumor or pertinent mass/lesion:  Yes  Uterine fibroids "present for the past couple of years", per patient  Neurofibroma:  No  Glioma:  No  Lisch nodule(s):  No  Pancreatitis:  No  Café-au-lait macule:  Just 1 on leg  Axillary and/or inguinal freckling:  No  Bone lesion/skeletal abnormality:  No  Blood disorder:  No  Reproductive problem:  No  Future reproduction intended?  No    Focused Surgical History  Reproductive organs:  Intact    Ancestry  Ashkenazi Evangelical:  "No knowledge of ancestry"    Family Oncologic History  Consanguinity:  No  Hereditary cancer genetic testing in blood relatives:  Unknown  Other than noted, no known history of cancer in the family.  Other than noted, no known family history of benign tumor/mass/lesion.  Unknown whether any family history of colorectal polyps or pancreatitis.    ONCOLOGY PEDIGREE  ** If this pedigree appears small/illegible on your screen, expand this note window horizontally. **      Review of Systems  See HPI.       OBJECTIVE     Physical Exam  Significantly limited secondary to the inherent nature of a virtual visit.  Very pleasant patient.  Constitutional      Appearance:  " "Appears well developed and well nourished. No distress.   Neurological     Mental Status:  Alert and oriented.  Psychiatric         Mood and Affect:  Normal.     Thought Content:  Normal.     Speech:  Normal.     Behavior:  Normal.     Judgment:  Normal.    Per chart review:  Past Medical History:   Diagnosis Date    Acid reflux     Chalazion     Malignant neoplasm of lower-inner quadrant of right breast of female, estrogen receptor positive 11/06/2023   Per chart review:  Patient Active Problem List   Diagnosis    Rhinitis, allergic    Allergic conjunctivitis    Vitamin D deficiency    Malignant neoplasm of lower-inner quadrant of right breast of female, estrogen receptor positive    Chemotherapy-induced nausea    At risk for lymphedema    Stress and adjustment reaction    Physical deconditioning     Gene Variant Review  Genetic Variant Classification per ClinVar as of 01/02/2024  Reference   BARD1 c.2148_2149del (p.Mgg940Xadqj*12) Pathogenic/Likely pathogenic   National Center for Biotechnology Information. ClinVar; [[id number]], https://www.ncbi.nlm.nih.gov/clinvar/variation/PLT063527135.16 (accessed Jan. 2, 2024).   AYLIN c.2771G>T (p.Cwd094Pru) Uncertain significance     National Center for Biotechnology Information. ClinVar; [PAM049531830.9], https://www.ncbi.nlm.nih.gov/clinvar/variation/SGY557111504.9 (accessed Jan. 2, 2024).   CDH1 c.671G>A (p.Pwi440Snd)  Benign  National Center for Biotechnology Information. ClinVar; [[id number]], https://www.ncbi.nlm.nih.gov/clinvar/variation/KUT867010642.30 (accessed Jan. 2, 2024).      ASSESSMENT / PLAN      Please note that in this note, the terms "male" and "female" reflect sex assigned at birth.     Tasha Cornejo ("Tasha"), 43 y.o., assigned female sex at birth, is established with the [facility] but new to me.  She was referred by Dr. MARIANO Gabriel ([facility]) for post-test genetic counseling " "given her diagnosis of a germline BARD1 gene mutation and diagnosis of breast cancer.     Genetic Test Results Summary:  This is a partial copy of the report.  The complete report can be found in the patient's medical record.         BARD1 Mutation     BARD1 Gene:  BARD1 is a gene that, when functioning normally, helps to prevent breast cancer; however, when the BARD1 gene harbors a mutation, it cannot perform its functions normally, which makes the individual carrying the mutation more susceptible to developing the conditions that the BARD1 gene normally helps to control.     Interpretation of BARD1 Genetic Test Results:  Tasha Cornejo ("Tasha") has been found to carry a single, clinically significant (pathogenic/likely pathogenic) (i.e., harmful, disease-causing) variant (we'll refer to it as a mutation going forward in this note) in the BARD1 gene.  The specific mutation that Tasha carries is BARD1 c.2148_2149del (p.Wuy245Yoqjw*12).  Tasha carries this mutation in only one copy of the BARD1 gene, which can be referred to as carrying the mutation in the heterozygous or monoallelic state.       Mode of Inheritance of BARD1 Conditions:  Individuals normally have two copies of each gene -- one copy of that gene from each biological parent.  When a parent harbors a single mutation in a gene, there is a 50% chance they will pass it on to an offspring of theirs.  One mutated copy of a gene is sufficient to predispose the individual to autosomal-dominant conditions associated with that gene.  On the other hand, both copies of that gene need to be mutated in order for the individual to develop autosomal-recessive conditions associated with that gene.      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       BARD1 Health Risks and Management:       Female breast cancer:  There is strong " evidence regarding increased risk of developing breast cancer; the absolute risk of developing breast cancer is 17%-30%.  (For reference, breast cancer risk in the general female population is 12.9%.)    Mode of inheritance:  Autosomal-dominant  Management:    Self-awareness:  Breast awareness, meaning the individual being familiar with their own breasts and promptly reporting to their healthcare provider any changes or concerns, starting at age 18.  Periodic, consistent breast-self examination (BSE) may facilitate breast awareness.  Screening:  Clinical breast exam (CBE) every 6-12 months, starting at age 21.  Annual mammogram, starting at age 40.  Age at which to start may be modified based on family history, typically beginning screening 5-10 years earlier than the youngest diagnosis in the family, but not later than age 40. Consider annual breast MRI (with and without contrast), starting at age 40.  Age at which to start may be modified based on family history, typically beginning screening 5-10 years earlier than the youngest diagnosis in the family, but not later than age 40.  For patients who are treated for breast cancer and have not undergone bilateral mastectomy, screening should continue as described.  Risk reduction:  Evidence is insufficient for risk-reducing mastectomy (RRM); manage based on family history.  Risk-reducing medication (chemoprevention) is an option that can be discussed with a breast oncology specialist.  Healthcare provider managing Tasha's breast cancer risk:  Norma Gabriel MD, & Linda Mcbride MD    BARD1 Reproductive Risks and Management:       Both males and females can inherit, be affected by, and pass down (to their offspring) mutations in the BARD1 gene.  Each of Tasha's offspring has a 50% chance of carrying her BARD1 mutation.      Management:  Counseling for reproductive options, which may include assisted reproduction (such as in vitro fertilization) with preimplantation  "genetic testing, and/or prenatal genetic diagnosis (PGD), is indicated for individuals/couples expressing concern over future offspring's BARD1-associated risks.     BARD1 Risks to Relatives:  Both males and females can carry, be affected by, and pass down this gene mutation.  When passed down, these mutations are passed directly from parent to offspring and do not "skip generations."       Each offspring of Lorie has a 50% chance of carrying Tasha's BARD1 mutation.    It is likely that Tasha inherited her BARD1 mutation from one of her parents, which can be reasonably confirmed when one parent tests positive for the mutation.  If Jackelins BARD1 mutation is not identified in leukocyte DNA of either parent, two possible explanations are A) a de deejay mutation in Tasha or B) germline mosaicism in one parent of Lorie.     Tasha's siblings' risk depends on the mutation statuses of Tasha's parents:  If a parent of Lorie has Tasha's BARD1 mutation, each of Tasha's siblings has a 50% chance of also having the mutation.  On the other hand, if the mutation cannot be detected in the leukocyte DNA of either parent, the chance of the siblings' carrying the mutation is low (~1%) but greater than that of the general population because of the possibility of germline mosaicism.    If a parent of Lorie has inherited the familial BARD1 mutation, their other relatives are also at risk.  If a sibling or an offspring of Lorie has inherited the familial BARD1 mutation, their own offspring each have a 50% chance of inheriting the mutation.     Management:  To manage the above risks, Tasha's relatives should strongly consider meeting with a genetic specialist for genetic counseling and potentially for testing to determine whether they carry the familial BARD1 mutation; if a relative is a carrier of the mutation, further management recommendations, for their own cancer risks and reproductive risks, can be provided by the genetic specialist.       Genetic " Variant(s) of Uncertain Significance     Additionally, Tasha's genetic testing showed that she carries variants of uncertain significance (VUSs) in her AYLIN and CDH1 genes.  A VUS is a genetic change that may be benign or may be harmful, but at this time there is not enough data to categorize it as either.  VUSs are not typically considered to be clinically actionable.  Most VUSs that are reclassified are downgraded to benign or likely benign.  If [facility] reclassifies Tasha's VUS in the future to a clinically significant variant/mutation, notification should be provided; additionally, I encourage Tasha to call [facility] every 6-12 months at phone number [phone] ([phone]) to check the classification status(es) of her VUS(s) at that time, and Tasha is to let me know if any updates are ever provided by [facility].     While Tasha is not known at this time to carry any pathogenic (harmful) variants (also known as mutations) in any of the following genes, the below is provided for reference:  Pathogenic variants (mutations) in the AYLIN gene can be associated with:  Autosomal-dominantly inherited predisposition* to:  Breast cancer  Pancreatic cancer  Ovarian cancer  Colorectal cancer  Prostate cancer  Autosomal-recessively inherited risk* for:  Ataxia-telengiectasia  Pathogenic variants (mutations) in the CDH1 gene can be associated with:  Autosomal-dominantly inherited predisposition* to:  Breast cancer  Hereditary diffuse gastric cancer (with or without cleft lip and/or cleft palate)  Prostate cancer  Blepharocheilodontic syndrome 1  * Terms and definitions:  Autosomal-dominantly inherited predisposition:  Carrying only one mutated copy of the gene (not two mutated copies) is sufficient for the predisposition to apply.  Autosomal-recessively inherited risk:  Carrying only one mutated copy of the gene is not sufficient for the condition to develop; rather, both copies of the gene must be mutated.     Additional Cancer Risks and  Management     The following cancer screenings are currently recommended for Tasha (please note that these recommendations can change based on updates to clinical guidelines and/or with changes to Tasha's medical or family history and are therefore only considered accurate as of the date on which this note was composed; additional and/or alternative screenings may be recommended by Tasha's healthcare providers, and recommendations from Tasha's healthcare providers directly managing these risks supersede the below recommendations):    Cancer in general:  Annual visit with a primary care provider (PCP) for history review and physical exam and age-appropriate testing.    Gynecologic cancers:  Annual visit with a gynecology provider, which may include pelvic exam and/or Pap smear/HPV testing.    Breast cancer:  See BARD1 information above.    Colorectal cancer:  Colorectal cancer screenings as recommended by a gastroenterology (GI) provider.  With regard to family history, please let me know if any of the following applies, as such could change colorectal cancer screening recommendations for you:  If you learn moving forward that any blood relative of yours has been diagnosed with colorectal cancer.  If a first-degree relative (i.e., your parent, sibling, or child) has a colorectal polyp history including any of the following characteristics:  Adenoma with high-grade dysplasia  Adenoma or sessile serrated polyp/adenoma 1 cm or larger in size  Villous or tubulovillous adenoma  Traditional serrated adenoma (TSA)  Sessile serrated lesion/polyp/adenoma with any dysplasia  Cumulatively 10 or more polyps    Skin cancer:  Annual total-body skin examination (TBSE) with a dermatology provider.    If you are not established with a healthcare specialist listed above, please let me know so I can refer you.    Some information regarding general cancer risk reduction:  It is recommended to avoid tobacco use; be physically active; maintain a  healthy weight; eat a diet rich in fruits, vegetables, and whole grains and low in saturated/trans fat, red meat, and processed meat; limit alcohol consumption (zero is best); protect against sexually transmitted infections; protect against the sun, and avoid tanning beds; and get regular screenings for cancers as recommended by your healthcare team.    Regarding Relatives:  Tasha's relatives also may be at increased risk for certain cancers, depending upon their own personal and family history; therefore, it is important that they, too, ensure that their own healthcare providers are aware of and up-to-date on their personal and family history so that their medical management including cancer screenings can be based in part off of this information.  Additionally, it is possible for Tasha's relatives to have hereditary cancer susceptibility gene mutations in addition to and/or other than those identified in Tasha. PREFFNAMEManny's relatives should speak with a healthcare provider about their cancer risks and whether genetic counseling and potentially testing may be indicated for them.  Anyone interested in pursuing cancer genetic counseling/testing may contact the [facility] at [phone] to schedule an appointment or may visit [email or web address] to locate a genetic specialist near them.     Follow-up and Contact Information     Moving forward, Tasha is to update me with any changes to--or new information learned about--her personal or family history and with any relative's genetic testing results.  Barring any such changes, Tasha is to follow up with me in 3 years and as she needs or desires anytime sooner.     In the meantime, I can be reached by Tasha via her [facility] portal or by calling [phone].  I encourage Tasha to reach out to me periodically (as often as she wishes) to inquire as to BARD1 updates.      Diagnoses and Orders for This Encounter:    ICD-10-CM ICD-9-CM   1. Encounter for nonprocreative genetic  counseling  Z71.83 V26.33   2. BARD1 gene mutation positive  Z15.01 V84.01   3. Malignant neoplasm of lower-inner quadrant of right breast of female, estrogen receptor positive  C50.311 174.3    Z17.0 V86.0     1. Encounter for nonprocreative genetic counseling  The above information has been discussed with and/or provided in writing to Tasha.  Cancer screening recommendations for Tasha are described above.  A BARD1 Family Notification Letter has been sent to [facility]'s [facility] portal, and she is encouraged to share this letter with her relatives.    2. BARD1 gene mutation positive  - Ambulatory referral/consult to Genetics:  Completed  - See #1  - Continue care with Dr. Gabriel and Dr. Mcbride    3. Malignant neoplasm of lower-inner quadrant of right breast of female, estrogen receptor positive  - Ambulatory referral/consult to Genetics:  Completed  - Continue care with Dr. Gabriel and Dr. Mcbride  - Discussed implications of NF1 gene and that such gene has not yet be3en tested in Tasha; based on Tasha's responses to NF1 questions today, I do not strongly suspect she carries a clinically significant NF1 gene mutation  - Have sent Tasha, in a [facility] portal message, information on RNA analyses, as so far only DNA analyses have been performed on the genes tested in her       Follow-up:  Follow up in about 3 years (around 1/2/2027).  Follow up sooner as needed/desired.    Questions were encouraged and answered to the patient's satisfaction, and she verbalized understanding of the information and agreement with the plan.         BARD1 References:  NCCN Genetics BOP, Version 2.2024  NCCN Breast Cancer Screening and Diagnosis, Version 3.2023  NCCN Prostate Cancer Early Detection, Version 2.2023      Robert Gillis, DNP, APRN, FNP-BC, AOCNP, CGRA  Nurse Practitioner, Cancer Genetics  Department of Hematology and Oncology  [facility]        CC:  Dr. MARIANO Gabriel,  Dr. Linda Mcbride        Portions of the record may have been created with voice-recognition software. Occasional wrong-word or sound-a-like substitutions may have occurred due to the inherent limitations of voice-recognition software. Read the chart carefully and recognize, using context, where substitutions have occurred.

## 2024-01-02 NOTE — PROGRESS NOTES
[facility]       Post-Op        REFERRING PHYSICIAN:  No referring provider defined for this encounter.       Julia Sandoval MD    MEDICAL ONCOLOGIST:    Lnida Mcbride MD   RADIATION ONCOLOGIST:   N/a    DIAGNOSIS:    This is a 43 y.o. female with a stage pT1c N0 MX grade 2 ER + MI - HER2 + invasive ductal carcinoma with associated DCIS of the right breast.    TREATMENT SUMMARY:  The patient is status post left IJ port placement, bilateral nipple-sparing mastectomy and sentinel node biopsy on 12/14/2023.  Final pathology showed right invasive ductal carcinoma measuring 1.5 cm with associated DCIS, all surgical margins were negative for carcinoma or DCIS (DCIS closest margin 2mm), and sentinel node x2 was negative for metastatic carcinoma.       INTERVAL HISTORY:   Tasha Cornejo comes in for a post-op check.   Her pain is well controlled. Both drains have been removed by Dr. Damian.     MEDICATIONS:  Current Outpatient Medications   Medication Sig Dispense Refill    dexAMETHasone (DECADRON) 4 MG Tab Take 2 tablets by mouth daily on days 2-4 of each chemotherapy cycle. (Patient not taking: Reported on 11/20/2023) 6 tablet 5    fluticasone propionate (FLONASE) 50 mcg/actuation nasal spray 1 spray by Each Nare route daily as needed for Rhinitis.      LIDOcaine-prilocaine (EMLA) cream Apply topically as needed. (Patient not taking: Reported on 11/20/2023) 30 g 0    LORazepam (ATIVAN) 1 MG tablet Take 1 tablet (1 mg total) by mouth every 12 (twelve) hours as needed for Anxiety (sleep or nausea). (Patient not taking: Reported on 11/20/2023) 60 tablet 0    multivitamin (THERAGRAN) per tablet Take 1 tablet by mouth once daily.      OLANZapine (ZYPREXA) 5 MG tablet Take 1 tablet by mouth nightly on days 1-4 of each chemotherapy cycle. (Patient not taking: Reported on 11/20/2023) 4 tablet 5    omeprazole (PRILOSEC) 40 MG capsule Take 1 capsule (40 mg total) by mouth every morning. 90 capsule 3     prochlorperazine (COMPAZINE) 5 MG tablet Take 2 tablets (10 mg total) by mouth every 6 (six) hours as needed for Nausea. (Patient not taking: Reported on 11/20/2023) 20 tablet 5    valerian root 500 mg Cap daily as needed.       No current facility-administered medications for this visit.     Facility-Administered Medications Ordered in Other Visits   Medication Dose Route Frequency Provider Last Rate Last Admin    ceFAZolin 1 g, gentamicin 80 mg in sodium chloride 0.9% 500 mL irrigation   Irrigation On Call Procedure Allen Damian MD        ceFAZolin 1 g, gentamicin 80 mg in sodium chloride 0.9% 500 mL irrigation   Irrigation On Call Procedure Allen Damian MD           ALLERGIES:   Review of patient's allergies indicates:   Allergen Reactions    Aleve [naproxen sodium] Other (See Comments)     Throat swelling       PHYSICAL EXAMINATION:   General:  This is a well appearing female with appropriate speech, affect and gait.     Breast:  Incision clean, dry, and intact. Some fluid around the expander of the left breast, no evidence of infection or cellulitis   Chest: L IJ port site well healed     IMPRESSION:   The patient has had an uneventful postoperative course.    PLAN:   Close but negative margin discussed at tumor board.  Recommended no additional surgery or radiation.  1. return in 6 months for a follow up office visit and breast exam  2. Mammograms no longer indicated   3. The patient is advised in continued exam of the breast chest wall and to report to this office sooner should she note any areas of abnormality or concern.   4.  She will follow up with Medical Oncology to begin adjuvant treatments.

## 2024-01-02 NOTE — PATIENT INSTRUCTIONS
"Please note that in this note, the terms "male" and "female" reflect sex assigned at birth.     Tasha Cornejo ("Tasha"), 43 y.o., assigned female sex at birth, is established with the [facility] but new to me.  She was referred by Dr. MARIANO Gabriel ([facility]) for post-test genetic counseling given her diagnosis of a germline BARD1 gene mutation and diagnosis of breast cancer.     Genetic Test Results Summary:  This is a partial copy of the report.  The complete report can be found in the patient's medical record.         BARD1 Mutation     BARD1 Gene:  BARD1 is a gene that, when functioning normally, helps to prevent breast cancer; however, when the BARD1 gene harbors a mutation, it cannot perform its functions normally, which makes the individual carrying the mutation more susceptible to developing the conditions that the BARD1 gene normally helps to control.     Interpretation of BARD1 Genetic Test Results:  Tasha Cornejo ("Tasha") has been found to carry a single, clinically significant (pathogenic/likely pathogenic) (i.e., harmful, disease-causing) variant (we'll refer to it as a mutation going forward in this note) in the BARD1 gene.  The specific mutation that Tasha carries is BARD1 [id number] (p.[id number]).  Tasha carries this mutation in only one copy of the BARD1 gene, which can be referred to as carrying the mutation in the heterozygous or monoallelic state.       Mode of Inheritance of BARD1 Conditions:  Individuals normally have two copies of each gene -- one copy of that gene from each biological parent.  When a parent harbors a single mutation in a gene, there is a 50% chance they will pass it on to an offspring of theirs.  One mutated copy of a gene is sufficient to predispose the individual to autosomal-dominant conditions associated with that gene.  On the other hand, both copies of that gene need to be mutated in order for the " individual to develop autosomal-recessive conditions associated with that gene.      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       BARD1 Health Risks and Management:       Female breast cancer:  There is strong evidence regarding increased risk of developing breast cancer; the absolute risk of developing breast cancer is 17%-30%.  (For reference, breast cancer risk in the general female population is 12.9%.)    Mode of inheritance:  Autosomal-dominant  Management:    Self-awareness:  Breast awareness, meaning the individual being familiar with their own breasts and promptly reporting to their healthcare provider any changes or concerns, starting at age 18.  Periodic, consistent breast-self examination (BSE) may facilitate breast awareness.  Screening:  Clinical breast exam (CBE) every 6-12 months, starting at age 21.  Annual mammogram, starting at age 40.  Age at which to start may be modified based on family history, typically beginning screening 5-10 years earlier than the youngest diagnosis in the family, but not later than age 40. Consider annual breast MRI (with and without contrast), starting at age 40.  Age at which to start may be modified based on family history, typically beginning screening 5-10 years earlier than the youngest diagnosis in the family, but not later than age 40.  For patients who are treated for breast cancer and have not undergone bilateral mastectomy, screening should continue as described.  Risk reduction:  Evidence is insufficient for risk-reducing mastectomy (RRM); manage based on family history.  Risk-reducing medication (chemoprevention) is an option that can be discussed with a breast oncology specialist.  Healthcare provider managing Tasha's breast cancer risk:  Norma Gabriel MD, & Linda Mcbride MD    BARD1 Reproductive Risks and  "Management:       Both males and females can inherit, be affected by, and pass down (to their offspring) mutations in the BARD1 gene.  Each of Tasha's offspring has a 50% chance of carrying her BARD1 mutation.      Management:  Counseling for reproductive options, which may include assisted reproduction (such as in vitro fertilization) with preimplantation genetic testing, and/or prenatal genetic diagnosis (PGD), is indicated for individuals/couples expressing concern over future offspring's BARD1-associated risks.     BARD1 Risks to Relatives:  Both males and females can carry, be affected by, and pass down this gene mutation.  When passed down, these mutations are passed directly from parent to offspring and do not "skip generations."       Each offspring of Lorie has a 50% chance of carrying Tasha's BARD1 mutation.    It is likely that Tasha inherited her BARD1 mutation from one of her parents, which can be reasonably confirmed when one parent tests positive for the mutation.  If Tasha's BARD1 mutation is not identified in leukocyte DNA of either parent, two possible explanations are A) a de deejay mutation in Tasha or B) germline mosaicism in one parent of Lorie.     Tasha's siblings' risk depends on the mutation statuses of Tasha's parents:  If a parent of Lorie has Tasha's BARD1 mutation, each of Tasha's siblings has a 50% chance of also having the mutation.  On the other hand, if the mutation cannot be detected in the leukocyte DNA of either parent, the chance of the siblings' carrying the mutation is low (~1%) but greater than that of the general population because of the possibility of germline mosaicism.    If a parent of Lorie has inherited the familial BARD1 mutation, their other relatives are also at risk.  If a sibling or an offspring of Lorie has inherited the familial BARD1 mutation, their own offspring each have a 50% chance of inheriting the mutation.     Management:  To manage the above risks, Tasha's relatives should " strongly consider meeting with a genetic specialist for genetic counseling and potentially for testing to determine whether they carry the familial BARD1 mutation; if a relative is a carrier of the mutation, further management recommendations, for their own cancer risks and reproductive risks, can be provided by the genetic specialist.       Genetic Variant(s) of Uncertain Significance     Additionally, Tasha's genetic testing showed that she carries variants of uncertain significance (VUSs) in her AYLIN and CDH1 genes.  A VUS is a genetic change that may be benign or may be harmful, but at this time there is not enough data to categorize it as either.  VUSs are not typically considered to be clinically actionable.  Most VUSs that are reclassified are downgraded to benign or likely benign.  If [employer] reclassifies Tasha's VUS in the future to a clinically significant variant/mutation, notification should be provided; additionally, I encourage Tasha to call [employer] every 6-12 months at phone number [phone] ([phone]) to check the classification status(es) of her VUS(s) at that time, and Tasha is to let me know if any updates are ever provided by [employer].     While Tasha is not known at this time to carry any pathogenic (harmful) variants (also known as mutations) in any of the following genes, the below is provided for reference:  Pathogenic variants (mutations) in the AYLIN gene can be associated with:  Autosomal-dominantly inherited predisposition* to:  Breast cancer  Pancreatic cancer  Ovarian cancer  Colorectal cancer  Prostate cancer  Autosomal-recessively inherited risk* for:  Ataxia-telengiectasia  Pathogenic variants (mutations) in the CDH1 gene can be associated with:  Autosomal-dominantly inherited predisposition* to:  Breast cancer  Hereditary diffuse gastric cancer (with or without cleft lip and/or cleft palate)  Prostate cancer  Blepharocheilodontic syndrome 1  * Terms and definitions:  Autosomal-dominantly  inherited predisposition:  Carrying only one mutated copy of the gene (not two mutated copies) is sufficient for the predisposition to apply.  Autosomal-recessively inherited risk:  Carrying only one mutated copy of the gene is not sufficient for the condition to develop; rather, both copies of the gene must be mutated.     Additional Cancer Risks and Management     The following cancer screenings are currently recommended for Tasha (please note that these recommendations can change based on updates to clinical guidelines and/or with changes to Tasha's medical or family history and are therefore only considered accurate as of the date on which this note was composed; additional and/or alternative screenings may be recommended by Tasha's healthcare providers, and recommendations from Tasha's healthcare providers directly managing these risks supersede the below recommendations):    Cancer in general:  Annual visit with a primary care provider (PCP) for history review and physical exam and age-appropriate testing.    Gynecologic cancers:  Annual visit with a gynecology provider, which may include pelvic exam and/or Pap smear/HPV testing.    Breast cancer:  See BARD1 information above.    Colorectal cancer:  Colorectal cancer screenings as recommended by a gastroenterology (GI) provider.  With regard to family history, please let me know if any of the following applies, as such could change colorectal cancer screening recommendations for you:  If you learn moving forward that any blood relative of yours has been diagnosed with colorectal cancer.  If a first-degree relative (i.e., your parent, sibling, or child) has a colorectal polyp history including any of the following characteristics:  Adenoma with high-grade dysplasia  Adenoma or sessile serrated polyp/adenoma 1 cm or larger in size  Villous or tubulovillous adenoma  Traditional serrated adenoma (TSA)  Sessile serrated lesion/polyp/adenoma with any dysplasia  Cumulatively  10 or more polyps    Skin cancer:  Annual total-body skin examination (TBSE) with a dermatology provider.    If you are not established with a healthcare specialist listed above, please let me know so I can refer you.    Some information regarding general cancer risk reduction:  It is recommended to avoid tobacco use; be physically active; maintain a healthy weight; eat a diet rich in fruits, vegetables, and whole grains and low in saturated/trans fat, red meat, and processed meat; limit alcohol consumption (zero is best); protect against sexually transmitted infections; protect against the sun, and avoid tanning beds; and get regular screenings for cancers as recommended by your healthcare team.    Regarding Relatives:  Tasha's relatives also may be at increased risk for certain cancers, depending upon their own personal and family history; therefore, it is important that they, too, ensure that their own healthcare providers are aware of and up-to-date on their personal and family history so that their medical management including cancer screenings can be based in part off of this information.  Additionally, it is possible for Tasha's relatives to have hereditary cancer susceptibility gene mutations in addition to and/or other than those identified in Tasha. PREFFNAME@'s relatives should speak with a healthcare provider about their cancer risks and whether genetic counseling and potentially testing may be indicated for them.  Anyone interested in pursuing cancer genetic counseling/testing may contact the [facility] at [phone] to schedule an appointment or may visit [email or web address] to locate a genetic specialist near them.     Follow-up and Contact Information     Moving forward, Tasha is to update me with any changes to--or new information learned about--her personal or family history and with any relative's genetic testing results.  Barring any such changes, Tasha is to follow up with me in 3 years and as she  needs or desires anytime sooner.     In the meantime, I can be reached by Tasha via her [facility] portal or by calling [phone].  I encourage Tasha to reach out to me periodically (as often as she wishes) to inquire as to BARD1 updates.      Diagnoses and Orders for This Encounter:    ICD-10-CM ICD-9-CM   1. Encounter for nonprocreative genetic counseling  Z71.83 V26.33   2. BARD1 gene mutation positive  Z15.01 V84.01   3. Malignant neoplasm of lower-inner quadrant of right breast of female, estrogen receptor positive  C50.311 174.3    Z17.0 V86.0     1. Encounter for nonprocreative genetic counseling  The above information has been discussed with and/or provided in writing to Tasha.  Cancer screening recommendations for Tasha are described above.  A BARD1 Family Notification Letter has been sent to Tasha's [facility] portal, and she is encouraged to share this letter with her relatives.    2. BARD1 gene mutation positive  - Ambulatory referral/consult to Genetics:  Completed  - See #1  - Continue care with Dr. Gabriel and Dr. Mcbride    3. Malignant neoplasm of lower-inner quadrant of right breast of female, estrogen receptor positive  - Ambulatory referral/consult to Genetics:  Completed  - Continue care with Dr. Gabriel and Dr. Mcbride  - Discussed implications of NF1 gene and that such gene has not yet be3en tested in Tasha; based on Tasha's responses to NF1 questions today, I do not strongly suspect she carries a clinically significant NF1 gene mutation  - Will send Tasha, in a [facility] portal message, information on RNA analyses, as so far only DNA analyses have been performed on the genes tested in her       Follow-up:  Follow up in about 3 years (around 1/2/2027).  Follow up sooner as needed/desired.

## 2024-01-02 NOTE — PROGRESS NOTES
Oncology History   Malignant neoplasm of lower-inner quadrant of right breast of female, estrogen receptor positive   10/26/2023 Cancer Staged    Staging form: Breast, AJCC 8th Edition  - Clinical stage from 10/26/2023: Stage IIA (cT2, cN0, cM0, G3, ER+, OR-, HER2+)     10/26/2023 Breast Tumor Markers    Estrogen: Positive  Progesterone: Negative  HER2: Positive     10/26/2023 Biopsy    BREAST, RIGHT, BIOPSY:   - Invasive ductal carcinoma of breast.   - Grade 2 of 3.     11/6/2023 Initial Diagnosis    Malignant neoplasm of lower-inner quadrant of right breast of female, estrogen receptor positive     11/7/2023 -  Chemotherapy    Treatment Summary   Plan Name: OP BREAST TCHP (pertuzumab trastuzumab DOCEtaxel CARBOplatin) Q3W  Treatment Goal: Curative  Status: Active  Start Date: 11/7/2023 (Planned)  End Date: 10/8/2024 (Planned)  Provider: Linda Mcbride MD  Chemotherapy: CARBOplatin (PARAPLATIN) in sodium chloride 0.9% 250 mL chemo infusion, , Intravenous, Clinic/HOD 1 time, 0 of 6 cycles  DOCEtaxel (TAXOTERE) 75 mg/m2 = 114 mg in sodium chloride 0.9% 255.7 mL chemo infusion, 75 mg/m2, Intravenous, Clinic/HOD 1 time, 0 of 6 cycles  pertuzumab (PERJETA) 840 mg in sodium chloride 0.9% 278 mL infusion, 840 mg, Intravenous, Clinic/HOD 1 time, 0 of 17 cycles  trastuzumab-anns (KANJINTI) 425 mg in sodium chloride 0.9% 250 mL chemo infusion, 8 mg/kg, Intravenous, Clinic/HOD 1 time, 0 of 17 cycles     11/9/2023 Genetic Testing    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     1/2/2024 Tumor Conference    The margin is close but clear   Can consider screening but not necessarily recommended  She will continue with adjuvant chemotherapy

## 2024-01-03 ENCOUNTER — DOCUMENTATION ONLY (OUTPATIENT)
Dept: SURGERY | Facility: CLINIC | Age: 44
End: 2024-01-03
Payer: COMMERCIAL

## 2024-01-03 NOTE — PROGRESS NOTES
Called patient and scheduled appt with Dr. Mcbride for 1/5/2023. Reviewed location of appt. Patient verbalized understanding.  Oncology Navigation   Intake  Date of Diagnosis: 10/26/23  Cancer Type: Breast  Date of Referral: 10/13/23  Initial Nurse Navigator Contact: 10/31/23  Referral to Initial Contact Timeline (days): 18  Date Worked: 11/07/23  First Appointment Available: 11/06/23  Appointment Date: 11/06/23  First Available Date vs. Scheduled Date (days): 0     Treatment  Current Status: Active  Date Presented to Tumor Board: 01/02/24    Surgical Oncologist: Harvey  Type of Surgery: Bilateral Mastectomy with R ight SLNB  Consult Date: 11/06/23  Surgery Schedule Date: 12/14/23    Medical Oncologist: Rae  Consult Date: 11/06/23  Chemotherapy: Planned  Chemotherapy Regimen: TCHP       Procedures: Genetic test; Port / PICC; Echo  Biopsy Schedule Date: 10/26/23  Echo Schedule Date: 11/10/23  Genetic Testing Date Sent: 11/09/23  Diagnostic Mammo Schedule Date: 10/12/23  MRI Schedule Date: 11/09/23  Port / PICC Schedule Date:  (referral placed 11/7/2023)    Physical Therapy Referral Date: 11/07/23    ER: Positive  Her2: Postive       Support Systems: Spouse/significant other     Acuity  Treatment Tolerability: Has not started treatment yet/treatment fully completed and side effects resolved  Hospitalization Within the Past Month: 0   Needed: 0  Support: 0  Verbalizes Financial Concerns: 0  Transportation: 0  History of noncompliance/frequent no shows and cancellations: 0  Verbalizes the need for more education: 0  Navigation Acuity: 0     Follow Up  No follow-ups on file.

## 2024-01-04 ENCOUNTER — OFFICE VISIT (OUTPATIENT)
Dept: PLASTIC SURGERY | Facility: CLINIC | Age: 44
End: 2024-01-04
Attending: PLASTIC SURGERY
Payer: COMMERCIAL

## 2024-01-04 VITALS — HEART RATE: 55 BPM | DIASTOLIC BLOOD PRESSURE: 70 MMHG | SYSTOLIC BLOOD PRESSURE: 153 MMHG

## 2024-01-04 DIAGNOSIS — C50.919 MALIGNANT NEOPLASM OF FEMALE BREAST, UNSPECIFIED ESTROGEN RECEPTOR STATUS, UNSPECIFIED LATERALITY, UNSPECIFIED SITE OF BREAST: Primary | ICD-10-CM

## 2024-01-04 PROCEDURE — 3078F DIAST BP <80 MM HG: CPT | Mod: CPTII,S$GLB,, | Performed by: PLASTIC SURGERY

## 2024-01-04 PROCEDURE — 3077F SYST BP >= 140 MM HG: CPT | Mod: CPTII,S$GLB,, | Performed by: PLASTIC SURGERY

## 2024-01-04 PROCEDURE — 99024 POSTOP FOLLOW-UP VISIT: CPT | Mod: S$GLB,,, | Performed by: PLASTIC SURGERY

## 2024-01-04 PROCEDURE — 1159F MED LIST DOCD IN RCRD: CPT | Mod: CPTII,S$GLB,, | Performed by: PLASTIC SURGERY

## 2024-01-04 NOTE — PROGRESS NOTES
Patient presents for follow-up.  She is status post bilateral mastectomy with placement of tissue expanders     She is concerned it might be fluid around the left expander     On exam: All incisions are well healed     There appeared to be some periprosthetic fluid on the left side     Using sterile technique the aspiration port was accessed and 50 cc of seroma fluid was aspirated     Assessment:  Seroma of the left breast    Plan will see her again in 1 week from now may contemplate started the expansion process at that appointment

## 2024-01-05 ENCOUNTER — OFFICE VISIT (OUTPATIENT)
Dept: HEMATOLOGY/ONCOLOGY | Facility: CLINIC | Age: 44
End: 2024-01-05
Payer: COMMERCIAL

## 2024-01-05 VITALS
RESPIRATION RATE: 17 BRPM | HEART RATE: 63 BPM | SYSTOLIC BLOOD PRESSURE: 151 MMHG | HEIGHT: 60 IN | OXYGEN SATURATION: 100 % | WEIGHT: 116.75 LBS | TEMPERATURE: 98 F | BODY MASS INDEX: 22.92 KG/M2 | DIASTOLIC BLOOD PRESSURE: 77 MMHG

## 2024-01-05 DIAGNOSIS — Z15.01 BARD1 GENE MUTATION POSITIVE: ICD-10-CM

## 2024-01-05 DIAGNOSIS — Z17.0 MALIGNANT NEOPLASM OF LOWER-INNER QUADRANT OF RIGHT BREAST OF FEMALE, ESTROGEN RECEPTOR POSITIVE: Primary | ICD-10-CM

## 2024-01-05 DIAGNOSIS — C50.311 MALIGNANT NEOPLASM OF LOWER-INNER QUADRANT OF RIGHT BREAST OF FEMALE, ESTROGEN RECEPTOR POSITIVE: Primary | ICD-10-CM

## 2024-01-05 DIAGNOSIS — E55.9 VITAMIN D DEFICIENCY: ICD-10-CM

## 2024-01-05 PROCEDURE — 99999 PR PBB SHADOW E&M-EST. PATIENT-LVL IV: CPT | Mod: PBBFAC,,, | Performed by: INTERNAL MEDICINE

## 2024-01-05 PROCEDURE — 3077F SYST BP >= 140 MM HG: CPT | Mod: CPTII,S$GLB,, | Performed by: INTERNAL MEDICINE

## 2024-01-05 PROCEDURE — 1159F MED LIST DOCD IN RCRD: CPT | Mod: CPTII,S$GLB,, | Performed by: INTERNAL MEDICINE

## 2024-01-05 PROCEDURE — 3008F BODY MASS INDEX DOCD: CPT | Mod: CPTII,S$GLB,, | Performed by: INTERNAL MEDICINE

## 2024-01-05 PROCEDURE — 99215 OFFICE O/P EST HI 40 MIN: CPT | Mod: S$GLB,,, | Performed by: INTERNAL MEDICINE

## 2024-01-05 PROCEDURE — 3078F DIAST BP <80 MM HG: CPT | Mod: CPTII,S$GLB,, | Performed by: INTERNAL MEDICINE

## 2024-01-05 NOTE — PROGRESS NOTES
[facility]/ [facility]   at [facility] Note:      Chief Complaint:   Encounter Diagnoses   Name Primary?    Malignant neoplasm of lower-inner quadrant of right breast of female, estrogen receptor positive Yes    BARD1 gene mutation positive     Vitamin D deficiency           Cancer Staging   Malignant neoplasm of lower-inner quadrant of right breast of female, estrogen receptor positive  Staging form: Breast, AJCC 8th Edition  - Clinical stage from 10/26/2023: Stage IIA (cT2, cN0, cM0, G3, ER+, [place]-, HER2+) - Signed by Linda Mcbride MD on 2023  - Pathologic stage from 2023: Stage IA (pT1c, pN0, cM0, G2, ER+, W[place]-, HER2+) - Signed by Linda Mcbride MD on 2024      HPI:  Tasha Cornejo is a 43 y.o. female who presents today for follow up of newly diagnosed breast cancer. She underwent bilateral mastectomy on 23 and is healing well.     Oncology History  Screening mammogram on 10/5/2023 identified coarse heterogeneous calcifications in a linear distribution seen in the lower outer quadrant of the right breast, spanning 1.5 cm.  Diagnostic mammogram on 10/12/2023 showed coarse heterogeneous calcifications in a regional distribution spanning 3.2 cm long axis   Biopsy 10/26/2023 with pathology revealing infiltrating ductal carcinoma of the breast, ER 70%/ [place]-/HER2 3+; Ki67 60%    Bilateral mastectomy 23: IDC, grade 2, 1.5cm in maximum; 0/2 LN+       GYN History:  Age of menarche was 9.   Age of menopause n/a.  Patient denies hormonal therapy.   Patient is .       Patient Active Problem List   Diagnosis    Rhinitis, allergic    Allergic conjunctivitis    Vitamin D deficiency    Malignant neoplasm of lower-inner quadrant of right breast of female, estrogen receptor positive    Chemotherapy-induced nausea    At risk for lymphedema    Stress and adjustment reaction    Physical deconditioning       Current Outpatient Medications    Medication Instructions    dexAMETHasone (DECADRON) 4 MG Tab Take 2 tablets by mouth daily on days 2-4 of each chemotherapy cycle.    fluticasone propionate (FLONASE) 50 mcg/actuation nasal spray 1 spray, Each Nostril, Daily PRN    LIDOcaine-prilocaine (EMLA) cream Topical (Top), As needed (PRN)    LORazepam (ATIVAN) 1 mg, Oral, Every 12 hours PRN    multivitamin (THERAGRAN) per tablet 1 tablet, Oral, Daily    OLANZapine (ZYPREXA) 5 MG tablet Take 1 tablet by mouth nightly on days 1-4 of each chemotherapy cycle.    omeprazole (PRILOSEC) 40 mg, Oral, Every morning    prochlorperazine (COMPAZINE) 10 mg, Oral, Every 6 hours PRN       Review of Systems:   Review of Systems   Respiratory:  Positive for cough. Negative for shortness of breath.    Cardiovascular:  Negative for chest pain.   Gastrointestinal:  Positive for diarrhea. Negative for abdominal pain.   Genitourinary:  Negative for frequency.   Musculoskeletal:  Negative for back pain.   Skin:  Negative for rash.   Neurological:  Negative for headaches.   Psychiatric/Behavioral:  The patient is nervous/anxious.    All other systems reviewed and are negative.      PHYSICAL EXAM:  BP (!) 151/77   Pulse 63   Temp 98 °F (36.7 °C) (Oral)   Resp 17   Ht 5' (1.524 m)   Wt 53 kg (116 lb 11.7 oz)   LMP 12/16/2023   SpO2 100%   BMI 22.80 kg/m²     General Appearance:    Alert, cooperative, no distress, appears stated age   Head:    Normocephalic, without obvious abnormality, atraumatic   Eyes:    PERRL, conjunctiva/corneas clear   Nose:   Nares normal, septum midline   Throat:   Lips, mucosa, and tongue normal; teeth and gums normal   Lungs:     Respirations unlabored   Extremities:   Extremities normal, atraumatic, no cyanosis or edema   Pulses:   2+ and symmetric all extremities   Skin:   Skin color, texture, turgor normal, no rashes or lesions   Neurologic:   CNII-XII intact, normal gait           Pertinent Labs & Imaging:  Specimen (730h ago, onward)      None             No results found for this or any previous visit (from the past 24 hour(s)).    Assessment & Plan:    1. Malignant neoplasm of lower-inner quadrant of right breast of female, estrogen receptor positive    2. BARD1 gene mutation positive    3. Vitamin D deficiency    Reviewed pathology in detail with patient and   Final pathology T1cN0  We discussed adjuvant Paclitaxel and Trastuzumab for T1N0 HER2-Positive Breast Cancer per study published in [place] 2015 by Tita et al.     I have recommended upfront surgery for final pathologic staging. If her disease remains Stage I, I recommend weekly Paclitaxel at 80 mg/meter squared with weekly Trastuzumab. I have recommended a total of 12 weekly cycles of therapy. After completion of the first 12 weekly cycles, I have strongly recommended continuation of Trastuzumab alone for a total of one year of adjuvant Trastuzumab therapy. After completion of Taxol and Trastuzumab, adjuvant Trastuzumab can be given every three weeks.     First Infusion Loading Dose of Trastuzumab will be 4 mg/Kg over 90 minutes followed by the second Infusion of Trastuzumab at 2mg/kg over 30 minutes Qweek for the remainder of Taxol and Trastuzumab.    When switching to [id number] Trastuzumab, it will be given at 6mg/kg over 60 minutes Q 3 weeks and if tolerated, all subsequent Infusions of Trastuzumab but may be over 30 minutes until completion of one year of therapy.    Overall side effects of chemotherapy were discussed including alopecia, immunosuppression, Neutropenia (low white count), anemia (low hemoglobin), thrombocytopenia (lowered platelets), and neuropathy from Taxol. Other side effects such as nail changes, dry eyes, mouth sores, and bone pain were discussed.  Rare but serious side effects such as increased risk of cardiac toxicity were discussed with Trastuzumab. Monitoring of her cardiac function with MUGAs or an echocardiogram is strongly recommended every 3 months by NCCN  guidelines while on adjuvant Trastuzumab.    After completion of chemotherapy, recommend adjuvant endocrine therapy    Route Chart for Scheduling    Med Onc Chart Routing  Urgent    Follow up with physician . First day of chemo   Follow up with BHARGAVI . Needs chemo teaching with Gabrielle   Infusion scheduling note   weekly chemotherapy x 12   Injection scheduling note    Labs CMP, CBC and B HCG   Scheduling:  Preferred lab:  Lab interval: once a week     Imaging    Pharmacy appointment    Other referrals                Treatment Plan Information   OP BREAST TRASTUZUMAB PACLITAXEL WEEKLY   Linda Mcbride MD   Upcoming Treatment Dates - OP BREAST TRASTUZUMAB PACLITAXEL WEEKLY    1/5/2024       Pre-Medications       DIPHENHYDRAMINE IV ORDERABLE       DEXAMETHASONE ORDERABLE       FAMOTIDINE (PF) 20 MG/2 ML IV SOLN       Chemotherapy       TRASTUZUMAB-ANNS CHEMO INFUSION       PACLITAXEL INFUSION  1/12/2024       Pre-Medications       DIPHENHYDRAMINE IV ORDERABLE       DEXAMETHASONE ORDERABLE       FAMOTIDINE (PF) 20 MG/2 ML IV SOLN       Chemotherapy       TRASTUZUMAB-ANNS CHEMO INFUSION       PACLITAXEL INFUSION  1/19/2024       Pre-Medications       DIPHENHYDRAMINE IV ORDERABLE       DEXAMETHASONE ORDERABLE       FAMOTIDINE (PF) 20 MG/2 ML IV SOLN       Chemotherapy       TRASTUZUMAB-ANNS CHEMO INFUSION       PACLITAXEL INFUSION  1/26/2024       Pre-Medications       DIPHENHYDRAMINE IV ORDERABLE       DEXAMETHASONE ORDERABLE       FAMOTIDINE (PF) 20 MG/2 ML IV SOLN       Chemotherapy       TRASTUZUMAB-ANNS CHEMO INFUSION       PACLITAXEL INFUSION      MDM includes  :    - Acute or chronic illness or injury that poses a threat to life or bodily function  - Review of prior external notes from unique source  - Independent review and explanation of 3+ results from unique tests  - Discussion of management and ordering 3+ unique tests  - Extensive discussion of treatment and management  - Drug therapy requiring intensive  monitoring for toxicity        Linda Mcbride MD   01/05/2024

## 2024-01-05 NOTE — PLAN OF CARE
DISCONTINUE ON PATHWAY REGIMEN - Breast    [id number]        Pertuzumab (Perjeta)       Trastuzumab-xxxx       Docetaxel (Taxotere)       Carboplatin       Pertuzumab (Perjeta)       Trastuzumab-xxxx       Docetaxel (Taxotere)       Carboplatin           Additional Orders: Assess LVEF prior to initiation of trastuzumab and   pertuzumab and as clinically indicated during and after treatment. See PI for   details. Refer to PI for instructions regarding delayed or missed doses of   pertuzumab and trastuzumab.    **Always confirm dose/schedule in your pharmacy ordering system**    REASON: Other Reason  PRIOR TREATMENT: [id number]  TREATMENT RESPONSE: Unable to Evaluate    START ON PATHWAY REGIMEN - Breast    [id number]        Trastuzumab-xxxx       Paclitaxel       Trastuzumab-xxxx       Paclitaxel       Trastuzumab-xxxx           Additional Orders: Obtain LVEF assessment prior to initiation of   trastuzumab and as clinically indicated during and after treatment. See PI for   details. Reload trastuzumab if a dose is missed by more than one week.    **Always confirm dose/schedule in your pharmacy ordering system**    Patient Characteristics:  Postoperative without Neoadjuvant Therapy (Pathologic Staging), Invasive   Disease, Adjuvant Therapy, HER2 Positive, ER Positive, Node Negative, pT1c,   pN0/N1mi  Therapeutic Status: Postoperative without Neoadjuvant Therapy (Pathologic   Staging)  AJCC Grade: G2  AJCC N Category: pN0  AJCC M Category: cM0  ER Status: Positive (+)  AJCC 8 Stage Grouping: IA  HER2 Status: Positive (+)  Oncotype Dx Recurrence Score: Not Appropriate  AJCC T Category: pT1c  NJ Status: Negative (-)  Intent of Therapy:  Curative Intent, Discussed with Patient

## 2024-01-09 ENCOUNTER — PATIENT MESSAGE (OUTPATIENT)
Dept: PLASTIC SURGERY | Facility: CLINIC | Age: 44
End: 2024-01-09
Payer: COMMERCIAL

## 2024-01-10 ENCOUNTER — PATIENT MESSAGE (OUTPATIENT)
Dept: PLASTIC SURGERY | Facility: CLINIC | Age: 44
End: 2024-01-10
Payer: COMMERCIAL

## 2024-01-11 ENCOUNTER — OFFICE VISIT (OUTPATIENT)
Dept: PLASTIC SURGERY | Facility: CLINIC | Age: 44
End: 2024-01-11
Attending: PLASTIC SURGERY
Payer: COMMERCIAL

## 2024-01-11 DIAGNOSIS — C50.919 MALIGNANT NEOPLASM OF FEMALE BREAST, UNSPECIFIED ESTROGEN RECEPTOR STATUS, UNSPECIFIED LATERALITY, UNSPECIFIED SITE OF BREAST: Primary | ICD-10-CM

## 2024-01-11 PROCEDURE — 99024 POSTOP FOLLOW-UP VISIT: CPT | Mod: S$GLB,,, | Performed by: PLASTIC SURGERY

## 2024-01-11 NOTE — PROGRESS NOTES
Patient presents for follow-up.  She is status post bilateral mastectomy and placement of tissue expander     No concerns     On exam:  Incisions are well healed    There has no evidence of periprosthetic fluid     The fill port was located cleansed with chlorhexidine and each expander was filled with 50 cc of saline     Assessment:  Stable     Plan patient is happy with the size of the current expander     We will see her towards the end of her chemotherapy the plan for the next step which will be expander to gap flap exchange

## 2024-01-16 NOTE — PROGRESS NOTES
[facility]/ [facility]   at [facility] Note:      Chief Complaint:   Encounter Diagnoses   Name Primary?    Malignant neoplasm of lower-inner quadrant of right breast of female, estrogen receptor positive Yes    BARD1 gene mutation positive     Chemotherapy-induced nausea         Cancer Staging   Malignant neoplasm of lower-inner quadrant of right breast of female, estrogen receptor positive  Staging form: Breast, AJCC 8th Edition  - Clinical stage from 10/26/2023: Stage IIA (cT2, cN0, cM0, G3, ER+, [place]-, HER2+) - Signed by Linda Mcbride MD on 2023  - Pathologic stage from 2023: Stage IA (pT1c, pN0, cM0, G2, ER+, [place]-, HER2+) - Signed by Linda Mcbride MD on 2024      HPI:  Tasha Cornejo is a 43 y.o. female who presents today for chemo education for newly diagnosed breast cancer. She underwent bilateral mastectomy on 23 and is healing well.     Per Dr. Mcbride's note:   Oncology History  Screening mammogram on 10/5/2023 identified coarse heterogeneous calcifications in a linear distribution seen in the lower outer quadrant of the right breast, spanning 1.5 cm.  Diagnostic mammogram on 10/12/2023 showed coarse heterogeneous calcifications in a regional distribution spanning 3.2 cm long axis   Biopsy 10/26/2023 with pathology revealing infiltrating ductal carcinoma of the breast, ER 70%/ [place]-/HER2 3+; Ki67 60%    Bilateral mastectomy 23: IDC, grade 2, 1.5cm in maximum; 0/2 LN+       GYN History:  Age of menarche was 9.   Age of menopause n/a.  Patient denies hormonal therapy.   Patient is .     Patient Active Problem List   Diagnosis    Rhinitis, allergic    Allergic conjunctivitis    Vitamin D deficiency    Malignant neoplasm of lower-inner quadrant of right breast of female, estrogen receptor positive    Chemotherapy-induced nausea    At risk for lymphedema    Stress and adjustment reaction    Physical deconditioning        Current Outpatient Medications   Medication Instructions    dexAMETHasone (DECADRON) 4 MG Tab Take 2 tablets by mouth daily on days 2-4 of each chemotherapy cycle.    fluticasone propionate (FLONASE) 50 mcg/actuation nasal spray 1 spray, Each Nostril, Daily PRN    LIDOcaine-prilocaine (EMLA) cream Apply topically to port one hour prior to chemotherapy infusion    LORazepam (ATIVAN) 1 mg, Oral, Every 12 hours PRN    multivitamin (THERAGRAN) per tablet 1 tablet, Oral, Daily    OLANZapine (ZYPREXA) 5 MG tablet Take 1 tablet by mouth nightly on days 1-4 of each chemotherapy cycle.    omeprazole (PRILOSEC) 40 mg, Oral, Every morning    ondansetron (ZOFRAN) 8 mg, Oral, Every 8 hours PRN    prochlorperazine (COMPAZINE) 10 mg, Oral, Every 6 hours PRN       Review of Systems:   Review of Systems   Respiratory:  Positive for cough. Negative for shortness of breath.    Cardiovascular:  Negative for chest pain.   Gastrointestinal:  Positive for diarrhea. Negative for abdominal pain.   Genitourinary:  Negative for frequency.   Musculoskeletal:  Negative for back pain.   Skin:  Negative for rash.   Neurological:  Negative for headaches.   Psychiatric/Behavioral:  The patient is nervous/anxious.    All other systems reviewed and are negative.      PHYSICAL EXAM:  BP (!) 141/73   Pulse 70   Temp 97 °F (36.1 °C) (Oral)   Resp 17   Ht 5' (1.524 m)   Wt 57.8 kg (127 lb 6.8 oz)   LMP 01/11/2024   SpO2 99%   BMI 24.89 kg/m²     Physical Exam  Constitutional:       General: She is not in acute distress.     Appearance: Normal appearance. She is not ill-appearing.   HENT:      Head: Normocephalic and atraumatic.   Pulmonary:      Effort: Pulmonary effort is normal. No respiratory distress.   Musculoskeletal:      Cervical back: Normal range of motion.   Neurological:      Mental Status: She is alert and oriented to person, place, and time.         Pertinent Labs & Imaging:  Specimen (730h ago, onward)      None            No  "results found for this or any previous visit (from the past 24 hour(s)).    Assessment & Plan:    1. Malignant neoplasm of lower-inner quadrant of right breast of female, estrogen receptor positive  Reviewed pathology in detail with patient and   Final pathology T1cN0  We discussed adjuvant Paclitaxel and Trastuzumab for T1N0 HER2-Positive Breast Cancer per study published in [facility] 2015 by Tita et al.     Per Dr. Mcbride's note:   "I have recommended upfront surgery for final pathologic staging. If her disease remains Stage I, I recommend weekly Paclitaxel at 80 mg/meter squared with weekly Trastuzumab. I have recommended a total of 12 weekly cycles of therapy. After completion of the first 12 weekly cycles, I have strongly recommended continuation of Trastuzumab alone for a total of one year of adjuvant Trastuzumab therapy. After completion of Taxol and Trastuzumab, adjuvant Trastuzumab can be given every three weeks.     First Infusion Loading Dose of Trastuzumab will be 4 mg/Kg over 90 minutes followed by the second Infusion of Trastuzumab at 2mg/kg over 30 minutes Qweek for the remainder of Taxol and Trastuzumab.    When switching to [id number] Trastuzumab, it will be given at 6mg/kg over 60 minutes Q 3 weeks and if tolerated, all subsequent Infusions of Trastuzumab but may be over 30 minutes until completion of one year of therapy.    Overall side effects of chemotherapy were discussed including alopecia, immunosuppression, Neutropenia (low white count), anemia (low hemoglobin), thrombocytopenia (lowered platelets), and neuropathy from Taxol. Other side effects such as nail changes, dry eyes, mouth sores, and bone pain were discussed.  Rare but serious side effects such as increased risk of cardiac toxicity were discussed with Trastuzumab. Monitoring of her cardiac function with MUGAs or an echocardiogram is strongly recommended every 3 months by NCCN guidelines while on adjuvant Trastuzumab.    After " "completion of chemotherapy, recommend adjuvant endocrine therapy"      - [facility] Enrollment Chemotherapy; [facility]  - LIDOcaine-prilocaine (EMLA) cream; Apply topically to port one hour prior to chemotherapy infusion  Dispense: 30 g; Refill: 1  - ondansetron (ZOFRAN) 8 MG tablet; Take 1 tablet (8 mg total) by mouth every 8 (eight) hours as needed for Nausea.  Dispense: 30 tablet; Refill: 2  - Echo  - port placed during surgery  - pt would prefer labs through port, the day of her infusion    Tasha Cornejo was consented for chemotherapy/immunotherapy to treat breast cancer. Tasha Cornejo was consented to receive paclitaxel and trastuzumab.   The consent was discussed and reviewed with patient and .     2. Patient was education on what to expect when receiving chemotherapy including: checking in, receiving an ID band, 1 guest allowed in the infusion suite during infusion, can alternate people as well, pole going with you once you are hooked up, warm blankets are available, you may bring lunch and snacks, minimal snacks are available, take medications as regularly unless told otherwise, warm blankets, will be available. Education about what to expect during their chemo cycle and how often their regimen is given.     3. An extensive discussion was had which included a thorough discussion of the risk and benefits of treatment and alternatives.  Risks, including but not limited to, possible hair loss, bone marrow damage (anemia, thrombocytopenia, immune suppression, neutropenia), damage to body organs (brain, heart, liver, kidney, lungs, nervous system, skin, and others), allergic reactions, sterility, nausea/vomiting, constipation/diarrhea, sores in the mouth, secondary cancers, local damage at possible injection sites, and rarely death were all discussed. Specific side effects pertaining to their chemotherapy/immunotherapy medications were discussed as well.The patient agrees with the plan, and " all questions and their support system's questions have been answered to their satisfaction. Contraindications and potential side effects discussed as listed in micromedx.     4. Patient was given binder which includes: contact information for the [facility], an immunotherapy side effect guide (if applicable to patient), resources including, but not limited to: women's wellness, acupuncture, physical therapy, , urgent care within oncology and financial assistance.     5. Patient was educated on when to call (and given the numbers to call and knows to message via [facility] if possible) or notify the provider including, but not limited to:   Persistent Nausea and/or Vomiting  Dehydration  Persistent Diarrhea  Fever of 100.4 > 1 hour in duration or any isolated fever > 101   Rash (while on active chemotherapy or immunotherapy)   Severe pain or new onset pain not controlled by current medication regimen  Or any other symptom you feel is related to your current hematology or oncology treatment    6. Patient was provided with additional resources that [facility] offers including, but not limited to: financial counseling, , psychologist, palliative care, support groups, transportation, dietician, rehab services, women's wellness and urgent care visits within the oncology department.     7. Patient was offered and signed up for chemo care companion. Educated on daily vital signs and daily questionnaire.     8. Patient has an established PCP    9. Patient was offered a virtual visit or a phone call 1 week post their Cycle 1 Day 1 chemotherapy and agreed     10. Advance Care Planning     Date: 01/17/2024    Living Will and HCPOA  Living will and hcpoa not dicussed today d/t curative nature of treatment        Patient will Proceed with cycle 1 day 1 of paclitaxel/herceptin. Patient will be seen ~1 week for a post chemo visit with BHARGAVI.     2. Chemotherapy-induced nausea  - ondansetron (ZOFRAN) 8  MG tablet; Take 1 tablet (8 mg total) by mouth every 8 (eight) hours as needed for Nausea.  Dispense: 30 tablet; Refill: 2    Route Chart for Scheduling    Med Onc Chart Routing  Urgent    Follow up with physician . In one to two weeks for chemo start   Follow up with JORDIN . One week after first infusion (for cycle 2) and then every two weeks with md or jordin   Infusion scheduling note   weekly x 12   Injection scheduling note    Labs B HCG, CMP and CBC   Scheduling:  Preferred lab:  Lab interval:  weekly x12, patient would prefer to get drawn from port same day as chemo   Imaging ECHO   needs first week of February   Pharmacy appointment    Other referrals                Treatment Plan Information   OP BREAST TRASTUZUMAB PACLITAXEL WEEKLY   Linda Mcbride MD   Upcoming Treatment Dates - OP BREAST TRASTUZUMAB PACLITAXEL WEEKLY    1/5/2024       Pre-Medications       diphenhydrAMINE (BENADRYL) 50 mg in NS 50 mL IVPB       dexAMETHasone (DECADRON) 10 mg in sodium chloride 0.9% 50 mL IVPB       famotidine (PF) injection 20 mg       Chemotherapy       trastuzumab-anns (KANJINTI) 212 mg in sodium chloride 0.9% 250 mL chemo infusion       PACLitaxeL (TAXOL) 80 mg/m2 = 120 mg in sodium chloride 0.9% chemo infusion  1/12/2024       Pre-Medications       diphenhydrAMINE (BENADRYL) 50 mg in NS 50 mL IVPB       dexAMETHasone (DECADRON) 10 mg in sodium chloride 0.9% 50 mL IVPB       famotidine (PF) injection 20 mg       Chemotherapy       trastuzumab-anns (KANJINTI) 106 mg in sodium chloride 0.9% 250 mL chemo infusion       PACLitaxeL (TAXOL) 80 mg/m2 = 120 mg in sodium chloride 0.9% chemo infusion  1/19/2024       Pre-Medications       diphenhydrAMINE (BENADRYL) 50 mg in NS 50 mL IVPB       dexAMETHasone (DECADRON) 10 mg in sodium chloride 0.9% 50 mL IVPB       famotidine (PF) injection 20 mg       Chemotherapy       trastuzumab-anns (KANJINTI) 106 mg in sodium chloride 0.9% 250 mL chemo infusion       PACLitaxeL (TAXOL) 80  mg/m2 = 120 mg in sodium chloride 0.9% chemo infusion  1/26/2024       Pre-Medications       diphenhydrAMINE (BENADRYL) 50 mg in NS 50 mL IVPB       dexAMETHasone (DECADRON) 10 mg in sodium chloride 0.9% 50 mL IVPB       famotidine (PF) injection 20 mg       Chemotherapy       trastuzumab-anns (KANJINTI) 106 mg in sodium chloride 0.9% 250 mL chemo infusion       PACLitaxeL (TAXOL) 80 mg/m2 = 120 mg in sodium chloride 0.9% chemo infusion      Total time of this visit, including time spent face to face with patient and/or via video/audio, and also in preparing for today's visit for MDM and documentation. (Medical Decision Making, including consideration of possible diagnoses, management options, complex medical record review, review of diagnostic tests and information, consideration and discussion of significant complications based on comorbidities, and discussion with providers involved with the care of the patient) is 60 minutes. Greater than 50% was spent face to face with the patient counseling and coordinating care.    Gabrielle Woo NP   01/17/2024

## 2024-01-17 ENCOUNTER — OFFICE VISIT (OUTPATIENT)
Dept: HEMATOLOGY/ONCOLOGY | Facility: CLINIC | Age: 44
End: 2024-01-17
Payer: COMMERCIAL

## 2024-01-17 ENCOUNTER — PATIENT MESSAGE (OUTPATIENT)
Dept: HEMATOLOGY/ONCOLOGY | Facility: CLINIC | Age: 44
End: 2024-01-17

## 2024-01-17 VITALS
OXYGEN SATURATION: 99 % | HEART RATE: 70 BPM | BODY MASS INDEX: 25.02 KG/M2 | SYSTOLIC BLOOD PRESSURE: 141 MMHG | DIASTOLIC BLOOD PRESSURE: 73 MMHG | WEIGHT: 127.44 LBS | HEIGHT: 60 IN | RESPIRATION RATE: 17 BRPM | TEMPERATURE: 97 F

## 2024-01-17 DIAGNOSIS — C50.311 MALIGNANT NEOPLASM OF LOWER-INNER QUADRANT OF RIGHT BREAST OF FEMALE, ESTROGEN RECEPTOR POSITIVE: Primary | ICD-10-CM

## 2024-01-17 DIAGNOSIS — R11.0 CHEMOTHERAPY-INDUCED NAUSEA: ICD-10-CM

## 2024-01-17 DIAGNOSIS — T45.1X5A CHEMOTHERAPY-INDUCED NAUSEA: ICD-10-CM

## 2024-01-17 DIAGNOSIS — Z17.0 MALIGNANT NEOPLASM OF LOWER-INNER QUADRANT OF RIGHT BREAST OF FEMALE, ESTROGEN RECEPTOR POSITIVE: Primary | ICD-10-CM

## 2024-01-17 PROCEDURE — 3008F BODY MASS INDEX DOCD: CPT | Mod: CPTII,S$GLB,, | Performed by: NURSE PRACTITIONER

## 2024-01-17 PROCEDURE — 99215 OFFICE O/P EST HI 40 MIN: CPT | Mod: S$GLB,,, | Performed by: NURSE PRACTITIONER

## 2024-01-17 PROCEDURE — 1159F MED LIST DOCD IN RCRD: CPT | Mod: CPTII,S$GLB,, | Performed by: NURSE PRACTITIONER

## 2024-01-17 PROCEDURE — 99999 PR PBB SHADOW E&M-EST. PATIENT-LVL III: CPT | Mod: PBBFAC,,, | Performed by: NURSE PRACTITIONER

## 2024-01-17 PROCEDURE — 3078F DIAST BP <80 MM HG: CPT | Mod: CPTII,S$GLB,, | Performed by: NURSE PRACTITIONER

## 2024-01-17 PROCEDURE — 3077F SYST BP >= 140 MM HG: CPT | Mod: CPTII,S$GLB,, | Performed by: NURSE PRACTITIONER

## 2024-01-17 RX ORDER — LIDOCAINE AND PRILOCAINE 25; 25 MG/G; MG/G
CREAM TOPICAL
Qty: 30 G | Refills: 1 | Status: SHIPPED | OUTPATIENT
Start: 2024-01-17

## 2024-01-17 RX ORDER — ONDANSETRON HYDROCHLORIDE 8 MG/1
8 TABLET, FILM COATED ORAL EVERY 8 HOURS PRN
Qty: 30 TABLET | Refills: 2 | Status: SHIPPED | OUTPATIENT
Start: 2024-01-17 | End: 2025-01-16

## 2024-01-18 ENCOUNTER — PATIENT MESSAGE (OUTPATIENT)
Dept: PLASTIC SURGERY | Facility: CLINIC | Age: 44
End: 2024-01-18
Payer: COMMERCIAL

## 2024-01-18 DIAGNOSIS — Z17.0 MALIGNANT NEOPLASM OF LOWER-INNER QUADRANT OF RIGHT BREAST OF FEMALE, ESTROGEN RECEPTOR POSITIVE: Primary | ICD-10-CM

## 2024-01-18 DIAGNOSIS — C50.311 MALIGNANT NEOPLASM OF LOWER-INNER QUADRANT OF RIGHT BREAST OF FEMALE, ESTROGEN RECEPTOR POSITIVE: Primary | ICD-10-CM

## 2024-01-19 ENCOUNTER — PATIENT MESSAGE (OUTPATIENT)
Dept: HEMATOLOGY/ONCOLOGY | Facility: CLINIC | Age: 44
End: 2024-01-19
Payer: COMMERCIAL

## 2024-01-19 ENCOUNTER — PATIENT MESSAGE (OUTPATIENT)
Dept: PLASTIC SURGERY | Facility: CLINIC | Age: 44
End: 2024-01-19
Payer: COMMERCIAL

## 2024-01-19 DIAGNOSIS — Z79.52 CURRENT USE OF STEROID MEDICATION: ICD-10-CM

## 2024-01-19 DIAGNOSIS — Z17.0 MALIGNANT NEOPLASM OF LOWER-INNER QUADRANT OF RIGHT BREAST OF FEMALE, ESTROGEN RECEPTOR POSITIVE: Primary | ICD-10-CM

## 2024-01-19 DIAGNOSIS — C50.311 MALIGNANT NEOPLASM OF LOWER-INNER QUADRANT OF RIGHT BREAST OF FEMALE, ESTROGEN RECEPTOR POSITIVE: Primary | ICD-10-CM

## 2024-01-19 RX ORDER — LEVOFLOXACIN 500 MG/1
500 TABLET, FILM COATED ORAL DAILY
Qty: 7 TABLET | Refills: 0 | Status: SHIPPED | OUTPATIENT
Start: 2024-01-19 | End: 2024-02-08

## 2024-01-19 RX ORDER — SULFAMETHOXAZOLE AND TRIMETHOPRIM 800; 160 MG/1; MG/1
1 TABLET ORAL 2 TIMES DAILY
Qty: 14 TABLET | Refills: 0 | Status: SHIPPED | OUTPATIENT
Start: 2024-01-19 | End: 2024-02-08

## 2024-01-24 ENCOUNTER — PATIENT MESSAGE (OUTPATIENT)
Dept: ADMINISTRATIVE | Facility: OTHER | Age: 44
End: 2024-01-24
Payer: COMMERCIAL

## 2024-01-25 ENCOUNTER — PATIENT MESSAGE (OUTPATIENT)
Dept: ADMINISTRATIVE | Facility: OTHER | Age: 44
End: 2024-01-25
Payer: COMMERCIAL

## 2024-01-26 ENCOUNTER — PATIENT MESSAGE (OUTPATIENT)
Dept: ADMINISTRATIVE | Facility: OTHER | Age: 44
End: 2024-01-26
Payer: COMMERCIAL

## 2024-01-27 ENCOUNTER — PATIENT MESSAGE (OUTPATIENT)
Dept: ADMINISTRATIVE | Facility: OTHER | Age: 44
End: 2024-01-27
Payer: COMMERCIAL

## 2024-01-28 ENCOUNTER — PATIENT MESSAGE (OUTPATIENT)
Dept: ADMINISTRATIVE | Facility: OTHER | Age: 44
End: 2024-01-28
Payer: COMMERCIAL

## 2024-01-29 ENCOUNTER — PATIENT MESSAGE (OUTPATIENT)
Dept: ADMINISTRATIVE | Facility: OTHER | Age: 44
End: 2024-01-29
Payer: COMMERCIAL

## 2024-01-30 ENCOUNTER — PATIENT MESSAGE (OUTPATIENT)
Dept: ADMINISTRATIVE | Facility: OTHER | Age: 44
End: 2024-01-30
Payer: COMMERCIAL

## 2024-01-31 ENCOUNTER — PATIENT MESSAGE (OUTPATIENT)
Dept: ADMINISTRATIVE | Facility: OTHER | Age: 44
End: 2024-01-31
Payer: COMMERCIAL

## 2024-01-31 ENCOUNTER — LAB VISIT (OUTPATIENT)
Dept: LAB | Facility: HOSPITAL | Age: 44
End: 2024-01-31
Payer: COMMERCIAL

## 2024-01-31 DIAGNOSIS — Z17.0 MALIGNANT NEOPLASM OF LOWER-INNER QUADRANT OF RIGHT BREAST OF FEMALE, ESTROGEN RECEPTOR POSITIVE: ICD-10-CM

## 2024-01-31 DIAGNOSIS — Z79.52 CURRENT USE OF STEROID MEDICATION: ICD-10-CM

## 2024-01-31 DIAGNOSIS — C50.311 MALIGNANT NEOPLASM OF LOWER-INNER QUADRANT OF RIGHT BREAST OF FEMALE, ESTROGEN RECEPTOR POSITIVE: ICD-10-CM

## 2024-01-31 LAB
ALBUMIN SERPL BCP-MCNC: 4 G/DL (ref 3.5–5.2)
ALP SERPL-CCNC: 52 U/L (ref 55–135)
ALT SERPL W/O P-5'-P-CCNC: 11 U/L (ref 10–44)
ANION GAP SERPL CALC-SCNC: 5 MMOL/L (ref 8–16)
AST SERPL-CCNC: 20 U/L (ref 10–40)
BILIRUB SERPL-MCNC: 0.4 MG/DL (ref 0.1–1)
BUN SERPL-MCNC: 12 MG/DL (ref 6–20)
CALCIUM SERPL-MCNC: 9.6 MG/DL (ref 8.7–10.5)
CHLORIDE SERPL-SCNC: 105 MMOL/L (ref 95–110)
CO2 SERPL-SCNC: 26 MMOL/L (ref 23–29)
CREAT SERPL-MCNC: 0.8 MG/DL (ref 0.5–1.4)
ERYTHROCYTE [DISTWIDTH] IN BLOOD BY AUTOMATED COUNT: 12.8 % (ref 11.5–14.5)
EST. GFR  (NO RACE VARIABLE): >60 ML/MIN/1.73 M^2
ESTIMATED AVG GLUCOSE: 100 MG/DL (ref 68–131)
GLUCOSE SERPL-MCNC: 81 MG/DL (ref 70–110)
HBA1C MFR BLD: 5.1 % (ref 4–5.6)
HCG INTACT+B SERPL-ACNC: <2.4 MIU/ML
HCT VFR BLD AUTO: 41.1 % (ref 37–48.5)
HGB BLD-MCNC: 13.5 G/DL (ref 12–16)
IMM GRANULOCYTES # BLD AUTO: 0.01 K/UL (ref 0–0.04)
MCH RBC QN AUTO: 29.3 PG (ref 27–31)
MCHC RBC AUTO-ENTMCNC: 32.8 G/DL (ref 32–36)
MCV RBC AUTO: 89 FL (ref 82–98)
NEUTROPHILS # BLD AUTO: 3.1 K/UL (ref 1.8–7.7)
PLATELET # BLD AUTO: 267 K/UL (ref 150–450)
PMV BLD AUTO: 10.5 FL (ref 9.2–12.9)
POTASSIUM SERPL-SCNC: 4.7 MMOL/L (ref 3.5–5.1)
PROT SERPL-MCNC: 7.2 G/DL (ref 6–8.4)
RBC # BLD AUTO: 4.6 M/UL (ref 4–5.4)
SODIUM SERPL-SCNC: 136 MMOL/L (ref 136–145)
WBC # BLD AUTO: 5.64 K/UL (ref 3.9–12.7)

## 2024-01-31 PROCEDURE — 85027 COMPLETE CBC AUTOMATED: CPT | Performed by: NURSE PRACTITIONER

## 2024-01-31 PROCEDURE — 84702 CHORIONIC GONADOTROPIN TEST: CPT | Performed by: NURSE PRACTITIONER

## 2024-01-31 PROCEDURE — 36415 COLL VENOUS BLD VENIPUNCTURE: CPT | Mod: PO | Performed by: NURSE PRACTITIONER

## 2024-01-31 PROCEDURE — 83036 HEMOGLOBIN GLYCOSYLATED A1C: CPT | Performed by: NURSE PRACTITIONER

## 2024-01-31 PROCEDURE — 80053 COMPREHEN METABOLIC PANEL: CPT | Performed by: NURSE PRACTITIONER

## 2024-02-01 ENCOUNTER — CLINICAL SUPPORT (OUTPATIENT)
Dept: REHABILITATION | Facility: HOSPITAL | Age: 44
End: 2024-02-01

## 2024-02-01 ENCOUNTER — INFUSION (OUTPATIENT)
Dept: INFUSION THERAPY | Facility: HOSPITAL | Age: 44
End: 2024-02-01
Payer: COMMERCIAL

## 2024-02-01 ENCOUNTER — OFFICE VISIT (OUTPATIENT)
Dept: HEMATOLOGY/ONCOLOGY | Facility: CLINIC | Age: 44
End: 2024-02-01
Payer: COMMERCIAL

## 2024-02-01 ENCOUNTER — PATIENT MESSAGE (OUTPATIENT)
Dept: ADMINISTRATIVE | Facility: OTHER | Age: 44
End: 2024-02-01
Payer: COMMERCIAL

## 2024-02-01 VITALS
RESPIRATION RATE: 14 BRPM | WEIGHT: 113 LBS | BODY MASS INDEX: 22.19 KG/M2 | HEART RATE: 66 BPM | TEMPERATURE: 98 F | SYSTOLIC BLOOD PRESSURE: 139 MMHG | HEIGHT: 60 IN | DIASTOLIC BLOOD PRESSURE: 80 MMHG | OXYGEN SATURATION: 100 %

## 2024-02-01 VITALS
TEMPERATURE: 99 F | SYSTOLIC BLOOD PRESSURE: 123 MMHG | RESPIRATION RATE: 20 BRPM | HEART RATE: 79 BPM | DIASTOLIC BLOOD PRESSURE: 75 MMHG

## 2024-02-01 DIAGNOSIS — T45.1X5A CHEMOTHERAPY-INDUCED NAUSEA: Primary | ICD-10-CM

## 2024-02-01 DIAGNOSIS — Z17.0 MALIGNANT NEOPLASM OF LOWER-INNER QUADRANT OF RIGHT BREAST OF FEMALE, ESTROGEN RECEPTOR POSITIVE: ICD-10-CM

## 2024-02-01 DIAGNOSIS — C50.311 MALIGNANT NEOPLASM OF LOWER-INNER QUADRANT OF RIGHT BREAST OF FEMALE, ESTROGEN RECEPTOR POSITIVE: ICD-10-CM

## 2024-02-01 DIAGNOSIS — Z15.01 BARD1 GENE MUTATION POSITIVE: ICD-10-CM

## 2024-02-01 DIAGNOSIS — R11.0 CHEMOTHERAPY-INDUCED NAUSEA: Primary | ICD-10-CM

## 2024-02-01 DIAGNOSIS — Z17.0 MALIGNANT NEOPLASM OF LOWER-INNER QUADRANT OF RIGHT BREAST OF FEMALE, ESTROGEN RECEPTOR POSITIVE: Primary | ICD-10-CM

## 2024-02-01 DIAGNOSIS — C50.311 MALIGNANT NEOPLASM OF LOWER-INNER QUADRANT OF RIGHT BREAST OF FEMALE, ESTROGEN RECEPTOR POSITIVE: Primary | ICD-10-CM

## 2024-02-01 PROCEDURE — 96415 CHEMO IV INFUSION ADDL HR: CPT

## 2024-02-01 PROCEDURE — 3075F SYST BP GE 130 - 139MM HG: CPT | Mod: CPTII,S$GLB,, | Performed by: INTERNAL MEDICINE

## 2024-02-01 PROCEDURE — 3079F DIAST BP 80-89 MM HG: CPT | Mod: CPTII,S$GLB,, | Performed by: INTERNAL MEDICINE

## 2024-02-01 PROCEDURE — 96367 TX/PROPH/DG ADDL SEQ IV INF: CPT

## 2024-02-01 PROCEDURE — 99215 OFFICE O/P EST HI 40 MIN: CPT | Mod: S$GLB,,, | Performed by: INTERNAL MEDICINE

## 2024-02-01 PROCEDURE — 96413 CHEMO IV INFUSION 1 HR: CPT

## 2024-02-01 PROCEDURE — A4216 STERILE WATER/SALINE, 10 ML: HCPCS | Performed by: INTERNAL MEDICINE

## 2024-02-01 PROCEDURE — 63600175 PHARM REV CODE 636 W HCPCS: Performed by: INTERNAL MEDICINE

## 2024-02-01 PROCEDURE — 99999 PR PBB SHADOW E&M-EST. PATIENT-LVL IV: CPT | Mod: PBBFAC,,, | Performed by: INTERNAL MEDICINE

## 2024-02-01 PROCEDURE — 96417 CHEMO IV INFUS EACH ADDL SEQ: CPT

## 2024-02-01 PROCEDURE — 96375 TX/PRO/DX INJ NEW DRUG ADDON: CPT

## 2024-02-01 PROCEDURE — 97811 ACUP 1/> W/O ESTIM EA ADD 15: CPT | Performed by: ACUPUNCTURIST

## 2024-02-01 PROCEDURE — 3044F HG A1C LEVEL LT 7.0%: CPT | Mod: CPTII,S$GLB,, | Performed by: INTERNAL MEDICINE

## 2024-02-01 PROCEDURE — 3008F BODY MASS INDEX DOCD: CPT | Mod: CPTII,S$GLB,, | Performed by: INTERNAL MEDICINE

## 2024-02-01 PROCEDURE — 25000003 PHARM REV CODE 250: Performed by: INTERNAL MEDICINE

## 2024-02-01 PROCEDURE — 97810 ACUP 1/> WO ESTIM 1ST 15 MIN: CPT | Performed by: ACUPUNCTURIST

## 2024-02-01 RX ORDER — EPINEPHRINE 0.3 MG/.3ML
0.3 INJECTION SUBCUTANEOUS ONCE AS NEEDED
Status: CANCELLED | OUTPATIENT
Start: 2024-02-01

## 2024-02-01 RX ORDER — DIPHENHYDRAMINE HYDROCHLORIDE 50 MG/ML
50 INJECTION INTRAMUSCULAR; INTRAVENOUS ONCE AS NEEDED
Status: COMPLETED | OUTPATIENT
Start: 2024-02-01 | End: 2024-02-01

## 2024-02-01 RX ORDER — FAMOTIDINE 10 MG/ML
20 INJECTION INTRAVENOUS
Status: COMPLETED | OUTPATIENT
Start: 2024-02-01 | End: 2024-02-01

## 2024-02-01 RX ORDER — DIPHENHYDRAMINE HYDROCHLORIDE 50 MG/ML
50 INJECTION INTRAMUSCULAR; INTRAVENOUS ONCE AS NEEDED
Status: CANCELLED | OUTPATIENT
Start: 2024-02-01

## 2024-02-01 RX ORDER — SODIUM CHLORIDE 0.9 % (FLUSH) 0.9 %
10 SYRINGE (ML) INJECTION
Status: DISCONTINUED | OUTPATIENT
Start: 2024-02-01 | End: 2024-02-01 | Stop reason: HOSPADM

## 2024-02-01 RX ORDER — PROCHLORPERAZINE EDISYLATE 5 MG/ML
10 INJECTION INTRAMUSCULAR; INTRAVENOUS ONCE AS NEEDED
Status: CANCELLED
Start: 2024-02-01

## 2024-02-01 RX ORDER — PROCHLORPERAZINE EDISYLATE 5 MG/ML
10 INJECTION INTRAMUSCULAR; INTRAVENOUS ONCE AS NEEDED
Status: DISCONTINUED | OUTPATIENT
Start: 2024-02-01 | End: 2024-02-01 | Stop reason: HOSPADM

## 2024-02-01 RX ORDER — FAMOTIDINE 10 MG/ML
20 INJECTION INTRAVENOUS
Status: CANCELLED | OUTPATIENT
Start: 2024-02-01

## 2024-02-01 RX ORDER — HEPARIN 100 UNIT/ML
500 SYRINGE INTRAVENOUS
Status: CANCELLED | OUTPATIENT
Start: 2024-02-01

## 2024-02-01 RX ORDER — EPINEPHRINE 0.3 MG/.3ML
0.3 INJECTION SUBCUTANEOUS ONCE AS NEEDED
Status: DISCONTINUED | OUTPATIENT
Start: 2024-02-01 | End: 2024-02-01 | Stop reason: HOSPADM

## 2024-02-01 RX ORDER — SODIUM CHLORIDE 0.9 % (FLUSH) 0.9 %
10 SYRINGE (ML) INJECTION
Status: CANCELLED | OUTPATIENT
Start: 2024-02-01

## 2024-02-01 RX ORDER — HEPARIN 100 UNIT/ML
500 SYRINGE INTRAVENOUS
Status: DISCONTINUED | OUTPATIENT
Start: 2024-02-01 | End: 2024-02-01 | Stop reason: HOSPADM

## 2024-02-01 RX ADMIN — Medication 10 ML: at 02:02

## 2024-02-01 RX ADMIN — HYDROCORTISONE SODIUM SUCCINATE 100 MG: 100 INJECTION, POWDER, FOR SOLUTION INTRAMUSCULAR; INTRAVENOUS at 12:02

## 2024-02-01 RX ADMIN — PACLITAXEL 120 MG: 6 INJECTION, SOLUTION INTRAVENOUS at 12:02

## 2024-02-01 RX ADMIN — SODIUM CHLORIDE: 9 INJECTION, SOLUTION INTRAVENOUS at 09:02

## 2024-02-01 RX ADMIN — DIPHENHYDRAMINE HYDROCHLORIDE 50 MG: 50 INJECTION, SOLUTION INTRAMUSCULAR; INTRAVENOUS at 12:02

## 2024-02-01 RX ADMIN — HEPARIN 500 UNITS: 100 SYRINGE at 02:02

## 2024-02-01 RX ADMIN — DEXAMETHASONE SODIUM PHOSPHATE 10 MG: 4 INJECTION, SOLUTION INTRA-ARTICULAR; INTRALESIONAL; INTRAMUSCULAR; INTRAVENOUS; SOFT TISSUE at 11:02

## 2024-02-01 RX ADMIN — FAMOTIDINE 20 MG: 10 INJECTION INTRAVENOUS at 11:02

## 2024-02-01 RX ADMIN — TRAZTUZUMAB-ANNS 212 MG: KIT at 10:02

## 2024-02-01 RX ADMIN — DIPHENHYDRAMINE HYDROCHLORIDE 50 MG: 50 INJECTION INTRAMUSCULAR; INTRAVENOUS at 12:02

## 2024-02-01 NOTE — PROGRESS NOTES
[facility]/ [facility]   at [facility] Note:      Chief Complaint:   Encounter Diagnoses   Name Primary?    Malignant neoplasm of lower-inner quadrant of right breast of female, estrogen receptor positive Yes    BARD1 gene mutation positive           Cancer Staging   Malignant neoplasm of lower-inner quadrant of right breast of female, estrogen receptor positive  Staging form: Breast, AJCC 8th Edition  - Clinical stage from 10/26/2023: Stage IIA (cT2, cN0, cM0, G3, ER+, IL-, HER2+) - Signed by Linda Mcbride MD on 2023  - Pathologic stage from 2023: Stage IA (pT1c, pN0, cM0, G2, ER+, IL-, HER2+) - Signed by Linda Mcbride MD on 2024      HPI:  Tasha Cornejo is a 44 y.o. female who presents today for chemo start. She is understandably nervous. Overall feeling well otherwise.       Oncology History  Screening mammogram on 10/5/2023 identified coarse heterogeneous calcifications in a linear distribution seen in the lower outer quadrant of the right breast, spanning 1.5 cm.  Diagnostic mammogram on 10/12/2023 showed coarse heterogeneous calcifications in a regional distribution spanning 3.2 cm long axis   Biopsy 10/26/2023 with pathology revealing infiltrating ductal carcinoma of the breast, ER 70%/ IL-/HER2 3+; Ki67 60%    Bilateral mastectomy 23: IDC, grade 2, 1.5cm in maximum; 0/2 LN+    Adjuvant TH 24       GYN History:  Age of menarche was 9.   Age of menopause n/a.  Patient denies hormonal therapy.   Patient is .     Patient Active Problem List   Diagnosis    Rhinitis, allergic    Allergic conjunctivitis    Vitamin D deficiency    Malignant neoplasm of lower-inner quadrant of right breast of female, estrogen receptor positive    Chemotherapy-induced nausea    At risk for lymphedema    Stress and adjustment reaction    Physical deconditioning       Current Outpatient Medications   Medication Instructions     dexAMETHasone (DECADRON) 4 MG Tab Take 2 tablets by mouth daily on days 2-4 of each chemotherapy cycle.    fluticasone propionate (FLONASE) 50 mcg/actuation nasal spray 1 spray, Each Nostril, Daily PRN    LIDOcaine-prilocaine (EMLA) cream Apply topically to port one hour prior to chemotherapy infusion    LORazepam (ATIVAN) 1 mg, Oral, Every 12 hours PRN    multivitamin (THERAGRAN) per tablet 1 tablet, Oral, Daily    OLANZapine (ZYPREXA) 5 MG tablet Take 1 tablet by mouth nightly on days 1-4 of each chemotherapy cycle.    omeprazole (PRILOSEC) 40 mg, Oral, Every morning    ondansetron (ZOFRAN) 8 mg, Oral, Every 8 hours PRN    prochlorperazine (COMPAZINE) 10 mg, Oral, Every 6 hours PRN       Review of Systems:   Review of Systems   Respiratory:  Positive for cough. Negative for shortness of breath.    Cardiovascular:  Negative for chest pain.   Gastrointestinal:  Positive for diarrhea. Negative for abdominal pain.   Genitourinary:  Negative for frequency.   Musculoskeletal:  Negative for back pain.   Skin:  Negative for rash.   Neurological:  Negative for headaches.   Psychiatric/Behavioral:  The patient is nervous/anxious.    All other systems reviewed and are negative.      PHYSICAL EXAM:  /80 (BP Location: Left arm, Patient Position: Sitting, BP Method: Medium (Automatic))   Pulse 66   Temp 98.3 °F (36.8 °C) (Oral)   Resp 14   Ht 5' (1.524 m)   Wt 51.3 kg (113 lb) Comment: 51.4kg/ 113lbs  LMP 01/11/2024   SpO2 100%   BMI 22.07 kg/m²     Physical Exam  Constitutional:       General: She is not in acute distress.     Appearance: Normal appearance. She is not ill-appearing.   HENT:      Head: Normocephalic and atraumatic.   Pulmonary:      Effort: Pulmonary effort is normal. No respiratory distress.   Musculoskeletal:      Cervical back: Normal range of motion.   Neurological:      Mental Status: She is alert and oriented to person, place, and time.         Pertinent Labs & Imaging:  Specimen (730h ago,  onward)      None            No results found for this or any previous visit (from the past 24 hour(s)).    Assessment & Plan:    1. Malignant neoplasm of lower-inner quadrant of right breast of female, estrogen receptor positive    2. BARD1 gene mutation positive      Final pathology T1cN0  Reviewed echo and labs  Echo from November ok to use given no treatment in the interval  Will repeat echo after 6 cycles    Follow up next week and then q2wk      Route Chart for Scheduling    Med Onc Chart Routing      Follow up with physician 3 weeks.   Follow up with BHARGAVI 1 week.   Infusion scheduling note    Injection scheduling note    Labs    Imaging    Pharmacy appointment    Other referrals                Treatment Plan Information   OP BREAST TRASTUZUMAB PACLITAXEL WEEKLY   Linda Mcbride MD   Upcoming Treatment Dates - OP BREAST TRASTUZUMAB PACLITAXEL WEEKLY    2/8/2024       Pre-Medications       diphenhydrAMINE (BENADRYL) 50 mg in NS 50 mL IVPB       dexAMETHasone (DECADRON) 10 mg in sodium chloride 0.9% 50 mL IVPB       famotidine (PF) injection 20 mg       Chemotherapy       trastuzumab-anns (KANJINTI) 106 mg in sodium chloride 0.9% 250 mL chemo infusion       PACLitaxeL (TAXOL) 80 mg/m2 = 120 mg in sodium chloride 0.9% 250 mL chemo infusion  2/15/2024       Pre-Medications       diphenhydrAMINE (BENADRYL) 50 mg in sodium chloride 0.9% 50 mL IVPB       dexAMETHasone (DECADRON) 10 mg in sodium chloride 0.9% 50 mL IVPB       famotidine (PF) injection 20 mg       Chemotherapy       trastuzumab-anns (KANJINTI) 106 mg in sodium chloride 0.9% 250 mL chemo infusion       PACLitaxeL (TAXOL) 80 mg/m2 = 120 mg in sodium chloride 0.9% 250 mL chemo infusion  2/22/2024       Pre-Medications       diphenhydrAMINE (BENADRYL) 50 mg in sodium chloride 0.9% 50 mL IVPB       dexAMETHasone (DECADRON) 10 mg in sodium chloride 0.9% 50 mL IVPB       famotidine (PF) injection 20 mg       Chemotherapy       trastuzumab-anns (KANJINTI)  106 mg in sodium chloride 0.9% 250 mL chemo infusion       PACLitaxeL (TAXOL) 80 mg/m2 = 120 mg in sodium chloride 0.9% 250 mL chemo infusion  2/29/2024       Pre-Medications       diphenhydrAMINE (BENADRYL) 50 mg in sodium chloride 0.9% 50 mL IVPB       dexAMETHasone (DECADRON) 10 mg in sodium chloride 0.9% 50 mL IVPB       famotidine (PF) injection 20 mg       Chemotherapy       trastuzumab-anns (KANJINTI) 106 mg in sodium chloride 0.9% 250 mL chemo infusion       PACLitaxeL (TAXOL) 80 mg/m2 = 120 mg in sodium chloride 0.9% 250 mL chemo infusion    MDM includes  :    - Acute or chronic illness or injury that poses a threat to life or bodily function  - Review of prior external notes from unique source  - Independent review and explanation of 3+ results from unique tests  - Discussion of management and ordering 2 unique tests  - Extensive discussion of treatment and management  - Prescription drug management  - Drug therapy requiring intensive monitoring for toxicity      Linda Mcbride MD   02/01/2024

## 2024-02-01 NOTE — PROGRESS NOTES
Acupuncture Evaluation Note     Name: Tasha Cornejo  Clinic Number: [id number]    Traditional Chinese Medicine (TCM) Diagnosis: Qi Stagnation and Blood Stasis  Medical Diagnosis:   Encounter Diagnosis   Name Primary?    Malignant neoplasm of lower-inner quadrant of right breast of female, estrogen receptor positive         Evaluation Date: 2/1/2024    Visit #/Visits authorized: self pay     Precautions: Standard and cancer    Subjective     Chief Concern: Breast cancer - malignant neoplasm of lower-inner quadrant of right breast of female, estrogen receptor positive. Dx in November 2023. First surgery in 12/2023, bilateral mastectomy with expanders. Starting chemo today - 12 rounds once/week. CINV, CIPN - prevent.     Medical necessity is demonstrated by the following IMPAIRMENTS: Medical Necessity: Decreased quality of life and Nausea and Vomiting              Aggravating Factors:  flexion and extension   Relieving Factors:  stretching/yoga     Symptom Description:     Quality:  Tight  Severity:  1  Frequency:  when active    Previous Treatments Tried:  Surgery    HEENT:  no headaches. Right ear - fluid with ocean sound that comes and goes, in evening when going to sleep.     Chest:  no complaints     Digestion:     Diet:  dairy free, esophagitis   Fluids: tea 1 /day and coffee a couple times a week , is drinking plenty of fluids,  rare  Taste/Appetite: normal    Symptoms: loose stools    Sleep: no complaints    Energy Levels:  no complaints     Psychological Symptoms:  stress/anxiety    Other Symptoms: tightness with arm/shoulder extension from mastectomy surgery     GYN Symptoms: LMP 1/11/24, regular cycle every 26-28 days, cramping mid cycle and 1st day of period, no clots     Objective     Observation:     Tongue:  pale, sl deviated to right, scallops, dip in back, dark sublinguals     Body:     Color:     Coating:      Pulse:  wiry, weak cun              New Findings:      Treatment     Treatment  Principles:  Move qi and blood, nourish qi, calm hernandez    Acupuncture points used:    SSC, Yintang  Bilateral points: LI4, LV3, ST36, SP9, SP6, KD3, GB34  Unilateral points: HT7, PC6, LU7, KD6, SP4  Auricular Treatment:      Needles In: 25  Needles Out: 25  Needles W/O STIM placed: 7:25  Needles W/O STIM removed: 7:45      Other Traditional Chinese Medicine Modalities - Cupping  Gwasha    Assessment     After treatment, patient felt relaxed     Patient prognosis is Good.     Patient will continue to benefit from acupuncture treatment to address the deficits listed in the problem list box on initial evaluation, provide patient family education and to maximize pt's level of independence in the home and community environment.     Patient's spiritual, cultural and educational needs considered and pt agreeable to plan of care and goals.     Anticipated barriers to treatment: none    Plan     Recommend 1 /week for 12 weeks - throughout chemo      Education:  Patient is aware of cumulative benefit of acupuncture

## 2024-02-01 NOTE — PLAN OF CARE
Pt completed C1D1 of Taxol and didn't have anymore adverse effects. She was instructed to seek medical attention for any s/s of a negative reaction. Discharged AAOx4 and ambulatory.

## 2024-02-01 NOTE — PLAN OF CARE
Pt had an adverse reaction to the Taxol  SOB, tight chest, and racing heart rate steroids and benadryl were emergently administered. After 5 minutes pt stated that she felt  better . Taxol was restarted at previous rate of 63 and pt is continuously monitored with VS.

## 2024-02-02 ENCOUNTER — PATIENT MESSAGE (OUTPATIENT)
Dept: ADMINISTRATIVE | Facility: OTHER | Age: 44
End: 2024-02-02
Payer: COMMERCIAL

## 2024-02-03 ENCOUNTER — PATIENT MESSAGE (OUTPATIENT)
Dept: ADMINISTRATIVE | Facility: OTHER | Age: 44
End: 2024-02-03
Payer: COMMERCIAL

## 2024-02-04 ENCOUNTER — PATIENT MESSAGE (OUTPATIENT)
Dept: ADMINISTRATIVE | Facility: OTHER | Age: 44
End: 2024-02-04
Payer: COMMERCIAL

## 2024-02-05 ENCOUNTER — PATIENT MESSAGE (OUTPATIENT)
Dept: ADMINISTRATIVE | Facility: OTHER | Age: 44
End: 2024-02-05
Payer: COMMERCIAL

## 2024-02-06 ENCOUNTER — PATIENT MESSAGE (OUTPATIENT)
Dept: ADMINISTRATIVE | Facility: OTHER | Age: 44
End: 2024-02-06
Payer: COMMERCIAL

## 2024-02-07 ENCOUNTER — LAB VISIT (OUTPATIENT)
Dept: LAB | Facility: HOSPITAL | Age: 44
End: 2024-02-07
Payer: COMMERCIAL

## 2024-02-07 ENCOUNTER — PATIENT MESSAGE (OUTPATIENT)
Dept: ADMINISTRATIVE | Facility: OTHER | Age: 44
End: 2024-02-07
Payer: COMMERCIAL

## 2024-02-07 DIAGNOSIS — C50.311 MALIGNANT NEOPLASM OF LOWER-INNER QUADRANT OF RIGHT BREAST OF FEMALE, ESTROGEN RECEPTOR POSITIVE: ICD-10-CM

## 2024-02-07 DIAGNOSIS — Z17.0 MALIGNANT NEOPLASM OF LOWER-INNER QUADRANT OF RIGHT BREAST OF FEMALE, ESTROGEN RECEPTOR POSITIVE: ICD-10-CM

## 2024-02-07 LAB
ALBUMIN SERPL BCP-MCNC: 4.2 G/DL (ref 3.5–5.2)
ALP SERPL-CCNC: 53 U/L (ref 55–135)
ALT SERPL W/O P-5'-P-CCNC: 13 U/L (ref 10–44)
ANION GAP SERPL CALC-SCNC: 9 MMOL/L (ref 8–16)
AST SERPL-CCNC: 21 U/L (ref 10–40)
BILIRUB SERPL-MCNC: 0.7 MG/DL (ref 0.1–1)
BUN SERPL-MCNC: 12 MG/DL (ref 6–20)
CALCIUM SERPL-MCNC: 9.9 MG/DL (ref 8.7–10.5)
CHLORIDE SERPL-SCNC: 108 MMOL/L (ref 95–110)
CO2 SERPL-SCNC: 23 MMOL/L (ref 23–29)
CREAT SERPL-MCNC: 0.8 MG/DL (ref 0.5–1.4)
ERYTHROCYTE [DISTWIDTH] IN BLOOD BY AUTOMATED COUNT: 12.5 % (ref 11.5–14.5)
EST. GFR  (NO RACE VARIABLE): >60 ML/MIN/1.73 M^2
GLUCOSE SERPL-MCNC: 79 MG/DL (ref 70–110)
HCG INTACT+B SERPL-ACNC: <2.4 MIU/ML
HCT VFR BLD AUTO: 39.6 % (ref 37–48.5)
HGB BLD-MCNC: 12.6 G/DL (ref 12–16)
IMM GRANULOCYTES # BLD AUTO: 0.01 K/UL (ref 0–0.04)
MCH RBC QN AUTO: 28.8 PG (ref 27–31)
MCHC RBC AUTO-ENTMCNC: 31.8 G/DL (ref 32–36)
MCV RBC AUTO: 90 FL (ref 82–98)
NEUTROPHILS # BLD AUTO: 2.4 K/UL (ref 1.8–7.7)
PLATELET # BLD AUTO: 286 K/UL (ref 150–450)
PMV BLD AUTO: 11.1 FL (ref 9.2–12.9)
POTASSIUM SERPL-SCNC: 4.8 MMOL/L (ref 3.5–5.1)
PROT SERPL-MCNC: 7.2 G/DL (ref 6–8.4)
RBC # BLD AUTO: 4.38 M/UL (ref 4–5.4)
SODIUM SERPL-SCNC: 140 MMOL/L (ref 136–145)
WBC # BLD AUTO: 4.14 K/UL (ref 3.9–12.7)

## 2024-02-07 PROCEDURE — 36415 COLL VENOUS BLD VENIPUNCTURE: CPT | Mod: PO | Performed by: NURSE PRACTITIONER

## 2024-02-07 PROCEDURE — 84702 CHORIONIC GONADOTROPIN TEST: CPT | Performed by: NURSE PRACTITIONER

## 2024-02-07 PROCEDURE — 80053 COMPREHEN METABOLIC PANEL: CPT | Performed by: NURSE PRACTITIONER

## 2024-02-07 PROCEDURE — 85027 COMPLETE CBC AUTOMATED: CPT | Performed by: NURSE PRACTITIONER

## 2024-02-07 NOTE — PROGRESS NOTES
[facility]/ [facility]   at [facility] Note:      Chief Complaint:   Encounter Diagnoses   Name Primary?    Malignant neoplasm of lower-inner quadrant of right breast of female, estrogen receptor positive Yes    BARD1 gene mutation positive     Dysuria         Cancer Staging   Malignant neoplasm of lower-inner quadrant of right breast of female, estrogen receptor positive  Staging form: Breast, AJCC 8th Edition  - Clinical stage from 10/26/2023: Stage IIA (cT2, cN0, cM0, G3, ER+, NE-, HER2+) - Signed by Linda Mcbride MD on 11/6/2023  - Pathologic stage from 12/14/2023: Stage IA (pT1c, pN0, cM0, G2, ER+, NE-, HER2+) - Signed by Linda Mcbride MD on 1/5/2024    HPI:  Tasha Cornejo is a 44 y.o. female who presents today for  Cycle 2, day 1 of TH.  She had an infusion reaction with her first taxol dose.  Started with sob, tight chest and heart racing.  Rescue meds given, symptoms improved, and taxol restarted at a lower rate.  She was able to complete the infusion without further difficulty.  Reports otherwise she is feeling generally well today, Had some bladder pain.  No urgency or frequency, but did have discomfort when urinating.  This has resolved  Had some flushing of skin the day after chemo  Energy levels good  Looser stool, but no major diarrhea  No fever  No neuropathy  Occasional pain in teeth in back on both sides    Per Dr. Mcbride's note:   Oncology History  Screening mammogram on 10/5/2023 identified coarse heterogeneous calcifications in a linear distribution seen in the lower outer quadrant of the right breast, spanning 1.5 cm.  Diagnostic mammogram on 10/12/2023 showed coarse heterogeneous calcifications in a regional distribution spanning 3.2 cm long axis   Biopsy 10/26/2023 with pathology revealing infiltrating ductal carcinoma of the breast, ER 70%/ NE-/HER2 3+; Ki67 60%    Bilateral mastectomy 12/14/23: IDC, grade 2, 1.5cm in  maximum; 0/2 LN+    Adjuvant TH 24    GYN History:  Age of menarche was 9.   Age of menopause n/a.  Patient denies hormonal therapy.   Patient is .     Patient Active Problem List   Diagnosis    Rhinitis, allergic    Allergic conjunctivitis    Vitamin D deficiency    Malignant neoplasm of lower-inner quadrant of right breast of female, estrogen receptor positive    Chemotherapy-induced nausea    At risk for lymphedema    Stress and adjustment reaction    Physical deconditioning       Current Outpatient Medications   Medication Instructions    fluticasone propionate (FLONASE) 50 mcg/actuation nasal spray 1 spray, Each Nostril, Daily PRN    LIDOcaine-prilocaine (EMLA) cream Apply topically to port one hour prior to chemotherapy infusion    multivitamin (THERAGRAN) per tablet 1 tablet, Oral, Daily    omeprazole (PRILOSEC) 40 mg, Oral, Every morning    ondansetron (ZOFRAN) 8 mg, Oral, Every 8 hours PRN    prochlorperazine (COMPAZINE) 10 mg, Oral, Every 6 hours PRN       Review of Systems:   Review of Systems   Respiratory:  Negative for cough and shortness of breath.    Cardiovascular:  Negative for chest pain.   Gastrointestinal:  Positive for diarrhea. Negative for abdominal pain.   Genitourinary:  Positive for dysuria. Negative for frequency.   Musculoskeletal:  Negative for back pain.   Skin:  Negative for rash.   Neurological:  Negative for headaches.   Psychiatric/Behavioral:  The patient is nervous/anxious.    All other systems reviewed and are negative.      PHYSICAL EXAM:  BP (!) 141/76 (BP Location: Left arm, Patient Position: Sitting, BP Method: Large (Automatic))   Pulse 71   Temp 98.5 °F (36.9 °C) (Oral)   Resp 16   Ht 5' (1.524 m)   Wt 51.4 kg (113 lb 5.1 oz)   LMP 2024   SpO2 100%   BMI 22.13 kg/m²     Physical Exam  Constitutional:       General: She is not in acute distress.     Appearance: Normal appearance. She is not ill-appearing.   HENT:      Head: Normocephalic and  atraumatic.      Mouth/Throat:      Pharynx: No oropharyngeal exudate or posterior oropharyngeal erythema.   Eyes:      Extraocular Movements: Extraocular movements intact.   Cardiovascular:      Rate and Rhythm: Normal rate and regular rhythm.      Pulses: Normal pulses.      Heart sounds: Normal heart sounds.   Pulmonary:      Effort: Pulmonary effort is normal. No respiratory distress.      Breath sounds: Normal breath sounds.   Chest:      Comments: S/p bilateral mastectomies, incisions well healed  Abdominal:      General: Bowel sounds are normal. There is no distension.      Palpations: Abdomen is soft. There is no mass.      Tenderness: There is no abdominal tenderness.      Hernia: No hernia is present.   Musculoskeletal:      Cervical back: Normal range of motion.   Skin:     General: Skin is warm and dry.   Neurological:      General: No focal deficit present.      Mental Status: She is alert and oriented to person, place, and time.       Pertinent Labs & Imaging:  Specimen (730h ago, onward)      None            No results found for this or any previous visit (from the past 24 hour(s)).    Assessment & Plan:    1. Malignant neoplasm of lower-inner quadrant of right breast of female, estrogen receptor positive  2. BARD1 gene mutation positive  Final pathology T1cN0  Reviewed echo and labs, ok for treatment today  Will increase decadron premed to 20 mg today, start taxol at 1/4 rate again given reaction with cycle 1  Echo from November ok to use given no treatment in the interval  Will repeat echo after 6 cycles    Follow up in 2 weeks with Dr. Mcbride, infusions weekly    3. Dysuria  Currently resolved  Will check UA today  - Urinalysis, Reflex to Urine Culture Urine, Clean Catch; Future    Route Chart for Scheduling    Med Onc Chart Routing      Follow up with physician 2 weeks.   Follow up with BHARGAVI 4 weeks.   Infusion scheduling note   weekly infusions   Injection scheduling note    Labs CMP, CBC and B HCG    Scheduling:  Preferred lab:  Lab interval:  weekly, day before infusion   Imaging ECHO   schedule echo after cycle 6   Pharmacy appointment    Other referrals                Treatment Plan Information   OP BREAST TRASTUZUMAB PACLITAXEL WEEKLY   Linda Mcbride MD   Upcoming Treatment Dates - OP BREAST TRASTUZUMAB PACLITAXEL WEEKLY    2/8/2024       Pre-Medications       diphenhydrAMINE (BENADRYL) 50 mg in NS 50 mL IVPB       dexAMETHasone 20 mg in sodium chloride 0.9% 50 mL IVPB       famotidine (PF) injection 20 mg       Chemotherapy       trastuzumab-anns (KANJINTI) 106 mg in sodium chloride 0.9% 250 mL chemo infusion       PACLitaxeL (TAXOL) 80 mg/m2 = 120 mg in sodium chloride 0.9% 250 mL chemo infusion  2/15/2024       Pre-Medications       diphenhydrAMINE (BENADRYL) 50 mg in sodium chloride 0.9% 50 mL IVPB       dexAMETHasone 20 mg in sodium chloride 0.9% 50 mL IVPB       famotidine (PF) injection 20 mg       Chemotherapy       trastuzumab-anns (KANJINTI) 106 mg in sodium chloride 0.9% 250 mL chemo infusion       PACLitaxeL (TAXOL) 80 mg/m2 = 120 mg in sodium chloride 0.9% 250 mL chemo infusion  2/22/2024       Pre-Medications       diphenhydrAMINE (BENADRYL) 50 mg in sodium chloride 0.9% 50 mL IVPB       dexAMETHasone 20 mg in sodium chloride 0.9% 50 mL IVPB       famotidine (PF) injection 20 mg       Chemotherapy       trastuzumab-anns (KANJINTI) 106 mg in sodium chloride 0.9% 250 mL chemo infusion       PACLitaxeL (TAXOL) 80 mg/m2 = 120 mg in sodium chloride 0.9% 250 mL chemo infusion  2/29/2024       Pre-Medications       diphenhydrAMINE (BENADRYL) 50 mg in sodium chloride 0.9% 50 mL IVPB       dexAMETHasone 20 mg in sodium chloride 0.9% 50 mL IVPB       famotidine (PF) injection 20 mg       Chemotherapy       trastuzumab-anns (KANJINTI) 106 mg in sodium chloride 0.9% 250 mL chemo infusion       PACLitaxeL (TAXOL) 80 mg/m2 = 120 mg in sodium chloride 0.9% 250 mL chemo infusion    MDM includes  :    -  Acute or chronic illness or injury that poses a threat to life or bodily function  - Review of prior external notes from unique source  - Independent review and explanation of 3+ results from unique tests  - Discussion of management and ordering 2 unique tests  - Extensive discussion of treatment and management  - Prescription drug management  - Drug therapy requiring intensive monitoring for toxicity    Gabrielle Woo, NP   02/08/2024

## 2024-02-08 ENCOUNTER — INFUSION (OUTPATIENT)
Dept: INFUSION THERAPY | Facility: HOSPITAL | Age: 44
End: 2024-02-08
Payer: COMMERCIAL

## 2024-02-08 ENCOUNTER — LAB VISIT (OUTPATIENT)
Dept: LAB | Facility: HOSPITAL | Age: 44
End: 2024-02-08
Attending: NURSE PRACTITIONER
Payer: COMMERCIAL

## 2024-02-08 ENCOUNTER — PATIENT MESSAGE (OUTPATIENT)
Dept: HEMATOLOGY/ONCOLOGY | Facility: CLINIC | Age: 44
End: 2024-02-08

## 2024-02-08 ENCOUNTER — PATIENT MESSAGE (OUTPATIENT)
Dept: ADMINISTRATIVE | Facility: OTHER | Age: 44
End: 2024-02-08
Payer: COMMERCIAL

## 2024-02-08 ENCOUNTER — OFFICE VISIT (OUTPATIENT)
Dept: HEMATOLOGY/ONCOLOGY | Facility: CLINIC | Age: 44
End: 2024-02-08
Payer: COMMERCIAL

## 2024-02-08 VITALS
HEIGHT: 60 IN | BODY MASS INDEX: 22.25 KG/M2 | HEART RATE: 70 BPM | TEMPERATURE: 98 F | SYSTOLIC BLOOD PRESSURE: 155 MMHG | RESPIRATION RATE: 16 BRPM | DIASTOLIC BLOOD PRESSURE: 84 MMHG | WEIGHT: 113.31 LBS

## 2024-02-08 VITALS
SYSTOLIC BLOOD PRESSURE: 141 MMHG | RESPIRATION RATE: 16 BRPM | HEART RATE: 71 BPM | BODY MASS INDEX: 22.25 KG/M2 | TEMPERATURE: 99 F | DIASTOLIC BLOOD PRESSURE: 76 MMHG | WEIGHT: 113.31 LBS | HEIGHT: 60 IN | OXYGEN SATURATION: 100 %

## 2024-02-08 DIAGNOSIS — R30.0 DYSURIA: ICD-10-CM

## 2024-02-08 DIAGNOSIS — Z15.01 BARD1 GENE MUTATION POSITIVE: ICD-10-CM

## 2024-02-08 DIAGNOSIS — Z17.0 MALIGNANT NEOPLASM OF LOWER-INNER QUADRANT OF RIGHT BREAST OF FEMALE, ESTROGEN RECEPTOR POSITIVE: Primary | ICD-10-CM

## 2024-02-08 DIAGNOSIS — C50.311 MALIGNANT NEOPLASM OF LOWER-INNER QUADRANT OF RIGHT BREAST OF FEMALE, ESTROGEN RECEPTOR POSITIVE: Primary | ICD-10-CM

## 2024-02-08 DIAGNOSIS — T45.1X5A CHEMOTHERAPY-INDUCED NAUSEA: Primary | ICD-10-CM

## 2024-02-08 DIAGNOSIS — C50.311 MALIGNANT NEOPLASM OF LOWER-INNER QUADRANT OF RIGHT BREAST OF FEMALE, ESTROGEN RECEPTOR POSITIVE: ICD-10-CM

## 2024-02-08 DIAGNOSIS — R11.0 CHEMOTHERAPY-INDUCED NAUSEA: Primary | ICD-10-CM

## 2024-02-08 DIAGNOSIS — Z17.0 MALIGNANT NEOPLASM OF LOWER-INNER QUADRANT OF RIGHT BREAST OF FEMALE, ESTROGEN RECEPTOR POSITIVE: ICD-10-CM

## 2024-02-08 LAB
BILIRUB UR QL STRIP: NEGATIVE
CLARITY UR REFRACT.AUTO: CLEAR
COLOR UR AUTO: ABNORMAL
GLUCOSE UR QL STRIP: NEGATIVE
HGB UR QL STRIP: ABNORMAL
KETONES UR QL STRIP: NEGATIVE
LEUKOCYTE ESTERASE UR QL STRIP: NEGATIVE
MICROSCOPIC COMMENT: NORMAL
NITRITE UR QL STRIP: NEGATIVE
PH UR STRIP: 7 [PH] (ref 5–8)
PROT UR QL STRIP: NEGATIVE
RBC #/AREA URNS AUTO: 0 /HPF (ref 0–4)
SP GR UR STRIP: 1 (ref 1–1.03)
SQUAMOUS #/AREA URNS AUTO: 0 /HPF
URN SPEC COLLECT METH UR: ABNORMAL
WBC #/AREA URNS AUTO: 0 /HPF (ref 0–5)

## 2024-02-08 PROCEDURE — 96375 TX/PRO/DX INJ NEW DRUG ADDON: CPT

## 2024-02-08 PROCEDURE — 3078F DIAST BP <80 MM HG: CPT | Mod: CPTII,S$GLB,, | Performed by: NURSE PRACTITIONER

## 2024-02-08 PROCEDURE — 81001 URINALYSIS AUTO W/SCOPE: CPT | Performed by: NURSE PRACTITIONER

## 2024-02-08 PROCEDURE — 96413 CHEMO IV INFUSION 1 HR: CPT

## 2024-02-08 PROCEDURE — 3008F BODY MASS INDEX DOCD: CPT | Mod: CPTII,S$GLB,, | Performed by: NURSE PRACTITIONER

## 2024-02-08 PROCEDURE — 3077F SYST BP >= 140 MM HG: CPT | Mod: CPTII,S$GLB,, | Performed by: NURSE PRACTITIONER

## 2024-02-08 PROCEDURE — 99999 PR PBB SHADOW E&M-EST. PATIENT-LVL III: CPT | Mod: PBBFAC,,, | Performed by: NURSE PRACTITIONER

## 2024-02-08 PROCEDURE — A4216 STERILE WATER/SALINE, 10 ML: HCPCS | Performed by: INTERNAL MEDICINE

## 2024-02-08 PROCEDURE — 96417 CHEMO IV INFUS EACH ADDL SEQ: CPT

## 2024-02-08 PROCEDURE — 3044F HG A1C LEVEL LT 7.0%: CPT | Mod: CPTII,S$GLB,, | Performed by: NURSE PRACTITIONER

## 2024-02-08 PROCEDURE — 63600175 PHARM REV CODE 636 W HCPCS: Mod: JG | Performed by: INTERNAL MEDICINE

## 2024-02-08 PROCEDURE — 99215 OFFICE O/P EST HI 40 MIN: CPT | Mod: S$GLB,,, | Performed by: NURSE PRACTITIONER

## 2024-02-08 PROCEDURE — 1159F MED LIST DOCD IN RCRD: CPT | Mod: CPTII,S$GLB,, | Performed by: NURSE PRACTITIONER

## 2024-02-08 PROCEDURE — 96367 TX/PROPH/DG ADDL SEQ IV INF: CPT

## 2024-02-08 PROCEDURE — 25000003 PHARM REV CODE 250: Performed by: INTERNAL MEDICINE

## 2024-02-08 RX ORDER — SODIUM CHLORIDE 0.9 % (FLUSH) 0.9 %
10 SYRINGE (ML) INJECTION
Status: DISCONTINUED | OUTPATIENT
Start: 2024-02-08 | End: 2024-02-08 | Stop reason: HOSPADM

## 2024-02-08 RX ORDER — PROCHLORPERAZINE EDISYLATE 5 MG/ML
10 INJECTION INTRAMUSCULAR; INTRAVENOUS ONCE AS NEEDED
Status: DISCONTINUED | OUTPATIENT
Start: 2024-02-08 | End: 2024-02-08 | Stop reason: HOSPADM

## 2024-02-08 RX ORDER — PROCHLORPERAZINE EDISYLATE 5 MG/ML
10 INJECTION INTRAMUSCULAR; INTRAVENOUS ONCE AS NEEDED
Status: CANCELLED
Start: 2024-02-08

## 2024-02-08 RX ORDER — FAMOTIDINE 10 MG/ML
20 INJECTION INTRAVENOUS
Status: CANCELLED | OUTPATIENT
Start: 2024-02-08

## 2024-02-08 RX ORDER — FAMOTIDINE 10 MG/ML
20 INJECTION INTRAVENOUS
Status: COMPLETED | OUTPATIENT
Start: 2024-02-08 | End: 2024-02-08

## 2024-02-08 RX ORDER — EPINEPHRINE 0.3 MG/.3ML
0.3 INJECTION SUBCUTANEOUS ONCE AS NEEDED
Status: DISCONTINUED | OUTPATIENT
Start: 2024-02-08 | End: 2024-02-08 | Stop reason: HOSPADM

## 2024-02-08 RX ORDER — SODIUM CHLORIDE 0.9 % (FLUSH) 0.9 %
10 SYRINGE (ML) INJECTION
Status: CANCELLED | OUTPATIENT
Start: 2024-02-08

## 2024-02-08 RX ORDER — HEPARIN 100 UNIT/ML
500 SYRINGE INTRAVENOUS
Status: DISCONTINUED | OUTPATIENT
Start: 2024-02-08 | End: 2024-02-08 | Stop reason: HOSPADM

## 2024-02-08 RX ORDER — DIPHENHYDRAMINE HYDROCHLORIDE 50 MG/ML
50 INJECTION INTRAMUSCULAR; INTRAVENOUS ONCE AS NEEDED
Status: DISCONTINUED | OUTPATIENT
Start: 2024-02-08 | End: 2024-02-08 | Stop reason: HOSPADM

## 2024-02-08 RX ORDER — HEPARIN 100 UNIT/ML
500 SYRINGE INTRAVENOUS
Status: CANCELLED | OUTPATIENT
Start: 2024-02-08

## 2024-02-08 RX ORDER — EPINEPHRINE 0.3 MG/.3ML
0.3 INJECTION SUBCUTANEOUS ONCE AS NEEDED
Status: CANCELLED | OUTPATIENT
Start: 2024-02-08

## 2024-02-08 RX ORDER — DIPHENHYDRAMINE HYDROCHLORIDE 50 MG/ML
50 INJECTION INTRAMUSCULAR; INTRAVENOUS ONCE AS NEEDED
Status: CANCELLED | OUTPATIENT
Start: 2024-02-08

## 2024-02-08 RX ADMIN — DEXAMETHASONE SODIUM PHOSPHATE 20 MG: 4 INJECTION, SOLUTION INTRA-ARTICULAR; INTRALESIONAL; INTRAMUSCULAR; INTRAVENOUS; SOFT TISSUE at 11:02

## 2024-02-08 RX ADMIN — FAMOTIDINE 20 MG: 10 INJECTION INTRAVENOUS at 11:02

## 2024-02-08 RX ADMIN — PACLITAXEL 120 MG: 6 INJECTION, SOLUTION INTRAVENOUS at 01:02

## 2024-02-08 RX ADMIN — Medication 10 ML: at 02:02

## 2024-02-08 RX ADMIN — HEPARIN 500 UNITS: 100 SYRINGE at 02:02

## 2024-02-08 RX ADMIN — TRAZTUZUMAB-ANNS 106 MG: KIT at 11:02

## 2024-02-08 RX ADMIN — SODIUM CHLORIDE: 9 INJECTION, SOLUTION INTRAVENOUS at 10:02

## 2024-02-08 RX ADMIN — DIPHENHYDRAMINE HYDROCHLORIDE 50 MG: 50 INJECTION, SOLUTION INTRAMUSCULAR; INTRAVENOUS at 12:02

## 2024-02-08 NOTE — PLAN OF CARE
1000 pt here for tx. Pts chart reviewed allergies, meds, tx, hx. Pt reclined in chair. No distress noted.   Please see message above regarding referral. Thank you

## 2024-02-09 ENCOUNTER — PATIENT MESSAGE (OUTPATIENT)
Dept: ADMINISTRATIVE | Facility: OTHER | Age: 44
End: 2024-02-09
Payer: COMMERCIAL

## 2024-02-09 ENCOUNTER — CLINICAL SUPPORT (OUTPATIENT)
Dept: REHABILITATION | Facility: HOSPITAL | Age: 44
End: 2024-02-09
Payer: COMMERCIAL

## 2024-02-09 DIAGNOSIS — R53.81 PHYSICAL DECONDITIONING: ICD-10-CM

## 2024-02-09 DIAGNOSIS — F43.29 STRESS AND ADJUSTMENT REACTION: Primary | ICD-10-CM

## 2024-02-09 PROCEDURE — 97535 SELF CARE MNGMENT TRAINING: CPT

## 2024-02-09 PROCEDURE — 97112 NEUROMUSCULAR REEDUCATION: CPT

## 2024-02-09 PROCEDURE — 97110 THERAPEUTIC EXERCISES: CPT

## 2024-02-10 ENCOUNTER — PATIENT MESSAGE (OUTPATIENT)
Dept: ADMINISTRATIVE | Facility: OTHER | Age: 44
End: 2024-02-10
Payer: COMMERCIAL

## 2024-02-11 ENCOUNTER — PATIENT MESSAGE (OUTPATIENT)
Dept: ADMINISTRATIVE | Facility: OTHER | Age: 44
End: 2024-02-11
Payer: COMMERCIAL

## 2024-02-12 ENCOUNTER — PATIENT MESSAGE (OUTPATIENT)
Dept: ADMINISTRATIVE | Facility: OTHER | Age: 44
End: 2024-02-12
Payer: COMMERCIAL

## 2024-02-13 ENCOUNTER — PATIENT MESSAGE (OUTPATIENT)
Dept: ADMINISTRATIVE | Facility: OTHER | Age: 44
End: 2024-02-13
Payer: COMMERCIAL

## 2024-02-14 ENCOUNTER — PATIENT MESSAGE (OUTPATIENT)
Dept: ADMINISTRATIVE | Facility: OTHER | Age: 44
End: 2024-02-14
Payer: COMMERCIAL

## 2024-02-14 ENCOUNTER — PATIENT MESSAGE (OUTPATIENT)
Dept: HEMATOLOGY/ONCOLOGY | Facility: CLINIC | Age: 44
End: 2024-02-14
Payer: COMMERCIAL

## 2024-02-14 ENCOUNTER — LAB VISIT (OUTPATIENT)
Dept: LAB | Facility: HOSPITAL | Age: 44
End: 2024-02-14
Payer: COMMERCIAL

## 2024-02-14 DIAGNOSIS — Z17.0 MALIGNANT NEOPLASM OF LOWER-INNER QUADRANT OF RIGHT BREAST OF FEMALE, ESTROGEN RECEPTOR POSITIVE: ICD-10-CM

## 2024-02-14 DIAGNOSIS — C50.311 MALIGNANT NEOPLASM OF LOWER-INNER QUADRANT OF RIGHT BREAST OF FEMALE, ESTROGEN RECEPTOR POSITIVE: ICD-10-CM

## 2024-02-14 LAB
ALBUMIN SERPL BCP-MCNC: 4.2 G/DL (ref 3.5–5.2)
ALP SERPL-CCNC: 53 U/L (ref 55–135)
ALT SERPL W/O P-5'-P-CCNC: 18 U/L (ref 10–44)
ANION GAP SERPL CALC-SCNC: 10 MMOL/L (ref 8–16)
AST SERPL-CCNC: 24 U/L (ref 10–40)
BILIRUB SERPL-MCNC: 0.3 MG/DL (ref 0.1–1)
BUN SERPL-MCNC: 17 MG/DL (ref 6–20)
CALCIUM SERPL-MCNC: 9.6 MG/DL (ref 8.7–10.5)
CHLORIDE SERPL-SCNC: 106 MMOL/L (ref 95–110)
CO2 SERPL-SCNC: 23 MMOL/L (ref 23–29)
CREAT SERPL-MCNC: 1 MG/DL (ref 0.5–1.4)
ERYTHROCYTE [DISTWIDTH] IN BLOOD BY AUTOMATED COUNT: 12.8 % (ref 11.5–14.5)
EST. GFR  (NO RACE VARIABLE): >60 ML/MIN/1.73 M^2
GLUCOSE SERPL-MCNC: 84 MG/DL (ref 70–110)
HCG INTACT+B SERPL-ACNC: <2.4 MIU/ML
HCT VFR BLD AUTO: 41.1 % (ref 37–48.5)
HGB BLD-MCNC: 13.2 G/DL (ref 12–16)
IMM GRANULOCYTES # BLD AUTO: 0.01 K/UL (ref 0–0.04)
MCH RBC QN AUTO: 29.2 PG (ref 27–31)
MCHC RBC AUTO-ENTMCNC: 32.1 G/DL (ref 32–36)
MCV RBC AUTO: 91 FL (ref 82–98)
NEUTROPHILS # BLD AUTO: 1.7 K/UL (ref 1.8–7.7)
PLATELET # BLD AUTO: 318 K/UL (ref 150–450)
PMV BLD AUTO: 10.6 FL (ref 9.2–12.9)
POTASSIUM SERPL-SCNC: 5.1 MMOL/L (ref 3.5–5.1)
PROT SERPL-MCNC: 7.4 G/DL (ref 6–8.4)
RBC # BLD AUTO: 4.52 M/UL (ref 4–5.4)
SODIUM SERPL-SCNC: 139 MMOL/L (ref 136–145)
WBC # BLD AUTO: 3.91 K/UL (ref 3.9–12.7)

## 2024-02-14 PROCEDURE — 80053 COMPREHEN METABOLIC PANEL: CPT | Performed by: NURSE PRACTITIONER

## 2024-02-14 PROCEDURE — 85027 COMPLETE CBC AUTOMATED: CPT | Performed by: NURSE PRACTITIONER

## 2024-02-14 PROCEDURE — 36415 COLL VENOUS BLD VENIPUNCTURE: CPT | Mod: PO | Performed by: NURSE PRACTITIONER

## 2024-02-14 PROCEDURE — 84702 CHORIONIC GONADOTROPIN TEST: CPT | Performed by: NURSE PRACTITIONER

## 2024-02-14 RX ORDER — HEPARIN 100 UNIT/ML
500 SYRINGE INTRAVENOUS
Status: CANCELLED | OUTPATIENT
Start: 2024-02-15

## 2024-02-14 RX ORDER — DIPHENHYDRAMINE HYDROCHLORIDE 50 MG/ML
50 INJECTION INTRAMUSCULAR; INTRAVENOUS ONCE AS NEEDED
Status: CANCELLED | OUTPATIENT
Start: 2024-02-15

## 2024-02-14 RX ORDER — FAMOTIDINE 10 MG/ML
20 INJECTION INTRAVENOUS
Status: CANCELLED | OUTPATIENT
Start: 2024-02-15

## 2024-02-14 RX ORDER — PROCHLORPERAZINE EDISYLATE 5 MG/ML
10 INJECTION INTRAMUSCULAR; INTRAVENOUS ONCE AS NEEDED
Status: CANCELLED
Start: 2024-02-15

## 2024-02-14 RX ORDER — SODIUM CHLORIDE 0.9 % (FLUSH) 0.9 %
10 SYRINGE (ML) INJECTION
Status: CANCELLED | OUTPATIENT
Start: 2024-02-15

## 2024-02-14 RX ORDER — EPINEPHRINE 0.3 MG/.3ML
0.3 INJECTION SUBCUTANEOUS ONCE AS NEEDED
Status: CANCELLED | OUTPATIENT
Start: 2024-02-15

## 2024-02-15 ENCOUNTER — INFUSION (OUTPATIENT)
Dept: INFUSION THERAPY | Facility: HOSPITAL | Age: 44
End: 2024-02-15
Payer: COMMERCIAL

## 2024-02-15 ENCOUNTER — PATIENT MESSAGE (OUTPATIENT)
Dept: ADMINISTRATIVE | Facility: OTHER | Age: 44
End: 2024-02-15
Payer: COMMERCIAL

## 2024-02-15 VITALS
TEMPERATURE: 98 F | RESPIRATION RATE: 16 BRPM | WEIGHT: 113.31 LBS | DIASTOLIC BLOOD PRESSURE: 85 MMHG | BODY MASS INDEX: 22.25 KG/M2 | HEART RATE: 71 BPM | OXYGEN SATURATION: 100 % | SYSTOLIC BLOOD PRESSURE: 138 MMHG | HEIGHT: 60 IN

## 2024-02-15 DIAGNOSIS — R11.0 CHEMOTHERAPY-INDUCED NAUSEA: Primary | ICD-10-CM

## 2024-02-15 DIAGNOSIS — Z17.0 MALIGNANT NEOPLASM OF LOWER-INNER QUADRANT OF RIGHT BREAST OF FEMALE, ESTROGEN RECEPTOR POSITIVE: ICD-10-CM

## 2024-02-15 DIAGNOSIS — C50.311 MALIGNANT NEOPLASM OF LOWER-INNER QUADRANT OF RIGHT BREAST OF FEMALE, ESTROGEN RECEPTOR POSITIVE: ICD-10-CM

## 2024-02-15 DIAGNOSIS — T45.1X5A CHEMOTHERAPY-INDUCED NAUSEA: Primary | ICD-10-CM

## 2024-02-15 PROCEDURE — 96360 HYDRATION IV INFUSION INIT: CPT

## 2024-02-15 PROCEDURE — 96367 TX/PROPH/DG ADDL SEQ IV INF: CPT

## 2024-02-15 PROCEDURE — 96375 TX/PRO/DX INJ NEW DRUG ADDON: CPT

## 2024-02-15 PROCEDURE — 96417 CHEMO IV INFUS EACH ADDL SEQ: CPT

## 2024-02-15 PROCEDURE — 63600175 PHARM REV CODE 636 W HCPCS: Performed by: INTERNAL MEDICINE

## 2024-02-15 PROCEDURE — 25000003 PHARM REV CODE 250: Performed by: INTERNAL MEDICINE

## 2024-02-15 PROCEDURE — A4216 STERILE WATER/SALINE, 10 ML: HCPCS | Performed by: INTERNAL MEDICINE

## 2024-02-15 PROCEDURE — 96413 CHEMO IV INFUSION 1 HR: CPT

## 2024-02-15 RX ORDER — DIPHENHYDRAMINE HYDROCHLORIDE 50 MG/ML
50 INJECTION INTRAMUSCULAR; INTRAVENOUS ONCE AS NEEDED
Status: DISCONTINUED | OUTPATIENT
Start: 2024-02-15 | End: 2024-02-15 | Stop reason: HOSPADM

## 2024-02-15 RX ORDER — SODIUM CHLORIDE 0.9 % (FLUSH) 0.9 %
10 SYRINGE (ML) INJECTION
Status: DISCONTINUED | OUTPATIENT
Start: 2024-02-15 | End: 2024-02-15 | Stop reason: HOSPADM

## 2024-02-15 RX ORDER — HEPARIN 100 UNIT/ML
500 SYRINGE INTRAVENOUS
Status: DISCONTINUED | OUTPATIENT
Start: 2024-02-15 | End: 2024-02-15 | Stop reason: HOSPADM

## 2024-02-15 RX ORDER — PROCHLORPERAZINE EDISYLATE 5 MG/ML
10 INJECTION INTRAMUSCULAR; INTRAVENOUS ONCE AS NEEDED
Status: DISCONTINUED | OUTPATIENT
Start: 2024-02-15 | End: 2024-02-15 | Stop reason: HOSPADM

## 2024-02-15 RX ORDER — FAMOTIDINE 10 MG/ML
20 INJECTION INTRAVENOUS
Status: COMPLETED | OUTPATIENT
Start: 2024-02-15 | End: 2024-02-15

## 2024-02-15 RX ORDER — EPINEPHRINE 0.3 MG/.3ML
0.3 INJECTION SUBCUTANEOUS ONCE AS NEEDED
Status: DISCONTINUED | OUTPATIENT
Start: 2024-02-15 | End: 2024-02-15 | Stop reason: HOSPADM

## 2024-02-15 RX ADMIN — PACLITAXEL 120 MG: 6 INJECTION, SOLUTION, CONCENTRATE INTRAVENOUS at 10:02

## 2024-02-15 RX ADMIN — FAMOTIDINE 20 MG: 10 INJECTION INTRAVENOUS at 10:02

## 2024-02-15 RX ADMIN — SODIUM CHLORIDE: 9 INJECTION, SOLUTION INTRAVENOUS at 08:02

## 2024-02-15 RX ADMIN — DIPHENHYDRAMINE HYDROCHLORIDE 50 MG: 50 INJECTION, SOLUTION INTRAMUSCULAR; INTRAVENOUS at 09:02

## 2024-02-15 RX ADMIN — TRAZTUZUMAB-ANNS 106 MG: KIT at 09:02

## 2024-02-15 RX ADMIN — DEXAMETHASONE SODIUM PHOSPHATE 20 MG: 4 INJECTION, SOLUTION INTRA-ARTICULAR; INTRALESIONAL; INTRAMUSCULAR; INTRAVENOUS; SOFT TISSUE at 10:02

## 2024-02-15 RX ADMIN — Medication 10 ML: at 12:02

## 2024-02-15 RX ADMIN — HEPARIN 500 UNITS: 100 SYRINGE at 12:02

## 2024-02-15 NOTE — PLAN OF CARE
0800  Patient seated in chair accompanied by .  VSS, assessment done, port accessed, flushed, blood return noted, started NS @ 50 cc/hr KVO while waiting for Kanjinti & Taxol from pharmacy.  [facility] for safety

## 2024-02-15 NOTE — PLAN OF CARE
1215  Patient completed Kanjinti & Taxol infusion.  Taxol started @ 125 cc/hr for 15 mins then titrated up to full @ 250 cc/hr, tolerated well.  Port deaccessed, flushed, blood return noted, heparin locked.  RTC 2/22/24  Patient ambulated off floor accompanied by .

## 2024-02-16 ENCOUNTER — PATIENT MESSAGE (OUTPATIENT)
Dept: HEMATOLOGY/ONCOLOGY | Facility: CLINIC | Age: 44
End: 2024-02-16
Payer: COMMERCIAL

## 2024-02-16 ENCOUNTER — PATIENT MESSAGE (OUTPATIENT)
Dept: ADMINISTRATIVE | Facility: OTHER | Age: 44
End: 2024-02-16
Payer: COMMERCIAL

## 2024-02-17 ENCOUNTER — PATIENT MESSAGE (OUTPATIENT)
Dept: ADMINISTRATIVE | Facility: OTHER | Age: 44
End: 2024-02-17
Payer: COMMERCIAL

## 2024-02-18 ENCOUNTER — PATIENT MESSAGE (OUTPATIENT)
Dept: ADMINISTRATIVE | Facility: OTHER | Age: 44
End: 2024-02-18
Payer: COMMERCIAL

## 2024-02-19 ENCOUNTER — PATIENT MESSAGE (OUTPATIENT)
Dept: ADMINISTRATIVE | Facility: OTHER | Age: 44
End: 2024-02-19
Payer: COMMERCIAL

## 2024-02-20 ENCOUNTER — PATIENT MESSAGE (OUTPATIENT)
Dept: ADMINISTRATIVE | Facility: OTHER | Age: 44
End: 2024-02-20
Payer: COMMERCIAL

## 2024-02-21 ENCOUNTER — LAB VISIT (OUTPATIENT)
Dept: LAB | Facility: HOSPITAL | Age: 44
End: 2024-02-21
Payer: COMMERCIAL

## 2024-02-21 ENCOUNTER — CLINICAL SUPPORT (OUTPATIENT)
Dept: REHABILITATION | Facility: HOSPITAL | Age: 44
End: 2024-02-21

## 2024-02-21 ENCOUNTER — PATIENT MESSAGE (OUTPATIENT)
Dept: ADMINISTRATIVE | Facility: OTHER | Age: 44
End: 2024-02-21
Payer: COMMERCIAL

## 2024-02-21 DIAGNOSIS — Z17.0 MALIGNANT NEOPLASM OF LOWER-INNER QUADRANT OF RIGHT BREAST OF FEMALE, ESTROGEN RECEPTOR POSITIVE: ICD-10-CM

## 2024-02-21 DIAGNOSIS — C50.311 MALIGNANT NEOPLASM OF LOWER-INNER QUADRANT OF RIGHT BREAST OF FEMALE, ESTROGEN RECEPTOR POSITIVE: ICD-10-CM

## 2024-02-21 DIAGNOSIS — C50.311 MALIGNANT NEOPLASM OF LOWER-INNER QUADRANT OF RIGHT BREAST OF FEMALE, ESTROGEN RECEPTOR POSITIVE: Primary | ICD-10-CM

## 2024-02-21 DIAGNOSIS — Z17.0 MALIGNANT NEOPLASM OF LOWER-INNER QUADRANT OF RIGHT BREAST OF FEMALE, ESTROGEN RECEPTOR POSITIVE: Primary | ICD-10-CM

## 2024-02-21 LAB
ALBUMIN SERPL BCP-MCNC: 4.2 G/DL (ref 3.5–5.2)
ALP SERPL-CCNC: 51 U/L (ref 55–135)
ALT SERPL W/O P-5'-P-CCNC: 15 U/L (ref 10–44)
ANION GAP SERPL CALC-SCNC: 10 MMOL/L (ref 8–16)
AST SERPL-CCNC: 15 U/L (ref 10–40)
BILIRUB SERPL-MCNC: 0.5 MG/DL (ref 0.1–1)
BUN SERPL-MCNC: 17 MG/DL (ref 6–20)
CALCIUM SERPL-MCNC: 9.7 MG/DL (ref 8.7–10.5)
CHLORIDE SERPL-SCNC: 105 MMOL/L (ref 95–110)
CO2 SERPL-SCNC: 25 MMOL/L (ref 23–29)
CREAT SERPL-MCNC: 0.9 MG/DL (ref 0.5–1.4)
ERYTHROCYTE [DISTWIDTH] IN BLOOD BY AUTOMATED COUNT: 13 % (ref 11.5–14.5)
EST. GFR  (NO RACE VARIABLE): >60 ML/MIN/1.73 M^2
GLUCOSE SERPL-MCNC: 87 MG/DL (ref 70–110)
HCG INTACT+B SERPL-ACNC: <2.4 MIU/ML
HCT VFR BLD AUTO: 39.8 % (ref 37–48.5)
HGB BLD-MCNC: 12.8 G/DL (ref 12–16)
IMM GRANULOCYTES # BLD AUTO: 0.01 K/UL (ref 0–0.04)
MCH RBC QN AUTO: 29.4 PG (ref 27–31)
MCHC RBC AUTO-ENTMCNC: 32.2 G/DL (ref 32–36)
MCV RBC AUTO: 92 FL (ref 82–98)
NEUTROPHILS # BLD AUTO: 2 K/UL (ref 1.8–7.7)
PLATELET # BLD AUTO: 320 K/UL (ref 150–450)
PMV BLD AUTO: 10.4 FL (ref 9.2–12.9)
POTASSIUM SERPL-SCNC: 4.6 MMOL/L (ref 3.5–5.1)
PROT SERPL-MCNC: 7.1 G/DL (ref 6–8.4)
RBC # BLD AUTO: 4.35 M/UL (ref 4–5.4)
SODIUM SERPL-SCNC: 140 MMOL/L (ref 136–145)
WBC # BLD AUTO: 4.22 K/UL (ref 3.9–12.7)

## 2024-02-21 PROCEDURE — 80053 COMPREHEN METABOLIC PANEL: CPT | Performed by: NURSE PRACTITIONER

## 2024-02-21 PROCEDURE — 97810 ACUP 1/> WO ESTIM 1ST 15 MIN: CPT | Performed by: ACUPUNCTURIST

## 2024-02-21 PROCEDURE — 97811 ACUP 1/> W/O ESTIM EA ADD 15: CPT | Performed by: ACUPUNCTURIST

## 2024-02-21 PROCEDURE — 36415 COLL VENOUS BLD VENIPUNCTURE: CPT | Mod: PO | Performed by: NURSE PRACTITIONER

## 2024-02-21 PROCEDURE — 84702 CHORIONIC GONADOTROPIN TEST: CPT | Performed by: NURSE PRACTITIONER

## 2024-02-21 PROCEDURE — 85027 COMPLETE CBC AUTOMATED: CPT | Performed by: NURSE PRACTITIONER

## 2024-02-22 ENCOUNTER — INFUSION (OUTPATIENT)
Dept: INFUSION THERAPY | Facility: HOSPITAL | Age: 44
End: 2024-02-22
Payer: COMMERCIAL

## 2024-02-22 ENCOUNTER — PATIENT MESSAGE (OUTPATIENT)
Dept: ADMINISTRATIVE | Facility: OTHER | Age: 44
End: 2024-02-22
Payer: COMMERCIAL

## 2024-02-22 ENCOUNTER — OFFICE VISIT (OUTPATIENT)
Dept: HEMATOLOGY/ONCOLOGY | Facility: CLINIC | Age: 44
End: 2024-02-22
Payer: COMMERCIAL

## 2024-02-22 VITALS
SYSTOLIC BLOOD PRESSURE: 133 MMHG | OXYGEN SATURATION: 100 % | DIASTOLIC BLOOD PRESSURE: 85 MMHG | TEMPERATURE: 98 F | HEIGHT: 70 IN | WEIGHT: 112 LBS | BODY MASS INDEX: 16.03 KG/M2 | HEART RATE: 67 BPM | RESPIRATION RATE: 17 BRPM

## 2024-02-22 DIAGNOSIS — Z17.0 MALIGNANT NEOPLASM OF LOWER-INNER QUADRANT OF RIGHT BREAST OF FEMALE, ESTROGEN RECEPTOR POSITIVE: ICD-10-CM

## 2024-02-22 DIAGNOSIS — Z79.899 IMMUNODEFICIENCY DUE TO DRUGS: ICD-10-CM

## 2024-02-22 DIAGNOSIS — D84.821 IMMUNODEFICIENCY DUE TO DRUGS: ICD-10-CM

## 2024-02-22 DIAGNOSIS — Z15.01 BARD1 GENE MUTATION POSITIVE: ICD-10-CM

## 2024-02-22 DIAGNOSIS — C50.311 MALIGNANT NEOPLASM OF LOWER-INNER QUADRANT OF RIGHT BREAST OF FEMALE, ESTROGEN RECEPTOR POSITIVE: Primary | ICD-10-CM

## 2024-02-22 DIAGNOSIS — Z17.0 MALIGNANT NEOPLASM OF LOWER-INNER QUADRANT OF RIGHT BREAST OF FEMALE, ESTROGEN RECEPTOR POSITIVE: Primary | ICD-10-CM

## 2024-02-22 DIAGNOSIS — R11.0 CHEMOTHERAPY-INDUCED NAUSEA: Primary | ICD-10-CM

## 2024-02-22 DIAGNOSIS — C50.311 MALIGNANT NEOPLASM OF LOWER-INNER QUADRANT OF RIGHT BREAST OF FEMALE, ESTROGEN RECEPTOR POSITIVE: ICD-10-CM

## 2024-02-22 DIAGNOSIS — T45.1X5A CHEMOTHERAPY-INDUCED NAUSEA: Primary | ICD-10-CM

## 2024-02-22 PROCEDURE — 3044F HG A1C LEVEL LT 7.0%: CPT | Mod: CPTII,S$GLB,, | Performed by: INTERNAL MEDICINE

## 2024-02-22 PROCEDURE — 3008F BODY MASS INDEX DOCD: CPT | Mod: CPTII,S$GLB,, | Performed by: INTERNAL MEDICINE

## 2024-02-22 PROCEDURE — 63600175 PHARM REV CODE 636 W HCPCS: Mod: JG | Performed by: INTERNAL MEDICINE

## 2024-02-22 PROCEDURE — 99999 PR PBB SHADOW E&M-EST. PATIENT-LVL III: CPT | Mod: PBBFAC,,, | Performed by: INTERNAL MEDICINE

## 2024-02-22 PROCEDURE — 3075F SYST BP GE 130 - 139MM HG: CPT | Mod: CPTII,S$GLB,, | Performed by: INTERNAL MEDICINE

## 2024-02-22 PROCEDURE — 25000003 PHARM REV CODE 250: Performed by: INTERNAL MEDICINE

## 2024-02-22 PROCEDURE — 96375 TX/PRO/DX INJ NEW DRUG ADDON: CPT

## 2024-02-22 PROCEDURE — A4216 STERILE WATER/SALINE, 10 ML: HCPCS | Performed by: INTERNAL MEDICINE

## 2024-02-22 PROCEDURE — 3079F DIAST BP 80-89 MM HG: CPT | Mod: CPTII,S$GLB,, | Performed by: INTERNAL MEDICINE

## 2024-02-22 PROCEDURE — 99215 OFFICE O/P EST HI 40 MIN: CPT | Mod: S$GLB,,, | Performed by: INTERNAL MEDICINE

## 2024-02-22 PROCEDURE — 96367 TX/PROPH/DG ADDL SEQ IV INF: CPT

## 2024-02-22 PROCEDURE — 1159F MED LIST DOCD IN RCRD: CPT | Mod: CPTII,S$GLB,, | Performed by: INTERNAL MEDICINE

## 2024-02-22 PROCEDURE — 96417 CHEMO IV INFUS EACH ADDL SEQ: CPT

## 2024-02-22 PROCEDURE — 96413 CHEMO IV INFUSION 1 HR: CPT

## 2024-02-22 RX ORDER — FAMOTIDINE 10 MG/ML
20 INJECTION INTRAVENOUS
Status: COMPLETED | OUTPATIENT
Start: 2024-02-22 | End: 2024-02-22

## 2024-02-22 RX ORDER — PROCHLORPERAZINE EDISYLATE 5 MG/ML
10 INJECTION INTRAMUSCULAR; INTRAVENOUS ONCE AS NEEDED
Status: CANCELLED
Start: 2024-02-22

## 2024-02-22 RX ORDER — DIPHENHYDRAMINE HYDROCHLORIDE 50 MG/ML
50 INJECTION INTRAMUSCULAR; INTRAVENOUS ONCE AS NEEDED
Status: CANCELLED | OUTPATIENT
Start: 2024-02-22

## 2024-02-22 RX ORDER — HEPARIN 100 UNIT/ML
500 SYRINGE INTRAVENOUS
Status: CANCELLED | OUTPATIENT
Start: 2024-02-22

## 2024-02-22 RX ORDER — DIPHENHYDRAMINE HYDROCHLORIDE 50 MG/ML
50 INJECTION INTRAMUSCULAR; INTRAVENOUS ONCE AS NEEDED
Status: DISCONTINUED | OUTPATIENT
Start: 2024-02-22 | End: 2024-02-22 | Stop reason: HOSPADM

## 2024-02-22 RX ORDER — HEPARIN 100 UNIT/ML
500 SYRINGE INTRAVENOUS
Status: DISCONTINUED | OUTPATIENT
Start: 2024-02-22 | End: 2024-02-22 | Stop reason: HOSPADM

## 2024-02-22 RX ORDER — SODIUM CHLORIDE 0.9 % (FLUSH) 0.9 %
10 SYRINGE (ML) INJECTION
Status: CANCELLED | OUTPATIENT
Start: 2024-02-22

## 2024-02-22 RX ORDER — FAMOTIDINE 10 MG/ML
20 INJECTION INTRAVENOUS
Status: CANCELLED | OUTPATIENT
Start: 2024-02-22

## 2024-02-22 RX ORDER — EPINEPHRINE 0.3 MG/.3ML
0.3 INJECTION SUBCUTANEOUS ONCE AS NEEDED
Status: DISCONTINUED | OUTPATIENT
Start: 2024-02-22 | End: 2024-02-22 | Stop reason: HOSPADM

## 2024-02-22 RX ORDER — EPINEPHRINE 0.3 MG/.3ML
0.3 INJECTION SUBCUTANEOUS ONCE AS NEEDED
Status: CANCELLED | OUTPATIENT
Start: 2024-02-22

## 2024-02-22 RX ORDER — PROCHLORPERAZINE EDISYLATE 5 MG/ML
10 INJECTION INTRAMUSCULAR; INTRAVENOUS ONCE AS NEEDED
Status: DISCONTINUED | OUTPATIENT
Start: 2024-02-22 | End: 2024-02-22 | Stop reason: HOSPADM

## 2024-02-22 RX ORDER — SODIUM CHLORIDE 0.9 % (FLUSH) 0.9 %
10 SYRINGE (ML) INJECTION
Status: DISCONTINUED | OUTPATIENT
Start: 2024-02-22 | End: 2024-02-22 | Stop reason: HOSPADM

## 2024-02-22 RX ADMIN — Medication 10 ML: at 12:02

## 2024-02-22 RX ADMIN — TRAZTUZUMAB-ANNS 106 MG: KIT at 10:02

## 2024-02-22 RX ADMIN — DEXAMETHASONE SODIUM PHOSPHATE 20 MG: 4 INJECTION, SOLUTION INTRA-ARTICULAR; INTRALESIONAL; INTRAMUSCULAR; INTRAVENOUS; SOFT TISSUE at 10:02

## 2024-02-22 RX ADMIN — DIPHENHYDRAMINE HYDROCHLORIDE 50 MG: 50 INJECTION INTRAMUSCULAR; INTRAVENOUS at 09:02

## 2024-02-22 RX ADMIN — PACLITAXEL 120 MG: 6 INJECTION, SOLUTION INTRAVENOUS at 11:02

## 2024-02-22 RX ADMIN — HEPARIN 500 UNITS: 100 SYRINGE at 12:02

## 2024-02-22 RX ADMIN — FAMOTIDINE 20 MG: 10 INJECTION INTRAVENOUS at 10:02

## 2024-02-22 NOTE — PROGRESS NOTES
[facility]/ [facility]   at [facility] Note:      Chief Complaint:   Encounter Diagnoses   Name Primary?    Malignant neoplasm of lower-inner quadrant of right breast of female, estrogen receptor positive Yes    BARD1 gene mutation positive     Immunodeficiency due to drugs         Cancer Staging   Malignant neoplasm of lower-inner quadrant of right breast of female, estrogen receptor positive  Staging form: Breast, AJCC 8th Edition  - Clinical stage from 10/26/2023: Stage IIA (cT2, cN0, cM0, G3, ER+, DC-, HER2+) - Signed by Linda Mcbride MD on 2023  - Pathologic stage from 2023: Stage IA (pT1c, pN0, cM0, G2, ER+, DC-, HER2+) - Signed by Linda Mcbride MD on 2024    HPI:  Tasha Cornejo is a 44 y.o. female who presents today for Cycle 4, day 1 of . She had an infusion reaction with her first taxol dose, but was able to complete therapy with a slower infusion. No further difficulty for cycle #2 or 3.  Looser stool, but no major diarrhea  Mild epistaxis  Increased anxiety but prefers holistic therapy; acupuncture has been helpful  No fever  No neuropathy    Oncology History  Screening mammogram on 10/5/2023 identified coarse heterogeneous calcifications in a linear distribution seen in the lower outer quadrant of the right breast, spanning 1.5 cm.  Diagnostic mammogram on 10/12/2023 showed coarse heterogeneous calcifications in a regional distribution spanning 3.2 cm long axis   Biopsy 10/26/2023 with pathology revealing infiltrating ductal carcinoma of the breast, ER 70%/ DC-/HER2 3+; Ki67 60%    Bilateral mastectomy 23: IDC, grade 2, 1.5cm in maximum; 0/2 LN+    Adjuvant TH 24    GYN History:  Age of menarche was 9.   Age of menopause n/a.  Patient denies hormonal therapy.   Patient is .     Patient Active Problem List   Diagnosis    Rhinitis, allergic    Allergic conjunctivitis    Vitamin D deficiency    Malignant  "neoplasm of lower-inner quadrant of right breast of female, estrogen receptor positive    Chemotherapy-induced nausea    At risk for lymphedema    Stress and adjustment reaction    Physical deconditioning    Immunodeficiency due to drugs       Current Outpatient Medications   Medication Instructions    fluticasone propionate (FLONASE) 50 mcg/actuation nasal spray 1 spray, Each Nostril, Daily PRN    LIDOcaine-prilocaine (EMLA) cream Apply topically to port one hour prior to chemotherapy infusion    multivitamin (THERAGRAN) per tablet 1 tablet, Oral, Daily    omeprazole (PRILOSEC) 40 mg, Oral, Every morning    ondansetron (ZOFRAN) 8 mg, Oral, Every 8 hours PRN    prochlorperazine (COMPAZINE) 10 mg, Oral, Every 6 hours PRN       Review of Systems:   Review of Systems   Respiratory:  Negative for cough and shortness of breath.    Cardiovascular:  Negative for chest pain.   Gastrointestinal:  Positive for diarrhea. Negative for abdominal pain.   Genitourinary:  Positive for dysuria. Negative for frequency.   Musculoskeletal:  Negative for back pain.   Skin:  Negative for rash.   Neurological:  Negative for headaches.   Psychiatric/Behavioral:  The patient is nervous/anxious.    All other systems reviewed and are negative.      PHYSICAL EXAM:  /85   Pulse 67   Temp 98 °F (36.7 °C) (Oral)   Resp 17   Ht 5' 10" (1.778 m)   Wt 50.8 kg (111 lb 15.9 oz)   LMP 02/07/2024   SpO2 100%   BMI 16.07 kg/m²     Physical Exam  Constitutional:       General: She is not in acute distress.     Appearance: Normal appearance. She is not ill-appearing.   HENT:      Head: Normocephalic and atraumatic.      Mouth/Throat:      Pharynx: No oropharyngeal exudate or posterior oropharyngeal erythema.   Eyes:      Extraocular Movements: Extraocular movements intact.   Cardiovascular:      Rate and Rhythm: Normal rate and regular rhythm.      Pulses: Normal pulses.      Heart sounds: Normal heart sounds.   Pulmonary:      Effort: " Pulmonary effort is normal. No respiratory distress.      Breath sounds: Normal breath sounds.   Chest:      Comments: S/p bilateral mastectomies, incisions well healed  Abdominal:      General: Bowel sounds are normal. There is no distension.      Palpations: Abdomen is soft. There is no mass.      Tenderness: There is no abdominal tenderness.      Hernia: No hernia is present.   Musculoskeletal:      Cervical back: Normal range of motion.   Skin:     General: Skin is warm and dry.   Neurological:      General: No focal deficit present.      Mental Status: She is alert and oriented to person, place, and time.       Pertinent Labs & Imaging:  Specimen (730h ago, onward)      None            No results found for this or any previous visit (from the past 24 hour(s)).    Assessment & Plan:  1. Malignant neoplasm of lower-inner quadrant of right breast of female, estrogen receptor positive    2. BARD1 gene mutation positive    3. Immunodeficiency due to drugs    Final pathology T1cN0  Reviewed echo and labs, ok for treatment today  Continue increased decadron premed at 20 mg today, start taxol at 1/4 rate again given reaction with cycle 1  Echo from November ok to use as baseline  Will repeat echo after 6 cycles    Follow up in 2 weeks with deana Anthony weekly      Route Chart for Scheduling    Med Onc Chart Routing      Follow up with physician 4 weeks and 2 months.   Follow up with BHARGAVI 2 weeks and 6 weeks.   Infusion scheduling note    Injection scheduling note    Labs CBC, CMP and B HCG   Scheduling:  Preferred lab:  Lab interval: once a week  labs day prior   Imaging    Pharmacy appointment    Other referrals                Treatment Plan Information   OP BREAST TRASTUZUMAB PACLITAXEL WEEKLY   Linda Mcbride MD   Upcoming Treatment Dates - OP BREAST TRASTUZUMAB PACLITAXEL WEEKLY    2/22/2024       Pre-Medications       diphenhydrAMINE (BENADRYL) 50 mg in sodium chloride 0.9% 50 mL IVPB       dexAMETHasone  20 mg in sodium chloride 0.9% 50 mL IVPB       famotidine (PF) injection 20 mg       Chemotherapy       trastuzumab-anns (KANJINTI) 106 mg in sodium chloride 0.9% 250 mL chemo infusion       PACLitaxeL (TAXOL) 80 mg/m2 = 120 mg in sodium chloride 0.9% 250 mL chemo infusion  2/29/2024       Pre-Medications       diphenhydrAMINE (BENADRYL) 50 mg in sodium chloride 0.9% 50 mL IVPB       dexAMETHasone 20 mg in sodium chloride 0.9% 50 mL IVPB       famotidine (PF) injection 20 mg       Chemotherapy       trastuzumab-anns (KANJINTI) 106 mg in sodium chloride 0.9% 250 mL chemo infusion       PACLitaxeL (TAXOL) 80 mg/m2 = 120 mg in sodium chloride 0.9% 250 mL chemo infusion  3/7/2024       Pre-Medications       diphenhydrAMINE (BENADRYL) 50 mg in sodium chloride 0.9% 50 mL IVPB       dexAMETHasone 20 mg in sodium chloride 0.9% 50 mL IVPB       famotidine (PF) injection 20 mg       Chemotherapy       trastuzumab-anns (KANJINTI) 106 mg in sodium chloride 0.9% 250 mL chemo infusion       PACLitaxeL (TAXOL) 80 mg/m2 = 120 mg in sodium chloride 0.9% 250 mL chemo infusion  3/14/2024       Pre-Medications       diphenhydrAMINE (BENADRYL) 50 mg in sodium chloride 0.9% 50 mL IVPB       dexAMETHasone 20 mg in sodium chloride 0.9% 50 mL IVPB       famotidine (PF) injection 20 mg       Chemotherapy       trastuzumab-anns (KANJINTI) 106 mg in sodium chloride 0.9% 250 mL chemo infusion       PACLitaxeL (TAXOL) 80 mg/m2 = 120 mg in sodium chloride 0.9% 250 mL chemo infusion    MDM includes  :    - Acute or chronic illness or injury that poses a threat to life or bodily function  - Independent review and explanation of 2 results from unique tests  - Discussion of management and ordering 2 unique tests  - Extensive discussion of treatment and management  - Prescription drug management  - Drug therapy requiring intensive monitoring for toxicity    Linda Mcbride MD   02/22/2024

## 2024-02-23 ENCOUNTER — PATIENT MESSAGE (OUTPATIENT)
Dept: ADMINISTRATIVE | Facility: OTHER | Age: 44
End: 2024-02-23
Payer: COMMERCIAL

## 2024-02-23 ENCOUNTER — CLINICAL SUPPORT (OUTPATIENT)
Dept: REHABILITATION | Facility: HOSPITAL | Age: 44
End: 2024-02-23
Payer: COMMERCIAL

## 2024-02-23 DIAGNOSIS — R53.81 PHYSICAL DECONDITIONING: ICD-10-CM

## 2024-02-23 DIAGNOSIS — M25.611 DECREASED ROM OF RIGHT SHOULDER: ICD-10-CM

## 2024-02-23 DIAGNOSIS — F43.29 STRESS AND ADJUSTMENT REACTION: Primary | ICD-10-CM

## 2024-02-23 DIAGNOSIS — R29.898 SHOULDER WEAKNESS: ICD-10-CM

## 2024-02-23 PROCEDURE — 97110 THERAPEUTIC EXERCISES: CPT

## 2024-02-23 PROCEDURE — 97535 SELF CARE MNGMENT TRAINING: CPT

## 2024-02-23 PROCEDURE — 97112 NEUROMUSCULAR REEDUCATION: CPT

## 2024-02-23 NOTE — PROGRESS NOTES
" [facility]  Occupational Therapy Progress and Treatment Note - Therapeutic Yoga Progam    Date: 2/23/2024  Name: Tasha Cornejo  Clinic Number: [id number]    Therapy Diagnosis:   Encounter Diagnoses   Name Primary?    Stress and adjustment reaction Yes    Physical deconditioning      Physician: Linda Mcbride MD    Physician Orders: Physician Orders: Eval and Treat   Medical Diagnosis from Referral: Malignant neoplasm of lower-inner quadrant of right breast of female, estrogen receptor positive [C50.311, Z17.0]   Evaluation Date: 11/29/2023  Authorization Period Expiration: 12/31/23  Plan of Care Expiration: 04/29/23  Progress Note Due: 02/29/23  Visit # / Visits authorized: 2/20      Precautions: Standard and cancer        Time In: 8:45 am  Time Out: 9:30 am  Total Billable Time: 45 minutes    SUBJECTIVE     Pt reports: She has just completed 4/12  weekly chemotherapy sessions .  She has fair energy and is doing her HEP.  "Work has been very stressful"  She was compliant with home exercise program given last session.   Response to previous treatment: I felt good and my pain decerased.  Functional change: no chanhe    Pain: 4/10  Location: bilateral breasts due to tissue expanders and decreased ROM and strength of shoulders.     OBJECTIVE     Patient Specific Functional Scale:           Activity 11/29/23 2/23/24         Anxiety and stress 4           2.    sleep 3           3.   Motivation due depression  5           4.             5.             6.             SCORE    4.0              Total Score = Sum of activity scores / number of activities  Minimum Detectable Change (90% CII) for average score = 2 points  Minimum Detectable Change (90% CI) for single activity score = 3 points    Treatment     Tasha received the treatments listed below:       Date 2/9/24 2/23/24       Therapeutic Yoga Exercises / Neuromuscular Re-education 30 minutes  30 minutes  minutes  minutes  minutes  " minutes   Seated Yoga   chair yoga:  Cat-Cow,forward bend, back bend, twist,         Quadruped         Supine Wide footed twist,  Wide footed twist, cat cow,       Prone Sphinx/sphinx plank, Sphinx/sphinx plank,       standing Chair pose, warrior 2, triangle, Chair pose, warrior 2, triangle,                UE exercise for right shoulder ROM and stregth Dowel flexion, sidelying abd., door pulley, flexion and abd.  Flexion and abd PROM in side lying, contract/relax,        Self-Care/Home Management  / Therapeutic Activities  15 minutes  minutes  minutes  minutes  minutes  minutes            Relaxation techniques DB, body scan DB, body scan       Restorative  Fish and bridge on blocks Fish and bridge on blocks       Activity Pacing                           Stress Management/Education  - physiology of yoga/meditation and immune health - physiology of yoga/meditation and immune health                    Patient Education and Home Exercises      Education provided:   - - physiology of yoga/meditation and immune health  - Progress towards goals     Written Home Exercises Provided: yes.  Exercises were reviewed and Tasha was able to demonstrate them prior to the end of the session.  Tasha demonstrated good  understanding of the HEP provided. See EMR under Patient Instructions for exercises provided during therapy sessions.       Assessment       In today's session, Tasha was taught a yoga HEP consisting of strengthening, stretching, and balance yoga exercise.  She was also introduced to breathing and body scan techniques to assist with relaxation/immune health. We spent half of session on gentle facial release of right upper quadrant and UE exercise for right shoulder.  She tolerated the session well and required maximum verbal and neuromuscular cues in all treatment.     Tasha is progressing well towards her goals and patient prognosis is Good. Patient will continue to benefit from skilled outpatient occupational therapy to  address the deficits listed in the problem list on initial evaluation provide pt/family education and to maximize pt's level of independence in the home and community environment. Pt's spiritual, cultural and educational needs considered and pt agreeable to plan of care and goals.    Anticipated barriers to occupational therapy: none    GOALS:    Short Term Goals: 6 weeks      Goal # Goal Status   1 Patient will demonstrate independence with diaphragmatic breathing in sit and supine. met   2 Pt. will identify resource for audio body scan to assist with stress management/immune health    3 Patient will identify 2 new stress coping skills for stress management/immune health. Progressing   4 Patient will identify activity pacing problems.  Patient will then implement new plan for daily activity to increase endurance for ADL's. Progressing      Long Term Goals: 12 weeks      Goal # Goal Status   1 Patient will demonstrate independence with yoga Home Exercise Program to increase strength and endurance for ADL's. Progressing   2 Patient will demonstrate independence with relaxation techniques to manage stress for immune health. Progressing   3 Patient will verbalize good understanding of stress/immune health relationship.  Progressing   4                  PLAN     Plan of care Certification: 2/23/2024 to 4/29/24.    Outpatient Occupational Therapy 1 times weekly for 12 weeks to include the following interventions: Neuromuscular Re-ed, Patient Education, Self Care, Therapeutic Activities, and Therapeutic Exercise.     Ernestina Romo, OT

## 2024-02-23 NOTE — PROGRESS NOTES
Acupuncture Evaluation Note     Name: Tasha Cornejo  [facility] Number: [id number]    Traditional Chinese Medicine (TCM) Diagnosis: Qi Stagnation, Blood Stasis, and Qi Deficiency  Medical Diagnosis:   Encounter Diagnosis   Name Primary?    Malignant neoplasm of lower-inner quadrant of right breast of female, estrogen receptor positive Yes        Evaluation Date: 2/23/2024    Visit #/Visits authorized: self pay     Precautions: Standard and cancer    Subjective     Chief Concern: Breast cancer - 12 rounds of chemo once/week. CINV, CIPN - prevent.     Medical necessity is demonstrated by the following IMPAIRMENTS: Medical Necessity: Decreased quality of life and Nausea and Vomiting              Aggravating Factors:  flexion and extension   Relieving Factors:  stretching/yoga     Symptom Description:     Quality:  Tight  Severity:  1  Frequency:  when active      Treatment     Treatment Principles:  Move qi and blood, nourish qi, calm hernandez    Acupuncture points used:    SSC, Yintang  Bilateral points: LI4, LV3, ST36, SP9, SP6, KD3, GB34, HT7, KD27  Unilateral points: PC6, LU7, KD6, SP4  Auricular Treatment:      Needles In: 28  Needles Out: 28  Needles W/O STIM placed: 4:20  Needles W/O STIM removed: 4:50      Assessment     After treatment, patient felt trouble relaxing and an overall tension with chemo, monitor progress and continue with care     Patient prognosis is Good.     Patient will continue to benefit from acupuncture treatment to address the deficits listed in the problem list box on initial evaluation, provide patient family education and to maximize pt's level of independence in the home and community environment.     Patient's spiritual, cultural and educational needs considered and pt agreeable to plan of care and goals.     Anticipated barriers to treatment: none    Plan     Recommend 1 /week for 12 weeks - throughout chemo      Education:  Patient is aware of cumulative benefit of acupuncture

## 2024-02-24 ENCOUNTER — PATIENT MESSAGE (OUTPATIENT)
Dept: ADMINISTRATIVE | Facility: OTHER | Age: 44
End: 2024-02-24
Payer: COMMERCIAL

## 2024-02-25 ENCOUNTER — PATIENT MESSAGE (OUTPATIENT)
Dept: ADMINISTRATIVE | Facility: OTHER | Age: 44
End: 2024-02-25
Payer: COMMERCIAL

## 2024-02-26 ENCOUNTER — PATIENT MESSAGE (OUTPATIENT)
Dept: ADMINISTRATIVE | Facility: OTHER | Age: 44
End: 2024-02-26
Payer: COMMERCIAL

## 2024-02-26 RX ORDER — FAMOTIDINE 10 MG/ML
20 INJECTION INTRAVENOUS
Status: CANCELLED | OUTPATIENT
Start: 2024-02-29

## 2024-02-26 RX ORDER — HEPARIN 100 UNIT/ML
500 SYRINGE INTRAVENOUS
Status: CANCELLED | OUTPATIENT
Start: 2024-02-29

## 2024-02-26 RX ORDER — EPINEPHRINE 0.3 MG/.3ML
0.3 INJECTION SUBCUTANEOUS ONCE AS NEEDED
Status: CANCELLED | OUTPATIENT
Start: 2024-02-29

## 2024-02-26 RX ORDER — DIPHENHYDRAMINE HYDROCHLORIDE 50 MG/ML
50 INJECTION INTRAMUSCULAR; INTRAVENOUS ONCE AS NEEDED
Status: CANCELLED | OUTPATIENT
Start: 2024-02-29

## 2024-02-26 RX ORDER — PROCHLORPERAZINE EDISYLATE 5 MG/ML
10 INJECTION INTRAMUSCULAR; INTRAVENOUS ONCE AS NEEDED
Status: CANCELLED
Start: 2024-02-29

## 2024-02-26 RX ORDER — SODIUM CHLORIDE 0.9 % (FLUSH) 0.9 %
10 SYRINGE (ML) INJECTION
Status: CANCELLED | OUTPATIENT
Start: 2024-02-29

## 2024-02-27 ENCOUNTER — PATIENT MESSAGE (OUTPATIENT)
Dept: ADMINISTRATIVE | Facility: OTHER | Age: 44
End: 2024-02-27
Payer: COMMERCIAL

## 2024-02-28 ENCOUNTER — PATIENT MESSAGE (OUTPATIENT)
Dept: HEMATOLOGY/ONCOLOGY | Facility: CLINIC | Age: 44
End: 2024-02-28
Payer: COMMERCIAL

## 2024-02-28 ENCOUNTER — PATIENT MESSAGE (OUTPATIENT)
Dept: ADMINISTRATIVE | Facility: OTHER | Age: 44
End: 2024-02-28
Payer: COMMERCIAL

## 2024-02-28 ENCOUNTER — LAB VISIT (OUTPATIENT)
Dept: LAB | Facility: HOSPITAL | Age: 44
End: 2024-02-28
Payer: COMMERCIAL

## 2024-02-28 DIAGNOSIS — C50.311 MALIGNANT NEOPLASM OF LOWER-INNER QUADRANT OF RIGHT BREAST OF FEMALE, ESTROGEN RECEPTOR POSITIVE: ICD-10-CM

## 2024-02-28 DIAGNOSIS — Z17.0 MALIGNANT NEOPLASM OF LOWER-INNER QUADRANT OF RIGHT BREAST OF FEMALE, ESTROGEN RECEPTOR POSITIVE: ICD-10-CM

## 2024-02-28 DIAGNOSIS — K64.9 HEMORRHOIDS, UNSPECIFIED HEMORRHOID TYPE: Primary | ICD-10-CM

## 2024-02-28 LAB
ALBUMIN SERPL BCP-MCNC: 4.3 G/DL (ref 3.5–5.2)
ALP SERPL-CCNC: 55 U/L (ref 55–135)
ALT SERPL W/O P-5'-P-CCNC: 14 U/L (ref 10–44)
ANION GAP SERPL CALC-SCNC: 10 MMOL/L (ref 8–16)
AST SERPL-CCNC: 19 U/L (ref 10–40)
BILIRUB SERPL-MCNC: 0.5 MG/DL (ref 0.1–1)
BUN SERPL-MCNC: 11 MG/DL (ref 6–20)
CALCIUM SERPL-MCNC: 9.9 MG/DL (ref 8.7–10.5)
CHLORIDE SERPL-SCNC: 107 MMOL/L (ref 95–110)
CO2 SERPL-SCNC: 26 MMOL/L (ref 23–29)
CREAT SERPL-MCNC: 1 MG/DL (ref 0.5–1.4)
ERYTHROCYTE [DISTWIDTH] IN BLOOD BY AUTOMATED COUNT: 13.2 % (ref 11.5–14.5)
EST. GFR  (NO RACE VARIABLE): >60 ML/MIN/1.73 M^2
GLUCOSE SERPL-MCNC: 90 MG/DL (ref 70–110)
HCG INTACT+B SERPL-ACNC: <2.4 MIU/ML
HCT VFR BLD AUTO: 40.3 % (ref 37–48.5)
HGB BLD-MCNC: 12.8 G/DL (ref 12–16)
IMM GRANULOCYTES # BLD AUTO: 0.02 K/UL (ref 0–0.04)
MCH RBC QN AUTO: 29.1 PG (ref 27–31)
MCHC RBC AUTO-ENTMCNC: 31.8 G/DL (ref 32–36)
MCV RBC AUTO: 92 FL (ref 82–98)
NEUTROPHILS # BLD AUTO: 2.5 K/UL (ref 1.8–7.7)
PLATELET # BLD AUTO: 324 K/UL (ref 150–450)
PMV BLD AUTO: 10.6 FL (ref 9.2–12.9)
POTASSIUM SERPL-SCNC: 4.9 MMOL/L (ref 3.5–5.1)
PROT SERPL-MCNC: 7.3 G/DL (ref 6–8.4)
RBC # BLD AUTO: 4.4 M/UL (ref 4–5.4)
SODIUM SERPL-SCNC: 143 MMOL/L (ref 136–145)
WBC # BLD AUTO: 4.55 K/UL (ref 3.9–12.7)

## 2024-02-28 PROCEDURE — 84702 CHORIONIC GONADOTROPIN TEST: CPT | Performed by: NURSE PRACTITIONER

## 2024-02-28 PROCEDURE — 36415 COLL VENOUS BLD VENIPUNCTURE: CPT | Mod: PO | Performed by: NURSE PRACTITIONER

## 2024-02-28 PROCEDURE — 85027 COMPLETE CBC AUTOMATED: CPT | Performed by: NURSE PRACTITIONER

## 2024-02-28 PROCEDURE — 80053 COMPREHEN METABOLIC PANEL: CPT | Performed by: NURSE PRACTITIONER

## 2024-02-28 RX ORDER — HYDROCORTISONE 25 MG/G
CREAM TOPICAL 2 TIMES DAILY
Qty: 28 G | Refills: 1 | Status: SHIPPED | OUTPATIENT
Start: 2024-02-28 | End: 2024-05-20

## 2024-02-29 ENCOUNTER — INFUSION (OUTPATIENT)
Dept: INFUSION THERAPY | Facility: HOSPITAL | Age: 44
End: 2024-02-29
Payer: COMMERCIAL

## 2024-02-29 ENCOUNTER — CLINICAL SUPPORT (OUTPATIENT)
Dept: REHABILITATION | Facility: HOSPITAL | Age: 44
End: 2024-02-29

## 2024-02-29 ENCOUNTER — PATIENT MESSAGE (OUTPATIENT)
Dept: ADMINISTRATIVE | Facility: OTHER | Age: 44
End: 2024-02-29
Payer: COMMERCIAL

## 2024-02-29 VITALS
HEIGHT: 60 IN | SYSTOLIC BLOOD PRESSURE: 153 MMHG | WEIGHT: 112 LBS | TEMPERATURE: 98 F | DIASTOLIC BLOOD PRESSURE: 92 MMHG | RESPIRATION RATE: 18 BRPM | HEART RATE: 65 BPM | BODY MASS INDEX: 21.99 KG/M2

## 2024-02-29 VITALS
RESPIRATION RATE: 18 BRPM | BODY MASS INDEX: 21.99 KG/M2 | DIASTOLIC BLOOD PRESSURE: 81 MMHG | HEART RATE: 67 BPM | SYSTOLIC BLOOD PRESSURE: 154 MMHG | WEIGHT: 112 LBS | TEMPERATURE: 99 F | HEIGHT: 60 IN

## 2024-02-29 DIAGNOSIS — C50.311 MALIGNANT NEOPLASM OF LOWER-INNER QUADRANT OF RIGHT BREAST OF FEMALE, ESTROGEN RECEPTOR POSITIVE: Primary | ICD-10-CM

## 2024-02-29 DIAGNOSIS — Z17.0 MALIGNANT NEOPLASM OF LOWER-INNER QUADRANT OF RIGHT BREAST OF FEMALE, ESTROGEN RECEPTOR POSITIVE: Primary | ICD-10-CM

## 2024-02-29 DIAGNOSIS — C50.311 MALIGNANT NEOPLASM OF LOWER-INNER QUADRANT OF RIGHT BREAST OF FEMALE, ESTROGEN RECEPTOR POSITIVE: ICD-10-CM

## 2024-02-29 DIAGNOSIS — T45.1X5A CHEMOTHERAPY-INDUCED NAUSEA: Primary | ICD-10-CM

## 2024-02-29 DIAGNOSIS — R11.0 CHEMOTHERAPY-INDUCED NAUSEA: Primary | ICD-10-CM

## 2024-02-29 DIAGNOSIS — Z17.0 MALIGNANT NEOPLASM OF LOWER-INNER QUADRANT OF RIGHT BREAST OF FEMALE, ESTROGEN RECEPTOR POSITIVE: ICD-10-CM

## 2024-02-29 PROCEDURE — 63600175 PHARM REV CODE 636 W HCPCS: Performed by: INTERNAL MEDICINE

## 2024-02-29 PROCEDURE — A4216 STERILE WATER/SALINE, 10 ML: HCPCS | Performed by: INTERNAL MEDICINE

## 2024-02-29 PROCEDURE — 96413 CHEMO IV INFUSION 1 HR: CPT

## 2024-02-29 PROCEDURE — 97814 ACUP 1/> W/ESTIM EA ADDL 15: CPT | Performed by: ACUPUNCTURIST

## 2024-02-29 PROCEDURE — 96367 TX/PROPH/DG ADDL SEQ IV INF: CPT

## 2024-02-29 PROCEDURE — 96375 TX/PRO/DX INJ NEW DRUG ADDON: CPT

## 2024-02-29 PROCEDURE — 97813 ACUP 1/> W/ESTIM 1ST 15 MIN: CPT | Performed by: ACUPUNCTURIST

## 2024-02-29 PROCEDURE — 96417 CHEMO IV INFUS EACH ADDL SEQ: CPT

## 2024-02-29 PROCEDURE — 25000003 PHARM REV CODE 250: Performed by: INTERNAL MEDICINE

## 2024-02-29 RX ORDER — EPINEPHRINE 0.3 MG/.3ML
0.3 INJECTION SUBCUTANEOUS ONCE AS NEEDED
Status: DISCONTINUED | OUTPATIENT
Start: 2024-02-29 | End: 2024-02-29 | Stop reason: HOSPADM

## 2024-02-29 RX ORDER — PROCHLORPERAZINE EDISYLATE 5 MG/ML
10 INJECTION INTRAMUSCULAR; INTRAVENOUS ONCE AS NEEDED
Status: DISCONTINUED | OUTPATIENT
Start: 2024-02-29 | End: 2024-02-29 | Stop reason: HOSPADM

## 2024-02-29 RX ORDER — DIPHENHYDRAMINE HYDROCHLORIDE 50 MG/ML
50 INJECTION INTRAMUSCULAR; INTRAVENOUS ONCE AS NEEDED
Status: DISCONTINUED | OUTPATIENT
Start: 2024-02-29 | End: 2024-02-29 | Stop reason: HOSPADM

## 2024-02-29 RX ORDER — HEPARIN 100 UNIT/ML
500 SYRINGE INTRAVENOUS
Status: DISCONTINUED | OUTPATIENT
Start: 2024-02-29 | End: 2024-02-29 | Stop reason: HOSPADM

## 2024-02-29 RX ORDER — SODIUM CHLORIDE 0.9 % (FLUSH) 0.9 %
10 SYRINGE (ML) INJECTION
Status: DISCONTINUED | OUTPATIENT
Start: 2024-02-29 | End: 2024-02-29 | Stop reason: HOSPADM

## 2024-02-29 RX ORDER — FAMOTIDINE 10 MG/ML
20 INJECTION INTRAVENOUS
Status: COMPLETED | OUTPATIENT
Start: 2024-02-29 | End: 2024-02-29

## 2024-02-29 RX ADMIN — PACLITAXEL 120 MG: 6 INJECTION, SOLUTION INTRAVENOUS at 10:02

## 2024-02-29 RX ADMIN — SODIUM CHLORIDE: 9 INJECTION, SOLUTION INTRAVENOUS at 08:02

## 2024-02-29 RX ADMIN — DIPHENHYDRAMINE HYDROCHLORIDE 50 MG: 50 INJECTION, SOLUTION INTRAMUSCULAR; INTRAVENOUS at 09:02

## 2024-02-29 RX ADMIN — DEXAMETHASONE SODIUM PHOSPHATE 20 MG: 4 INJECTION, SOLUTION INTRA-ARTICULAR; INTRALESIONAL; INTRAMUSCULAR; INTRAVENOUS; SOFT TISSUE at 10:02

## 2024-02-29 RX ADMIN — HEPARIN 500 UNITS: 100 SYRINGE at 11:02

## 2024-02-29 RX ADMIN — Medication 10 ML: at 11:02

## 2024-02-29 RX ADMIN — FAMOTIDINE 20 MG: 10 INJECTION INTRAVENOUS at 08:02

## 2024-02-29 RX ADMIN — TRASTUZUMAB-ANNS 106 MG: 420 INJECTION, POWDER, LYOPHILIZED, FOR SOLUTION INTRAVENOUS at 09:02

## 2024-02-29 NOTE — PLAN OF CARE
0815-Labs , hx, and medications reviewed. Assessment completed. Discussed plan of care with patient. Patient in agreement. Chair reclined and warm blanket and snack offered.

## 2024-02-29 NOTE — PLAN OF CARE
1135-Patient tolerated treatment well. Discharged without complaints or S/S of adverse event.   Instructed to call provider for any questions or concerns.

## 2024-02-29 NOTE — PROGRESS NOTES
Acupuncture Evaluation Note     Name: Tasha Cornejo  [facility] Number: [id number]    Traditional Chinese Medicine (TCM) Diagnosis: Qi Stagnation, Blood Stasis, and Qi Deficiency  Medical Diagnosis:   Encounter Diagnosis   Name Primary?    Malignant neoplasm of lower-inner quadrant of right breast of female, estrogen receptor positive Yes        Evaluation Date: 2/29/2024    Visit #/Visits authorized: self pay     Precautions: Standard and cancer    Subjective     Chief Concern: Breast cancer - 12 rounds of chemo once/week. CINV, CIPN - prevent.     Medical necessity is demonstrated by the following IMPAIRMENTS: Medical Necessity: Decreased quality of life and Nausea and Vomiting              Aggravating Factors:  flexion and extension   Relieving Factors:  stretching/yoga     Symptom Description:     Quality:  Tight  Severity:  1  Frequency:  when active      Treatment     Treatment Principles:  Move qi and blood, nourish qi, calm hernandez    Acupuncture points used:   Mat Christopher  Bilateral points: LI4, LV3, ST36, SP9, SP6, KD3, GB34, HT7, SP4, PC6  Unilateral points:   Auricular Treatment:      Needles In: 28  Needles Out: 28  Lyons W/ STIM placed: 7:20  Needles W/ STIM removed: 7:50      Assessment     After treatment, patient felt acupuncture is helping symptoms, continue with care.     Patient prognosis is Good.     Patient will continue to benefit from acupuncture treatment to address the deficits listed in the problem list box on initial evaluation, provide patient family education and to maximize pt's level of independence in the home and community environment.     Patient's spiritual, cultural and educational needs considered and pt agreeable to plan of care and goals.     Anticipated barriers to treatment: none    Plan     Recommend 1 /week for 12 weeks - throughout chemo      Education:  Patient is aware of cumulative benefit of acupuncture

## 2024-03-01 ENCOUNTER — CLINICAL SUPPORT (OUTPATIENT)
Dept: REHABILITATION | Facility: HOSPITAL | Age: 44
End: 2024-03-01
Payer: COMMERCIAL

## 2024-03-01 ENCOUNTER — PATIENT MESSAGE (OUTPATIENT)
Dept: ADMINISTRATIVE | Facility: OTHER | Age: 44
End: 2024-03-01
Payer: COMMERCIAL

## 2024-03-01 DIAGNOSIS — R53.81 PHYSICAL DECONDITIONING: ICD-10-CM

## 2024-03-01 DIAGNOSIS — M25.611 DECREASED ROM OF RIGHT SHOULDER: ICD-10-CM

## 2024-03-01 DIAGNOSIS — F43.29 STRESS AND ADJUSTMENT REACTION: Primary | ICD-10-CM

## 2024-03-01 DIAGNOSIS — R29.898 SHOULDER WEAKNESS: ICD-10-CM

## 2024-03-01 PROCEDURE — 97110 THERAPEUTIC EXERCISES: CPT

## 2024-03-01 PROCEDURE — 97535 SELF CARE MNGMENT TRAINING: CPT

## 2024-03-01 PROCEDURE — 97112 NEUROMUSCULAR REEDUCATION: CPT

## 2024-03-01 NOTE — PROGRESS NOTES
" [facility]  Occupational Therapy Progress and Treatment Note - Therapeutic Yoga Progam    Date: 3/1/2024  Name: Tasha Cornejo  Clinic Number: [id number]    Therapy Diagnosis:   Encounter Diagnoses   Name Primary?    Stress and adjustment reaction Yes    Physical deconditioning     Decreased ROM of right shoulder     Shoulder weakness      Physician: Linda Mcbride MD    Physician Orders: Physician Orders: Eval and Treat   Medical Diagnosis from Referral: Malignant neoplasm of lower-inner quadrant of right breast of female, estrogen receptor positive [C50.311, Z17.0]   Evaluation Date: 11/29/2023  Authorization Period Expiration: 12/31/24  Plan of Care Expiration: 04/29/24  Progress Note Due: 04/124  Visit # / Visits authorized: 5/20      Precautions: Standard and cancer        Time In: 8:45 am  Time Out: 9:30 am  Total Billable Time: 45 minutes    SUBJECTIVE     Pt reports: She has just completed 5/12  weekly chemotherapy sessions .  She has fair energy and is doing her HEP.  "Work has been very stressful"  She was compliant with home exercise program given last session.   Response to previous treatment: I felt good and my pain decerased.  Functional change: no chanhe    Pain: 4/10  Location: bilateral breasts due to tissue expanders and decreased ROM and strength of shoulders.     OBJECTIVE     Patient Specific Functional Scale:           Activity 11/29/23  3/1/24         Anxiety and stress 4     4         2.    sleep 3      5         3.   Motivation due depression  5      6         4.             5.             6.             SCORE    4.0     5.3            Total Score = Sum of activity scores / number of activities  Minimum Detectable Change (90% CII) for average score = 2 points  Minimum Detectable Change (90% CI) for single activity score = 3 points    Treatment     Tasha received the treatments listed below:       Date 2/9/24 2/23/24 3/1/24 3/8/24     Therapeutic Yoga " Exercises / Neuromuscular Re-education 30 minutes  30 minutes  30 minutes  PN  30 minutes  minutes  minutes   Seated Yoga   chair yoga:  Cat-Cow,forward bend, back bend, twist,    Hero's pose on block for meditaion     Quadruped         Supine Wide footed twist,  Wide footed twist, cat cow, Wide footed twist, cat cow, knee to chest flow,  Wide footed twist, cat cow, knee to chest flow,      Prone Sphinx/sphinx plank, Sphinx/sphinx plank, Sphinx/sphinx plank, Sphinx/sphinx plank x 3     standing Chair pose, warrior 2, triangle, Chair pose, warrior 2, triangle, Chair pose x 2,  warrior 2, triangle, wide straddle forward fold Chair pose,  warrior 2, triangle, side angle, wide straddle forward fold              UE exercise for right shoulder ROM and stregth Dowel flexion, sidelying abd., door pulley, flexion and abd.  Flexion and abd PROM in side lying, contract/relax,  Flexion and abd AROM in side lying, contract/relax, 1# sidelying abd x10, ER strength with 1# in sidelying x 10, IR       Self-Care/Home Management  / Therapeutic Activities  15 minutes  15 minutes  15 minutes  15 minutes  minutes  minutes            Relaxation techniques DB, body scan DB, body scan DB, body scan DB, body scan. Bramari breath     Restorative  Fish and bridge on blocks Fish and bridge on blocks Supine with bolster under hips and legs on chair Sequential bridge to fish     Activity Pacing                           Stress Management/Education  - physiology of yoga/meditation and immune health - physiology of yoga/meditation and immune health - physiology of yoga/meditation and immune health - physiology of yoga/meditation and immune health                  Patient Education and Home Exercises      Education provided:   - - physiology of yoga/meditation and immune health  - Progress towards goals     Written Home Exercises Provided: yes.  Exercises were reviewed and Tasha was able to demonstrate them prior to the end of the session.  Tasha  demonstrated good  understanding of the HEP provided. See EMR under Patient Instructions for exercises provided during therapy sessions.       Assessment       In today's session, Tasha reviewed her yoga HEP consisting of strengthening, stretching, and balance yoga exercise.  She was also introduced to bramari breathing and body scan techniques to assist with relaxation/immune health. She is ind. For her upper quadrant UE exercise for right shoulder.  She tolerated the session well and required maximum verbal and neuromuscular cues in all treatment.     Tasha is progressing well towards her goals and patient prognosis is Good. Patient will continue to benefit from skilled outpatient occupational therapy to address the deficits listed in the problem list on initial evaluation provide pt/family education and to maximize pt's level of independence in the home and community environment. Pt's spiritual, cultural and educational needs considered and pt agreeable to plan of care and goals.    Anticipated barriers to occupational therapy: none    GOALS:    Short Term Goals: 6 weeks      Goal # Goal Status   1 Patient will demonstrate independence with diaphragmatic breathing in sit and supine. met   2 Pt. will identify resource for audio body scan to assist with stress management/immune health progressing   3 Patient will identify 2 new stress coping skills for stress management/immune health. Progressing   4 Patient will identify activity pacing problems.  Patient will then implement new plan for daily activity to increase endurance for ADL's. Progressing      Long Term Goals: 12 weeks      Goal # Goal Status   1 Patient will demonstrate independence with yoga Home Exercise Program to increase strength and endurance for ADL's. Progressing   2 Patient will demonstrate independence with relaxation techniques to manage stress for immune health. Progressing   3 Patient will verbalize good understanding of stress/immune health  relationship.  Progressing   4                  PLAN     Plan of care Certification: 3/1/2024 to 4/29/24.    Outpatient Occupational Therapy 1 times weekly for 12 weeks to include the following interventions: Neuromuscular Re-ed, Patient Education, Self Care, Therapeutic Activities, and Therapeutic Exercise.     Ernestina Romo, OT

## 2024-03-02 ENCOUNTER — PATIENT MESSAGE (OUTPATIENT)
Dept: ADMINISTRATIVE | Facility: OTHER | Age: 44
End: 2024-03-02
Payer: COMMERCIAL

## 2024-03-03 ENCOUNTER — PATIENT MESSAGE (OUTPATIENT)
Dept: ADMINISTRATIVE | Facility: OTHER | Age: 44
End: 2024-03-03
Payer: COMMERCIAL

## 2024-03-04 ENCOUNTER — PATIENT MESSAGE (OUTPATIENT)
Dept: ADMINISTRATIVE | Facility: OTHER | Age: 44
End: 2024-03-04
Payer: COMMERCIAL

## 2024-03-05 ENCOUNTER — PATIENT MESSAGE (OUTPATIENT)
Dept: ADMINISTRATIVE | Facility: OTHER | Age: 44
End: 2024-03-05
Payer: COMMERCIAL

## 2024-03-06 ENCOUNTER — PATIENT MESSAGE (OUTPATIENT)
Dept: ADMINISTRATIVE | Facility: OTHER | Age: 44
End: 2024-03-06
Payer: COMMERCIAL

## 2024-03-06 ENCOUNTER — LAB VISIT (OUTPATIENT)
Dept: LAB | Facility: HOSPITAL | Age: 44
End: 2024-03-06
Attending: NURSE PRACTITIONER
Payer: COMMERCIAL

## 2024-03-06 DIAGNOSIS — C50.311 MALIGNANT NEOPLASM OF LOWER-INNER QUADRANT OF RIGHT BREAST OF FEMALE, ESTROGEN RECEPTOR POSITIVE: ICD-10-CM

## 2024-03-06 DIAGNOSIS — Z17.0 MALIGNANT NEOPLASM OF LOWER-INNER QUADRANT OF RIGHT BREAST OF FEMALE, ESTROGEN RECEPTOR POSITIVE: ICD-10-CM

## 2024-03-06 LAB
ALBUMIN SERPL BCP-MCNC: 4.2 G/DL (ref 3.5–5.2)
ALP SERPL-CCNC: 54 U/L (ref 55–135)
ALT SERPL W/O P-5'-P-CCNC: 15 U/L (ref 10–44)
ANION GAP SERPL CALC-SCNC: 7 MMOL/L (ref 8–16)
AST SERPL-CCNC: 17 U/L (ref 10–40)
BILIRUB SERPL-MCNC: 0.3 MG/DL (ref 0.1–1)
BUN SERPL-MCNC: 10 MG/DL (ref 6–20)
CALCIUM SERPL-MCNC: 9.8 MG/DL (ref 8.7–10.5)
CHLORIDE SERPL-SCNC: 108 MMOL/L (ref 95–110)
CO2 SERPL-SCNC: 26 MMOL/L (ref 23–29)
CREAT SERPL-MCNC: 0.9 MG/DL (ref 0.5–1.4)
ERYTHROCYTE [DISTWIDTH] IN BLOOD BY AUTOMATED COUNT: 13.5 % (ref 11.5–14.5)
EST. GFR  (NO RACE VARIABLE): >60 ML/MIN/1.73 M^2
GLUCOSE SERPL-MCNC: 87 MG/DL (ref 70–110)
HCG INTACT+B SERPL-ACNC: <2.4 MIU/ML
HCT VFR BLD AUTO: 40.2 % (ref 37–48.5)
HGB BLD-MCNC: 12.9 G/DL (ref 12–16)
IMM GRANULOCYTES # BLD AUTO: 0.03 K/UL (ref 0–0.04)
MCH RBC QN AUTO: 29.7 PG (ref 27–31)
MCHC RBC AUTO-ENTMCNC: 32.1 G/DL (ref 32–36)
MCV RBC AUTO: 93 FL (ref 82–98)
NEUTROPHILS # BLD AUTO: 2.3 K/UL (ref 1.8–7.7)
PLATELET # BLD AUTO: 286 K/UL (ref 150–450)
PMV BLD AUTO: 10.8 FL (ref 9.2–12.9)
POTASSIUM SERPL-SCNC: 4.9 MMOL/L (ref 3.5–5.1)
PROT SERPL-MCNC: 7.2 G/DL (ref 6–8.4)
RBC # BLD AUTO: 4.34 M/UL (ref 4–5.4)
SODIUM SERPL-SCNC: 141 MMOL/L (ref 136–145)
WBC # BLD AUTO: 4.28 K/UL (ref 3.9–12.7)

## 2024-03-06 PROCEDURE — 85027 COMPLETE CBC AUTOMATED: CPT | Performed by: NURSE PRACTITIONER

## 2024-03-06 PROCEDURE — 84702 CHORIONIC GONADOTROPIN TEST: CPT | Performed by: NURSE PRACTITIONER

## 2024-03-06 PROCEDURE — 36415 COLL VENOUS BLD VENIPUNCTURE: CPT | Mod: PO | Performed by: NURSE PRACTITIONER

## 2024-03-06 PROCEDURE — 80053 COMPREHEN METABOLIC PANEL: CPT | Performed by: NURSE PRACTITIONER

## 2024-03-07 ENCOUNTER — PATIENT MESSAGE (OUTPATIENT)
Dept: ADMINISTRATIVE | Facility: OTHER | Age: 44
End: 2024-03-07
Payer: COMMERCIAL

## 2024-03-07 ENCOUNTER — OFFICE VISIT (OUTPATIENT)
Dept: HEMATOLOGY/ONCOLOGY | Facility: CLINIC | Age: 44
End: 2024-03-07
Payer: COMMERCIAL

## 2024-03-07 ENCOUNTER — CLINICAL SUPPORT (OUTPATIENT)
Dept: REHABILITATION | Facility: HOSPITAL | Age: 44
End: 2024-03-07

## 2024-03-07 ENCOUNTER — PATIENT MESSAGE (OUTPATIENT)
Dept: HEMATOLOGY/ONCOLOGY | Facility: CLINIC | Age: 44
End: 2024-03-07

## 2024-03-07 ENCOUNTER — INFUSION (OUTPATIENT)
Dept: INFUSION THERAPY | Facility: HOSPITAL | Age: 44
End: 2024-03-07
Payer: COMMERCIAL

## 2024-03-07 VITALS
BODY MASS INDEX: 22.16 KG/M2 | HEIGHT: 60 IN | DIASTOLIC BLOOD PRESSURE: 84 MMHG | HEART RATE: 74 BPM | WEIGHT: 112.88 LBS | SYSTOLIC BLOOD PRESSURE: 148 MMHG | TEMPERATURE: 97 F | OXYGEN SATURATION: 98 % | RESPIRATION RATE: 17 BRPM

## 2024-03-07 VITALS
RESPIRATION RATE: 18 BRPM | DIASTOLIC BLOOD PRESSURE: 84 MMHG | BODY MASS INDEX: 22.16 KG/M2 | WEIGHT: 112.88 LBS | SYSTOLIC BLOOD PRESSURE: 139 MMHG | HEIGHT: 60 IN | TEMPERATURE: 99 F | HEART RATE: 84 BPM

## 2024-03-07 DIAGNOSIS — Z15.01 BARD1 GENE MUTATION POSITIVE: ICD-10-CM

## 2024-03-07 DIAGNOSIS — C50.311 MALIGNANT NEOPLASM OF LOWER-INNER QUADRANT OF RIGHT BREAST OF FEMALE, ESTROGEN RECEPTOR POSITIVE: Primary | ICD-10-CM

## 2024-03-07 DIAGNOSIS — R11.0 CHEMOTHERAPY-INDUCED NAUSEA: Primary | ICD-10-CM

## 2024-03-07 DIAGNOSIS — C50.311 MALIGNANT NEOPLASM OF LOWER-INNER QUADRANT OF RIGHT BREAST OF FEMALE, ESTROGEN RECEPTOR POSITIVE: ICD-10-CM

## 2024-03-07 DIAGNOSIS — Z17.0 MALIGNANT NEOPLASM OF LOWER-INNER QUADRANT OF RIGHT BREAST OF FEMALE, ESTROGEN RECEPTOR POSITIVE: Primary | ICD-10-CM

## 2024-03-07 DIAGNOSIS — R04.0 EPISTAXIS: ICD-10-CM

## 2024-03-07 DIAGNOSIS — R45.89 ANXIETY ABOUT HEALTH: ICD-10-CM

## 2024-03-07 DIAGNOSIS — K64.9 HEMORRHOIDS, UNSPECIFIED HEMORRHOID TYPE: ICD-10-CM

## 2024-03-07 DIAGNOSIS — Z17.0 MALIGNANT NEOPLASM OF LOWER-INNER QUADRANT OF RIGHT BREAST OF FEMALE, ESTROGEN RECEPTOR POSITIVE: ICD-10-CM

## 2024-03-07 DIAGNOSIS — T45.1X5A CHEMOTHERAPY-INDUCED NAUSEA: Primary | ICD-10-CM

## 2024-03-07 PROCEDURE — A4216 STERILE WATER/SALINE, 10 ML: HCPCS | Performed by: INTERNAL MEDICINE

## 2024-03-07 PROCEDURE — 3044F HG A1C LEVEL LT 7.0%: CPT | Mod: CPTII,S$GLB,, | Performed by: NURSE PRACTITIONER

## 2024-03-07 PROCEDURE — 97814 ACUP 1/> W/ESTIM EA ADDL 15: CPT | Performed by: ACUPUNCTURIST

## 2024-03-07 PROCEDURE — 99215 OFFICE O/P EST HI 40 MIN: CPT | Mod: S$GLB,,, | Performed by: NURSE PRACTITIONER

## 2024-03-07 PROCEDURE — 97813 ACUP 1/> W/ESTIM 1ST 15 MIN: CPT | Performed by: ACUPUNCTURIST

## 2024-03-07 PROCEDURE — 1159F MED LIST DOCD IN RCRD: CPT | Mod: CPTII,S$GLB,, | Performed by: NURSE PRACTITIONER

## 2024-03-07 PROCEDURE — 3079F DIAST BP 80-89 MM HG: CPT | Mod: CPTII,S$GLB,, | Performed by: NURSE PRACTITIONER

## 2024-03-07 PROCEDURE — 3077F SYST BP >= 140 MM HG: CPT | Mod: CPTII,S$GLB,, | Performed by: NURSE PRACTITIONER

## 2024-03-07 PROCEDURE — 3008F BODY MASS INDEX DOCD: CPT | Mod: CPTII,S$GLB,, | Performed by: NURSE PRACTITIONER

## 2024-03-07 PROCEDURE — 96413 CHEMO IV INFUSION 1 HR: CPT

## 2024-03-07 PROCEDURE — 63600175 PHARM REV CODE 636 W HCPCS: Performed by: INTERNAL MEDICINE

## 2024-03-07 PROCEDURE — 96367 TX/PROPH/DG ADDL SEQ IV INF: CPT

## 2024-03-07 PROCEDURE — 96417 CHEMO IV INFUS EACH ADDL SEQ: CPT

## 2024-03-07 PROCEDURE — 96375 TX/PRO/DX INJ NEW DRUG ADDON: CPT

## 2024-03-07 PROCEDURE — 99999 PR PBB SHADOW E&M-EST. PATIENT-LVL III: CPT | Mod: PBBFAC,,, | Performed by: NURSE PRACTITIONER

## 2024-03-07 PROCEDURE — 25000003 PHARM REV CODE 250: Performed by: INTERNAL MEDICINE

## 2024-03-07 RX ORDER — SODIUM CHLORIDE 0.9 % (FLUSH) 0.9 %
10 SYRINGE (ML) INJECTION
Status: DISCONTINUED | OUTPATIENT
Start: 2024-03-07 | End: 2024-03-07 | Stop reason: HOSPADM

## 2024-03-07 RX ORDER — PROCHLORPERAZINE EDISYLATE 5 MG/ML
10 INJECTION INTRAMUSCULAR; INTRAVENOUS ONCE AS NEEDED
Status: DISCONTINUED | OUTPATIENT
Start: 2024-03-07 | End: 2024-03-07 | Stop reason: HOSPADM

## 2024-03-07 RX ORDER — SODIUM CHLORIDE 0.9 % (FLUSH) 0.9 %
10 SYRINGE (ML) INJECTION
Status: CANCELLED | OUTPATIENT
Start: 2024-03-07

## 2024-03-07 RX ORDER — DIPHENHYDRAMINE HYDROCHLORIDE 50 MG/ML
50 INJECTION INTRAMUSCULAR; INTRAVENOUS ONCE AS NEEDED
Status: DISCONTINUED | OUTPATIENT
Start: 2024-03-07 | End: 2024-03-07 | Stop reason: HOSPADM

## 2024-03-07 RX ORDER — EPINEPHRINE 0.3 MG/.3ML
0.3 INJECTION SUBCUTANEOUS ONCE AS NEEDED
Status: CANCELLED | OUTPATIENT
Start: 2024-03-07

## 2024-03-07 RX ORDER — HEPARIN 100 UNIT/ML
500 SYRINGE INTRAVENOUS
Status: CANCELLED | OUTPATIENT
Start: 2024-03-07

## 2024-03-07 RX ORDER — FAMOTIDINE 10 MG/ML
20 INJECTION INTRAVENOUS
Status: COMPLETED | OUTPATIENT
Start: 2024-03-07 | End: 2024-03-07

## 2024-03-07 RX ORDER — EPINEPHRINE 0.3 MG/.3ML
0.3 INJECTION SUBCUTANEOUS ONCE AS NEEDED
Status: DISCONTINUED | OUTPATIENT
Start: 2024-03-07 | End: 2024-03-07 | Stop reason: HOSPADM

## 2024-03-07 RX ORDER — DIPHENHYDRAMINE HYDROCHLORIDE 50 MG/ML
50 INJECTION INTRAMUSCULAR; INTRAVENOUS ONCE AS NEEDED
Status: CANCELLED | OUTPATIENT
Start: 2024-03-07

## 2024-03-07 RX ORDER — FAMOTIDINE 10 MG/ML
20 INJECTION INTRAVENOUS
Status: CANCELLED | OUTPATIENT
Start: 2024-03-07

## 2024-03-07 RX ORDER — HEPARIN 100 UNIT/ML
500 SYRINGE INTRAVENOUS
Status: DISCONTINUED | OUTPATIENT
Start: 2024-03-07 | End: 2024-03-07 | Stop reason: HOSPADM

## 2024-03-07 RX ORDER — PROCHLORPERAZINE EDISYLATE 5 MG/ML
10 INJECTION INTRAMUSCULAR; INTRAVENOUS ONCE AS NEEDED
Status: CANCELLED
Start: 2024-03-07

## 2024-03-07 RX ADMIN — HEPARIN 500 UNITS: 100 SYRINGE at 11:03

## 2024-03-07 RX ADMIN — DIPHENHYDRAMINE HYDROCHLORIDE 50 MG: 50 INJECTION, SOLUTION INTRAMUSCULAR; INTRAVENOUS at 10:03

## 2024-03-07 RX ADMIN — DEXAMETHASONE SODIUM PHOSPHATE 20 MG: 4 INJECTION, SOLUTION INTRA-ARTICULAR; INTRALESIONAL; INTRAMUSCULAR; INTRAVENOUS; SOFT TISSUE at 10:03

## 2024-03-07 RX ADMIN — TRAZTUZUMAB-ANNS 106 MG: KIT at 09:03

## 2024-03-07 RX ADMIN — Medication 10 ML: at 11:03

## 2024-03-07 RX ADMIN — PACLITAXEL 120 MG: 6 INJECTION, SOLUTION INTRAVENOUS at 10:03

## 2024-03-07 RX ADMIN — FAMOTIDINE 20 MG: 10 INJECTION INTRAVENOUS at 10:03

## 2024-03-07 NOTE — PROGRESS NOTES
[facility]/ [facility]   at [facility] Note:      Chief Complaint:   Encounter Diagnoses   Name Primary?    Malignant neoplasm of lower-inner quadrant of right breast of female, estrogen receptor positive Yes    BARD1 gene mutation positive     Hemorrhoids, unspecified hemorrhoid type     Anxiety about health     Epistaxis         Cancer Staging   Malignant neoplasm of lower-inner quadrant of right breast of female, estrogen receptor positive  Staging form: Breast, AJCC 8th Edition  - Clinical stage from 10/26/2023: Stage IIA (cT2, cN0, cM0, G3, ER+, VT-, HER2+) - Signed by Linda Mcbride MD on 11/6/2023  - Pathologic stage from 12/14/2023: Stage IA (pT1c, pN0, cM0, G2, ER+, VT-, HER2+) - Signed by Linda Mcbride MD on 1/5/2024    HPI:  Tasha Cornejo is a 44 y.o. female who presents today for Cycle 6, day 1 of TH. She had an infusion reaction with her first taxol dose, but was able to complete therapy with a slower infusion. No further difficulty for cycles after C1  Looser stool, but no major diarrhea  Mild epistaxis, improving  Increased anxiety but prefers holistic therapy; acupuncture has been helpful  No fever  Minimal neuropathy, acupuncture is helping, using cryotherapy  Has noted brbpr and a hemorrhoid.  Using anusol hc and preparation  Witch hazel.  Has noted mild improvement in bleeding  None yesterday or today.  Feels the cream may have caused a rash in her rectal area.  Now using baby rash cream.  Has a virtual visit with GI next week and will further discuss.   Energy level is good.  Eating and drinking well  Minimal nausea, no vomting    Per Dr. Mcbride's note:   Oncology History  Screening mammogram on 10/5/2023 identified coarse heterogeneous calcifications in a linear distribution seen in the lower outer quadrant of the right breast, spanning 1.5 cm.  Diagnostic mammogram on 10/12/2023 showed coarse heterogeneous calcifications in a  regional distribution spanning 3.2 cm long axis   Biopsy 10/26/2023 with pathology revealing infiltrating ductal carcinoma of the breast, ER 70%/ TX-/HER2 3+; Ki67 60%    Bilateral mastectomy 23: IDC, grade 2, 1.5cm in maximum; 0/2 LN+    Adjuvant TH 24    GYN History:  Age of menarche was 9.   Age of menopause n/a.  Patient denies hormonal therapy.   Patient is .     Patient Active Problem List   Diagnosis    Rhinitis, allergic    Allergic conjunctivitis    Vitamin D deficiency    Malignant neoplasm of lower-inner quadrant of right breast of female, estrogen receptor positive    Chemotherapy-induced nausea    At risk for lymphedema    Stress and adjustment reaction    Physical deconditioning    Immunodeficiency due to drugs    Decreased ROM of right shoulder    Shoulder weakness       Current Outpatient Medications   Medication Instructions    fluticasone propionate (FLONASE) 50 mcg/actuation nasal spray 1 spray, Each Nostril, Daily PRN    hydrocortisone 2.5 % cream Topical (Top), 2 times daily    LIDOcaine-prilocaine (EMLA) cream Apply topically to port one hour prior to chemotherapy infusion    multivitamin (THERAGRAN) per tablet 1 tablet, Oral, Daily    omeprazole (PRILOSEC) 40 mg, Oral, Every morning    ondansetron (ZOFRAN) 8 mg, Oral, Every 8 hours PRN    prochlorperazine (COMPAZINE) 10 mg, Oral, Every 6 hours PRN       Review of Systems:   Review of Systems   HENT:  Positive for nosebleeds.    Respiratory:  Negative for cough and shortness of breath.    Cardiovascular:  Negative for chest pain.   Gastrointestinal:  Positive for diarrhea and nausea. Negative for abdominal pain.   Genitourinary:  Positive for dysuria. Negative for frequency.   Musculoskeletal:  Negative for back pain.   Skin:  Positive for rash.   Neurological:  Negative for headaches.   Psychiatric/Behavioral:  The patient is nervous/anxious.    All other systems reviewed and are negative.      PHYSICAL EXAM:  BP (!) 148/84    Pulse 74   Temp 97 °F (36.1 °C) (Oral)   Resp 17   Ht 5' (1.524 m)   Wt 51.2 kg (112 lb 14 oz)   LMP 02/07/2024   SpO2 98%   BMI 22.04 kg/m²     Physical Exam  Constitutional:       General: She is not in acute distress.     Appearance: Normal appearance. She is not ill-appearing.   HENT:      Head: Normocephalic and atraumatic.      Mouth/Throat:      Pharynx: No oropharyngeal exudate or posterior oropharyngeal erythema.   Eyes:      Extraocular Movements: Extraocular movements intact.   Cardiovascular:      Rate and Rhythm: Normal rate and regular rhythm.      Pulses: Normal pulses.      Heart sounds: Normal heart sounds.   Pulmonary:      Effort: Pulmonary effort is normal. No respiratory distress.      Breath sounds: Normal breath sounds.   Chest:      Comments: S/p bilateral mastectomies, incisions well healed  Abdominal:      General: Bowel sounds are normal. There is no distension.      Palpations: Abdomen is soft. There is no mass.      Tenderness: There is no abdominal tenderness.      Hernia: No hernia is present.   Musculoskeletal:      Cervical back: Normal range of motion.   Skin:     General: Skin is warm and dry.   Neurological:      General: No focal deficit present.      Mental Status: She is alert and oriented to person, place, and time.       Pertinent Labs & Imaging:  Specimen (730h ago, onward)      None            No results found for this or any previous visit (from the past 24 hour(s)).    Assessment & Plan:  1. Malignant neoplasm of lower-inner quadrant of right breast of female, estrogen receptor positive  2. BARD1 gene mutation positive  Final pathology T1cN0  Reviewed echo and labs, ok for treatment today  Continue increased decadron premed at 20 mg today, start taxol at 1/4 rate again given reaction with cycle 1  Echo from November ok to use as baseline  Will repeat echo after 6 cycles    Follow up in 2 weeks with Dr. Mcbride, infusions weekly    3. Hemorrhoids, unspecified  hemorrhoid type  Improving, declined rectal exam today  Continue current measures  Keep follow up with GI    4. Anxiety about health  Stable  Continue acupuncture    5. Epistaxis  Mild, manageable  Continue use of saline nasal gel  Breath in steam from hot shower, Humidifier at night    Route Chart for Scheduling    Med Onc Chart Routing  Urgent    Follow up with physician 2 weeks.   Follow up with BHARGAVI 4 weeks.   Infusion scheduling note   weekly x 6 more   Injection scheduling note    Labs CBC, CMP and B HCG   Scheduling:  Preferred lab:  Lab interval:  weekly labs   Imaging ECHO   can we move her echo up to the beginning of next week, prior to Cycle 7   Pharmacy appointment    Other referrals                Treatment Plan Information   OP BREAST TRASTUZUMAB PACLITAXEL WEEKLY   Linda Mcbride MD   Upcoming Treatment Dates - OP BREAST TRASTUZUMAB PACLITAXEL WEEKLY    3/7/2024       Pre-Medications       diphenhydrAMINE (BENADRYL) 50 mg in sodium chloride 0.9% 50 mL IVPB       dexAMETHasone 20 mg in sodium chloride 0.9% 50 mL IVPB       famotidine (PF) injection 20 mg       Chemotherapy       trastuzumab-anns (KANJINTI) 106 mg in sodium chloride 0.9% 250 mL chemo infusion       PACLitaxeL (TAXOL) 80 mg/m2 = 120 mg in sodium chloride 0.9% 250 mL chemo infusion  3/14/2024       Pre-Medications       diphenhydrAMINE (BENADRYL) 50 mg in sodium chloride 0.9% 50 mL IVPB       dexAMETHasone 20 mg in sodium chloride 0.9% 50 mL IVPB       famotidine (PF) injection 20 mg       Chemotherapy       trastuzumab-anns (KANJINTI) 106 mg in sodium chloride 0.9% 250 mL chemo infusion       PACLitaxeL (TAXOL) 80 mg/m2 = 120 mg in sodium chloride 0.9% 250 mL chemo infusion  3/21/2024       Pre-Medications       diphenhydrAMINE (BENADRYL) 50 mg in sodium chloride 0.9% 50 mL IVPB       dexAMETHasone 20 mg in sodium chloride 0.9% 50 mL IVPB       famotidine (PF) injection 20 mg       Chemotherapy       trastuzumab-anns (KANJINTI) 106 mg  in sodium chloride 0.9% 250 mL chemo infusion       PACLitaxeL (TAXOL) 80 mg/m2 = 120 mg in sodium chloride 0.9% 250 mL chemo infusion  3/28/2024       Pre-Medications       diphenhydrAMINE (BENADRYL) 50 mg in sodium chloride 0.9% 50 mL IVPB       dexAMETHasone 20 mg in sodium chloride 0.9% 50 mL IVPB       famotidine (PF) injection 20 mg       Chemotherapy       trastuzumab-anns (KANJINTI) 106 mg in sodium chloride 0.9% 250 mL chemo infusion       PACLitaxeL (TAXOL) 80 mg/m2 = 120 mg in sodium chloride 0.9% 250 mL chemo infusion    MDM includes  :    - Acute or chronic illness or injury that poses a threat to life or bodily function  - Independent review and explanation of 2 results from unique tests  - Discussion of management and ordering 2 unique tests  - Extensive discussion of treatment and management  - Prescription drug management  - Drug therapy requiring intensive monitoring for toxicity    Gabrielle Woo, NP   03/07/2024

## 2024-03-07 NOTE — PROGRESS NOTES
Acupuncture Evaluation Note     Name: Tasha Cornejo  [facility] Number: [id number]    Traditional Chinese Medicine (TCM) Diagnosis: Qi Stagnation, Blood Stasis, and Qi Deficiency  Medical Diagnosis:   Encounter Diagnosis   Name Primary?    Malignant neoplasm of lower-inner quadrant of right breast of female, estrogen receptor positive Yes        Evaluation Date: 3/7/2024    Visit #/Visits authorized: self pay     Precautions: Standard and cancer    Subjective     Chief Concern: Breast cancer - 12 rounds of chemo once/week. CINV, CIPN - prevent.     Medical necessity is demonstrated by the following IMPAIRMENTS: Medical Necessity: Decreased quality of life and Nausea and Vomiting              Aggravating Factors:  flexion and extension   Relieving Factors:  stretching/yoga     Symptom Description:     Quality:  Tight  Severity:  1  Frequency:  when active      Treatment     Treatment Principles:  Move qi and blood, nourish qi, calm hernandez    Acupuncture points used:   Ba Ashwin, Ba Taryn   Bilateral points: LI4, LV3, ST36, SP9, SP6, KD3, GB34, PC6  Unilateral points:   Auricular Treatment:      Needles In: 30  Needles Out: 30  [place] W/ STIM placed: 7:20  Needles W/ STIM removed: 7:50      Assessment     After treatment, patient felt acupuncture is helping symptoms, continue with care.     Patient prognosis is Good.     Patient will continue to benefit from acupuncture treatment to address the deficits listed in the problem list box on initial evaluation, provide patient family education and to maximize pt's level of independence in the home and community environment.     Patient's spiritual, cultural and educational needs considered and pt agreeable to plan of care and goals.     Anticipated barriers to treatment: none    Plan     Recommend 1 /week for 12 weeks - throughout chemo      Education:  Patient is aware of cumulative benefit of acupuncture

## 2024-03-08 ENCOUNTER — CLINICAL SUPPORT (OUTPATIENT)
Dept: REHABILITATION | Facility: HOSPITAL | Age: 44
End: 2024-03-08
Payer: COMMERCIAL

## 2024-03-08 ENCOUNTER — PATIENT MESSAGE (OUTPATIENT)
Dept: ADMINISTRATIVE | Facility: OTHER | Age: 44
End: 2024-03-08
Payer: COMMERCIAL

## 2024-03-08 ENCOUNTER — NURSE TRIAGE (OUTPATIENT)
Dept: ADMINISTRATIVE | Facility: CLINIC | Age: 44
End: 2024-03-08
Payer: COMMERCIAL

## 2024-03-08 DIAGNOSIS — M25.611 DECREASED ROM OF RIGHT SHOULDER: ICD-10-CM

## 2024-03-08 DIAGNOSIS — R53.81 PHYSICAL DECONDITIONING: ICD-10-CM

## 2024-03-08 DIAGNOSIS — F43.29 STRESS AND ADJUSTMENT REACTION: Primary | ICD-10-CM

## 2024-03-08 DIAGNOSIS — R29.898 SHOULDER WEAKNESS: ICD-10-CM

## 2024-03-08 NOTE — PROGRESS NOTES
"  [facility]  Occupational Therapy Progress and Treatment Note - Therapeutic Yoga Progam    Date: 3/8/2024  Name: Tasha Cornejo  Clinic Number: [id number]    Therapy Diagnosis:   Encounter Diagnoses   Name Primary?    Stress and adjustment reaction Yes    Physical deconditioning     Decreased ROM of right shoulder     Shoulder weakness      Physician: Linda Mcbride MD    Physician Orders: Physician Orders: Eval and Treat   Medical Diagnosis from Referral: Malignant neoplasm of lower-inner quadrant of right breast of female, estrogen receptor positive [C50.311, Z17.0]   Evaluation Date: 11/29/2023  Authorization Period Expiration: 12/31/24  Plan of Care Expiration: 04/29/24  Progress Note Due: 04/124  Visit # / Visits authorized:  5/20      Precautions: Standard and cancer        Time In: 8:45 am  Time Out: 9:30 am  Total Billable Time: 45 minutes    SUBJECTIVE     Pt reports: She has just completed 4/12  weekly chemotherapy sessions .  She has fair energy and is doing her HEP.  "Work has been very stressful"  She was compliant with home exercise program given last session.   Response to previous treatment: I felt good and my pain decerased.  Functional change: no change    Pain: 4/10  Location: bilateral breasts due to tissue expanders and decreased ROM and strength of shoulders.     OBJECTIVE     Patient Specific Functional Scale:           Activity 11/29/23  3/1/24         Anxiety and stress 4       4         2.    sleep 3       5         3.   Motivation due depression  5      6         4.             5.             6.             SCORE    4.0    5.3            Total Score = Sum of activity scores / number of activities  Minimum Detectable Change (90% CII) for average score = 2 points  Minimum Detectable Change (90% CI) for single activity score = 3 points    Treatment     Tasha received the treatments listed below:       Date 2/9/24 2/23/24 3/1/24 3/8/24     Therapeutic Yoga " Exercises / Neuromuscular Re-education 30 minutes  30 minutes  30 minutes  PN  30 minutes  minutes  minutes   Seated Yoga   chair yoga:  Cat-Cow,forward bend, back bend, twist,    chair yoga:  Cat-Cow,forward bend, back bend, twist,      Quadruped         Supine Wide footed twist,  Wide footed twist, cat cow, Wide footed twist, cat cow, knee to chest flow,  Wide footed twist, cat cow, knee to chest flow,      Prone Sphinx/sphinx plank, Sphinx/sphinx plank, Sphinx/sphinx plank,      standing Chair pose, warrior 2, triangle, Chair pose, warrior 2, triangle, Chair pose x 2,  warrior 2, triangle, wide straddle forward fold Chair pose x 2,  wide straddle forward fold              UE exercise for right shoulder ROM and stregth Dowel flexion, sidelying abd., door pulley, flexion and abd.  Flexion and abd PROM in side lying, contract/relax,  Flexion and abd AROM in side lying, contract/relax, 1# sidelying abd x10, ER strength with 1# in sidelying x 10, IR       Self-Care/Home Management  / Therapeutic Activities  15 minutes  15 minutes  15 minutes  15 minutes  minutes  minutes            Relaxation techniques DB, body scan DB, body scan DB, body scan DB, body scan, bramari breath     Restorative  Fish and bridge on blocks Fish and bridge on blocks Supine with bolster under hips and legs on chair Supine with bolster under hips and legs on chair, Bridge and bound angle with bolster support     Activity Pacing                           Stress Management/Education  - physiology of yoga/meditation and immune health - physiology of yoga/meditation and immune health - physiology of yoga/meditation and immune health - physiology of yoga/meditation and immune health                  Patient Education and Home Exercises      Education provided:   - - physiology of yoga/meditation and immune health  - Progress towards goals     Written Home Exercises Provided: yes.  Exercises were reviewed and Tasha was able to demonstrate them prior to  the end of the session.  Tasha demonstrated good  understanding of the HEP provided. See EMR under Patient Instructions for exercises provided during therapy sessions.       Assessment       In today's session, Tasha reviewed her yoga HEP consisting of strengthening, stretching, and balance yoga exercise.  She was also introduced to bramari breathing and body scan techniques to assist with relaxation/immune health. Her right shoulder ROM has improved to WNL's.  She tolerated the session well and required maximum verbal and neuromuscular cues in all treatment.     Tasha is progressing well towards her goals and patient prognosis is Good. Patient will continue to benefit from skilled outpatient occupational therapy to address the deficits listed in the problem list on initial evaluation provide pt/family education and to maximize pt's level of independence in the home and community environment. Pt's spiritual, cultural and educational needs considered and pt agreeable to plan of care and goals.    Anticipated barriers to occupational therapy: none    GOALS:    Short Term Goals: 6 weeks      Goal # Goal Status   1 Patient will demonstrate independence with diaphragmatic breathing in sit and supine. met   2 Pt. will identify resource for audio body scan to assist with stress management/immune health progressing   3 Patient will identify 2 new stress coping skills for stress management/immune health. Progressing   4 Patient will identify activity pacing problems.  Patient will then implement new plan for daily activity to increase endurance for ADL's. Progressing      Long Term Goals: 12 weeks      Goal # Goal Status   1 Patient will demonstrate independence with yoga Home Exercise Program to increase strength and endurance for ADL's. Progressing   2 Patient will demonstrate independence with relaxation techniques to manage stress for immune health. Progressing   3 Patient will verbalize good understanding of stress/immune  health relationship.  Progressing   4                  PLAN     Plan of care Certification: 3/8/2024 to 4/29/24.    Outpatient Occupational Therapy 1 times weekly for 12 weeks to include the following interventions: Neuromuscular Re-ed, Patient Education, Self Care, Therapeutic Activities, and Therapeutic Exercise.     Ernestina Romo, OT

## 2024-03-09 ENCOUNTER — PATIENT MESSAGE (OUTPATIENT)
Dept: ADMINISTRATIVE | Facility: OTHER | Age: 44
End: 2024-03-09
Payer: COMMERCIAL

## 2024-03-09 NOTE — TELEPHONE ENCOUNTER
Patient has a hemrroid that developed a rash, redness, and blisters around the area. Patient was advised per protocol to go to UC/ED. Patient plans to conduct a virtual visit. Will go to the ED if unable to be assisted virtually.  Advised the patient to call back with any further questions or if symptoms worsen.        Reason for Disposition   Black (necrotic), dark purple, or blisters develop in area of wound    Additional Information   Negative: [1] Widespread rash AND [2] bright red, sunburn-like AND [3] too weak to stand   Negative: Sounds like a life-threatening emergency to the triager   Negative: SEVERE pain in the wound   Negative: [1] SEVERE pain with bending of finger (or toe) AND [2] wound on hand (or foot)   Negative: [1] Widespread rash AND [2] bright red, sunburn-like   Negative: Fever > 103 F (39.4 C)    Protocols used: Wound Infection [id number]

## 2024-03-10 ENCOUNTER — PATIENT MESSAGE (OUTPATIENT)
Dept: ADMINISTRATIVE | Facility: OTHER | Age: 44
End: 2024-03-10
Payer: COMMERCIAL

## 2024-03-11 ENCOUNTER — PATIENT MESSAGE (OUTPATIENT)
Dept: GASTROENTEROLOGY | Facility: CLINIC | Age: 44
End: 2024-03-11

## 2024-03-11 ENCOUNTER — OFFICE VISIT (OUTPATIENT)
Dept: GASTROENTEROLOGY | Facility: CLINIC | Age: 44
End: 2024-03-11
Payer: COMMERCIAL

## 2024-03-11 ENCOUNTER — OFFICE VISIT (OUTPATIENT)
Dept: HEMATOLOGY/ONCOLOGY | Facility: CLINIC | Age: 44
End: 2024-03-11
Payer: COMMERCIAL

## 2024-03-11 ENCOUNTER — TELEPHONE (OUTPATIENT)
Dept: HEMATOLOGY/ONCOLOGY | Facility: CLINIC | Age: 44
End: 2024-03-11

## 2024-03-11 ENCOUNTER — PATIENT MESSAGE (OUTPATIENT)
Dept: ADMINISTRATIVE | Facility: OTHER | Age: 44
End: 2024-03-11
Payer: COMMERCIAL

## 2024-03-11 VITALS
WEIGHT: 110.69 LBS | OXYGEN SATURATION: 100 % | TEMPERATURE: 97 F | HEIGHT: 60 IN | HEART RATE: 70 BPM | BODY MASS INDEX: 21.73 KG/M2 | RESPIRATION RATE: 17 BRPM | SYSTOLIC BLOOD PRESSURE: 169 MMHG | DIASTOLIC BLOOD PRESSURE: 74 MMHG

## 2024-03-11 DIAGNOSIS — R04.0 EPISTAXIS: ICD-10-CM

## 2024-03-11 DIAGNOSIS — Z17.0 MALIGNANT NEOPLASM OF LOWER-INNER QUADRANT OF RIGHT BREAST OF FEMALE, ESTROGEN RECEPTOR POSITIVE: Primary | ICD-10-CM

## 2024-03-11 DIAGNOSIS — K62.5 RECTAL BLEEDING: ICD-10-CM

## 2024-03-11 DIAGNOSIS — R13.14 DYSPHAGIA, PHARYNGOESOPHAGEAL PHASE: Primary | ICD-10-CM

## 2024-03-11 DIAGNOSIS — C50.311 MALIGNANT NEOPLASM OF LOWER-INNER QUADRANT OF RIGHT BREAST OF FEMALE, ESTROGEN RECEPTOR POSITIVE: Primary | ICD-10-CM

## 2024-03-11 DIAGNOSIS — K64.9 HEMORRHOIDS, UNSPECIFIED HEMORRHOID TYPE: ICD-10-CM

## 2024-03-11 DIAGNOSIS — K62.89 RECTAL INFLAMMATION: ICD-10-CM

## 2024-03-11 DIAGNOSIS — T14.8XXA BLISTER: ICD-10-CM

## 2024-03-11 DIAGNOSIS — R45.89 ANXIETY ABOUT HEALTH: ICD-10-CM

## 2024-03-11 DIAGNOSIS — Z15.01 BARD1 GENE MUTATION POSITIVE: ICD-10-CM

## 2024-03-11 PROCEDURE — 3008F BODY MASS INDEX DOCD: CPT | Mod: CPTII,S$GLB,, | Performed by: NURSE PRACTITIONER

## 2024-03-11 PROCEDURE — 1159F MED LIST DOCD IN RCRD: CPT | Mod: CPTII,S$GLB,, | Performed by: NURSE PRACTITIONER

## 2024-03-11 PROCEDURE — 99214 OFFICE O/P EST MOD 30 MIN: CPT | Mod: 95,,, | Performed by: INTERNAL MEDICINE

## 2024-03-11 PROCEDURE — 3078F DIAST BP <80 MM HG: CPT | Mod: CPTII,S$GLB,, | Performed by: NURSE PRACTITIONER

## 2024-03-11 PROCEDURE — 99999 PR PBB SHADOW E&M-EST. PATIENT-LVL IV: CPT | Mod: PBBFAC,,, | Performed by: NURSE PRACTITIONER

## 2024-03-11 PROCEDURE — 3044F HG A1C LEVEL LT 7.0%: CPT | Mod: CPTII,S$GLB,, | Performed by: NURSE PRACTITIONER

## 2024-03-11 PROCEDURE — 99214 OFFICE O/P EST MOD 30 MIN: CPT | Mod: S$GLB,,, | Performed by: NURSE PRACTITIONER

## 2024-03-11 PROCEDURE — 3044F HG A1C LEVEL LT 7.0%: CPT | Mod: CPTII,95,, | Performed by: INTERNAL MEDICINE

## 2024-03-11 PROCEDURE — 3077F SYST BP >= 140 MM HG: CPT | Mod: CPTII,S$GLB,, | Performed by: NURSE PRACTITIONER

## 2024-03-11 RX ORDER — MUPIROCIN 20 MG/G
OINTMENT TOPICAL 3 TIMES DAILY
Qty: 30 G | Refills: 0 | Status: SHIPPED | OUTPATIENT
Start: 2024-03-11 | End: 2024-05-20

## 2024-03-11 NOTE — PROGRESS NOTES
[facility] Consultation Note    Reason for Consult:  The primary encounter diagnosis was Dysphagia, pharyngoesophageal phase. A diagnosis of Rectal bleeding was also pertinent to this visit.    PCP:   Julia Sandoval       Referring MD:  No referring provider defined for this encounter.    Initial History of Present Illness (HPI):  This is a 44 y.o. female here for evaluation of sour stomach after salad, but     Carrots and rice get stuck with eating over last 6 months    and bleeding after running, worse with longer runs, but still happening  Some grainy stools for a few months    Abdominal pain - no  Reflux - no  Dysphagia - to solids  Bowel habits - normal  GI bleeding - with running  NSAID usage - none    Interval History 03/11/2024  Issues with hemorrhoid recently, going through chemo for breast cancer  Lotrimin irritated her hemorrhoid area and now taking bactroban  Taking prilosec  Has cut back on dairy with symptom improvement    ROS:  Constitutional: No fevers, chills, No weight loss  ENT: No allergies  CV: No chest pain  Pulm: + cough for years, No shortness of breath  Ophtho: No vision changes  GI: see HPI  Derm: No rash  Heme: No lymphadenopathy, No bruising  MSK: No arthritis  : No dysuria, No hematuria  Endo: No hot or cold intolerance  Neuro: No syncope, No seizure  Psych: No anxiety, No depression    Medical History:  has a past medical history of Acid reflux, Chalazion, and Malignant neoplasm of lower-inner quadrant of right breast of female, estrogen receptor positive (11/06/2023).    Surgical History:  has a past surgical history that includes Esophagogastroduodenoscopy (N/A, 6/2/2023); Colonoscopy (N/A, 6/2/2023); Esophagogastroduodenoscopy (N/A, 10/18/2023); Insertion of breast tissue expander (Bilateral, 12/14/2023); Bilateral mastectomy (Bilateral, 12/14/2023); [place] lymph node biopsy (Right, 12/14/2023); Injection for sentinel node identification (Right,  12/14/2023); and insertion, catheter, tunneled (Left, 12/14/2023).    Family History: family history includes Albinism in her paternal grandmother; Alzheimer's disease in her paternal grandmother; Breast cancer in an other family member; Cancer in her maternal grandfather and other family members; Cancer (age of onset: 68) in her maternal grandmother; Diabetes in her paternal grandfather; Heart failure in her paternal grandfather and paternal grandmother; Other in her mother; Pancreatic cancer (age of onset: 68) in her paternal uncle; Prostate cancer (age of onset: 67) in her father; Solid tumor in her mother and another family member..     Social History:  reports that she has never smoked. She has never used smokeless tobacco. She reports current alcohol use. She reports that she does not use drugs.    Review of patient's allergies indicates:   Allergen Reactions    Aleve [naproxen sodium] Other (See Comments)     Throat swelling       Medication List with Changes/Refills   Current Medications    FLUTICASONE PROPIONATE (FLONASE) 50 MCG/ACTUATION NASAL SPRAY    1 spray by Each Nare route daily as needed for Rhinitis.    HYDROCORTISONE 2.5 % CREAM    Apply topically 2 (two) times daily.    LIDOCAINE-PRILOCAINE (EMLA) CREAM    Apply topically to port one hour prior to chemotherapy infusion    MULTIVITAMIN (THERAGRAN) PER TABLET    Take 1 tablet by mouth once daily.    MUPIROCIN (BACTROBAN) 2 % OINTMENT    Apply topically 3 (three) times daily.    OMEPRAZOLE (PRILOSEC) 40 MG CAPSULE    Take 1 capsule (40 mg total) by mouth every morning.    ONDANSETRON (ZOFRAN) 8 MG TABLET    Take 1 tablet (8 mg total) by mouth every 8 (eight) hours as needed for Nausea.    PROCHLORPERAZINE (COMPAZINE) 5 MG TABLET    Take 2 tablets (10 mg total) by mouth every 6 (six) hours as needed for Nausea.         Objective Findings:    Vital Signs:  There were no vitals taken for this visit.  There is no height or weight on file to calculate  "BMI.    Physical Exam:  General Appearance: Well appearing in no acute distress  Head:   Normocephalic, without obvious abnormality  Eyes:    No scleral icterus, EOMI  ENT: Neck supple, Lips, mucosa, and tongue normal; teeth and gums normal  Lungs: CTA bilaterally in anterior and posterior fields, no wheezes, no crackles.  Heart:  Regular rate and rhythm, S1, S2 normal, no murmurs heard  Abdomen: Soft, non tender, non distended with positive bowel sounds in all four quadrants. No hepatosplenomegaly, ascites, or mass  Extremities: 2+ pulses, no clubbing, cyanosis or edema  Skin: No rash  Neurologic: CN II-XII intact      Labs:  Lab Results   Component Value Date    WBC 4.28 03/06/2024    HGB 12.9 03/06/2024    HCT 40.2 03/06/2024     03/06/2024    CHOL 166 08/30/2023    TRIG 43 08/30/2023    HDL 52 08/30/2023    ALT 15 03/06/2024    AST 17 03/06/2024     03/06/2024    K 4.9 03/06/2024     03/06/2024    CREATININE 0.9 03/06/2024    BUN 10 03/06/2024    CO2 26 03/06/2024    TSH 1.662 08/30/2023    HGBA1C 5.1 01/31/2024       No results found for: "HPYLORINTERP"  No results found for: "HPYLORIANTIG"    Eos% 12.5, 8.2    Imaging:    Endoscopy:    reviewed      Assessment:  1. Dysphagia, pharyngoesophageal phase    2. Rectal bleeding               Recommendations:  1. Can attempt to wean off PPI after chemo completed  2. Start low dairy diet  3. Seeing CRS for rectal irritation, agree with barrier cream/bactroban for now    Order summary:           Thank you so much for allowing me to participate in the care of Tasha Rice MD        "

## 2024-03-11 NOTE — Clinical Note
Good morning!  This patient is getting active chemotherapy.  She developed a hemorrhoid and brbpr about 1.5 weeks ago and now has rectal inflammation and surrounding skin is very irritated.  I saw her for an urgent care visit today in oncology and she has a virtual visit with GI today for other issued, but can we get her seen in person in the colorectal department sometime in the next day or two.  Thanks.  Gabrielle Woo NP

## 2024-03-11 NOTE — PROGRESS NOTES
[facility]/ [facility]   at [facility] Note:      Chief Complaint:   Encounter Diagnoses   Name Primary?    Malignant neoplasm of lower-inner quadrant of right breast of female, estrogen receptor positive Yes    BARD1 gene mutation positive     Hemorrhoids, unspecified hemorrhoid type     Rectal inflammation     Blister         Cancer Staging   Malignant neoplasm of lower-inner quadrant of right breast of female, estrogen receptor positive  Staging form: Breast, AJCC 8th Edition  - Clinical stage from 10/26/2023: Stage IIA (cT2, cN0, cM0, G3, ER+, OH-, HER2+) - Signed by Linda Mcbride MD on 11/6/2023  - Pathologic stage from 12/14/2023: Stage IA (pT1c, pN0, cM0, G2, ER+, OH-, HER2+) - Signed by Linda Mcbride MD on 1/5/2024    HPI:  Tasha Cornejo is a 44 y.o. female who presents for an urgent care visit s/p Cycle 6, day 1 of TH. She had an infusion reaction with her first taxol dose, but was able to complete therapy with a slower infusion. No further difficulty for cycles after C1  Has has had looser stools recently  Developed a hemorrhoid last week with some brbpr  The hemorrhoid has improved, no further bleeding, but she was using preperation h and anusol hc and noticed that this cause a rash to her rectal area.  She has been having trouble keeping the area clean and dry.  She tried some lotrimin spray to the area and this caused more swelling and blisters that have now opened up.  The swelling has improved, but the area is very irritated and she is concerned with infection with the blisters being open  She denies fevers or chills or systemic symptoms  Has a decreased appetite, but is staying hydrated.     Per Dr. Mcbride's note:   Oncology History  Screening mammogram on 10/5/2023 identified coarse heterogeneous calcifications in a linear distribution seen in the lower outer quadrant of the right breast, spanning 1.5 cm.  Diagnostic mammogram on  10/12/2023 showed coarse heterogeneous calcifications in a regional distribution spanning 3.2 cm long axis   Biopsy 10/26/2023 with pathology revealing infiltrating ductal carcinoma of the breast, ER 70%/ DC-/HER2 3+; Ki67 60%    Bilateral mastectomy 23: IDC, grade 2, 1.5cm in maximum; 0/2 LN+    Adjuvant TH 24    GYN History:  Age of menarche was 9.   Age of menopause n/a.  Patient denies hormonal therapy.   Patient is .     Patient Active Problem List   Diagnosis    Rhinitis, allergic    Allergic conjunctivitis    Vitamin D deficiency    Malignant neoplasm of lower-inner quadrant of right breast of female, estrogen receptor positive    Chemotherapy-induced nausea    At risk for lymphedema    Stress and adjustment reaction    Physical deconditioning    Immunodeficiency due to drugs    Decreased ROM of right shoulder    Shoulder weakness       Current Outpatient Medications   Medication Instructions    fluticasone propionate (FLONASE) 50 mcg/actuation nasal spray 1 spray, Each Nostril, Daily PRN    hydrocortisone 2.5 % cream Topical (Top), 2 times daily    LIDOcaine-prilocaine (EMLA) cream Apply topically to port one hour prior to chemotherapy infusion    multivitamin (THERAGRAN) per tablet 1 tablet, Oral, Daily    mupirocin (BACTROBAN) 2 % ointment Topical (Top), 3 times daily    omeprazole (PRILOSEC) 40 mg, Oral, Every morning    ondansetron (ZOFRAN) 8 mg, Oral, Every 8 hours PRN    prochlorperazine (COMPAZINE) 10 mg, Oral, Every 6 hours PRN       Review of Systems:   Review of Systems   HENT:  Positive for nosebleeds.    Respiratory:  Negative for cough and shortness of breath.    Cardiovascular:  Negative for chest pain.   Gastrointestinal:  Positive for diarrhea and nausea. Negative for abdominal pain.        Hemorrhoid, rectal pain and inflammation, rash   Genitourinary:  Positive for dysuria. Negative for frequency.   Musculoskeletal:  Negative for back pain.   Skin:  Positive for rash.    Neurological:  Negative for headaches.   Psychiatric/Behavioral:  The patient is nervous/anxious.    All other systems reviewed and are negative.      PHYSICAL EXAM:  BP (!) 169/74   Pulse 70   Temp 97 °F (36.1 °C) (Oral)   Resp 17   Ht 5' (1.524 m)   Wt 50.2 kg (110 lb 10.7 oz)   SpO2 100%   BMI 21.61 kg/m²     Physical Exam  Constitutional:       General: She is not in acute distress.     Appearance: Normal appearance. She is not ill-appearing.   HENT:      Head: Normocephalic and atraumatic.      Mouth/Throat:      Pharynx: No oropharyngeal exudate or posterior oropharyngeal erythema.   Eyes:      Extraocular Movements: Extraocular movements intact.   Cardiovascular:      Rate and Rhythm: Normal rate and regular rhythm.      Pulses: Normal pulses.      Heart sounds: Normal heart sounds.   Pulmonary:      Effort: Pulmonary effort is normal. No respiratory distress.      Breath sounds: Normal breath sounds.   Chest:      Comments: Deferred today  Abdominal:      General: Bowel sounds are normal. There is no distension.      Palpations: Abdomen is soft. There is no mass.      Tenderness: There is no abdominal tenderness.      Hernia: No hernia is present.   Genitourinary:     Comments: Erythematous skin surrounding rectal area, external hemorrhoid noted, two larger open blisters noted to rectal area  Musculoskeletal:      Cervical back: Normal range of motion.   Skin:     General: Skin is warm and dry.   Neurological:      General: No focal deficit present.      Mental Status: She is alert and oriented to person, place, and time.       Pertinent Labs & Imaging:  Specimen (730h ago, onward)      None            No results found for this or any previous visit (from the past 24 hour(s)).    Assessment & Plan:  1. Malignant neoplasm of lower-inner quadrant of right breast of female, estrogen receptor positive  2. BARD1 gene mutation positive  Final pathology T1cN0  Reviewed echo and labs, ok for treatment  today  Continue increased decadron premed at 20 mg today, start taxol at 1/4 rate again given reaction with cycle 1  Echo from November ok to use as baseline  Will repeat echo after 6 cycles    Follow up as scheduled with Dr. Mcbride, weekly infusions    3. Hemorrhoids, unspecified hemorrhoid type  4. Rectal inflammation  5. Blister  Area very irritated and raw  Trial of mupirocin ointment to open blisters  Use desitin or triple paste as a barrier for skin protection  Use a warm squirt bottle, sitz rinses for cleaning  Colorectal referral placed  She has a virtual GI appt today as well    4. Anxiety about health  Worsening over the weekend with above symptoms  Continue acupuncture    5. Epistaxis  Mild, manageable  Continue use of saline nasal gel  Breath in steam from hot shower, Humidifier at night    Route Chart for Scheduling    Med Onc Chart Routing      Follow up with physician . As scheduled   Follow up with BHARGAVI    Infusion scheduling note   weekly   Injection scheduling note    Labs CBC, CMP and B HCG   Scheduling:  Preferred lab:  Lab interval:  weekly   Imaging    Pharmacy appointment    Other referrals                  Treatment Plan Information   OP BREAST TRASTUZUMAB PACLITAXEL WEEKLY   Linda Mcbride MD   Upcoming Treatment Dates - OP BREAST TRASTUZUMAB PACLITAXEL WEEKLY    3/14/2024       Pre-Medications       diphenhydrAMINE (BENADRYL) 50 mg in sodium chloride 0.9% 50 mL IVPB       dexAMETHasone 20 mg in sodium chloride 0.9% 50 mL IVPB       famotidine (PF) injection 20 mg       Chemotherapy       trastuzumab-anns (KANJINTI) 106 mg in sodium chloride 0.9% 250 mL chemo infusion       PACLitaxeL (TAXOL) 80 mg/m2 = 120 mg in sodium chloride 0.9% 250 mL chemo infusion  3/21/2024       Pre-Medications       diphenhydrAMINE (BENADRYL) 50 mg in sodium chloride 0.9% 50 mL IVPB       dexAMETHasone 20 mg in sodium chloride 0.9% 50 mL IVPB       famotidine (PF) injection 20 mg       Chemotherapy        trastuzumab-anns (KANJINTI) 106 mg in sodium chloride 0.9% 250 mL chemo infusion       PACLitaxeL (TAXOL) 80 mg/m2 = 120 mg in sodium chloride 0.9% 250 mL chemo infusion  3/28/2024       Pre-Medications       diphenhydrAMINE (BENADRYL) 50 mg in sodium chloride 0.9% 50 mL IVPB       dexAMETHasone 20 mg in sodium chloride 0.9% 50 mL IVPB       famotidine (PF) injection 20 mg       Chemotherapy       trastuzumab-anns (KANJINTI) 106 mg in sodium chloride 0.9% 250 mL chemo infusion       PACLitaxeL (TAXOL) 80 mg/m2 = 120 mg in sodium chloride 0.9% 250 mL chemo infusion  4/4/2024       Pre-Medications       diphenhydrAMINE (BENADRYL) 50 mg in sodium chloride 0.9% 50 mL IVPB       dexAMETHasone 20 mg in sodium chloride 0.9% 50 mL IVPB       famotidine (PF) injection 20 mg       Chemotherapy       trastuzumab-anns (KANJINTI) 106 mg in sodium chloride 0.9% 250 mL chemo infusion       PACLitaxeL (TAXOL) 80 mg/m2 = 120 mg in sodium chloride 0.9% 250 mL chemo infusion    Urgent Care Oncology Visit    Date and Time: 03/11/2024 9:11 AM   Patient MRN: [id number]   Chief Complaint:   Chief Complaint   Patient presents with    Malignant neoplasm of lower-inner quadrant of right breast      Reason for Urgent Care Visit:  hemorrhoids, rectal inflammation, pain  Patient Type: active chemotherapy/immunotherapy treatment  Intervention: additional referrals and prescriptions sent  Dispo: return to clinic as previous  UrgOnc appointment addressed via: [facility] message  This UrgOnc visit was in person  Time spent handling UC issue:  30 min    Was this virtual or in person UrgOnc visit appropriate? Yes      Gabrielle Woo NP   03/11/2024

## 2024-03-12 ENCOUNTER — HOSPITAL ENCOUNTER (OUTPATIENT)
Dept: CARDIOLOGY | Facility: HOSPITAL | Age: 44
Discharge: HOME | End: 2024-03-12
Attending: NURSE PRACTITIONER
Payer: COMMERCIAL

## 2024-03-12 ENCOUNTER — PATIENT MESSAGE (OUTPATIENT)
Dept: ADMINISTRATIVE | Facility: OTHER | Age: 44
End: 2024-03-12
Payer: COMMERCIAL

## 2024-03-12 ENCOUNTER — TELEPHONE (OUTPATIENT)
Dept: SURGERY | Facility: CLINIC | Age: 44
End: 2024-03-12
Payer: COMMERCIAL

## 2024-03-12 VITALS
HEIGHT: 60 IN | BODY MASS INDEX: 21.72 KG/M2 | SYSTOLIC BLOOD PRESSURE: 134 MMHG | WEIGHT: 110.63 LBS | DIASTOLIC BLOOD PRESSURE: 86 MMHG | HEART RATE: 78 BPM

## 2024-03-12 DIAGNOSIS — Z17.0 MALIGNANT NEOPLASM OF LOWER-INNER QUADRANT OF RIGHT BREAST OF FEMALE, ESTROGEN RECEPTOR POSITIVE: ICD-10-CM

## 2024-03-12 DIAGNOSIS — C50.311 MALIGNANT NEOPLASM OF LOWER-INNER QUADRANT OF RIGHT BREAST OF FEMALE, ESTROGEN RECEPTOR POSITIVE: ICD-10-CM

## 2024-03-12 LAB
AV INDEX (PROSTH): 0.87
AV MEAN GRADIENT: 2 MMHG
AV PEAK GRADIENT: 4 MMHG
AV VALVE AREA BY VELOCITY RATIO: 2.63 CM²
AV VALVE AREA: 2.56 CM²
AV VELOCITY RATIO: 0.89
BSA FOR ECHO PROCEDURE: 1.46 M2
CV ECHO LV RWT: 0.31 CM
DOP CALC AO PEAK VEL: 0.99 M/S
DOP CALC AO VTI: 20.84 CM
DOP CALC LVOT AREA: 3 CM2
DOP CALC LVOT DIAMETER: 1.94 CM
DOP CALC LVOT PEAK VEL: 0.88 M/S
DOP CALC LVOT STROKE VOLUME: 53.45 CM3
DOP CALCLVOT PEAK VEL VTI: 18.09 CM
E WAVE DECELERATION TIME: 217.53 MSEC
E/A RATIO: 1.84
E/E' RATIO: 6.87 M/S
ECHO LV POSTERIOR WALL: 0.61 CM (ref 0.6–1.1)
EJECTION FRACTION: 55 %
FRACTIONAL SHORTENING: 26 % (ref 28–44)
GLOBAL LONGITUIDAL STRAIN: 18.4 %
INTERVENTRICULAR SEPTUM: 0.54 CM (ref 0.6–1.1)
LA MAJOR: 4.75 CM
LA MINOR: 4.47 CM
LA WIDTH: 3.99 CM
LEFT ATRIUM SIZE: 3.43 CM
LEFT ATRIUM VOLUME INDEX MOD: 31.4 ML/M2
LEFT ATRIUM VOLUME INDEX: 37 ML/M2
LEFT ATRIUM VOLUME MOD: 45.5 CM3
LEFT ATRIUM VOLUME: 53.58 CM3
LEFT INTERNAL DIMENSION IN SYSTOLE: 2.89 CM (ref 2.1–4)
LEFT VENTRICLE DIASTOLIC VOLUME INDEX: 46.06 ML/M2
LEFT VENTRICLE DIASTOLIC VOLUME: 66.79 ML
LEFT VENTRICLE MASS INDEX: 41 G/M2
LEFT VENTRICLE SYSTOLIC VOLUME INDEX: 22 ML/M2
LEFT VENTRICLE SYSTOLIC VOLUME: 31.86 ML
LEFT VENTRICULAR INTERNAL DIMENSION IN DIASTOLE: 3.92 CM (ref 3.5–6)
LEFT VENTRICULAR MASS: 58.91 G
LV LATERAL E/E' RATIO: 6.58 M/S
LV SEPTAL E/E' RATIO: 7.18 M/S
MV A" WAVE DURATION": 5.33 MSEC
MV PEAK A VEL: 0.43 M/S
MV PEAK E VEL: 0.79 M/S
OHS LV EJECTION FRACTION SIMPSONS BIPLANE MOD: 55 %
PULM VEIN S/D RATIO: 1.34
PV PEAK D VEL: 0.32 M/S
PV PEAK S VEL: 0.43 M/S
RA MAJOR: 3.9 CM
RA PRESSURE ESTIMATED: 3 MMHG
RA WIDTH: 2.97 CM
RIGHT VENTRICULAR END-DIASTOLIC DIMENSION: 3.67 CM
SINUS: 3.45 CM
TDI LATERAL: 0.12 M/S
TDI SEPTAL: 0.11 M/S
TDI: 0.12 M/S
TRICUSPID ANNULAR PLANE SYSTOLIC EXCURSION: 2.35 CM
Z-SCORE OF LEFT VENTRICULAR DIMENSION IN END DIASTOLE: -1.12
Z-SCORE OF LEFT VENTRICULAR DIMENSION IN END SYSTOLE: 0.47

## 2024-03-12 PROCEDURE — 93306 TTE W/DOPPLER COMPLETE: CPT | Mod: 26,,, | Performed by: INTERNAL MEDICINE

## 2024-03-12 PROCEDURE — 93306 TTE W/DOPPLER COMPLETE: CPT

## 2024-03-12 RX ORDER — DIPHENHYDRAMINE HYDROCHLORIDE 50 MG/ML
50 INJECTION INTRAMUSCULAR; INTRAVENOUS ONCE AS NEEDED
Status: CANCELLED | OUTPATIENT
Start: 2024-03-14

## 2024-03-12 RX ORDER — PROCHLORPERAZINE EDISYLATE 5 MG/ML
10 INJECTION INTRAMUSCULAR; INTRAVENOUS ONCE AS NEEDED
Status: CANCELLED
Start: 2024-03-14

## 2024-03-12 RX ORDER — EPINEPHRINE 0.3 MG/.3ML
0.3 INJECTION SUBCUTANEOUS ONCE AS NEEDED
Status: CANCELLED | OUTPATIENT
Start: 2024-03-14

## 2024-03-12 RX ORDER — HEPARIN 100 UNIT/ML
500 SYRINGE INTRAVENOUS
Status: CANCELLED | OUTPATIENT
Start: 2024-03-14

## 2024-03-12 RX ORDER — FAMOTIDINE 10 MG/ML
20 INJECTION INTRAVENOUS
Status: CANCELLED | OUTPATIENT
Start: 2024-03-14

## 2024-03-12 RX ORDER — SODIUM CHLORIDE 0.9 % (FLUSH) 0.9 %
10 SYRINGE (ML) INJECTION
Status: CANCELLED | OUTPATIENT
Start: 2024-03-14

## 2024-03-12 NOTE — TELEPHONE ENCOUNTER
Called pt on 3/11 at 2203 to get her scheduled with [facility]S for hemorrhoids per request from Gabrielle Woo NP.  Pt would like a sooner appt this week and will meet with Dr. Cano on 3/14 at 2pm.  All appt details reviewed.  Pt verbalized understanding to all.

## 2024-03-13 ENCOUNTER — PATIENT MESSAGE (OUTPATIENT)
Dept: ADMINISTRATIVE | Facility: OTHER | Age: 44
End: 2024-03-13
Payer: COMMERCIAL

## 2024-03-13 ENCOUNTER — TELEPHONE (OUTPATIENT)
Dept: HEMATOLOGY/ONCOLOGY | Facility: CLINIC | Age: 44
End: 2024-03-13
Payer: COMMERCIAL

## 2024-03-13 ENCOUNTER — LAB VISIT (OUTPATIENT)
Dept: LAB | Facility: HOSPITAL | Age: 44
End: 2024-03-13
Payer: COMMERCIAL

## 2024-03-13 DIAGNOSIS — Z17.0 MALIGNANT NEOPLASM OF LOWER-INNER QUADRANT OF RIGHT BREAST OF FEMALE, ESTROGEN RECEPTOR POSITIVE: ICD-10-CM

## 2024-03-13 DIAGNOSIS — R00.0 TACHYCARDIA: ICD-10-CM

## 2024-03-13 DIAGNOSIS — C50.311 MALIGNANT NEOPLASM OF LOWER-INNER QUADRANT OF RIGHT BREAST OF FEMALE, ESTROGEN RECEPTOR POSITIVE: ICD-10-CM

## 2024-03-13 DIAGNOSIS — F41.0 PANIC ATTACK: Primary | ICD-10-CM

## 2024-03-13 LAB
ALBUMIN SERPL BCP-MCNC: 4.3 G/DL (ref 3.5–5.2)
ALP SERPL-CCNC: 53 U/L (ref 55–135)
ALT SERPL W/O P-5'-P-CCNC: 17 U/L (ref 10–44)
ANION GAP SERPL CALC-SCNC: 9 MMOL/L (ref 8–16)
AST SERPL-CCNC: 18 U/L (ref 10–40)
BILIRUB SERPL-MCNC: 0.4 MG/DL (ref 0.1–1)
BUN SERPL-MCNC: 14 MG/DL (ref 6–20)
CALCIUM SERPL-MCNC: 9.8 MG/DL (ref 8.7–10.5)
CHLORIDE SERPL-SCNC: 107 MMOL/L (ref 95–110)
CO2 SERPL-SCNC: 26 MMOL/L (ref 23–29)
CREAT SERPL-MCNC: 0.8 MG/DL (ref 0.5–1.4)
ERYTHROCYTE [DISTWIDTH] IN BLOOD BY AUTOMATED COUNT: 13.5 % (ref 11.5–14.5)
EST. GFR  (NO RACE VARIABLE): >60 ML/MIN/1.73 M^2
GLUCOSE SERPL-MCNC: 84 MG/DL (ref 70–110)
HCG INTACT+B SERPL-ACNC: <2.4 MIU/ML
HCT VFR BLD AUTO: 39.8 % (ref 37–48.5)
HGB BLD-MCNC: 13.1 G/DL (ref 12–16)
IMM GRANULOCYTES # BLD AUTO: 0.01 K/UL (ref 0–0.04)
MCH RBC QN AUTO: 30.5 PG (ref 27–31)
MCHC RBC AUTO-ENTMCNC: 32.9 G/DL (ref 32–36)
MCV RBC AUTO: 93 FL (ref 82–98)
NEUTROPHILS # BLD AUTO: 1.7 K/UL (ref 1.8–7.7)
PLATELET # BLD AUTO: 317 K/UL (ref 150–450)
PMV BLD AUTO: 10.8 FL (ref 9.2–12.9)
POTASSIUM SERPL-SCNC: 4.7 MMOL/L (ref 3.5–5.1)
PROT SERPL-MCNC: 7.3 G/DL (ref 6–8.4)
RBC # BLD AUTO: 4.3 M/UL (ref 4–5.4)
SODIUM SERPL-SCNC: 142 MMOL/L (ref 136–145)
WBC # BLD AUTO: 3.47 K/UL (ref 3.9–12.7)

## 2024-03-13 PROCEDURE — 80053 COMPREHEN METABOLIC PANEL: CPT | Performed by: NURSE PRACTITIONER

## 2024-03-13 PROCEDURE — 36415 COLL VENOUS BLD VENIPUNCTURE: CPT | Mod: PO | Performed by: NURSE PRACTITIONER

## 2024-03-13 PROCEDURE — 85027 COMPLETE CBC AUTOMATED: CPT | Performed by: NURSE PRACTITIONER

## 2024-03-13 PROCEDURE — 84702 CHORIONIC GONADOTROPIN TEST: CPT | Performed by: NURSE PRACTITIONER

## 2024-03-13 RX ORDER — PROPRANOLOL HYDROCHLORIDE 20 MG/1
TABLET ORAL
Qty: 30 TABLET | Refills: 0 | Status: SHIPPED | OUTPATIENT
Start: 2024-03-13 | End: 2024-05-20

## 2024-03-13 NOTE — TELEPHONE ENCOUNTER
Pt called bc new pain noted on ccc.  Says she has had a headache yesterday and today.  Described as a throbbing headache over her right eye.  No major neuro changes noted.  No fevers.  She took tylenol today with some improvement.  Also notes intermittent heart racing.  Also notes a feeling of throat constriction at that time.  No major sob,  no chest pain.  Feels this may be anxiety/panic related.  She does not want a benzodiazepine if she can avoid it.  Would like something to take as needed.  Will trial propranolol as needed for these episodes.  Will get EKG.  Labs today reviewed and look acceptable.  Recent echo reviewed. Acceptable for chemo tomorrow, but with a mild dip in EF and strain.  Will refer to cards/onc. Pt reports some improvement in rectal pain/swelling.  Seeing colorectal tomorrow.  Using bactroban ointment to open blisters and triple paste as barrier cream.  No further pain to area, but still very red.    Gabrielle Woo, BREN

## 2024-03-13 NOTE — Clinical Note
Can you schedule an EKG for her tomorrow sometime that fits around her other appointments tomorrow.  Thank you.  Gabrielle

## 2024-03-13 NOTE — PROGRESS NOTES
Reviewed with patient.  Cards/onc referral placed.  See telephone encounter from today.    Gabrielle Woo NP

## 2024-03-13 NOTE — Clinical Note
See my note. Just oneyda.  Having a few issues including headaches, palpitations, intermittent feelings of throat constriction (thinks panic attacks, no sob or chest pain, no other allergic symptoms), and hemorrhoid, rectal pain and rash.  She is getting her chemo tomorrow, seeing colorectal tomorrow.  Thanks

## 2024-03-13 NOTE — Clinical Note
Can we get her in for a visit with Dr. De La Rosa sometime soon.  On taxol/herceptin.  Echo overall ok, but with drop in EF and strain.  Thank you.  Gabrielle

## 2024-03-14 ENCOUNTER — INFUSION (OUTPATIENT)
Dept: INFUSION THERAPY | Facility: HOSPITAL | Age: 44
End: 2024-03-14
Payer: COMMERCIAL

## 2024-03-14 ENCOUNTER — OFFICE VISIT (OUTPATIENT)
Dept: SURGERY | Facility: CLINIC | Age: 44
End: 2024-03-14
Payer: COMMERCIAL

## 2024-03-14 ENCOUNTER — PATIENT MESSAGE (OUTPATIENT)
Dept: ADMINISTRATIVE | Facility: OTHER | Age: 44
End: 2024-03-14
Payer: COMMERCIAL

## 2024-03-14 ENCOUNTER — CLINICAL SUPPORT (OUTPATIENT)
Dept: REHABILITATION | Facility: HOSPITAL | Age: 44
End: 2024-03-14

## 2024-03-14 VITALS
HEART RATE: 73 BPM | TEMPERATURE: 98 F | WEIGHT: 110.63 LBS | DIASTOLIC BLOOD PRESSURE: 87 MMHG | SYSTOLIC BLOOD PRESSURE: 155 MMHG | BODY MASS INDEX: 21.72 KG/M2 | RESPIRATION RATE: 16 BRPM | HEIGHT: 60 IN

## 2024-03-14 VITALS
SYSTOLIC BLOOD PRESSURE: 137 MMHG | WEIGHT: 111.44 LBS | BODY MASS INDEX: 21.88 KG/M2 | DIASTOLIC BLOOD PRESSURE: 66 MMHG | RESPIRATION RATE: 19 BRPM | HEIGHT: 60 IN | HEART RATE: 89 BPM | OXYGEN SATURATION: 97 %

## 2024-03-14 DIAGNOSIS — R11.0 CHEMOTHERAPY-INDUCED NAUSEA: Primary | ICD-10-CM

## 2024-03-14 DIAGNOSIS — C50.311 MALIGNANT NEOPLASM OF LOWER-INNER QUADRANT OF RIGHT BREAST OF FEMALE, ESTROGEN RECEPTOR POSITIVE: Primary | ICD-10-CM

## 2024-03-14 DIAGNOSIS — C50.311 MALIGNANT NEOPLASM OF LOWER-INNER QUADRANT OF RIGHT BREAST OF FEMALE, ESTROGEN RECEPTOR POSITIVE: ICD-10-CM

## 2024-03-14 DIAGNOSIS — T45.1X5A CHEMOTHERAPY-INDUCED NAUSEA: Primary | ICD-10-CM

## 2024-03-14 DIAGNOSIS — K64.5 THROMBOSED EXTERNAL HEMORRHOID: Primary | ICD-10-CM

## 2024-03-14 DIAGNOSIS — Z17.0 MALIGNANT NEOPLASM OF LOWER-INNER QUADRANT OF RIGHT BREAST OF FEMALE, ESTROGEN RECEPTOR POSITIVE: ICD-10-CM

## 2024-03-14 DIAGNOSIS — K64.9 HEMORRHOIDS, UNSPECIFIED HEMORRHOID TYPE: ICD-10-CM

## 2024-03-14 DIAGNOSIS — K62.89 RECTAL INFLAMMATION: ICD-10-CM

## 2024-03-14 DIAGNOSIS — Z17.0 MALIGNANT NEOPLASM OF LOWER-INNER QUADRANT OF RIGHT BREAST OF FEMALE, ESTROGEN RECEPTOR POSITIVE: Primary | ICD-10-CM

## 2024-03-14 PROCEDURE — 99203 OFFICE O/P NEW LOW 30 MIN: CPT | Mod: S$GLB,,, | Performed by: SURGERY

## 2024-03-14 PROCEDURE — 97813 ACUP 1/> W/ESTIM 1ST 15 MIN: CPT | Performed by: ACUPUNCTURIST

## 2024-03-14 PROCEDURE — 96367 TX/PROPH/DG ADDL SEQ IV INF: CPT

## 2024-03-14 PROCEDURE — 3078F DIAST BP <80 MM HG: CPT | Mod: CPTII,S$GLB,, | Performed by: SURGERY

## 2024-03-14 PROCEDURE — 1159F MED LIST DOCD IN RCRD: CPT | Mod: CPTII,S$GLB,, | Performed by: SURGERY

## 2024-03-14 PROCEDURE — 96375 TX/PRO/DX INJ NEW DRUG ADDON: CPT

## 2024-03-14 PROCEDURE — 97814 ACUP 1/> W/ESTIM EA ADDL 15: CPT | Performed by: ACUPUNCTURIST

## 2024-03-14 PROCEDURE — 96417 CHEMO IV INFUS EACH ADDL SEQ: CPT

## 2024-03-14 PROCEDURE — 3075F SYST BP GE 130 - 139MM HG: CPT | Mod: CPTII,S$GLB,, | Performed by: SURGERY

## 2024-03-14 PROCEDURE — 99999 PR PBB SHADOW E&M-EST. PATIENT-LVL III: CPT | Mod: PBBFAC,,, | Performed by: SURGERY

## 2024-03-14 PROCEDURE — 3044F HG A1C LEVEL LT 7.0%: CPT | Mod: CPTII,S$GLB,, | Performed by: SURGERY

## 2024-03-14 PROCEDURE — 96413 CHEMO IV INFUSION 1 HR: CPT

## 2024-03-14 PROCEDURE — A4216 STERILE WATER/SALINE, 10 ML: HCPCS | Performed by: INTERNAL MEDICINE

## 2024-03-14 PROCEDURE — 3008F BODY MASS INDEX DOCD: CPT | Mod: CPTII,S$GLB,, | Performed by: SURGERY

## 2024-03-14 PROCEDURE — 63600175 PHARM REV CODE 636 W HCPCS: Performed by: INTERNAL MEDICINE

## 2024-03-14 PROCEDURE — 25000003 PHARM REV CODE 250: Performed by: INTERNAL MEDICINE

## 2024-03-14 RX ORDER — HEPARIN 100 UNIT/ML
500 SYRINGE INTRAVENOUS
Status: DISCONTINUED | OUTPATIENT
Start: 2024-03-14 | End: 2024-03-14 | Stop reason: HOSPADM

## 2024-03-14 RX ORDER — PROCHLORPERAZINE EDISYLATE 5 MG/ML
10 INJECTION INTRAMUSCULAR; INTRAVENOUS ONCE AS NEEDED
Status: DISCONTINUED | OUTPATIENT
Start: 2024-03-14 | End: 2024-03-14 | Stop reason: HOSPADM

## 2024-03-14 RX ORDER — DIPHENHYDRAMINE HYDROCHLORIDE 50 MG/ML
50 INJECTION INTRAMUSCULAR; INTRAVENOUS ONCE AS NEEDED
Status: DISCONTINUED | OUTPATIENT
Start: 2024-03-14 | End: 2024-03-14 | Stop reason: HOSPADM

## 2024-03-14 RX ORDER — EPINEPHRINE 0.3 MG/.3ML
0.3 INJECTION SUBCUTANEOUS ONCE AS NEEDED
Status: DISCONTINUED | OUTPATIENT
Start: 2024-03-14 | End: 2024-03-14 | Stop reason: HOSPADM

## 2024-03-14 RX ORDER — FAMOTIDINE 10 MG/ML
20 INJECTION INTRAVENOUS
Status: COMPLETED | OUTPATIENT
Start: 2024-03-14 | End: 2024-03-14

## 2024-03-14 RX ORDER — SODIUM CHLORIDE 0.9 % (FLUSH) 0.9 %
10 SYRINGE (ML) INJECTION
Status: DISCONTINUED | OUTPATIENT
Start: 2024-03-14 | End: 2024-03-14 | Stop reason: HOSPADM

## 2024-03-14 RX ADMIN — Medication 10 ML: at 11:03

## 2024-03-14 RX ADMIN — DEXAMETHASONE SODIUM PHOSPHATE 20 MG: 4 INJECTION, SOLUTION INTRA-ARTICULAR; INTRALESIONAL; INTRAMUSCULAR; INTRAVENOUS; SOFT TISSUE at 09:03

## 2024-03-14 RX ADMIN — PACLITAXEL 120 MG: 6 INJECTION, SOLUTION INTRAVENOUS at 10:03

## 2024-03-14 RX ADMIN — DIPHENHYDRAMINE HYDROCHLORIDE 50 MG: 50 INJECTION, SOLUTION INTRAMUSCULAR; INTRAVENOUS at 09:03

## 2024-03-14 RX ADMIN — HEPARIN 500 UNITS: 100 SYRINGE at 11:03

## 2024-03-14 RX ADMIN — TRASTUZUMAB-ANNS 106 MG: 420 INJECTION, POWDER, LYOPHILIZED, FOR SOLUTION INTRAVENOUS at 08:03

## 2024-03-14 RX ADMIN — SODIUM CHLORIDE: 9 INJECTION, SOLUTION INTRAVENOUS at 08:03

## 2024-03-14 RX ADMIN — FAMOTIDINE 20 MG: 10 INJECTION INTRAVENOUS at 09:03

## 2024-03-14 NOTE — PLAN OF CARE
0821-Labs , hx, and medications reviewed. Assessment completed. Discussed plan of care with patient. Patient in agreement. Chair reclined and warm blanket and snack offered.

## 2024-03-14 NOTE — PLAN OF CARE
1130-Patient tolerated treatment well. Discharged without complaints or S/S of adverse event.   Instructed to call provider for any questions or concerns.

## 2024-03-14 NOTE — PROGRESS NOTES
Acupuncture Evaluation Note     Name: Tasha Cornejo  [facility] Number: [id number]    Traditional Chinese Medicine (TCM) Diagnosis: Qi Stagnation, Blood Stasis, and Qi Deficiency  Medical Diagnosis:   Encounter Diagnosis   Name Primary?    Malignant neoplasm of lower-inner quadrant of right breast of female, estrogen receptor positive Yes        Evaluation Date: 3/14/2024    Visit #/Visits authorized: self pay, 5    Precautions: Standard and cancer    Subjective     Chief Concern: Breast cancer - 12 rounds of chemo once/week. CINV, CIPN - prevent.     Medical necessity is demonstrated by the following IMPAIRMENTS: Medical Necessity: Decreased quality of life and Nausea and Vomiting              Aggravating Factors:  flexion and extension   Relieving Factors:  stretching/yoga     Symptom Description:     Quality:  Tight  Severity:  1  Frequency:  when active      Treatment     Treatment Principles:  Move qi and blood, nourish qi, calm hernandez    Acupuncture points used:   Ba Ashwin, Ba Taryn, 4 [address]   Bilateral points: ST36, SP9, SP6, KD3, GB34  Unilateral points:   Auricular Treatment:      Needles In: 30  Needles Out: 30  [place] W/ STIM placed: 7:20  Needles W/ STIM removed: 7:50      Assessment     After treatment, patient felt she is tolerating chemo ok, but with some difficulties, monitor progress and continue with care.     Patient prognosis is Good.     Patient will continue to benefit from acupuncture treatment to address the deficits listed in the problem list box on initial evaluation, provide patient family education and to maximize pt's level of independence in the home and community environment.     Patient's spiritual, cultural and educational needs considered and pt agreeable to plan of care and goals.     Anticipated barriers to treatment: none    Plan     Recommend 1 /week for 12 weeks - throughout chemo      Education:  Patient is aware of cumulative benefit of acupuncture

## 2024-03-14 NOTE — PROGRESS NOTES
[facility]S Office Visit History and Physical    Referring Md:   Gabrielle Woo, Np  [address] [place] [address]    SUBJECTIVE:     Chief Complaint: thrombosed external hemorrhoid    History of Present Illness:  The patient is a new patient to this practice.   Course is as follows:  Tasha Cornejo is a 44 y.o. female presents with thrombosed external hemorrhoid and rash.  Noticed the hemorrhoid about 3 weeks ago.  Started with preparation H and witch hazel.  Developed a rash.  Started on anusol and lotrimin.  Rash progressed to blistering and desquamation.  Now using a zinc oxide based barrier cream.  Denies constipation, reports diarrhea.  Has been taking kobe seed and flax seed.      Review of patient's allergies indicates:   Allergen Reactions    Aleve [naproxen sodium] Other (See Comments)     Throat swelling       Past Medical History:   Diagnosis Date    Acid reflux     Chalazion     Malignant neoplasm of lower-inner quadrant of right breast of female, estrogen receptor positive 2023     Past Surgical History:   Procedure Laterality Date    BILATERAL MASTECTOMY Bilateral 2023    Procedure: MASTECTOMY, BILATERAL;  Surgeon: Tasha Mcleod MD;  Location: [facility];  Service: General;  Laterality: Bilateral;  3.5 HOURS / EMAIL SENT  @ 1:03 LK    COLONOSCOPY N/A 2023    Procedure: COLONOSCOPY;  Surgeon: Fabio Rice MD;  Location: [address]);  Service: Endoscopy;  Laterality: N/A;  pt confirmed earlier time  EB    ESOPHAGOGASTRODUODENOSCOPY N/A 2023    Procedure: EGD (ESOPHAGOGASTRODUODENOSCOPY);  Surgeon: Fabio Rice MD;  Location: [address]);  Service: Endoscopy;  Laterality: N/A;  Procedure Timin-4 weeks    referred by Dr. Rice/Danita instructions handed to patient and to portal.  Patient called did not answer- DB    ESOPHAGOGASTRODUODENOSCOPY N/A 10/18/2023    Procedure: EGD (ESOPHAGOGASTRODUODENOSCOPY);  Surgeon: Fabio Rice MD;  Location: [facility]  ENDO (4TH FLR);  Service: Endoscopy;  Laterality: N/A;  ref Ray  timeframe 5-12 weeks   Dr. Rice patient  portal instruction and given to patient jm  10/11-precall complete-MS    INJECTION FOR SENTINEL NODE IDENTIFICATION Right 12/14/2023    Procedure: INJECTION, FOR SENTINEL NODE IDENTIFICATION;  Surgeon: Tasha Mcleod MD;  Location: [facility];  Service: General;  Laterality: Right;    INSERTION OF BREAST TISSUE EXPANDER Bilateral 12/14/2023    Procedure: INSERTION, TISSUE EXPANDER, BREAST;  Surgeon: Allen Damian MD;  Location: [facility] OR;  Service: Plastics;  Laterality: Bilateral;  2 HOURS    INSERTION, CATHETER, TUNNELED Left 12/14/2023    Procedure: INSERTION, CATHETER, TUNNELED;  Surgeon: Tasha Mcleod MD;  Location: [facility] OR;  Service: General;  Laterality: Left;    SENTINEL LYMPH NODE BIOPSY Right 12/14/2023    Procedure: BIOPSY, LYMPH NODE, SENTINEL;  Surgeon: Tasha Mcleod MD;  Location: [facility];  Service: General;  Laterality: Right;     Family History   Problem Relation Age of Onset    Heart failure Paternal Grandmother     Albinism Paternal Grandmother     Alzheimer's disease Paternal Grandmother     Diabetes Paternal Grandfather     Heart failure Paternal Grandfather     Solid tumor Mother         uterine polyps    Other Mother         abnormal cells of cervix    Prostate cancer Father 67        tx: XRT seeds    Cancer Maternal Grandfather         esophageal (abn. cells at 54, tumor at 62)    Cancer Maternal Grandmother 68        mother thinks that a cancer dx was on death cert.    Pancreatic cancer Paternal Uncle 68        subtype unk; tx: unk    Breast cancer Other         dx.late 50s or 60s (tx: unk)    Cancer Other         cervical or ovarian, dx.mid-60s (tx: unk)    Cancer Other         lung, dx.mid-60s    Cancer Other         lung, dx. early 60s    Solid tumor Other         stomach tumor (noncancerous)    Cataracts Neg Hx     Glaucoma Neg Hx     Macular degeneration Neg Hx     Colon cancer  Neg Hx      Social History     Tobacco Use    Smoking status: Never    Smokeless tobacco: Never   Substance Use Topics    Alcohol use: Yes     Comment: rarely    Drug use: No        Review of Systems:  Review of Systems   All other systems reviewed and are negative.    OBJECTIVE:     Vital Signs (Most Recent)  /66 (BP Location: Left arm, Patient Position: Sitting)   Pulse 89   Resp 19   Ht 5' (1.524 m)   Wt 50.5 kg (111 lb 7.1 oz)   SpO2 97%   BMI 21.76 kg/m²     Physical Exam:  A chaperone was present for the anorectal portion of the exam   General: 44 y.o. female in no distress   Neuro: alert and oriented x 4.  Moves all extremities.     HEENT: normocephalic, atraumatic, PERRL, EOMI   Respiratory: respirations are even and unlabored  Cardiac: regular rate and rhythm  Abdomen: soft, NTND   Extremities: Warm dry and intact  Skin: no rashes  Anorectal: resolving external hemorrhoid in the left anterolateral position, mild right and left lateral perianal desquamation without cellulitis, induration or fluctuance, BALJIT deferred     Labs: NA    Imaging: NA      ASSESSMENT/PLAN:     There are no diagnoses linked to this encounter.    44 y.o. female with thrombosed external hemorrhoid and rash     - both hemorrhoid and rash appear to be resolving   - okay to stop all topical medications, recommend barrier cream only at this point for skin protection   - okay for sitz baths for comfort, pat dry gently after   - can try citrucel or fibercon for diarrhea.   - follow up in 4-6 weeks if no improvement     Celeste aCno MD  Staff Surgeon  Colon & Rectal Surgery

## 2024-03-15 ENCOUNTER — PATIENT MESSAGE (OUTPATIENT)
Dept: ADMINISTRATIVE | Facility: OTHER | Age: 44
End: 2024-03-15
Payer: COMMERCIAL

## 2024-03-15 ENCOUNTER — PATIENT MESSAGE (OUTPATIENT)
Dept: HEMATOLOGY/ONCOLOGY | Facility: CLINIC | Age: 44
End: 2024-03-15
Payer: COMMERCIAL

## 2024-03-15 ENCOUNTER — PATIENT MESSAGE (OUTPATIENT)
Dept: GASTROENTEROLOGY | Facility: CLINIC | Age: 44
End: 2024-03-15
Payer: COMMERCIAL

## 2024-03-15 ENCOUNTER — CLINICAL SUPPORT (OUTPATIENT)
Dept: REHABILITATION | Facility: HOSPITAL | Age: 44
End: 2024-03-15
Payer: COMMERCIAL

## 2024-03-15 DIAGNOSIS — R29.898 SHOULDER WEAKNESS: ICD-10-CM

## 2024-03-15 DIAGNOSIS — M25.611 DECREASED ROM OF RIGHT SHOULDER: ICD-10-CM

## 2024-03-15 DIAGNOSIS — F43.29 STRESS AND ADJUSTMENT REACTION: Primary | ICD-10-CM

## 2024-03-15 DIAGNOSIS — R53.81 PHYSICAL DECONDITIONING: ICD-10-CM

## 2024-03-15 NOTE — PROGRESS NOTES
"  [facility]  Occupational Therapy Treatment Note - Therapeutic Yoga Progam    Date: 3/15/2024  Name: Tasha Cornejo  Clinic Number: [id number]    Therapy Diagnosis:   Encounter Diagnoses   Name Primary?    Stress and adjustment reaction Yes    Physical deconditioning     Decreased ROM of right shoulder     Shoulder weakness      Physician: Linda Mcbride MD    Physician Orders: Physician Orders: Eval and Treat   Medical Diagnosis from Referral: Malignant neoplasm of lower-inner quadrant of right breast of female, estrogen receptor positive [C50.311, Z17.0]   Evaluation Date: 11/29/2023  Authorization Period Expiration: 12/31/24  Plan of Care Expiration: 04/29/24  Progress Note Due: 04/124  Visit # / Visits authorized:  5/20      Precautions: Standard and cancer        Time In: 8:45 am  Time Out: 9:30 am  Total Billable Time: 45 minutes    SUBJECTIVE     Pt reports: She has just completed 4/12  weekly chemotherapy sessions .  "I had a bad reaction to the chemotherapy and felt terrible and then has been very stressfull.  I think I had a nervous breakdown".  She was compliant with home exercise program given last session.   Response to previous treatment: I felt good and my pain decerased.  Functional change: I used breathing techniques to assist during the stress.    Pain: 2/10  Location: bilateral breasts due to tissue expanders and decreased ROM and strength of shoulders.     OBJECTIVE     Patient Specific Functional Scale:           Activity 11/29/23  3/1/24         Anxiety and stress 4       4         2.    sleep 3       5         3.   Motivation due depression  5      6         4.             5.             6.             SCORE    4.0    5.3            Total Score = Sum of activity scores / number of activities  Minimum Detectable Change (90% CII) for average score = 2 points  Minimum Detectable Change (90% CI) for single activity score = 3 points    Treatment     Tasha received " the treatments listed below:       Date 2/9/24 2/23/24 3/1/24 3/8/24 3/15/24    Therapeutic Yoga Exercises / Neuromuscular Re-education 30 minutes  30 minutes  30 minutes  PN  30 minutes  15 minutes  minutes   Seated Yoga   chair yoga:  Cat-Cow,forward bend, back bend, twist,    chair yoga:  Cat-Cow,forward bend, back bend, twist,  chair yoga:  Cat-Cow,forward bend, back bend, twist,     Quadruped         Supine Wide footed twist,  Wide footed twist, cat cow, Wide footed twist, cat cow, knee to chest flow,  Wide footed twist, cat cow, knee to chest flow,   twist, and pelvic tilt flow with breath, cat cow, knee to chest flow,     Prone Sphinx/sphinx plank, Sphinx/sphinx plank, Sphinx/sphinx plank,      standing Chair pose, warrior 2, triangle, Chair pose, warrior 2, triangle, Chair pose x 2,  warrior 2, triangle, wide straddle forward fold Chair pose x 2,  wide straddle forward fold              UE exercise for right shoulder ROM and stregth Dowel flexion, sidelying abd., door pulley, flexion and abd.  Flexion and abd PROM in side lying, contract/relax,  Flexion and abd AROM in side lying, contract/relax, 1# sidelying abd x10, ER strength with 1# in sidelying x 10, IR       Self-Care/Home Management  / Therapeutic Activities  15 minutes  15 minutes  15 minutes  15 minutes  30 minutes  minutes            Relaxation techniques DB, body scan DB, body scan DB, body scan DB, body scan, bramari breath DB, body scan, bramari breath    Restorative  Fish and bridge on blocks Fish and bridge on blocks Supine with bolster under hips and legs on chair Supine with bolster under hips and legs on chair, Bridge and bound angle with bolster support Bridge an bound angle with breath flow,prone twist,    Activity Pacing                           Stress Management/Education  - physiology of yoga/meditation and immune health - physiology of yoga/meditation and immune health - physiology of yoga/meditation and immune health - physiology  of yoga/meditation and immune health - physiology of yoga/meditation and immune health                 Patient Education and Home Exercises      Education provided:   - - physiology of yoga/meditation and immune health  - Progress towards goals     Written Home Exercises Provided: yes.  Exercises were reviewed and Tasha was able to demonstrate them prior to the end of the session.  Tasha demonstrated good  understanding of the HEP provided. See EMR under Patient Instructions for exercises provided during therapy sessions.       Assessment       In today's session, Tasha reviewed her yoga HEP consisting of strengthening, stretching, and balance yoga exercise.  She was also introduced to bramari breathing and body scan techniques to assist with relaxation/immune health. Her right shoulder ROM has improved to WNL's.  She tolerated the session well and required maximum verbal and neuromuscular cues in all treatment.     Tasha is progressing well towards her goals and patient prognosis is Good. Patient will continue to benefit from skilled outpatient occupational therapy to address the deficits listed in the problem list on initial evaluation provide pt/family education and to maximize pt's level of independence in the home and community environment. Pt's spiritual, cultural and educational needs considered and pt agreeable to plan of care and goals.    Anticipated barriers to occupational therapy: none    GOALS:    Short Term Goals: 6 weeks      Goal # Goal Status   1 Patient will demonstrate independence with diaphragmatic breathing in sit and supine. met   2 Pt. will identify resource for audio body scan to assist with stress management/immune health progressing   3 Patient will identify 2 new stress coping skills for stress management/immune health. Progressing   4 Patient will identify activity pacing problems.  Patient will then implement new plan for daily activity to increase endurance for ADL's. Progressing      Long  Term Goals: 12 weeks      Goal # Goal Status   1 Patient will demonstrate independence with yoga Home Exercise Program to increase strength and endurance for ADL's. Progressing   2 Patient will demonstrate independence with relaxation techniques to manage stress for immune health. Progressing   3 Patient will verbalize good understanding of stress/immune health relationship.  Progressing   4                  PLAN     Plan of care Certification: 3/15/2024 to 4/29/24.    Outpatient Occupational Therapy 1 times weekly for 12 weeks to include the following interventions: Neuromuscular Re-ed, Patient Education, Self Care, Therapeutic Activities, and Therapeutic Exercise.     Ernestina Romo, OT

## 2024-03-16 ENCOUNTER — PATIENT MESSAGE (OUTPATIENT)
Dept: ADMINISTRATIVE | Facility: OTHER | Age: 44
End: 2024-03-16
Payer: COMMERCIAL

## 2024-03-17 ENCOUNTER — PATIENT MESSAGE (OUTPATIENT)
Dept: ADMINISTRATIVE | Facility: OTHER | Age: 44
End: 2024-03-17
Payer: COMMERCIAL

## 2024-03-18 ENCOUNTER — PATIENT MESSAGE (OUTPATIENT)
Dept: ADMINISTRATIVE | Facility: OTHER | Age: 44
End: 2024-03-18
Payer: COMMERCIAL

## 2024-03-19 ENCOUNTER — PATIENT MESSAGE (OUTPATIENT)
Dept: ADMINISTRATIVE | Facility: OTHER | Age: 44
End: 2024-03-19
Payer: COMMERCIAL

## 2024-03-19 ENCOUNTER — TELEPHONE (OUTPATIENT)
Dept: HEMATOLOGY/ONCOLOGY | Facility: CLINIC | Age: 44
End: 2024-03-19
Payer: COMMERCIAL

## 2024-03-19 NOTE — TELEPHONE ENCOUNTER
Pt called and requested to be scheduled with female psych. MA acknowledged and sent msg to [place].       [place], [place] ext [phone]

## 2024-03-19 NOTE — TELEPHONE ENCOUNTER
Spoke w/ pt in regards to scheduling psych referral per Joanna Dubinsky, PA-C recommendation. Pt approved to schedule w/ Dr. Alireza Wilks for Monday 4/8/2024 @ 1PM. MA informed pt that clinic is located on the 2nd floor of the [facility]. Pt acknowledged.       [place], [place] ext [phone]

## 2024-03-20 ENCOUNTER — LAB VISIT (OUTPATIENT)
Dept: LAB | Facility: HOSPITAL | Age: 44
End: 2024-03-20
Payer: COMMERCIAL

## 2024-03-20 ENCOUNTER — PATIENT MESSAGE (OUTPATIENT)
Dept: ADMINISTRATIVE | Facility: OTHER | Age: 44
End: 2024-03-20
Payer: COMMERCIAL

## 2024-03-20 DIAGNOSIS — Z17.0 MALIGNANT NEOPLASM OF LOWER-INNER QUADRANT OF RIGHT BREAST OF FEMALE, ESTROGEN RECEPTOR POSITIVE: ICD-10-CM

## 2024-03-20 DIAGNOSIS — C50.311 MALIGNANT NEOPLASM OF LOWER-INNER QUADRANT OF RIGHT BREAST OF FEMALE, ESTROGEN RECEPTOR POSITIVE: ICD-10-CM

## 2024-03-20 LAB
ALBUMIN SERPL BCP-MCNC: 4.3 G/DL (ref 3.5–5.2)
ALP SERPL-CCNC: 55 U/L (ref 55–135)
ALT SERPL W/O P-5'-P-CCNC: 19 U/L (ref 10–44)
ANION GAP SERPL CALC-SCNC: 7 MMOL/L (ref 8–16)
AST SERPL-CCNC: 22 U/L (ref 10–40)
BILIRUB SERPL-MCNC: 0.4 MG/DL (ref 0.1–1)
BUN SERPL-MCNC: 14 MG/DL (ref 6–20)
CALCIUM SERPL-MCNC: 10.3 MG/DL (ref 8.7–10.5)
CHLORIDE SERPL-SCNC: 106 MMOL/L (ref 95–110)
CO2 SERPL-SCNC: 27 MMOL/L (ref 23–29)
CREAT SERPL-MCNC: 0.9 MG/DL (ref 0.5–1.4)
ERYTHROCYTE [DISTWIDTH] IN BLOOD BY AUTOMATED COUNT: 14.2 % (ref 11.5–14.5)
EST. GFR  (NO RACE VARIABLE): >60 ML/MIN/1.73 M^2
GLUCOSE SERPL-MCNC: 91 MG/DL (ref 70–110)
HCG INTACT+B SERPL-ACNC: <2.4 MIU/ML
HCT VFR BLD AUTO: 40.6 % (ref 37–48.5)
HGB BLD-MCNC: 13.2 G/DL (ref 12–16)
IMM GRANULOCYTES # BLD AUTO: 0.02 K/UL (ref 0–0.04)
MCH RBC QN AUTO: 30.2 PG (ref 27–31)
MCHC RBC AUTO-ENTMCNC: 32.5 G/DL (ref 32–36)
MCV RBC AUTO: 93 FL (ref 82–98)
NEUTROPHILS # BLD AUTO: 2.7 K/UL (ref 1.8–7.7)
PLATELET # BLD AUTO: 361 K/UL (ref 150–450)
PMV BLD AUTO: 10.7 FL (ref 9.2–12.9)
POTASSIUM SERPL-SCNC: 4.8 MMOL/L (ref 3.5–5.1)
PROT SERPL-MCNC: 7.3 G/DL (ref 6–8.4)
RBC # BLD AUTO: 4.37 M/UL (ref 4–5.4)
SODIUM SERPL-SCNC: 140 MMOL/L (ref 136–145)
WBC # BLD AUTO: 5.12 K/UL (ref 3.9–12.7)

## 2024-03-20 PROCEDURE — 36415 COLL VENOUS BLD VENIPUNCTURE: CPT | Mod: PO | Performed by: NURSE PRACTITIONER

## 2024-03-20 PROCEDURE — 84702 CHORIONIC GONADOTROPIN TEST: CPT | Performed by: NURSE PRACTITIONER

## 2024-03-20 PROCEDURE — 80053 COMPREHEN METABOLIC PANEL: CPT | Performed by: NURSE PRACTITIONER

## 2024-03-20 PROCEDURE — 85027 COMPLETE CBC AUTOMATED: CPT | Performed by: NURSE PRACTITIONER

## 2024-03-21 ENCOUNTER — CLINICAL SUPPORT (OUTPATIENT)
Dept: REHABILITATION | Facility: HOSPITAL | Age: 44
End: 2024-03-21

## 2024-03-21 ENCOUNTER — TELEPHONE (OUTPATIENT)
Dept: PSYCHIATRY | Facility: CLINIC | Age: 44
End: 2024-03-21
Payer: COMMERCIAL

## 2024-03-21 ENCOUNTER — OFFICE VISIT (OUTPATIENT)
Dept: HEMATOLOGY/ONCOLOGY | Facility: CLINIC | Age: 44
End: 2024-03-21
Payer: COMMERCIAL

## 2024-03-21 ENCOUNTER — PATIENT MESSAGE (OUTPATIENT)
Dept: ADMINISTRATIVE | Facility: OTHER | Age: 44
End: 2024-03-21
Payer: COMMERCIAL

## 2024-03-21 ENCOUNTER — INFUSION (OUTPATIENT)
Dept: INFUSION THERAPY | Facility: HOSPITAL | Age: 44
End: 2024-03-21
Payer: COMMERCIAL

## 2024-03-21 VITALS
HEIGHT: 60 IN | DIASTOLIC BLOOD PRESSURE: 95 MMHG | OXYGEN SATURATION: 100 % | HEART RATE: 80 BPM | BODY MASS INDEX: 22.03 KG/M2 | RESPIRATION RATE: 18 BRPM | WEIGHT: 112.19 LBS | SYSTOLIC BLOOD PRESSURE: 161 MMHG

## 2024-03-21 VITALS
HEIGHT: 60 IN | WEIGHT: 112.19 LBS | SYSTOLIC BLOOD PRESSURE: 162 MMHG | BODY MASS INDEX: 22.03 KG/M2 | TEMPERATURE: 98 F | RESPIRATION RATE: 17 BRPM | DIASTOLIC BLOOD PRESSURE: 70 MMHG | OXYGEN SATURATION: 100 % | HEART RATE: 71 BPM

## 2024-03-21 DIAGNOSIS — C50.311 MALIGNANT NEOPLASM OF LOWER-INNER QUADRANT OF RIGHT BREAST OF FEMALE, ESTROGEN RECEPTOR POSITIVE: Primary | ICD-10-CM

## 2024-03-21 DIAGNOSIS — C50.311 MALIGNANT NEOPLASM OF LOWER-INNER QUADRANT OF RIGHT BREAST OF FEMALE, ESTROGEN RECEPTOR POSITIVE: ICD-10-CM

## 2024-03-21 DIAGNOSIS — Z17.0 MALIGNANT NEOPLASM OF LOWER-INNER QUADRANT OF RIGHT BREAST OF FEMALE, ESTROGEN RECEPTOR POSITIVE: Primary | ICD-10-CM

## 2024-03-21 DIAGNOSIS — Z17.0 MALIGNANT NEOPLASM OF LOWER-INNER QUADRANT OF RIGHT BREAST OF FEMALE, ESTROGEN RECEPTOR POSITIVE: ICD-10-CM

## 2024-03-21 DIAGNOSIS — D84.821 IMMUNODEFICIENCY DUE TO DRUGS: ICD-10-CM

## 2024-03-21 DIAGNOSIS — Z79.899 IMMUNODEFICIENCY DUE TO DRUGS: ICD-10-CM

## 2024-03-21 DIAGNOSIS — T45.1X5A CHEMOTHERAPY-INDUCED NAUSEA: Primary | ICD-10-CM

## 2024-03-21 DIAGNOSIS — R11.0 CHEMOTHERAPY-INDUCED NAUSEA: Primary | ICD-10-CM

## 2024-03-21 PROCEDURE — 99215 OFFICE O/P EST HI 40 MIN: CPT | Mod: S$GLB,,, | Performed by: INTERNAL MEDICINE

## 2024-03-21 PROCEDURE — 96417 CHEMO IV INFUS EACH ADDL SEQ: CPT

## 2024-03-21 PROCEDURE — 99999 PR PBB SHADOW E&M-EST. PATIENT-LVL III: CPT | Mod: PBBFAC,,, | Performed by: INTERNAL MEDICINE

## 2024-03-21 PROCEDURE — 97814 ACUP 1/> W/ESTIM EA ADDL 15: CPT | Performed by: ACUPUNCTURIST

## 2024-03-21 PROCEDURE — 3044F HG A1C LEVEL LT 7.0%: CPT | Mod: CPTII,S$GLB,, | Performed by: INTERNAL MEDICINE

## 2024-03-21 PROCEDURE — 3008F BODY MASS INDEX DOCD: CPT | Mod: CPTII,S$GLB,, | Performed by: INTERNAL MEDICINE

## 2024-03-21 PROCEDURE — 3078F DIAST BP <80 MM HG: CPT | Mod: CPTII,S$GLB,, | Performed by: INTERNAL MEDICINE

## 2024-03-21 PROCEDURE — 96375 TX/PRO/DX INJ NEW DRUG ADDON: CPT

## 2024-03-21 PROCEDURE — 25000003 PHARM REV CODE 250: Performed by: INTERNAL MEDICINE

## 2024-03-21 PROCEDURE — 96367 TX/PROPH/DG ADDL SEQ IV INF: CPT

## 2024-03-21 PROCEDURE — 97813 ACUP 1/> W/ESTIM 1ST 15 MIN: CPT | Performed by: ACUPUNCTURIST

## 2024-03-21 PROCEDURE — 3077F SYST BP >= 140 MM HG: CPT | Mod: CPTII,S$GLB,, | Performed by: INTERNAL MEDICINE

## 2024-03-21 PROCEDURE — 63600175 PHARM REV CODE 636 W HCPCS: Performed by: INTERNAL MEDICINE

## 2024-03-21 PROCEDURE — A4216 STERILE WATER/SALINE, 10 ML: HCPCS | Performed by: INTERNAL MEDICINE

## 2024-03-21 PROCEDURE — 96413 CHEMO IV INFUSION 1 HR: CPT

## 2024-03-21 RX ORDER — SODIUM CHLORIDE 0.9 % (FLUSH) 0.9 %
10 SYRINGE (ML) INJECTION
Status: CANCELLED | OUTPATIENT
Start: 2024-03-21

## 2024-03-21 RX ORDER — SODIUM CHLORIDE 0.9 % (FLUSH) 0.9 %
10 SYRINGE (ML) INJECTION
Status: DISCONTINUED | OUTPATIENT
Start: 2024-03-21 | End: 2024-03-21 | Stop reason: HOSPADM

## 2024-03-21 RX ORDER — EPINEPHRINE 0.3 MG/.3ML
0.3 INJECTION SUBCUTANEOUS ONCE AS NEEDED
Status: CANCELLED | OUTPATIENT
Start: 2024-03-21

## 2024-03-21 RX ORDER — HEPARIN 100 UNIT/ML
500 SYRINGE INTRAVENOUS
Status: DISCONTINUED | OUTPATIENT
Start: 2024-03-21 | End: 2024-03-21 | Stop reason: HOSPADM

## 2024-03-21 RX ORDER — FAMOTIDINE 10 MG/ML
20 INJECTION INTRAVENOUS
Status: COMPLETED | OUTPATIENT
Start: 2024-03-21 | End: 2024-03-21

## 2024-03-21 RX ORDER — PROCHLORPERAZINE EDISYLATE 5 MG/ML
10 INJECTION INTRAMUSCULAR; INTRAVENOUS ONCE AS NEEDED
Status: CANCELLED
Start: 2024-03-21

## 2024-03-21 RX ORDER — FAMOTIDINE 10 MG/ML
20 INJECTION INTRAVENOUS
Status: CANCELLED | OUTPATIENT
Start: 2024-03-21

## 2024-03-21 RX ORDER — PROCHLORPERAZINE EDISYLATE 5 MG/ML
10 INJECTION INTRAMUSCULAR; INTRAVENOUS ONCE AS NEEDED
Status: DISCONTINUED | OUTPATIENT
Start: 2024-03-21 | End: 2024-03-21 | Stop reason: HOSPADM

## 2024-03-21 RX ORDER — HEPARIN 100 UNIT/ML
500 SYRINGE INTRAVENOUS
Status: CANCELLED | OUTPATIENT
Start: 2024-03-21

## 2024-03-21 RX ORDER — DIPHENHYDRAMINE HYDROCHLORIDE 50 MG/ML
50 INJECTION INTRAMUSCULAR; INTRAVENOUS ONCE AS NEEDED
Status: CANCELLED | OUTPATIENT
Start: 2024-03-21

## 2024-03-21 RX ORDER — DIPHENHYDRAMINE HYDROCHLORIDE 50 MG/ML
50 INJECTION INTRAMUSCULAR; INTRAVENOUS ONCE AS NEEDED
Status: DISCONTINUED | OUTPATIENT
Start: 2024-03-21 | End: 2024-03-21 | Stop reason: HOSPADM

## 2024-03-21 RX ORDER — EPINEPHRINE 0.3 MG/.3ML
0.3 INJECTION SUBCUTANEOUS ONCE AS NEEDED
Status: DISCONTINUED | OUTPATIENT
Start: 2024-03-21 | End: 2024-03-21 | Stop reason: HOSPADM

## 2024-03-21 RX ADMIN — DEXAMETHASONE SODIUM PHOSPHATE 20 MG: 4 INJECTION, SOLUTION INTRA-ARTICULAR; INTRALESIONAL; INTRAMUSCULAR; INTRAVENOUS; SOFT TISSUE at 11:03

## 2024-03-21 RX ADMIN — SODIUM CHLORIDE, PRESERVATIVE FREE 10 ML: 5 INJECTION INTRAVENOUS at 01:03

## 2024-03-21 RX ADMIN — DIPHENHYDRAMINE HYDROCHLORIDE 50 MG: 50 INJECTION INTRAMUSCULAR; INTRAVENOUS at 11:03

## 2024-03-21 RX ADMIN — FAMOTIDINE 20 MG: 10 INJECTION INTRAVENOUS at 11:03

## 2024-03-21 RX ADMIN — HEPARIN 500 UNITS: 100 SYRINGE at 01:03

## 2024-03-21 RX ADMIN — SODIUM CHLORIDE: 0.9 INJECTION, SOLUTION INTRAVENOUS at 09:03

## 2024-03-21 RX ADMIN — TRAZTUZUMAB-ANNS 106 MG: KIT at 10:03

## 2024-03-21 RX ADMIN — PACLITAXEL 120 MG: 6 INJECTION, SOLUTION INTRAVENOUS at 12:03

## 2024-03-21 NOTE — PROGRESS NOTES
Acupuncture Evaluation Note     Name: Tasha Cornejo  [facility] Number: [id number]    Traditional Chinese Medicine (TCM) Diagnosis: Qi Stagnation, Blood Stasis, and Qi Deficiency  Medical Diagnosis:   Encounter Diagnosis   Name Primary?    Malignant neoplasm of lower-inner quadrant of right breast of female, estrogen receptor positive Yes        Evaluation Date: 3/21/2024    Visit #/Visits authorized: self pay, 6    Precautions: Standard and cancer    Subjective     Chief Concern: Breast cancer - 12 rounds of chemo once/week. CINV, CIPN - prevent.     Medical necessity is demonstrated by the following IMPAIRMENTS: Medical Necessity: Decreased quality of life and Nausea and Vomiting              Aggravating Factors:  flexion and extension   Relieving Factors:  stretching/yoga     Symptom Description:     Quality:  Tight  Severity:  1  Frequency:  when active      Treatment     Treatment Principles:  Move qi and blood, nourish qi, calm hernandez    Acupuncture points used:   Ba Ashwin, Ba Taryn, 4 [address]   Bilateral points: ST36, SP9, SP6, KD3, GB34  Unilateral points:   Auricular Treatment:      Needles In: 30  Needles Out: 30  [place] W/ STIM placed: 7:20  Needles W/ STIM removed: 7:50      Assessment     After treatment, patient felt she is tolerating chemo ok, but with some difficulties, monitor progress and continue with care.     Patient prognosis is Good.     Patient will continue to benefit from acupuncture treatment to address the deficits listed in the problem list box on initial evaluation, provide patient family education and to maximize pt's level of independence in the home and community environment.     Patient's spiritual, cultural and educational needs considered and pt agreeable to plan of care and goals.     Anticipated barriers to treatment: none    Plan     Recommend 1 /week for 12 weeks - throughout chemo      Education:  Patient is aware of cumulative benefit of acupuncture

## 2024-03-21 NOTE — PROGRESS NOTES
[facility]/ [facility]   at [facility] Note:      Chief Complaint:   Encounter Diagnoses   Name Primary?    Malignant neoplasm of lower-inner quadrant of right breast of female, estrogen receptor positive Yes    Immunodeficiency due to drugs         Cancer Staging   Malignant neoplasm of lower-inner quadrant of right breast of female, estrogen receptor positive  Staging form: Breast, AJCC 8th Edition  - Clinical stage from 10/26/2023: Stage IIA (cT2, cN0, cM0, G3, ER+, WI-, HER2+) - Signed by Linda Mcbride MD on 2023  - Pathologic stage from 2023: Stage IA (pT1c, pN0, cM0, G2, ER+, WI-, HER2+) - Signed by Linda Mcbride MD on 2024    HPI:  Tasha Cornejo is a 44 y.o. female who presents today for Cycle 6, day 1 of . She had an infusion reaction with her first taxol dose, but was able to complete therapy with a slower infusion. No further difficulty for cycle #2 or 3.  Looser stool, but no major diarrhea  She has been having some palpitations prior to her last dose. Scheduled for EKG tomorrow. Recent echo  No further palpitations after last dose    Oncology History  Screening mammogram on 10/5/2023 identified coarse heterogeneous calcifications in a linear distribution seen in the lower outer quadrant of the right breast, spanning 1.5 cm.  Diagnostic mammogram on 10/12/2023 showed coarse heterogeneous calcifications in a regional distribution spanning 3.2 cm long axis   Biopsy 10/26/2023 with pathology revealing infiltrating ductal carcinoma of the breast, ER 70%/ WI-/HER2 3+; Ki67 60%    Bilateral mastectomy 23: IDC, grade 2, 1.5cm in maximum; 0/2 LN+    Adjuvant TH 24    GYN History:  Age of menarche was 9.   Age of menopause n/a.  Patient denies hormonal therapy.   Patient is .     Patient Active Problem List   Diagnosis    Rhinitis, allergic    Allergic conjunctivitis    Vitamin D deficiency    Malignant neoplasm of  lower-inner quadrant of right breast of female, estrogen receptor positive    Chemotherapy-induced nausea    At risk for lymphedema    Stress and adjustment reaction    Physical deconditioning    Immunodeficiency due to drugs    Decreased ROM of right shoulder    Shoulder weakness       Current Outpatient Medications   Medication Instructions    fluticasone propionate (FLONASE) 50 mcg/actuation nasal spray 1 spray, Each Nostril, Daily PRN    hydrocortisone 2.5 % cream Topical (Top), 2 times daily    LIDOcaine-prilocaine (EMLA) cream Apply topically to port one hour prior to chemotherapy infusion    multivitamin (THERAGRAN) per tablet 1 tablet, Oral, Daily    mupirocin (BACTROBAN) 2 % ointment Topical (Top), 3 times daily    omeprazole (PRILOSEC) 40 mg, Oral, Every morning    ondansetron (ZOFRAN) 8 mg, Oral, Every 8 hours PRN    prochlorperazine (COMPAZINE) 10 mg, Oral, Every 6 hours PRN    propranoloL (INDERAL) 20 MG tablet Take one tablet by mouth daily as needed for anxiety/panic attack       Review of Systems:   Review of Systems   Respiratory:  Negative for cough and shortness of breath.    Cardiovascular:  Negative for chest pain.   Gastrointestinal:  Positive for diarrhea. Negative for abdominal pain.   Genitourinary:  Positive for dysuria. Negative for frequency.   Musculoskeletal:  Negative for back pain.   Skin:  Negative for rash.   Neurological:  Negative for headaches.   Psychiatric/Behavioral:  The patient is nervous/anxious.    All other systems reviewed and are negative.      PHYSICAL EXAM:  BP (!) 162/70   Pulse 71   Temp 98 °F (36.7 °C) (Oral)   Resp 17   Ht 5' (1.524 m)   Wt 50.9 kg (112 lb 3.4 oz)   SpO2 100%   BMI 21.92 kg/m²     Physical Exam  Constitutional:       General: She is not in acute distress.     Appearance: Normal appearance. She is not ill-appearing.   HENT:      Head: Normocephalic and atraumatic.      Mouth/Throat:      Pharynx: No oropharyngeal exudate or posterior  oropharyngeal erythema.   Eyes:      Extraocular Movements: Extraocular movements intact.   Cardiovascular:      Rate and Rhythm: Normal rate and regular rhythm.      Pulses: Normal pulses.      Heart sounds: Normal heart sounds.   Pulmonary:      Effort: Pulmonary effort is normal. No respiratory distress.      Breath sounds: Normal breath sounds.   Chest:      Comments: S/p bilateral mastectomies, incisions well healed  Abdominal:      General: Bowel sounds are normal. There is no distension.      Palpations: Abdomen is soft. There is no mass.      Tenderness: There is no abdominal tenderness.      Hernia: No hernia is present.   Musculoskeletal:      Cervical back: Normal range of motion.   Skin:     General: Skin is warm and dry.   Neurological:      General: No focal deficit present.      Mental Status: She is alert and oriented to person, place, and time.       Pertinent Labs & Imaging:  Pathology Results  (Last 30 days)      None            No results found for this or any previous visit (from the past 24 hour(s)).    Assessment & Plan:  1. Malignant neoplasm of lower-inner quadrant of right breast of female, estrogen receptor positive    2. Immunodeficiency due to drugs    Final pathology T1cN0  Reviewed echo and labs, ok for treatment today  Continue increased decadron premed at 20 mg today, start taxol at 1/4 rate again given reaction with cycle 1  Discussed palpitations. Remain hydrated  Will follow up on EKG tomorrow  Cardio onc appt in April     Follow up in 2 weeks with deana Anthony weekly      Route Chart for Scheduling    Med Onc Chart Routing      Follow up with physician 4 weeks and 2 months.   Follow up with BHARGAVI 2 weeks and 6 weeks.   Infusion scheduling note    Injection scheduling note    Labs    Imaging    Pharmacy appointment    Other referrals                Treatment Plan Information   OP BREAST TRASTUZUMAB PACLITAXEL WEEKLY   Linda Mcbride MD   Upcoming Treatment Dates - OP BREAST  TRASTUZUMAB PACLITAXEL WEEKLY    3/28/2024       Pre-Medications       diphenhydrAMINE (BENADRYL) 50 mg in sodium chloride 0.9% 50 mL IVPB       dexAMETHasone 20 mg in sodium chloride 0.9% 50 mL IVPB       famotidine (PF) injection 20 mg       Chemotherapy       trastuzumab-anns (KANJINTI) 106 mg in sodium chloride 0.9% 250 mL chemo infusion       PACLitaxeL (TAXOL) 80 mg/m2 = 120 mg in sodium chloride 0.9% 250 mL chemo infusion  4/4/2024       Pre-Medications       diphenhydrAMINE (BENADRYL) 50 mg in sodium chloride 0.9% 50 mL IVPB       dexAMETHasone 20 mg in sodium chloride 0.9% 50 mL IVPB       famotidine (PF) injection 20 mg       Chemotherapy       trastuzumab-anns (KANJINTI) 106 mg in sodium chloride 0.9% 250 mL chemo infusion       PACLitaxeL (TAXOL) 80 mg/m2 = 120 mg in sodium chloride 0.9% 250 mL chemo infusion  4/11/2024       Pre-Medications       diphenhydrAMINE (BENADRYL) 50 mg in sodium chloride 0.9% 50 mL IVPB       dexAMETHasone 20 mg in sodium chloride 0.9% 50 mL IVPB       famotidine (PF) injection 20 mg       Chemotherapy       trastuzumab-anns (KANJINTI) 106 mg in sodium chloride 0.9% 250 mL chemo infusion       PACLitaxeL (TAXOL) 80 mg/m2 = 120 mg in sodium chloride 0.9% 250 mL chemo infusion  4/18/2024       Pre-Medications       diphenhydrAMINE (BENADRYL) 50 mg in sodium chloride 0.9% 50 mL IVPB       dexAMETHasone 20 mg in sodium chloride 0.9% 50 mL IVPB       famotidine (PF) injection 20 mg       Chemotherapy       trastuzumab-anns (KANJINTI) 106 mg in sodium chloride 0.9% 250 mL chemo infusion       PACLitaxeL (TAXOL) 80 mg/m2 = 120 mg in sodium chloride 0.9% 250 mL chemo infusion    MDM includes  :    - Acute or chronic illness or injury that poses a threat to life or bodily function  - Independent review and explanation of 2 results from unique tests  - Discussion of management and ordering 2 unique tests  - Extensive discussion of treatment and management  - Prescription drug  management  - Drug therapy requiring intensive monitoring for toxicity    Linda Mcbride MD   03/21/2024

## 2024-03-21 NOTE — PLAN OF CARE
Pt received Kanjinti & Taxol today and tolerated well, without complications. Educated patient about Kanjinti & Taxol (indications, side effects, possible reactions, chemotherapy precautions) and verbalized understanding.  VSS. CW port positive for blood return, saline flushed, Heparin flush instilled to dwell and de accessed prior to DC. Pt DC with no distress noted, ambulated off unit, w/o event, w/ fx, pleased.

## 2024-03-22 ENCOUNTER — CLINICAL SUPPORT (OUTPATIENT)
Dept: REHABILITATION | Facility: HOSPITAL | Age: 44
End: 2024-03-22
Payer: COMMERCIAL

## 2024-03-22 ENCOUNTER — PATIENT MESSAGE (OUTPATIENT)
Dept: HEMATOLOGY/ONCOLOGY | Facility: CLINIC | Age: 44
End: 2024-03-22
Payer: COMMERCIAL

## 2024-03-22 ENCOUNTER — PATIENT MESSAGE (OUTPATIENT)
Dept: ADMINISTRATIVE | Facility: OTHER | Age: 44
End: 2024-03-22
Payer: COMMERCIAL

## 2024-03-22 ENCOUNTER — HOSPITAL ENCOUNTER (OUTPATIENT)
Dept: CARDIOLOGY | Facility: CLINIC | Age: 44
Discharge: HOME | End: 2024-03-22
Payer: COMMERCIAL

## 2024-03-22 DIAGNOSIS — F43.29 STRESS AND ADJUSTMENT REACTION: Primary | ICD-10-CM

## 2024-03-22 DIAGNOSIS — R53.81 PHYSICAL DECONDITIONING: ICD-10-CM

## 2024-03-22 DIAGNOSIS — R00.0 TACHYCARDIA: ICD-10-CM

## 2024-03-22 DIAGNOSIS — M25.611 DECREASED ROM OF RIGHT SHOULDER: ICD-10-CM

## 2024-03-22 DIAGNOSIS — R29.898 SHOULDER WEAKNESS: ICD-10-CM

## 2024-03-22 LAB
OHS QRS DURATION: 70 MS
OHS QTC CALCULATION: 410 MS

## 2024-03-22 PROCEDURE — 93010 ELECTROCARDIOGRAM REPORT: CPT | Mod: S$GLB,,, | Performed by: INTERNAL MEDICINE

## 2024-03-22 PROCEDURE — 97535 SELF CARE MNGMENT TRAINING: CPT

## 2024-03-22 PROCEDURE — 93005 ELECTROCARDIOGRAM TRACING: CPT | Mod: S$GLB,,, | Performed by: NURSE PRACTITIONER

## 2024-03-22 PROCEDURE — 97112 NEUROMUSCULAR REEDUCATION: CPT

## 2024-03-22 PROCEDURE — 97110 THERAPEUTIC EXERCISES: CPT

## 2024-03-22 NOTE — PROGRESS NOTES
[facility]  Occupational Therapy Treatment Note - Therapeutic Yoga Progam    Date: 3/22/2024  Name: Tasha Cornejo  Clinic Number: [id number]    Therapy Diagnosis:   Encounter Diagnoses   Name Primary?    Stress and adjustment reaction Yes    Physical deconditioning     Decreased ROM of right shoulder     Shoulder weakness      Physician: Linda Mcbride MD    Physician Orders: Physician Orders: Eval and Treat   Medical Diagnosis from Referral: Malignant neoplasm of lower-inner quadrant of right breast of female, estrogen receptor positive [C50.311, Z17.0]   Evaluation Date: 11/29/2023  Authorization Period Expiration: 12/31/24  Plan of Care Expiration: 04/29/24  Progress Note Due: 04/124  Visit # / Visits authorized:  6/20      Precautions: Standard and cancer        Time In: 8:45 am  Time Out: 9:30 am  Total Billable Time: 45 minutes    SUBJECTIVE     Pt reports: She has just completed 8/12  weekly chemotherapy sessions.  I feel much better.  She was compliant with home exercise program given last session.   Response to previous treatment: I felt good and my pain decerased.  Functional change: relaxation helps with work stress.    Pain: 2/10  Location: bilateral breasts due to tissue expanders and decreased ROM and strength of shoulders.     OBJECTIVE     Patient Specific Functional Scale:           Activity 11/29/23  3/1/24         Anxiety and stress 4       4         2.    sleep 3       5         3.   Motivation due depression  5      6         4.             5.             6.             SCORE    4.0    5.3            Total Score = Sum of activity scores / number of activities  Minimum Detectable Change (90% CII) for average score = 2 points  Minimum Detectable Change (90% CI) for single activity score = 3 points    Treatment     Tasha received the treatments listed below:       Date 2/9/24 2/23/24 3/1/24 3/8/24 3/15/24 3/22/24   Therapeutic Yoga Exercises / Neuromuscular  Patient came into the clinic today for Reclast infusion, IV started infused over 15 min.  Tolerated well-IV d/c upon completion.   Re-education 30 minutes  30 minutes  30 minutes  PN  30 minutes  15 minutes  25  minutes   Seated Yoga   chair yoga:  Cat-Cow,forward bend, back bend, twist,    chair yoga:  Cat-Cow,forward bend, back bend, twist,  chair yoga:  Cat-Cow,forward bend, back bend, twist,  On bolster and raised mat:  Cat-Cow,forward bend, back bend, twist, bilateral forward fold, unilateral forward fold, bound angle, backbend x 4     Quadruped         Supine Wide footed twist,  Wide footed twist, cat cow, Wide footed twist, cat cow, knee to chest flow,  Wide footed twist, cat cow, knee to chest flow,   twist, and pelvic tilt flow with breath, cat cow, knee to chest flow,     Prone Sphinx/sphinx plank, Sphinx/sphinx plank, Sphinx/sphinx plank,      standing Chair pose, warrior 2, triangle, Chair pose, warrior 2, triangle, Chair pose x 2,  warrior 2, triangle, wide straddle forward fold Chair pose x 2,  wide straddle forward fold  Chair pose x 2,  warrior 2, triangle,             UE exercise for right shoulder ROM and stregth Dowel flexion, sidelying abd., door pulley, flexion and abd.  Flexion and abd PROM in side lying, contract/relax,  Flexion and abd AROM in side lying, contract/relax, 1# sidelying abd x10, ER strength with 1# in sidelying x 10, IR       Self-Care/Home Management  / Therapeutic Activities  15 minutes  15 minutes  15 minutes  15 minutes  30 minutes  20 minutes            Relaxation techniques DB, body scan DB, body scan DB, body scan DB, body scan, bramari breath DB, body scan, bramari breath DB, body scan, bramari breath, ujaii breath, central channel sweep aligned with breath   Restorative  Fish and bridge on blocks Fish and bridge on blocks Supine with bolster under hips and legs on chair Supine with bolster under hips and legs on chair, Bridge and bound angle with bolster support Bridge an bound angle with breath flow,prone twist, Siddasana, virasan sitting position for breath and meditation   Activity Pacing                            Stress Management/Education  - physiology of yoga/meditation and immune health - physiology of yoga/meditation and immune health - physiology of yoga/meditation and immune health - physiology of yoga/meditation and immune health - physiology of yoga/meditation and immune health - physiology of yoga/meditation and immune health                Patient Education and Home Exercises      Education provided:   - - physiology of yoga/meditation and immune health  - Progress towards goals     Written Home Exercises Provided: yes.  Exercises were reviewed and Tasha was able to demonstrate them prior to the end of the session.  Tasha demonstrated good  understanding of the HEP provided. See EMR under Patient Instructions for exercises provided during therapy sessions.       Assessment       In today's session, Tasha reviewed her yoga HEP consisting of strengthening, stretching, and balance yoga exercise.  She was also introduced to bramari breathing and body scan techniques to assist with relaxation/immune health. Her right shoulder ROM has improved to WNL's.  She tolerated the session well and required maximum verbal and neuromuscular cues in all treatment.     Tasha is progressing well towards her goals and patient prognosis is Good. Patient will continue to benefit from skilled outpatient occupational therapy to address the deficits listed in the problem list on initial evaluation provide pt/family education and to maximize pt's level of independence in the home and community environment. Pt's spiritual, cultural and educational needs considered and pt agreeable to plan of care and goals.    Anticipated barriers to occupational therapy: none    GOALS:    Short Term Goals: 6 weeks      Goal # Goal Status   1 Patient will demonstrate independence with diaphragmatic breathing in sit and supine. met   2 Pt. will identify resource for audio body scan to assist with stress management/immune health progressing   3  Patient will identify 2 new stress coping skills for stress management/immune health. Progressing   4 Patient will identify activity pacing problems.  Patient will then implement new plan for daily activity to increase endurance for ADL's. Progressing      Long Term Goals: 12 weeks      Goal # Goal Status   1 Patient will demonstrate independence with yoga Home Exercise Program to increase strength and endurance for ADL's. Progressing   2 Patient will demonstrate independence with relaxation techniques to manage stress for immune health. Progressing   3 Patient will verbalize good understanding of stress/immune health relationship.  Progressing   4                  PLAN     Plan of care Certification: 3/22/2024 to 4/29/24.    Outpatient Occupational Therapy 1 times weekly for 12 weeks to include the following interventions: Neuromuscular Re-ed, Patient Education, Self Care, Therapeutic Activities, and Therapeutic Exercise.     Ernestina Romo OT

## 2024-03-23 ENCOUNTER — PATIENT MESSAGE (OUTPATIENT)
Dept: ADMINISTRATIVE | Facility: OTHER | Age: 44
End: 2024-03-23
Payer: COMMERCIAL

## 2024-03-24 ENCOUNTER — PATIENT MESSAGE (OUTPATIENT)
Dept: ADMINISTRATIVE | Facility: OTHER | Age: 44
End: 2024-03-24
Payer: COMMERCIAL

## 2024-03-25 ENCOUNTER — PATIENT MESSAGE (OUTPATIENT)
Dept: ADMINISTRATIVE | Facility: OTHER | Age: 44
End: 2024-03-25
Payer: COMMERCIAL

## 2024-03-25 RX ORDER — FAMOTIDINE 10 MG/ML
20 INJECTION INTRAVENOUS
Status: CANCELLED | OUTPATIENT
Start: 2024-03-28

## 2024-03-25 RX ORDER — SODIUM CHLORIDE 0.9 % (FLUSH) 0.9 %
10 SYRINGE (ML) INJECTION
Status: CANCELLED | OUTPATIENT
Start: 2024-03-28

## 2024-03-25 RX ORDER — HEPARIN 100 UNIT/ML
500 SYRINGE INTRAVENOUS
Status: CANCELLED | OUTPATIENT
Start: 2024-03-28

## 2024-03-25 RX ORDER — PROCHLORPERAZINE EDISYLATE 5 MG/ML
10 INJECTION INTRAMUSCULAR; INTRAVENOUS ONCE AS NEEDED
Status: CANCELLED
Start: 2024-03-28

## 2024-03-25 RX ORDER — EPINEPHRINE 0.3 MG/.3ML
0.3 INJECTION SUBCUTANEOUS ONCE AS NEEDED
Status: CANCELLED | OUTPATIENT
Start: 2024-03-28

## 2024-03-25 RX ORDER — DIPHENHYDRAMINE HYDROCHLORIDE 50 MG/ML
50 INJECTION INTRAMUSCULAR; INTRAVENOUS ONCE AS NEEDED
Status: CANCELLED | OUTPATIENT
Start: 2024-03-28

## 2024-03-27 ENCOUNTER — PATIENT MESSAGE (OUTPATIENT)
Dept: ADMINISTRATIVE | Facility: OTHER | Age: 44
End: 2024-03-27
Payer: COMMERCIAL

## 2024-03-27 ENCOUNTER — LAB VISIT (OUTPATIENT)
Dept: LAB | Facility: HOSPITAL | Age: 44
End: 2024-03-27

## 2024-03-27 ENCOUNTER — CLINICAL SUPPORT (OUTPATIENT)
Dept: REHABILITATION | Facility: HOSPITAL | Age: 44
End: 2024-03-27

## 2024-03-27 DIAGNOSIS — Z17.0 MALIGNANT NEOPLASM OF LOWER-INNER QUADRANT OF RIGHT BREAST OF FEMALE, ESTROGEN RECEPTOR POSITIVE: ICD-10-CM

## 2024-03-27 DIAGNOSIS — C50.311 MALIGNANT NEOPLASM OF LOWER-INNER QUADRANT OF RIGHT BREAST OF FEMALE, ESTROGEN RECEPTOR POSITIVE: Primary | ICD-10-CM

## 2024-03-27 DIAGNOSIS — Z17.0 MALIGNANT NEOPLASM OF LOWER-INNER QUADRANT OF RIGHT BREAST OF FEMALE, ESTROGEN RECEPTOR POSITIVE: Primary | ICD-10-CM

## 2024-03-27 DIAGNOSIS — C50.311 MALIGNANT NEOPLASM OF LOWER-INNER QUADRANT OF RIGHT BREAST OF FEMALE, ESTROGEN RECEPTOR POSITIVE: ICD-10-CM

## 2024-03-27 LAB
ALBUMIN SERPL BCP-MCNC: 4.1 G/DL (ref 3.5–5.2)
ALP SERPL-CCNC: 55 U/L (ref 55–135)
ALT SERPL W/O P-5'-P-CCNC: 17 U/L (ref 10–44)
ANION GAP SERPL CALC-SCNC: 7 MMOL/L (ref 8–16)
AST SERPL-CCNC: 18 U/L (ref 10–40)
BILIRUB SERPL-MCNC: 0.5 MG/DL (ref 0.1–1)
BUN SERPL-MCNC: 12 MG/DL (ref 6–20)
CALCIUM SERPL-MCNC: 9.8 MG/DL (ref 8.7–10.5)
CHLORIDE SERPL-SCNC: 104 MMOL/L (ref 95–110)
CO2 SERPL-SCNC: 29 MMOL/L (ref 23–29)
CREAT SERPL-MCNC: 0.8 MG/DL (ref 0.5–1.4)
ERYTHROCYTE [DISTWIDTH] IN BLOOD BY AUTOMATED COUNT: 14 % (ref 11.5–14.5)
EST. GFR  (NO RACE VARIABLE): >60 ML/MIN/1.73 M^2
GLUCOSE SERPL-MCNC: 96 MG/DL (ref 70–110)
HCG INTACT+B SERPL-ACNC: <2.4 MIU/ML
HCT VFR BLD AUTO: 39.4 % (ref 37–48.5)
HGB BLD-MCNC: 12.8 G/DL (ref 12–16)
IMM GRANULOCYTES # BLD AUTO: 0.04 K/UL (ref 0–0.04)
MCH RBC QN AUTO: 30.6 PG (ref 27–31)
MCHC RBC AUTO-ENTMCNC: 32.5 G/DL (ref 32–36)
MCV RBC AUTO: 94 FL (ref 82–98)
NEUTROPHILS # BLD AUTO: 4.1 K/UL (ref 1.8–7.7)
PLATELET # BLD AUTO: 348 K/UL (ref 150–450)
PMV BLD AUTO: 10.7 FL (ref 9.2–12.9)
POTASSIUM SERPL-SCNC: 4 MMOL/L (ref 3.5–5.1)
PROT SERPL-MCNC: 7 G/DL (ref 6–8.4)
RBC # BLD AUTO: 4.18 M/UL (ref 4–5.4)
SODIUM SERPL-SCNC: 140 MMOL/L (ref 136–145)
WBC # BLD AUTO: 5.32 K/UL (ref 3.9–12.7)

## 2024-03-27 PROCEDURE — 97814 ACUP 1/> W/ESTIM EA ADDL 15: CPT | Performed by: ACUPUNCTURIST

## 2024-03-27 PROCEDURE — 84702 CHORIONIC GONADOTROPIN TEST: CPT | Performed by: NURSE PRACTITIONER

## 2024-03-27 PROCEDURE — 97813 ACUP 1/> W/ESTIM 1ST 15 MIN: CPT | Performed by: ACUPUNCTURIST

## 2024-03-27 PROCEDURE — 80053 COMPREHEN METABOLIC PANEL: CPT | Performed by: NURSE PRACTITIONER

## 2024-03-27 PROCEDURE — 36415 COLL VENOUS BLD VENIPUNCTURE: CPT | Mod: PO | Performed by: NURSE PRACTITIONER

## 2024-03-27 PROCEDURE — 85027 COMPLETE CBC AUTOMATED: CPT | Performed by: NURSE PRACTITIONER

## 2024-03-28 ENCOUNTER — PATIENT MESSAGE (OUTPATIENT)
Dept: ADMINISTRATIVE | Facility: OTHER | Age: 44
End: 2024-03-28
Payer: COMMERCIAL

## 2024-03-28 ENCOUNTER — INFUSION (OUTPATIENT)
Dept: INFUSION THERAPY | Facility: HOSPITAL | Age: 44
End: 2024-03-28
Payer: COMMERCIAL

## 2024-03-28 VITALS
TEMPERATURE: 98 F | HEART RATE: 81 BPM | SYSTOLIC BLOOD PRESSURE: 152 MMHG | DIASTOLIC BLOOD PRESSURE: 92 MMHG | WEIGHT: 111.75 LBS | BODY MASS INDEX: 21.94 KG/M2 | RESPIRATION RATE: 18 BRPM | HEIGHT: 60 IN

## 2024-03-28 DIAGNOSIS — Z17.0 MALIGNANT NEOPLASM OF LOWER-INNER QUADRANT OF RIGHT BREAST OF FEMALE, ESTROGEN RECEPTOR POSITIVE: ICD-10-CM

## 2024-03-28 DIAGNOSIS — R11.0 CHEMOTHERAPY-INDUCED NAUSEA: Primary | ICD-10-CM

## 2024-03-28 DIAGNOSIS — C50.311 MALIGNANT NEOPLASM OF LOWER-INNER QUADRANT OF RIGHT BREAST OF FEMALE, ESTROGEN RECEPTOR POSITIVE: ICD-10-CM

## 2024-03-28 DIAGNOSIS — T45.1X5A CHEMOTHERAPY-INDUCED NAUSEA: Primary | ICD-10-CM

## 2024-03-28 PROCEDURE — 25000003 PHARM REV CODE 250: Performed by: INTERNAL MEDICINE

## 2024-03-28 PROCEDURE — 96417 CHEMO IV INFUS EACH ADDL SEQ: CPT

## 2024-03-28 PROCEDURE — 96375 TX/PRO/DX INJ NEW DRUG ADDON: CPT

## 2024-03-28 PROCEDURE — 96367 TX/PROPH/DG ADDL SEQ IV INF: CPT

## 2024-03-28 PROCEDURE — 63600175 PHARM REV CODE 636 W HCPCS: Performed by: INTERNAL MEDICINE

## 2024-03-28 PROCEDURE — A4216 STERILE WATER/SALINE, 10 ML: HCPCS | Performed by: INTERNAL MEDICINE

## 2024-03-28 PROCEDURE — 96413 CHEMO IV INFUSION 1 HR: CPT

## 2024-03-28 RX ORDER — HEPARIN 100 UNIT/ML
500 SYRINGE INTRAVENOUS
Status: DISCONTINUED | OUTPATIENT
Start: 2024-03-28 | End: 2024-03-28 | Stop reason: HOSPADM

## 2024-03-28 RX ORDER — DIPHENHYDRAMINE HYDROCHLORIDE 50 MG/ML
50 INJECTION INTRAMUSCULAR; INTRAVENOUS ONCE AS NEEDED
Status: DISCONTINUED | OUTPATIENT
Start: 2024-03-28 | End: 2024-03-28 | Stop reason: HOSPADM

## 2024-03-28 RX ORDER — EPINEPHRINE 0.3 MG/.3ML
0.3 INJECTION SUBCUTANEOUS ONCE AS NEEDED
Status: DISCONTINUED | OUTPATIENT
Start: 2024-03-28 | End: 2024-03-28 | Stop reason: HOSPADM

## 2024-03-28 RX ORDER — FAMOTIDINE 10 MG/ML
20 INJECTION INTRAVENOUS
Status: COMPLETED | OUTPATIENT
Start: 2024-03-28 | End: 2024-03-28

## 2024-03-28 RX ORDER — PROCHLORPERAZINE EDISYLATE 5 MG/ML
10 INJECTION INTRAMUSCULAR; INTRAVENOUS ONCE AS NEEDED
Status: DISCONTINUED | OUTPATIENT
Start: 2024-03-28 | End: 2024-03-28 | Stop reason: HOSPADM

## 2024-03-28 RX ORDER — SODIUM CHLORIDE 0.9 % (FLUSH) 0.9 %
10 SYRINGE (ML) INJECTION
Status: DISCONTINUED | OUTPATIENT
Start: 2024-03-28 | End: 2024-03-28 | Stop reason: HOSPADM

## 2024-03-28 RX ADMIN — DIPHENHYDRAMINE HYDROCHLORIDE 50 MG: 50 INJECTION, SOLUTION INTRAMUSCULAR; INTRAVENOUS at 10:03

## 2024-03-28 RX ADMIN — FAMOTIDINE 20 MG: 10 INJECTION INTRAVENOUS at 09:03

## 2024-03-28 RX ADMIN — HEPARIN 500 UNITS: 100 SYRINGE at 11:03

## 2024-03-28 RX ADMIN — SODIUM CHLORIDE: 9 INJECTION, SOLUTION INTRAVENOUS at 08:03

## 2024-03-28 RX ADMIN — Medication 10 ML: at 11:03

## 2024-03-28 RX ADMIN — TRASTUZUMAB-ANNS 106 MG: 420 INJECTION, POWDER, LYOPHILIZED, FOR SOLUTION INTRAVENOUS at 09:03

## 2024-03-28 RX ADMIN — PACLITAXEL 120 MG: 6 INJECTION, SOLUTION INTRAVENOUS at 10:03

## 2024-03-28 RX ADMIN — DEXAMETHASONE SODIUM PHOSPHATE 20 MG: 4 INJECTION, SOLUTION INTRA-ARTICULAR; INTRALESIONAL; INTRAMUSCULAR; INTRAVENOUS; SOFT TISSUE at 09:03

## 2024-03-28 NOTE — PROGRESS NOTES
Acupuncture Evaluation Note     Name: Tasha Cornejo  [facility] Number: [id number]    Traditional Chinese Medicine (TCM) Diagnosis: Qi Stagnation, Blood Stasis, and Qi Deficiency  Medical Diagnosis:   Encounter Diagnosis   Name Primary?    Malignant neoplasm of lower-inner quadrant of right breast of female, estrogen receptor positive Yes        Evaluation Date: 3/28/2024    Visit #/Visits authorized: self pay, 7    Precautions: Standard and cancer    Subjective     Chief Concern: Breast cancer - 12 rounds of chemo once/week. CINV, CIPN - prevent.     Medical necessity is demonstrated by the following IMPAIRMENTS: Medical Necessity: Decreased quality of life and Nausea and Vomiting              Aggravating Factors:  flexion and extension   Relieving Factors:  stretching/yoga     Symptom Description:     Quality:  Tight  Severity:  1  Frequency:  when active      Treatment     Treatment Principles:  Move qi and blood, nourish qi, calm hernandez    Acupuncture points used:   Ba Ashwin, Ba Taryn, 4 Hill   Bilateral points: ST36, SP9, SP6, KD3, GB34  Unilateral points:   Auricular Treatment:      Needles In: 30  Needles Out: 30  Clinton W/ STIM placed: 11:20  Needles W/ STIM removed: 11:50      Assessment     After treatment, patient felt she is tolerating chemo ok, monitor progress and continue with care.     Patient prognosis is Good.     Patient will continue to benefit from acupuncture treatment to address the deficits listed in the problem list box on initial evaluation, provide patient family education and to maximize pt's level of independence in the home and community environment.     Patient's spiritual, cultural and educational needs considered and pt agreeable to plan of care and goals.     Anticipated barriers to treatment: none    Plan     Recommend 1 /week for 12 weeks - throughout chemo      Education:  Patient is aware of cumulative benefit of acupuncture

## 2024-03-28 NOTE — PLAN OF CARE
0836-Labs , hx, and medications reviewed. Assessment completed. Discussed plan of care with patient. Patient in agreement. Chair reclined and warm blanket and snack offered.

## 2024-03-28 NOTE — PLAN OF CARE
1141-Patient tolerated treatment well. Discharged without complaints or S/S of adverse event.   Instructed to call provider for any questions or concerns.

## 2024-03-29 ENCOUNTER — PATIENT MESSAGE (OUTPATIENT)
Dept: ADMINISTRATIVE | Facility: OTHER | Age: 44
End: 2024-03-29
Payer: COMMERCIAL

## 2024-04-03 ENCOUNTER — PATIENT MESSAGE (OUTPATIENT)
Dept: ADMINISTRATIVE | Facility: OTHER | Age: 44
End: 2024-04-03
Payer: COMMERCIAL

## 2024-04-03 ENCOUNTER — LAB VISIT (OUTPATIENT)
Dept: LAB | Facility: HOSPITAL | Age: 44
End: 2024-04-03
Payer: COMMERCIAL

## 2024-04-03 DIAGNOSIS — Z17.0 MALIGNANT NEOPLASM OF LOWER-INNER QUADRANT OF RIGHT BREAST OF FEMALE, ESTROGEN RECEPTOR POSITIVE: ICD-10-CM

## 2024-04-03 DIAGNOSIS — C50.311 MALIGNANT NEOPLASM OF LOWER-INNER QUADRANT OF RIGHT BREAST OF FEMALE, ESTROGEN RECEPTOR POSITIVE: ICD-10-CM

## 2024-04-03 LAB
ALBUMIN SERPL BCP-MCNC: 4 G/DL (ref 3.5–5.2)
ALP SERPL-CCNC: 50 U/L (ref 55–135)
ALT SERPL W/O P-5'-P-CCNC: 14 U/L (ref 10–44)
ANION GAP SERPL CALC-SCNC: 9 MMOL/L (ref 8–16)
AST SERPL-CCNC: 18 U/L (ref 10–40)
BILIRUB SERPL-MCNC: 0.3 MG/DL (ref 0.1–1)
BUN SERPL-MCNC: 13 MG/DL (ref 6–20)
CALCIUM SERPL-MCNC: 9.9 MG/DL (ref 8.7–10.5)
CHLORIDE SERPL-SCNC: 106 MMOL/L (ref 95–110)
CO2 SERPL-SCNC: 27 MMOL/L (ref 23–29)
CREAT SERPL-MCNC: 0.9 MG/DL (ref 0.5–1.4)
ERYTHROCYTE [DISTWIDTH] IN BLOOD BY AUTOMATED COUNT: 14.7 % (ref 11.5–14.5)
EST. GFR  (NO RACE VARIABLE): >60 ML/MIN/1.73 M^2
GLUCOSE SERPL-MCNC: 85 MG/DL (ref 70–110)
HCG INTACT+B SERPL-ACNC: <2.4 MIU/ML
HCT VFR BLD AUTO: 40 % (ref 37–48.5)
HGB BLD-MCNC: 12.7 G/DL (ref 12–16)
IMM GRANULOCYTES # BLD AUTO: 0.03 K/UL (ref 0–0.04)
MCH RBC QN AUTO: 30.2 PG (ref 27–31)
MCHC RBC AUTO-ENTMCNC: 31.8 G/DL (ref 32–36)
MCV RBC AUTO: 95 FL (ref 82–98)
NEUTROPHILS # BLD AUTO: 2.8 K/UL (ref 1.8–7.7)
PLATELET # BLD AUTO: 385 K/UL (ref 150–450)
PMV BLD AUTO: 10.5 FL (ref 9.2–12.9)
POTASSIUM SERPL-SCNC: 4.6 MMOL/L (ref 3.5–5.1)
PROT SERPL-MCNC: 7 G/DL (ref 6–8.4)
RBC # BLD AUTO: 4.21 M/UL (ref 4–5.4)
SODIUM SERPL-SCNC: 142 MMOL/L (ref 136–145)
WBC # BLD AUTO: 4.87 K/UL (ref 3.9–12.7)

## 2024-04-03 PROCEDURE — 36415 COLL VENOUS BLD VENIPUNCTURE: CPT | Mod: PO | Performed by: NURSE PRACTITIONER

## 2024-04-03 PROCEDURE — 85027 COMPLETE CBC AUTOMATED: CPT | Performed by: NURSE PRACTITIONER

## 2024-04-03 PROCEDURE — 80053 COMPREHEN METABOLIC PANEL: CPT | Performed by: NURSE PRACTITIONER

## 2024-04-03 PROCEDURE — 84702 CHORIONIC GONADOTROPIN TEST: CPT | Performed by: NURSE PRACTITIONER

## 2024-04-04 ENCOUNTER — PATIENT MESSAGE (OUTPATIENT)
Dept: PSYCHIATRY | Facility: CLINIC | Age: 44
End: 2024-04-04
Payer: COMMERCIAL

## 2024-04-04 ENCOUNTER — OFFICE VISIT (OUTPATIENT)
Dept: PSYCHIATRY | Facility: CLINIC | Age: 44
End: 2024-04-04
Payer: COMMERCIAL

## 2024-04-04 ENCOUNTER — INFUSION (OUTPATIENT)
Dept: INFUSION THERAPY | Facility: HOSPITAL | Age: 44
End: 2024-04-04
Payer: COMMERCIAL

## 2024-04-04 ENCOUNTER — OFFICE VISIT (OUTPATIENT)
Dept: HEMATOLOGY/ONCOLOGY | Facility: CLINIC | Age: 44
End: 2024-04-04
Payer: COMMERCIAL

## 2024-04-04 ENCOUNTER — CLINICAL SUPPORT (OUTPATIENT)
Dept: REHABILITATION | Facility: HOSPITAL | Age: 44
End: 2024-04-04

## 2024-04-04 ENCOUNTER — PATIENT MESSAGE (OUTPATIENT)
Dept: ADMINISTRATIVE | Facility: OTHER | Age: 44
End: 2024-04-04
Payer: COMMERCIAL

## 2024-04-04 VITALS
RESPIRATION RATE: 17 BRPM | TEMPERATURE: 97 F | DIASTOLIC BLOOD PRESSURE: 67 MMHG | OXYGEN SATURATION: 100 % | HEART RATE: 68 BPM | HEIGHT: 60 IN | BODY MASS INDEX: 21.99 KG/M2 | SYSTOLIC BLOOD PRESSURE: 152 MMHG | WEIGHT: 112 LBS

## 2024-04-04 VITALS
BODY MASS INDEX: 21.99 KG/M2 | RESPIRATION RATE: 18 BRPM | DIASTOLIC BLOOD PRESSURE: 89 MMHG | SYSTOLIC BLOOD PRESSURE: 123 MMHG | HEART RATE: 80 BPM | HEIGHT: 60 IN | WEIGHT: 112 LBS | TEMPERATURE: 98 F

## 2024-04-04 DIAGNOSIS — F43.29 STRESS AND ADJUSTMENT REACTION: ICD-10-CM

## 2024-04-04 DIAGNOSIS — R00.0 TACHYCARDIA: ICD-10-CM

## 2024-04-04 DIAGNOSIS — R45.89 ANXIETY ABOUT HEALTH: Primary | ICD-10-CM

## 2024-04-04 DIAGNOSIS — K62.89 RECTAL INFLAMMATION: ICD-10-CM

## 2024-04-04 DIAGNOSIS — Z17.0 MALIGNANT NEOPLASM OF LOWER-INNER QUADRANT OF RIGHT BREAST OF FEMALE, ESTROGEN RECEPTOR POSITIVE: ICD-10-CM

## 2024-04-04 DIAGNOSIS — T45.1X5A CHEMOTHERAPY-INDUCED NAUSEA: Primary | ICD-10-CM

## 2024-04-04 DIAGNOSIS — C50.311 MALIGNANT NEOPLASM OF LOWER-INNER QUADRANT OF RIGHT BREAST OF FEMALE, ESTROGEN RECEPTOR POSITIVE: ICD-10-CM

## 2024-04-04 DIAGNOSIS — Z17.0 MALIGNANT NEOPLASM OF LOWER-INNER QUADRANT OF RIGHT BREAST OF FEMALE, ESTROGEN RECEPTOR POSITIVE: Primary | ICD-10-CM

## 2024-04-04 DIAGNOSIS — C50.311 MALIGNANT NEOPLASM OF LOWER-INNER QUADRANT OF RIGHT BREAST OF FEMALE, ESTROGEN RECEPTOR POSITIVE: Primary | ICD-10-CM

## 2024-04-04 DIAGNOSIS — D84.821 IMMUNODEFICIENCY DUE TO DRUGS: ICD-10-CM

## 2024-04-04 DIAGNOSIS — Z79.899 IMMUNODEFICIENCY DUE TO DRUGS: ICD-10-CM

## 2024-04-04 DIAGNOSIS — R11.0 CHEMOTHERAPY-INDUCED NAUSEA: Primary | ICD-10-CM

## 2024-04-04 DIAGNOSIS — R45.89 ANXIETY ABOUT HEALTH: ICD-10-CM

## 2024-04-04 PROCEDURE — 99215 OFFICE O/P EST HI 40 MIN: CPT | Mod: S$GLB,,, | Performed by: NURSE PRACTITIONER

## 2024-04-04 PROCEDURE — 3044F HG A1C LEVEL LT 7.0%: CPT | Mod: CPTII,S$GLB,, | Performed by: NURSE PRACTITIONER

## 2024-04-04 PROCEDURE — 3078F DIAST BP <80 MM HG: CPT | Mod: CPTII,S$GLB,, | Performed by: NURSE PRACTITIONER

## 2024-04-04 PROCEDURE — 3008F BODY MASS INDEX DOCD: CPT | Mod: CPTII,S$GLB,, | Performed by: NURSE PRACTITIONER

## 2024-04-04 PROCEDURE — 96417 CHEMO IV INFUS EACH ADDL SEQ: CPT

## 2024-04-04 PROCEDURE — 97814 ACUP 1/> W/ESTIM EA ADDL 15: CPT | Performed by: ACUPUNCTURIST

## 2024-04-04 PROCEDURE — 99999 PR PBB SHADOW E&M-EST. PATIENT-LVL II: CPT | Mod: PBBFAC,,, | Performed by: PSYCHOLOGIST

## 2024-04-04 PROCEDURE — 96375 TX/PRO/DX INJ NEW DRUG ADDON: CPT

## 2024-04-04 PROCEDURE — 3077F SYST BP >= 140 MM HG: CPT | Mod: CPTII,S$GLB,, | Performed by: NURSE PRACTITIONER

## 2024-04-04 PROCEDURE — 99999 PR PBB SHADOW E&M-EST. PATIENT-LVL III: CPT | Mod: PBBFAC,,, | Performed by: NURSE PRACTITIONER

## 2024-04-04 PROCEDURE — 96413 CHEMO IV INFUSION 1 HR: CPT

## 2024-04-04 PROCEDURE — 1159F MED LIST DOCD IN RCRD: CPT | Mod: CPTII,S$GLB,, | Performed by: NURSE PRACTITIONER

## 2024-04-04 PROCEDURE — 90791 PSYCH DIAGNOSTIC EVALUATION: CPT | Mod: S$GLB,,, | Performed by: PSYCHOLOGIST

## 2024-04-04 PROCEDURE — 97813 ACUP 1/> W/ESTIM 1ST 15 MIN: CPT | Performed by: ACUPUNCTURIST

## 2024-04-04 PROCEDURE — 25000003 PHARM REV CODE 250: Performed by: INTERNAL MEDICINE

## 2024-04-04 PROCEDURE — 3044F HG A1C LEVEL LT 7.0%: CPT | Mod: CPTII,S$GLB,, | Performed by: PSYCHOLOGIST

## 2024-04-04 PROCEDURE — A4216 STERILE WATER/SALINE, 10 ML: HCPCS | Performed by: INTERNAL MEDICINE

## 2024-04-04 PROCEDURE — 96367 TX/PROPH/DG ADDL SEQ IV INF: CPT

## 2024-04-04 PROCEDURE — 63600175 PHARM REV CODE 636 W HCPCS: Performed by: INTERNAL MEDICINE

## 2024-04-04 RX ORDER — EPINEPHRINE 0.3 MG/.3ML
0.3 INJECTION SUBCUTANEOUS ONCE AS NEEDED
Status: CANCELLED | OUTPATIENT
Start: 2024-04-04

## 2024-04-04 RX ORDER — DIPHENHYDRAMINE HYDROCHLORIDE 50 MG/ML
50 INJECTION INTRAMUSCULAR; INTRAVENOUS ONCE AS NEEDED
Status: CANCELLED | OUTPATIENT
Start: 2024-04-04

## 2024-04-04 RX ORDER — HEPARIN 100 UNIT/ML
500 SYRINGE INTRAVENOUS
Status: CANCELLED | OUTPATIENT
Start: 2024-04-04

## 2024-04-04 RX ORDER — SODIUM CHLORIDE 0.9 % (FLUSH) 0.9 %
10 SYRINGE (ML) INJECTION
Status: CANCELLED | OUTPATIENT
Start: 2024-04-04

## 2024-04-04 RX ORDER — FAMOTIDINE 10 MG/ML
20 INJECTION INTRAVENOUS
Status: CANCELLED | OUTPATIENT
Start: 2024-04-04

## 2024-04-04 RX ORDER — SODIUM CHLORIDE 0.9 % (FLUSH) 0.9 %
10 SYRINGE (ML) INJECTION
Status: DISCONTINUED | OUTPATIENT
Start: 2024-04-04 | End: 2024-04-04 | Stop reason: HOSPADM

## 2024-04-04 RX ORDER — FAMOTIDINE 10 MG/ML
20 INJECTION INTRAVENOUS
Status: COMPLETED | OUTPATIENT
Start: 2024-04-04 | End: 2024-04-04

## 2024-04-04 RX ORDER — EPINEPHRINE 0.3 MG/.3ML
0.3 INJECTION SUBCUTANEOUS ONCE AS NEEDED
Status: DISCONTINUED | OUTPATIENT
Start: 2024-04-04 | End: 2024-04-04 | Stop reason: HOSPADM

## 2024-04-04 RX ORDER — HEPARIN 100 UNIT/ML
500 SYRINGE INTRAVENOUS
Status: DISCONTINUED | OUTPATIENT
Start: 2024-04-04 | End: 2024-04-04 | Stop reason: HOSPADM

## 2024-04-04 RX ORDER — PROCHLORPERAZINE EDISYLATE 5 MG/ML
10 INJECTION INTRAMUSCULAR; INTRAVENOUS ONCE AS NEEDED
Status: DISCONTINUED | OUTPATIENT
Start: 2024-04-04 | End: 2024-04-04 | Stop reason: HOSPADM

## 2024-04-04 RX ORDER — DIPHENHYDRAMINE HYDROCHLORIDE 50 MG/ML
50 INJECTION INTRAMUSCULAR; INTRAVENOUS ONCE AS NEEDED
Status: DISCONTINUED | OUTPATIENT
Start: 2024-04-04 | End: 2024-04-04 | Stop reason: HOSPADM

## 2024-04-04 RX ORDER — PROCHLORPERAZINE EDISYLATE 5 MG/ML
10 INJECTION INTRAMUSCULAR; INTRAVENOUS ONCE AS NEEDED
Status: CANCELLED
Start: 2024-04-04

## 2024-04-04 RX ADMIN — FAMOTIDINE 20 MG: 10 INJECTION INTRAVENOUS at 11:04

## 2024-04-04 RX ADMIN — Medication 10 ML: at 01:04

## 2024-04-04 RX ADMIN — TRASTUZUMAB-ANNS 106 MG: 420 INJECTION, POWDER, LYOPHILIZED, FOR SOLUTION INTRAVENOUS at 10:04

## 2024-04-04 RX ADMIN — HEPARIN 500 UNITS: 100 SYRINGE at 01:04

## 2024-04-04 RX ADMIN — DIPHENHYDRAMINE HYDROCHLORIDE 50 MG: 50 INJECTION, SOLUTION INTRAMUSCULAR; INTRAVENOUS at 11:04

## 2024-04-04 RX ADMIN — PACLITAXEL 120 MG: 6 INJECTION, SOLUTION, CONCENTRATE INTRAVENOUS at 12:04

## 2024-04-04 RX ADMIN — DEXAMETHASONE SODIUM PHOSPHATE 20 MG: 4 INJECTION, SOLUTION INTRA-ARTICULAR; INTRALESIONAL; INTRAMUSCULAR; INTRAVENOUS; SOFT TISSUE at 11:04

## 2024-04-04 NOTE — PROGRESS NOTES
[facility]/ [facility]   at [facility] Note:      Chief Complaint:   Encounter Diagnoses   Name Primary?    Malignant neoplasm of lower-inner quadrant of right breast of female, estrogen receptor positive Yes    Immunodeficiency due to drugs     Tachycardia     Rectal inflammation     Anxiety about health         Cancer Staging   Malignant neoplasm of lower-inner quadrant of right breast of female, estrogen receptor positive  Staging form: Breast, AJCC 8th Edition  - Clinical stage from 10/26/2023: Stage IIA (cT2, cN0, cM0, G3, ER+, ND-, HER2+) - Signed by Linda Mcbride MD on 11/6/2023  - Pathologic stage from 12/14/2023: Stage IA (pT1c, pN0, cM0, G2, ER+, ND-, HER2+) - Signed by Linda Mcbride MD on 1/5/2024    HPI:  Tasha Cornejo is a 44 y.o. female who presents today for Cycle 10, day 1 of TH. She had an infusion reaction with her first taxol dose, but was able to complete therapy with a slower infusion. No further difficulty for following cycles  Looser stool, but no major diarrhea, had one day of diarrhea and stomach pain after last cycle that has now resolved  She has been having some palpitations prior to her last dose. Recent EKG norml.  Recent echo  Palpitations improved after last dose  Having night sweats  Inflammation to rectum and hemorrhoid is improving    Per Dr. Mcbride's note:   Oncology History  Screening mammogram on 10/5/2023 identified coarse heterogeneous calcifications in a linear distribution seen in the lower outer quadrant of the right breast, spanning 1.5 cm.  Diagnostic mammogram on 10/12/2023 showed coarse heterogeneous calcifications in a regional distribution spanning 3.2 cm long axis   Biopsy 10/26/2023 with pathology revealing infiltrating ductal carcinoma of the breast, ER 70%/ ND-/HER2 3+; Ki67 60%    Bilateral mastectomy 12/14/23: IDC, grade 2, 1.5cm in maximum; 0/2 LN+    Adjuvant TH 2/1/24    GYN History:  Age of  menarche was 9.   Age of menopause n/a.  Patient denies hormonal therapy.   Patient is .     Patient Active Problem List   Diagnosis    Rhinitis, allergic    Allergic conjunctivitis    Vitamin D deficiency    Malignant neoplasm of lower-inner quadrant of right breast of female, estrogen receptor positive    Chemotherapy-induced nausea    At risk for lymphedema    Stress and adjustment reaction    Physical deconditioning    Immunodeficiency due to drugs    Decreased ROM of right shoulder    Shoulder weakness       Current Outpatient Medications   Medication Instructions    fluticasone propionate (FLONASE) 50 mcg/actuation nasal spray 1 spray, Each Nostril, Daily PRN    hydrocortisone 2.5 % cream Topical (Top), 2 times daily    LIDOcaine-prilocaine (EMLA) cream Apply topically to port one hour prior to chemotherapy infusion    multivitamin (THERAGRAN) per tablet 1 tablet, Oral, Daily    mupirocin (BACTROBAN) 2 % ointment Topical (Top), 3 times daily    omeprazole (PRILOSEC) 40 mg, Oral, Every morning    ondansetron (ZOFRAN) 8 mg, Oral, Every 8 hours PRN    prochlorperazine (COMPAZINE) 10 mg, Oral, Every 6 hours PRN    propranoloL (INDERAL) 20 MG tablet Take one tablet by mouth daily as needed for anxiety/panic attack       Review of Systems:   Review of Systems   Respiratory:  Negative for cough and shortness of breath.    Cardiovascular:  Negative for chest pain.   Gastrointestinal:  Positive for diarrhea. Negative for abdominal pain.   Genitourinary:  Positive for dysuria. Negative for frequency.   Musculoskeletal:  Negative for back pain.   Skin:  Negative for rash.   Neurological:  Negative for headaches.   Endo/Heme/Allergies:         Night sweats   Psychiatric/Behavioral:  The patient is nervous/anxious.    All other systems reviewed and are negative.      PHYSICAL EXAM:  BP (!) 152/67   Pulse 68   Temp 97 °F (36.1 °C) (Oral)   Resp 17   Ht 5' (1.524 m)   Wt 50.8 kg (111 lb 15.9 oz)   SpO2 100%   BMI  21.87 kg/m²     Physical Exam  Constitutional:       General: She is not in acute distress.     Appearance: Normal appearance. She is not ill-appearing.   HENT:      Head: Normocephalic and atraumatic.      Mouth/Throat:      Pharynx: No oropharyngeal exudate or posterior oropharyngeal erythema.   Eyes:      Extraocular Movements: Extraocular movements intact.   Cardiovascular:      Rate and Rhythm: Normal rate and regular rhythm.      Pulses: Normal pulses.      Heart sounds: Normal heart sounds.   Pulmonary:      Effort: Pulmonary effort is normal. No respiratory distress.      Breath sounds: Normal breath sounds.   Chest:      Comments: S/p bilateral mastectomies, incisions well healed, deferred today  Abdominal:      General: Bowel sounds are normal. There is no distension.      Palpations: Abdomen is soft. There is no mass.      Tenderness: There is no abdominal tenderness.      Hernia: No hernia is present.   Musculoskeletal:      Cervical back: Normal range of motion.   Skin:     General: Skin is warm and dry.   Neurological:      General: No focal deficit present.      Mental Status: She is alert and oriented to person, place, and time.       Pertinent Labs & Imaging:  Pathology Results  (Last 30 days)      None            No results found for this or any previous visit (from the past 24 hour(s)).      Assessment & Plan:  1. Malignant neoplasm of lower-inner quadrant of right breast of female, estrogen receptor positive  Final pathology T1cN0  Reviewed echo and labs, ok for treatment today  Continue increased decadron premed at 20 mg today, start taxol at 1/4 rate again given reaction with cycle 1  Discussed palpitations. Remain hydrated, EKG normal  Cardio onc appt in April     Follow up in 2 weeks with Dr. Mcbride, infusions weekly    2. Immunodeficiency due to drugs  Stable  Anc today 2.8  Will continue to monitor    3. Tachycardia  Improved  EKG normal  Has visit with cards/onc scheduled    4. Rectal  inflammation  Improving  Continue barrier cream  Will continue to monitor    5. Anxiety about health  Stable  Getting acupuncture  Seeing psych/onc today      Route Chart for Scheduling    Med Onc Chart Routing      Follow up with physician 2 weeks. with Dr. Mcbride   Follow up with BHARGAVI . 3 weeks with Gabrielle   Infusion scheduling note   weekly x 3 more (two TH and then large dose of herceptin) then every 3 weeks   Injection scheduling note    Labs CBC, CMP and B HCG   Scheduling:  Preferred lab:  Lab interval:  weekly x 3   Imaging    Pharmacy appointment    Other referrals                  Treatment Plan Information   OP BREAST TRASTUZUMAB PACLITAXEL WEEKLY   Linda Mcbride MD   Upcoming Treatment Dates - OP BREAST TRASTUZUMAB PACLITAXEL WEEKLY    4/4/2024       Pre-Medications       diphenhydrAMINE (BENADRYL) 50 mg in sodium chloride 0.9% 50 mL IVPB       dexAMETHasone 20 mg in sodium chloride 0.9% 50 mL IVPB       famotidine (PF) injection 20 mg       Chemotherapy       trastuzumab-anns (KANJINTI) 106 mg in sodium chloride 0.9% 250 mL chemo infusion       PACLitaxeL (TAXOL) 80 mg/m2 = 120 mg in sodium chloride 0.9% 250 mL chemo infusion  4/11/2024       Pre-Medications       diphenhydrAMINE (BENADRYL) 50 mg in sodium chloride 0.9% 50 mL IVPB       dexAMETHasone 20 mg in sodium chloride 0.9% 50 mL IVPB       famotidine (PF) injection 20 mg       Chemotherapy       trastuzumab-anns (KANJINTI) 106 mg in sodium chloride 0.9% 250 mL chemo infusion       PACLitaxeL (TAXOL) 80 mg/m2 = 120 mg in sodium chloride 0.9% 250 mL chemo infusion  4/18/2024       Pre-Medications       diphenhydrAMINE (BENADRYL) 50 mg in sodium chloride 0.9% 50 mL IVPB       dexAMETHasone 20 mg in sodium chloride 0.9% 50 mL IVPB       famotidine (PF) injection 20 mg       Chemotherapy       trastuzumab-anns (KANJINTI) 106 mg in sodium chloride 0.9% 250 mL chemo infusion       PACLitaxeL (TAXOL) 80 mg/m2 = 120 mg in sodium chloride 0.9% 250 mL  chemo infusion  4/25/2024       Chemotherapy       trastuzumab-anns (KANJINTI) 318 mg in sodium chloride 0.9% 250 mL chemo infusion    MDM includes  :    - Acute or chronic illness or injury that poses a threat to life or bodily function  - Independent review and explanation of 2 results from unique tests  - Discussion of management and ordering 2 unique tests  - Extensive discussion of treatment and management  - Prescription drug management  - Drug therapy requiring intensive monitoring for toxicity    Gabrielle Woo, NP   04/04/2024

## 2024-04-04 NOTE — PLAN OF CARE
Patient tolerated Kanjinti /1hr taxol infusions today. NAD noted. VSS. PAC + blood return upon deaccess. Discharged home

## 2024-04-04 NOTE — PROGRESS NOTES
PSYCHO-ONCOLOGY INTAKE    Diagnostic Interview - CPT 89351    Date: 4/4/2024  Site: [facility]     Evaluation Length (direct face-to-face time):  1.5 hours     Referral Source: Oncologist:  Dubinsky, Joanna L., PA*    PCP: Julia Sandoval MD    Clinical status of patient: Outpatient    Tasha Cornejo, a 44 y.o. female, seen for initial evaluation visit.  Met with patient.    Chief complaint/reason for encounter: adjustment to illness, anxiety and sleep    Medical/Surgical History:    Patient Active Problem List   Diagnosis    Rhinitis, allergic    Allergic conjunctivitis    Vitamin D deficiency    Malignant neoplasm of lower-inner quadrant of right breast of female, estrogen receptor positive    Chemotherapy-induced nausea    At risk for lymphedema    Stress and adjustment reaction    Physical deconditioning    Immunodeficiency due to drugs    Decreased ROM of right shoulder    Shoulder weakness    Anxiety about health       Health Behaviors:       ETOH Use: No (rare past)       Tobacco Use: No   THC use: No   Non-prescribed/Illicit Drug Use:  No   Prescription Misuse:No   Caffeine: minimal past, none current   Exercise:The patient maintains a regular, healthy exercise program. Running, yoga; biking in past.    Family History:   Psychiatric illness: No     Alcohol/Drug Abuse: No     Suicide: No      Past Psychiatric History:   Inpatient treatment: No     Outpatient treatment: Yes (1x psychiatrist in 20's)    Prior substance abuse treatment: No     Suicide Attempts: No     Psychotropic Medications:  Current: none       Past: Wellbutrin and Zoloft    Current medications as per below, allergies reviewed in chart.    Current Outpatient Medications   Medication    fluticasone propionate (FLONASE) 50 mcg/actuation nasal spray    hydrocortisone 2.5 % cream    LIDOcaine-prilocaine (EMLA) cream    multivitamin (THERAGRAN) per tablet    mupirocin (BACTROBAN) 2 % ointment    omeprazole (PRILOSEC) 40 MG capsule     ondansetron (ZOFRAN) 8 MG tablet    prochlorperazine (COMPAZINE) 5 MG tablet    propranoloL (INDERAL) 20 MG tablet     No current facility-administered medications for this visit.     Facility-Administered Medications Ordered in Other Visits   Medication Frequency    ceFAZolin 1 g, gentamicin 80 mg in sodium chloride 0.9% 500 mL irrigation On Call Procedure    ceFAZolin 1 g, gentamicin 80 mg in sodium chloride 0.9% 500 mL irrigation On Call Procedure          Social situation/Stressors: Tasha Cornejo lives with her  of 18 years (Oseas) in [place], [place].  She is a full-time .  She has been in her job for 8 years. She is also in school to earn a digital design certification.   Tasha Cornejo has been  1x and has no children (by choice).  Her spouse works in planning at [employer]. She has 1 younger brother and an older half-sister. Her parents are living. The patient reports good  social support from her  and family. She has 1 close friend, but few other friends.  Tasha Cornejo is spiritual, but not Gnosticism.  Tasha Cornejo's hobbies include running, cycling, being outdoors, painting, knitting, reading, cooking..  Additional stressors: work strain- Her boss had breast cancer last year and never took time off, so the expectation to keep working and stay productive is high. Her workload was overwhelming even before illness. SHe feels constantly time pressured. She does not work overtime/nights/weekends. The office pays lip service to supporting her, but she has felt no 1:1 support.     Strengths:Steady employment, financial stability, Housing stability, Motivation, readiness for change, Vocational interests, hobbies and/or talents, and Interpersonal relationships and supports available - family, relatives, friends  Liabilities: Complicated medical illness    Current Evaluation:     Mental Status Exam: Tasha Cornejo arrived promptly for the assessment  session. The patient was fully cooperative throughout the interview and was an adequate historian   Appearance: age appropriate, casually  dressed, well groomed  Behavior/Cooperation: friendly and cooperative  Speech: normal in rate, volume, and tone and appropriate quality, quantity and organization of sentences  Mood: anxious, sad  Affect: anxious and tearful  Thought Process: goal-directed, logical  Thought Content: normal, no suicidality, no homicidality, delusions, or paranoia;did not appear to be responding to internal stimuli during the interview.   Orientation: grossly intact  Memory: Grossly intact  Attention Span/Concentration: Attends to interview without distraction; reports subjective difficulty  Fund of Knowledge: average  Estimate of Intelligence: above average from verbal skills and history  Cognition: grossly intact  Insight: patient has awareness of illness; good insight into own behavior and behavior of others  Judgment: the patient's behavior is adequate to circumstances    Distress thermometer:       4/3/2024     6:58 AM 3/20/2024    11:57 AM 3/11/2024     7:14 AM 3/10/2024     2:40 PM 3/7/2024     5:41 AM 2/21/2024     8:32 AM 2/8/2024     8:28 AM   DISTRESS SCREENING   Distress Score 0 - No Distress 0 - No Distress 8 8 0 - No Distress 5 5   Practical Concerns None of these None of these  Taking care of myself None of these Work None of these   Social Concerns None of these None of these  None of these None of these None of these None of these   Emotional Concerns None of these Worry or anxiety;Fear  Worry or anxiety;Fear Worry or anxiety Worry or anxiety None of these   Spiritual or Rastafari Concerns None of these None of these  None of these None of these None of these None of these   Physical Concerns None of these None of these  Pain None of these None of these None of these   Other Problems    Worry about infection Hemmorhoid with bleeding (improving, not completely resolved); rash on  skin around hemmorhoid area (new since 2-3 days) Feeling tense / on edge / having trouble feeling relaxed. A little difficulty sleeping - trying melatonin when needed. Stabby, sensations in chest right side Tues PM / Wed AM - comes and goes       History of present illness:    Oncology History   Malignant neoplasm of lower-inner quadrant of right breast of female, estrogen receptor positive   10/26/2023 Cancer Staged    Staging form: Breast, AJCC 8th Edition  - Clinical stage from 10/26/2023: Stage IIA (cT2, cN0, cM0, G3, ER+, MT-, HER2+)     10/26/2023 Breast Tumor Markers    Estrogen: Positive  Progesterone: Negative  HER2: Positive     10/26/2023 Biopsy    BREAST, RIGHT, BIOPSY:   - Invasive ductal carcinoma of breast.   - Grade 2 of 3.     11/6/2023 Initial Diagnosis    Malignant neoplasm of lower-inner quadrant of right breast of female, estrogen receptor positive     11/7/2023 - 11/7/2023 Chemotherapy    Treatment Summary   Plan Name: OP BREAST TCHP (pertuzumab trastuzumab DOCEtaxel CARBOplatin) Q3W  Treatment Goal: Curative  Status: Inactive  Start Date:   End Date: 11/6/2023  Provider: Linda Mcbride MD  Chemotherapy: CARBOplatin (PARAPLATIN) in sodium chloride 0.9% 250 mL chemo infusion, , Intravenous, Clinic/HOD 1 time, 0 of 6 cycles  DOCEtaxel (TAXOTERE) 75 mg/m2 = 114 mg in sodium chloride 0.9% 255.7 mL chemo infusion, 75 mg/m2, Intravenous, Clinic/HOD 1 time, 0 of 6 cycles  pertuzumab (PERJETA) 840 mg in sodium chloride 0.9% 278 mL infusion, 840 mg, Intravenous, Clinic/HOD 1 time, 0 of 17 cycles  trastuzumab-anns (KANJINTI) 425 mg in sodium chloride 0.9% 250 mL chemo infusion, 8 mg/kg, Intravenous, Clinic/HOD 1 time, 0 of 17 cycles     11/9/2023 Genetic Testing    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     12/14/2023 Cancer Staged    Staging form: Breast, AJCC 8th Edition  - Pathologic stage from 12/14/2023: Stage IA (pT1c, pN0, cM0, G2, ER+, MT-, HER2+)     1/2/2024 Tumor  "Conference    The margin is close but clear   Can consider screening but not necessarily recommended  She will continue with adjuvant chemotherapy           2/1/2024 -  Chemotherapy    Treatment Summary   Plan Name: OP BREAST TRASTUZUMAB PACLITAXEL WEEKLY  Treatment Goal: Curative  Status: Active  Start Date: 2/1/2024  End Date: 1/2/2025 (Planned)  Provider: Linda Mcbride MD  Chemotherapy: PACLitaxeL (TAXOL) 80 mg/m2 = 120 mg in sodium chloride 0.9% 250 mL chemo infusion, 80 mg/m2 = 120 mg, Intravenous, Clinic/HOD 1 time, 10 of 12 cycles  Administration: 120 mg (2/1/2024), 120 mg (2/8/2024), 120 mg (2/15/2024), 120 mg (2/22/2024), 120 mg (2/29/2024), 120 mg (3/7/2024), 120 mg (3/14/2024), 120 mg (3/21/2024), 120 mg (3/28/2024), 120 mg (4/4/2024)  trastuzumab-anns (KANJINTI) 212 mg in sodium chloride 0.9% 295.1 mL chemo infusion, 4 mg/kg = 212 mg, Intravenous, Clinic/HOD 1 time, 10 of 25 cycles  Administration: 212 mg (2/1/2024), 106 mg (2/8/2024), 106 mg (2/15/2024), 106 mg (2/22/2024), 106 mg (2/29/2024), 106 mg (3/7/2024), 106 mg (3/14/2024), 106 mg (3/21/2024), 106 mg (3/28/2024), 106 mg (4/4/2024)           Patient Reported Cancer Treatment Symptoms:  Hair loss  Increased blood pressure      Tasha Cornejo has adjusted to illness with moderate difficulty.  She is distressed about her increased blood pressure and worried about future impacts on her health. She is coping adequately with her current treatment. She is overwhelmed by work demands. She is distressed by the impact her cancer may be having on her  ("I apologize to him all the time."). She iglesia primarily through active coping strategies, focus on alternative activities, focus on work, and exercise. She has engaged in appropriate information gathering.  The patient has good family support, but few friends.  Her support system is coping adequately with the diagnosis/treatment/prognosis. Illness-related psychosocial stressors include absence " "from work.  The patient has a good partnership [place] [facility] [place]. The patient reports the following barriers to cancer care:none.     Areas assessed:   Mood: Depression: denied;  prior depression:on and off throughout adulthood, worst in early 20's ; no SI/HI, prior SI with depression in 20's, no plan, no attempt; PHQ-9=not meeting screener;   Namita: denies symptoms of namita, including distractibility, grandiosity, delusions, hallucinations, decreased sleep with increase in activity, talkativeness, or risky behavior.   Psychosis: Denies   Anxiety: Feeling nervous, anxious, or on edge, Uncontrollable worry (about "everything"; work, health, future, minor issues, future cognitive functioning, productivity, FNE), Excessive worry (interfering with concentration, mood), Difficulty relaxing, Restlessness, Irritability, Fear of unknown, and muscle tension; lifelong anxiety; (+) perfectionism, "always have to push harder," avoids mistakes, did not have children due to fears she could not manage or that she would not be a good mother, very regretful of prior "safe" decisions (motherhood, local college, career choice); feels her cancer is a punishment for her fertility choice, very worried about her blood pressure (sees cardiology next week);  CHEVY-7=11;   Panic Disorder: 1x possible panic attack at work, limited symptom attacks in elevators, mri, claustrophobic situations  Social/specific phobia: Claustrophobia  OCD: Denies excessive time spent, but does re-check door and stove (x1 each), diligent with handwashing and hygiene but not excessive  Trauma: 2 emotional traumas in early romantic relationships (got in a relationship with male best friend who "dropped me"; then in an "experiment" relationship with another partner); comprehensive PTSD assessment not completed; does struggle with trust  Sexual Dysfunction:  no sexual contact since before surgery, scared to "damage my health" since chemo, low desire " "even prior, "afraid of being vulnerable," occasional enjoyment, no problem with arousal/lubrication/orgasm  Substance abuse: denied  Cognitive functioning: denied  Health behaviors: noncontributory  Sleep: Time asleep: 7-8 hours; restless sleep  and non-restorative sleep , 1 hour+ extended sleep onset latency and several x WASO (with variable re-onset difficulty), no naps , no caffeine/stimulants , (+) sleep hygiene considerations, and (+) psychophysiological factors, (+) use of melatonin and (+) valerian prior to chemo       Assessment - Diagnosis - Goals:       ICD-10-CM ICD-9-CM   1. Stress and adjustment reaction  F43.29 309.89   2. Malignant neoplasm of lower-inner quadrant of right breast of female, estrogen receptor positive  C50.311 174.3    Z17.0 V86.0   3. Anxiety about health  R45.89 799.29         Plan:Individual psychotherapy    Summary and Recommendations  Tasha Cornejo is a 44 y.o. female referred by PA Dubinsky for psychological evaluation and treatment.  Ms. Cornejo appears to be experiencing moderate difficulty coping with her diagnosis and treatment course mostly due to fears over its impact on her work and her ability to cope. She has a long history of excessive generalized anxiety (and health anxiety) which have been exacerbated by her illness.  She is interested in CBT to address anxiety/insomnia and will follow up with me for that purpose.    GOALS:   Write down worries daily and bring to next session  Relaxation exercises (instructions and options sent to patient)  Sleep hygiene- out of bed if not asleep  Stand when watch tells her to (to break habit of prioritizing work over her needs)    Discussed nature and treatment of panic     River Burns, PhD  Clinical Psychologist  [place] License #[id number]  [place] License #[id number]  [place] License #[id number]        "

## 2024-04-04 NOTE — PROGRESS NOTES
Acupuncture Evaluation Note     Name: Tasha Cornejo  [facility] Number: [id number]    Traditional Chinese Medicine (TCM) Diagnosis: Qi Stagnation, Blood Stasis, and Qi Deficiency  Medical Diagnosis:   Encounter Diagnosis   Name Primary?    Malignant neoplasm of lower-inner quadrant of right breast of female, estrogen receptor positive Yes        Evaluation Date: 4/4/2024    Visit #/Visits authorized: self pay, 8    Precautions: Standard and cancer    Subjective     Chief Concern: Breast cancer - 12 rounds of chemo once/week. CINV, CIPN - prevent.     Medical necessity is demonstrated by the following IMPAIRMENTS: Medical Necessity: Decreased quality of life and Nausea and Vomiting              Aggravating Factors:  flexion and extension   Relieving Factors:  stretching/yoga     Symptom Description:     Quality:  Tight  Severity:  1  Frequency:  when active      Treatment     Treatment Principles:  Move qi and blood, nourish qi, calm hernandez    Acupuncture points used:   Ba Ashwin, Ba Taryn, 4 Hill   Bilateral points: ST36, SP9, SP6, KD3, GB34  Unilateral points:   Auricular Treatment:      Needles In: 30  Needles Out: 30  Nenzel W/ STIM placed: 7:20  Needles W/ STIM removed: 7:50      Assessment     After treatment, patient felt she is tolerating chemo better, neuropathy present in left hand      Patient prognosis is Good.     Patient will continue to benefit from acupuncture treatment to address the deficits listed in the problem list box on initial evaluation, provide patient family education and to maximize pt's level of independence in the home and community environment.     Patient's spiritual, cultural and educational needs considered and pt agreeable to plan of care and goals.     Anticipated barriers to treatment: none    Plan     Recommend 1 /week for 12 weeks - throughout chemo      Education:  Patient is aware of cumulative benefit of acupuncture

## 2024-04-04 NOTE — LETTER
April 4, 2024        Joanna L. Dubinsky, PA-C  [address] [place] [address]  [address]th Floor  [facility] [id number]             [facility] - Psychiatry  [address] [facility] [address]  Phone: [phone]  Fax: [phone]   Patient: Tasha Cornejo   MR Number: [id number]   YOB: 1980   Date of Visit: 4/4/2024       Dear PA Dubinsky:    Thank you for referring Tasha Cornejo to me for evaluation. Below are the relevant portions of my assessment and plan of care.     Tasha Cornejo is a 44 y.o. female referred by PA Dubinsky for psychological evaluation and treatment.  Ms. Cornejo appears to be experiencing moderate difficulty coping with her diagnosis and treatment course mostly due to fears over its impact on her work and her ability to cope. She has a long history of excessive generalized anxiety (and health anxiety) which have been exacerbated by her illness.  She is interested in CBT to address anxiety/insomnia and will follow up with me for that purpose.     If you have questions, please do not hesitate to call me. I look forward to following Tasha along with you.    Sincerely,      River Meadows, PhD           CC  No Recipients

## 2024-04-05 ENCOUNTER — PATIENT MESSAGE (OUTPATIENT)
Dept: ADMINISTRATIVE | Facility: OTHER | Age: 44
End: 2024-04-05
Payer: COMMERCIAL

## 2024-04-05 ENCOUNTER — CLINICAL SUPPORT (OUTPATIENT)
Dept: REHABILITATION | Facility: HOSPITAL | Age: 44
End: 2024-04-05
Payer: COMMERCIAL

## 2024-04-05 DIAGNOSIS — F43.29 STRESS AND ADJUSTMENT REACTION: Primary | ICD-10-CM

## 2024-04-05 DIAGNOSIS — R29.898 SHOULDER WEAKNESS: ICD-10-CM

## 2024-04-05 DIAGNOSIS — M25.611 DECREASED ROM OF RIGHT SHOULDER: ICD-10-CM

## 2024-04-05 DIAGNOSIS — R53.81 PHYSICAL DECONDITIONING: ICD-10-CM

## 2024-04-05 PROCEDURE — 97110 THERAPEUTIC EXERCISES: CPT

## 2024-04-05 PROCEDURE — 97535 SELF CARE MNGMENT TRAINING: CPT

## 2024-04-05 PROCEDURE — 97112 NEUROMUSCULAR REEDUCATION: CPT

## 2024-04-05 NOTE — PROGRESS NOTES
[facility]  Occupational Therapy Progress and Treatment Note - Therapeutic Yoga Progam    Date: 4/5/2024  Name: Tasha Cornejo  Clinic Number: [id number]    Therapy Diagnosis:   Encounter Diagnoses   Name Primary?    Stress and adjustment reaction Yes    Physical deconditioning     Decreased ROM of right shoulder     Shoulder weakness      Physician: Linda Mcbride MD    Physician Orders: Physician Orders: Eval and Treat   Medical Diagnosis from Referral: Malignant neoplasm of lower-inner quadrant of right breast of female, estrogen receptor positive [C50.311, Z17.0]   Evaluation Date: 11/29/2023  Authorization Period Expiration: 12/31/24  Plan of Care Expiration: 04/29/24  Progress Note Due: 05/5124  Visit # / Visits authorized:  7/20      Precautions: Standard and cancer        Time In: 8:45 am  Time Out: 9:30 am  Total Billable Time: 45 minutes    SUBJECTIVE     Pt reports: She has 4 remaining chemotherapy sessions.  Her endurance has decreased due to symptoms of chemotherapy.  She was compliant with home exercise program given last session.   Response to previous treatment: I felt good relaxed.  Functional change: relaxation helps with work stress.    Pain: 4/10  Location: bilateral hip pain, likely  due to chemotherapy general joint pain.    OBJECTIVE     Patient Specific Functional Scale:           Activity 11/29/23  3/1/24 4/5/24       Anxiety and stress 4       4      5       2.    sleep 3       5       4       3.   Motivation due depression  5      6      7       4.             5.             6.             SCORE    4.0    5.3    5.3          Total Score = Sum of activity scores / number of activities  Minimum Detectable Change (90% CII) for average score = 2 points  Minimum Detectable Change (90% CI) for single activity score = 3 points    Treatment     Tasha received the treatments listed below:       Date 2/9/24 2/23/24 3/1/24 3/8/24 3/15/24 3/22/24 4/5/24   Therapeutic  Yoga Exercises / Neuromuscular Re-education 30 minutes  30 minutes  30 minutes  PN  30 minutes  15 minutes  25  minutes 30 minutes   Seated Yoga   chair yoga:  Cat-Cow,forward bend, back bend, twist,    chair yoga:  Cat-Cow,forward bend, back bend, twist,  chair yoga:  Cat-Cow,forward bend, back bend, twist,  On bolster and raised mat:  Cat-Cow,forward bend, back bend, twist, bilateral forward fold, unilateral forward fold, bound angle, backbend x 4   On 3 folded blankets, wide hollie   Forward fold, sukasana, twist,   Quadruped          Supine Wide footed twist,  Wide footed twist, cat cow, Wide footed twist, cat cow, knee to chest flow,  Wide footed twist, cat cow, knee to chest flow,   twist, and pelvic tilt flow with breath, cat cow, knee to chest flow,   Twist, bridge,    Prone Sphinx/sphinx plank, Sphinx/sphinx plank, Sphinx/sphinx plank,       standing Chair pose, warrior 2, triangle, Chair pose, warrior 2, triangle, Chair pose x 2,  warrior 2, triangle, wide straddle forward fold Chair pose x 2,  wide straddle forward fold  Chair pose x 2,  warrior 2, triangle,  warrior 2, triangle, side angle, down dog with chair, wide straddle forward fold             UE exercise for right shoulder ROM and stregth Dowel flexion, sidelying abd., door pulley, flexion and abd.  Flexion and abd PROM in side lying, contract/relax,  Flexion and abd AROM in side lying, contract/relax, 1# sidelying abd x10, ER strength with 1# in sidelying x 10, IR        Self-Care/Home Management  / Therapeutic Activities  15 minutes  15 minutes  15 minutes  15 minutes  30 minutes  20 minutes 15 minutes             Relaxation techniques DB, body scan DB, body scan DB, body scan DB, body scan, bramari breath DB, body scan, bramari breath DB, body scan, bramari breath, ujaii breath, central channel sweep aligned with breath DB, body scan, viloma on inhale x 3 pauses in sitting,   Restorative  Fish and bridge on blocks Fish and bridge on blocks  Supine with bolster under hips and legs on chair Supine with bolster under hips and legs on chair, Bridge and bound angle with bolster support Bridge an bound angle with breath flow,prone twist, Siddasana, virasan sitting position for breath and meditation Legs up wall with bolster-bound hollie and sandbag on feet   Activity Pacing                              Stress Management/Education  - physiology of yoga/meditation and immune health - physiology of yoga/meditation and immune health - physiology of yoga/meditation and immune health - physiology of yoga/meditation and immune health - physiology of yoga/meditation and immune health - physiology of yoga/meditation and immune health - physiology of yoga/meditation and immune health                 Patient Education and Home Exercises      Education provided:   - - physiology of yoga/meditation and immune health  - Progress towards goals     Written Home Exercises Provided: yes.  Exercises were reviewed and Tasha was able to demonstrate them prior to the end of the session.  Tasha demonstrated good  understanding of the HEP provided. See EMR under Patient Instructions for exercises provided during therapy sessions.       Assessment     In today's session, Tasha reported she feels a little weaker after every chemo session. We reviewed her yoga HEP consisting of strengthening, stretching, and balance yoga exercise.  We  also reviewed viloma breathing and body scan techniques to assist with relaxation/immune health.   She tolerated the session well and required maximum verbal and neuromuscular cues in all treatment.     PROGRESS:  Tasha is progressing well towards her goals and patient prognosis is Good. Her PSFS has remained steady due to effects of chemotherapy.  She continues to practice her HEP as tolerated,   Patient will continue to benefit from skilled outpatient occupational therapy to address the deficits listed in the problem list on initial evaluation provide  pt/family education and to maximize pt's level of independence in the home and community environment. Pt's spiritual, cultural and educational needs considered and pt agreeable to plan of care and goals.    Anticipated barriers to occupational therapy: none    GOALS:    Short Term Goals: 6 weeks      Goal # Goal Status   1 Patient will demonstrate independence with diaphragmatic breathing in sit and supine. met   2 Pt. will identify resource for audio body scan to assist with stress management/immune health progressing   3 Patient will identify 2 new stress coping skills for stress management/immune health. Progressing   4 Patient will identify activity pacing problems.  Patient will then implement new plan for daily activity to increase endurance for ADL's. Progressing      Long Term Goals: 12 weeks      Goal # Goal Status   1 Patient will demonstrate independence with yoga Home Exercise Program to increase strength and endurance for ADL's. Progressing   2 Patient will demonstrate independence with relaxation techniques to manage stress for immune health. Progressing   3 Patient will verbalize good understanding of stress/immune health relationship.  Progressing   4                  PLAN     Plan of care Certification: 4/5/2024 to 4/29/24.    Outpatient Occupational Therapy 1 times weekly for 12 weeks to include the following interventions: Neuromuscular Re-ed, Patient Education, Self Care, Therapeutic Activities, and Therapeutic Exercise.     Ernestina Romo, OT

## 2024-04-06 ENCOUNTER — PATIENT MESSAGE (OUTPATIENT)
Dept: ADMINISTRATIVE | Facility: OTHER | Age: 44
End: 2024-04-06
Payer: COMMERCIAL

## 2024-04-07 ENCOUNTER — PATIENT MESSAGE (OUTPATIENT)
Dept: ADMINISTRATIVE | Facility: OTHER | Age: 44
End: 2024-04-07
Payer: COMMERCIAL

## 2024-04-08 ENCOUNTER — PATIENT MESSAGE (OUTPATIENT)
Dept: ADMINISTRATIVE | Facility: OTHER | Age: 44
End: 2024-04-08
Payer: COMMERCIAL

## 2024-04-08 RX ORDER — SODIUM CHLORIDE 0.9 % (FLUSH) 0.9 %
10 SYRINGE (ML) INJECTION
Status: CANCELLED | OUTPATIENT
Start: 2024-04-11

## 2024-04-08 RX ORDER — EPINEPHRINE 0.3 MG/.3ML
0.3 INJECTION SUBCUTANEOUS ONCE AS NEEDED
Status: CANCELLED | OUTPATIENT
Start: 2024-04-11

## 2024-04-08 RX ORDER — PROCHLORPERAZINE EDISYLATE 5 MG/ML
10 INJECTION INTRAMUSCULAR; INTRAVENOUS ONCE AS NEEDED
Status: CANCELLED
Start: 2024-04-11

## 2024-04-08 RX ORDER — DIPHENHYDRAMINE HYDROCHLORIDE 50 MG/ML
50 INJECTION INTRAMUSCULAR; INTRAVENOUS ONCE AS NEEDED
Status: CANCELLED | OUTPATIENT
Start: 2024-04-11

## 2024-04-08 RX ORDER — HEPARIN 100 UNIT/ML
500 SYRINGE INTRAVENOUS
Status: CANCELLED | OUTPATIENT
Start: 2024-04-11

## 2024-04-08 RX ORDER — FAMOTIDINE 10 MG/ML
20 INJECTION INTRAVENOUS
Status: CANCELLED | OUTPATIENT
Start: 2024-04-11

## 2024-04-10 ENCOUNTER — PATIENT MESSAGE (OUTPATIENT)
Dept: ADMINISTRATIVE | Facility: OTHER | Age: 44
End: 2024-04-10
Payer: COMMERCIAL

## 2024-04-10 ENCOUNTER — LAB VISIT (OUTPATIENT)
Dept: LAB | Facility: HOSPITAL | Age: 44
End: 2024-04-10
Payer: COMMERCIAL

## 2024-04-10 DIAGNOSIS — C50.311 MALIGNANT NEOPLASM OF LOWER-INNER QUADRANT OF RIGHT BREAST OF FEMALE, ESTROGEN RECEPTOR POSITIVE: ICD-10-CM

## 2024-04-10 DIAGNOSIS — Z17.0 MALIGNANT NEOPLASM OF LOWER-INNER QUADRANT OF RIGHT BREAST OF FEMALE, ESTROGEN RECEPTOR POSITIVE: ICD-10-CM

## 2024-04-10 LAB
ALBUMIN SERPL BCP-MCNC: 4 G/DL (ref 3.5–5.2)
ALP SERPL-CCNC: 54 U/L (ref 55–135)
ALT SERPL W/O P-5'-P-CCNC: 15 U/L (ref 10–44)
ANION GAP SERPL CALC-SCNC: 9 MMOL/L (ref 8–16)
AST SERPL-CCNC: 18 U/L (ref 10–40)
BILIRUB SERPL-MCNC: 0.5 MG/DL (ref 0.1–1)
BUN SERPL-MCNC: 14 MG/DL (ref 6–20)
CALCIUM SERPL-MCNC: 9.3 MG/DL (ref 8.7–10.5)
CHLORIDE SERPL-SCNC: 104 MMOL/L (ref 95–110)
CO2 SERPL-SCNC: 28 MMOL/L (ref 23–29)
CREAT SERPL-MCNC: 0.7 MG/DL (ref 0.5–1.4)
ERYTHROCYTE [DISTWIDTH] IN BLOOD BY AUTOMATED COUNT: 14.6 % (ref 11.5–14.5)
EST. GFR  (NO RACE VARIABLE): >60 ML/MIN/1.73 M^2
GLUCOSE SERPL-MCNC: 87 MG/DL (ref 70–110)
HCG INTACT+B SERPL-ACNC: 3.1 MIU/ML
HCT VFR BLD AUTO: 38.5 % (ref 37–48.5)
HGB BLD-MCNC: 12.7 G/DL (ref 12–16)
IMM GRANULOCYTES # BLD AUTO: 0.02 K/UL (ref 0–0.04)
MCH RBC QN AUTO: 31.3 PG (ref 27–31)
MCHC RBC AUTO-ENTMCNC: 33 G/DL (ref 32–36)
MCV RBC AUTO: 95 FL (ref 82–98)
NEUTROPHILS # BLD AUTO: 2.4 K/UL (ref 1.8–7.7)
PLATELET # BLD AUTO: 333 K/UL (ref 150–450)
PMV BLD AUTO: 10.7 FL (ref 9.2–12.9)
POTASSIUM SERPL-SCNC: 4.8 MMOL/L (ref 3.5–5.1)
PROT SERPL-MCNC: 6.8 G/DL (ref 6–8.4)
RBC # BLD AUTO: 4.06 M/UL (ref 4–5.4)
SODIUM SERPL-SCNC: 141 MMOL/L (ref 136–145)
WBC # BLD AUTO: 4.36 K/UL (ref 3.9–12.7)

## 2024-04-10 PROCEDURE — 80053 COMPREHEN METABOLIC PANEL: CPT | Performed by: NURSE PRACTITIONER

## 2024-04-10 PROCEDURE — 36415 COLL VENOUS BLD VENIPUNCTURE: CPT | Mod: PO | Performed by: NURSE PRACTITIONER

## 2024-04-10 PROCEDURE — 85027 COMPLETE CBC AUTOMATED: CPT | Performed by: NURSE PRACTITIONER

## 2024-04-10 PROCEDURE — 84702 CHORIONIC GONADOTROPIN TEST: CPT | Performed by: NURSE PRACTITIONER

## 2024-04-11 ENCOUNTER — PATIENT MESSAGE (OUTPATIENT)
Dept: ADMINISTRATIVE | Facility: OTHER | Age: 44
End: 2024-04-11
Payer: COMMERCIAL

## 2024-04-11 ENCOUNTER — CLINICAL SUPPORT (OUTPATIENT)
Dept: REHABILITATION | Facility: HOSPITAL | Age: 44
End: 2024-04-11

## 2024-04-11 ENCOUNTER — INFUSION (OUTPATIENT)
Dept: INFUSION THERAPY | Facility: HOSPITAL | Age: 44
End: 2024-04-11
Payer: COMMERCIAL

## 2024-04-11 VITALS
OXYGEN SATURATION: 99 % | WEIGHT: 112.88 LBS | RESPIRATION RATE: 20 BRPM | HEIGHT: 60 IN | DIASTOLIC BLOOD PRESSURE: 88 MMHG | HEART RATE: 76 BPM | SYSTOLIC BLOOD PRESSURE: 139 MMHG | BODY MASS INDEX: 22.16 KG/M2 | TEMPERATURE: 99 F

## 2024-04-11 DIAGNOSIS — Z17.0 MALIGNANT NEOPLASM OF LOWER-INNER QUADRANT OF RIGHT BREAST OF FEMALE, ESTROGEN RECEPTOR POSITIVE: Primary | ICD-10-CM

## 2024-04-11 DIAGNOSIS — R11.0 CHEMOTHERAPY-INDUCED NAUSEA: Primary | ICD-10-CM

## 2024-04-11 DIAGNOSIS — T45.1X5A CHEMOTHERAPY-INDUCED NAUSEA: Primary | ICD-10-CM

## 2024-04-11 DIAGNOSIS — C50.311 MALIGNANT NEOPLASM OF LOWER-INNER QUADRANT OF RIGHT BREAST OF FEMALE, ESTROGEN RECEPTOR POSITIVE: Primary | ICD-10-CM

## 2024-04-11 DIAGNOSIS — Z17.0 MALIGNANT NEOPLASM OF LOWER-INNER QUADRANT OF RIGHT BREAST OF FEMALE, ESTROGEN RECEPTOR POSITIVE: ICD-10-CM

## 2024-04-11 DIAGNOSIS — C50.311 MALIGNANT NEOPLASM OF LOWER-INNER QUADRANT OF RIGHT BREAST OF FEMALE, ESTROGEN RECEPTOR POSITIVE: ICD-10-CM

## 2024-04-11 PROCEDURE — 97813 ACUP 1/> W/ESTIM 1ST 15 MIN: CPT | Performed by: ACUPUNCTURIST

## 2024-04-11 PROCEDURE — 96375 TX/PRO/DX INJ NEW DRUG ADDON: CPT

## 2024-04-11 PROCEDURE — 25000003 PHARM REV CODE 250: Performed by: INTERNAL MEDICINE

## 2024-04-11 PROCEDURE — 96413 CHEMO IV INFUSION 1 HR: CPT

## 2024-04-11 PROCEDURE — A4216 STERILE WATER/SALINE, 10 ML: HCPCS | Performed by: INTERNAL MEDICINE

## 2024-04-11 PROCEDURE — 96367 TX/PROPH/DG ADDL SEQ IV INF: CPT

## 2024-04-11 PROCEDURE — 97814 ACUP 1/> W/ESTIM EA ADDL 15: CPT | Performed by: ACUPUNCTURIST

## 2024-04-11 PROCEDURE — 63600175 PHARM REV CODE 636 W HCPCS: Performed by: INTERNAL MEDICINE

## 2024-04-11 PROCEDURE — 96417 CHEMO IV INFUS EACH ADDL SEQ: CPT

## 2024-04-11 RX ORDER — EPINEPHRINE 0.3 MG/.3ML
0.3 INJECTION SUBCUTANEOUS ONCE AS NEEDED
Status: DISCONTINUED | OUTPATIENT
Start: 2024-04-11 | End: 2024-04-11 | Stop reason: HOSPADM

## 2024-04-11 RX ORDER — HEPARIN 100 UNIT/ML
500 SYRINGE INTRAVENOUS
Status: DISCONTINUED | OUTPATIENT
Start: 2024-04-11 | End: 2024-04-11 | Stop reason: HOSPADM

## 2024-04-11 RX ORDER — DIPHENHYDRAMINE HYDROCHLORIDE 50 MG/ML
50 INJECTION INTRAMUSCULAR; INTRAVENOUS ONCE AS NEEDED
Status: DISCONTINUED | OUTPATIENT
Start: 2024-04-11 | End: 2024-04-11 | Stop reason: HOSPADM

## 2024-04-11 RX ORDER — FAMOTIDINE 10 MG/ML
20 INJECTION INTRAVENOUS
Status: COMPLETED | OUTPATIENT
Start: 2024-04-11 | End: 2024-04-11

## 2024-04-11 RX ORDER — SODIUM CHLORIDE 0.9 % (FLUSH) 0.9 %
10 SYRINGE (ML) INJECTION
Status: DISCONTINUED | OUTPATIENT
Start: 2024-04-11 | End: 2024-04-11 | Stop reason: HOSPADM

## 2024-04-11 RX ORDER — PROCHLORPERAZINE EDISYLATE 5 MG/ML
10 INJECTION INTRAMUSCULAR; INTRAVENOUS ONCE AS NEEDED
Status: DISCONTINUED | OUTPATIENT
Start: 2024-04-11 | End: 2024-04-11 | Stop reason: HOSPADM

## 2024-04-11 RX ADMIN — HEPARIN 500 UNITS: 100 SYRINGE at 11:04

## 2024-04-11 RX ADMIN — Medication 10 ML: at 11:04

## 2024-04-11 RX ADMIN — PACLITAXEL 120 MG: 6 INJECTION, SOLUTION INTRAVENOUS at 10:04

## 2024-04-11 RX ADMIN — SODIUM CHLORIDE: 9 INJECTION, SOLUTION INTRAVENOUS at 08:04

## 2024-04-11 RX ADMIN — TRASTUZUMAB-ANNS 106 MG: 420 INJECTION, POWDER, LYOPHILIZED, FOR SOLUTION INTRAVENOUS at 09:04

## 2024-04-11 RX ADMIN — DEXAMETHASONE SODIUM PHOSPHATE 20 MG: 4 INJECTION, SOLUTION INTRA-ARTICULAR; INTRALESIONAL; INTRAMUSCULAR; INTRAVENOUS; SOFT TISSUE at 09:04

## 2024-04-11 RX ADMIN — DIPHENHYDRAMINE HYDROCHLORIDE 50 MG: 50 INJECTION, SOLUTION INTRAMUSCULAR; INTRAVENOUS at 10:04

## 2024-04-11 RX ADMIN — FAMOTIDINE 20 MG: 10 INJECTION INTRAVENOUS at 09:04

## 2024-04-11 NOTE — NURSING
PT tolerated Kanjinti and Taxol infusion well. No adverse reaction noted. Pt education reinforced on side effects, what to expect and when to contact the doctor. Pt verbalized understanding. PAC flushed with heparin and de-accessed per protocol.

## 2024-04-11 NOTE — PROGRESS NOTES
Acupuncture Evaluation Note     Name: Tasha Cornejo  [facility] Number: [id number]    Traditional Chinese Medicine (TCM) Diagnosis: Qi Stagnation, Blood Stasis, and Qi Deficiency  Medical Diagnosis:   Encounter Diagnosis   Name Primary?    Malignant neoplasm of lower-inner quadrant of right breast of female, estrogen receptor positive Yes        Evaluation Date: 4/11/2024    Visit #/Visits authorized: self pay, 9    Precautions: Standard and cancer    Subjective     Chief Concern: Breast cancer - 12 rounds of chemo once/week. CINV, CIPN - prevent. Night sweats, hip pain at night.     Medical necessity is demonstrated by the following IMPAIRMENTS: Medical Necessity: Decreased quality of life and Nausea and Vomiting              Aggravating Factors:  flexion and extension   Relieving Factors:  stretching/yoga     Symptom Description:     Quality:  Tight  Severity:  1  Frequency:  when active      Treatment     Treatment Principles:  Move qi and blood, nourish qi, calm hernandez    Acupuncture points used:   Ba Ashwin, Ba Taryn, 4 Hill   Bilateral points: ST36, SP9, SP6, KD3, GB34  Unilateral points:   Auricular Treatment:      Needles In: 30  Needles Out: 30  [place] W/ STIM placed: 7:20  Needles W/ STIM removed: 7:50      Assessment     After treatment, patient felt she is tolerating chemo better, neuropathy present in left hand      Patient prognosis is Good.     Patient will continue to benefit from acupuncture treatment to address the deficits listed in the problem list box on initial evaluation, provide patient family education and to maximize pt's level of independence in the home and community environment.     Patient's spiritual, cultural and educational needs considered and pt agreeable to plan of care and goals.     Anticipated barriers to treatment: none    Plan     Recommend 1 /week for 12 weeks - throughout chemo      Education:  Patient is aware of cumulative benefit of acupuncture

## 2024-04-12 ENCOUNTER — PATIENT MESSAGE (OUTPATIENT)
Dept: ADMINISTRATIVE | Facility: OTHER | Age: 44
End: 2024-04-12
Payer: COMMERCIAL

## 2024-04-13 ENCOUNTER — PATIENT MESSAGE (OUTPATIENT)
Dept: ADMINISTRATIVE | Facility: OTHER | Age: 44
End: 2024-04-13
Payer: COMMERCIAL

## 2024-04-14 ENCOUNTER — PATIENT MESSAGE (OUTPATIENT)
Dept: ADMINISTRATIVE | Facility: OTHER | Age: 44
End: 2024-04-14
Payer: COMMERCIAL

## 2024-04-15 ENCOUNTER — PATIENT MESSAGE (OUTPATIENT)
Dept: ADMINISTRATIVE | Facility: OTHER | Age: 44
End: 2024-04-15
Payer: COMMERCIAL

## 2024-04-17 ENCOUNTER — PATIENT MESSAGE (OUTPATIENT)
Dept: ADMINISTRATIVE | Facility: OTHER | Age: 44
End: 2024-04-17
Payer: COMMERCIAL

## 2024-04-17 ENCOUNTER — LAB VISIT (OUTPATIENT)
Dept: LAB | Facility: HOSPITAL | Age: 44
End: 2024-04-17
Payer: COMMERCIAL

## 2024-04-17 DIAGNOSIS — C50.311 MALIGNANT NEOPLASM OF LOWER-INNER QUADRANT OF RIGHT BREAST OF FEMALE, ESTROGEN RECEPTOR POSITIVE: ICD-10-CM

## 2024-04-17 DIAGNOSIS — Z17.0 MALIGNANT NEOPLASM OF LOWER-INNER QUADRANT OF RIGHT BREAST OF FEMALE, ESTROGEN RECEPTOR POSITIVE: ICD-10-CM

## 2024-04-17 LAB
ALBUMIN SERPL BCP-MCNC: 4 G/DL (ref 3.5–5.2)
ALP SERPL-CCNC: 54 U/L (ref 55–135)
ALT SERPL W/O P-5'-P-CCNC: 14 U/L (ref 10–44)
ANION GAP SERPL CALC-SCNC: 9 MMOL/L (ref 8–16)
AST SERPL-CCNC: 19 U/L (ref 10–40)
BILIRUB SERPL-MCNC: 0.3 MG/DL (ref 0.1–1)
BUN SERPL-MCNC: 14 MG/DL (ref 6–20)
CALCIUM SERPL-MCNC: 9.8 MG/DL (ref 8.7–10.5)
CHLORIDE SERPL-SCNC: 108 MMOL/L (ref 95–110)
CO2 SERPL-SCNC: 24 MMOL/L (ref 23–29)
CREAT SERPL-MCNC: 0.8 MG/DL (ref 0.5–1.4)
ERYTHROCYTE [DISTWIDTH] IN BLOOD BY AUTOMATED COUNT: 15 % (ref 11.5–14.5)
EST. GFR  (NO RACE VARIABLE): >60 ML/MIN/1.73 M^2
GLUCOSE SERPL-MCNC: 87 MG/DL (ref 70–110)
HCG INTACT+B SERPL-ACNC: <2.4 MIU/ML
HCT VFR BLD AUTO: 37.8 % (ref 37–48.5)
HGB BLD-MCNC: 12 G/DL (ref 12–16)
IMM GRANULOCYTES # BLD AUTO: 0.01 K/UL (ref 0–0.04)
MCH RBC QN AUTO: 30.6 PG (ref 27–31)
MCHC RBC AUTO-ENTMCNC: 31.7 G/DL (ref 32–36)
MCV RBC AUTO: 96 FL (ref 82–98)
NEUTROPHILS # BLD AUTO: 2.5 K/UL (ref 1.8–7.7)
PLATELET # BLD AUTO: 352 K/UL (ref 150–450)
PMV BLD AUTO: 10.9 FL (ref 9.2–12.9)
POTASSIUM SERPL-SCNC: 4.7 MMOL/L (ref 3.5–5.1)
PROT SERPL-MCNC: 6.8 G/DL (ref 6–8.4)
RBC # BLD AUTO: 3.92 M/UL (ref 4–5.4)
SODIUM SERPL-SCNC: 141 MMOL/L (ref 136–145)
WBC # BLD AUTO: 4.55 K/UL (ref 3.9–12.7)

## 2024-04-17 PROCEDURE — 36415 COLL VENOUS BLD VENIPUNCTURE: CPT | Mod: PO | Performed by: NURSE PRACTITIONER

## 2024-04-17 PROCEDURE — 80053 COMPREHEN METABOLIC PANEL: CPT | Performed by: NURSE PRACTITIONER

## 2024-04-17 PROCEDURE — 85027 COMPLETE CBC AUTOMATED: CPT | Performed by: NURSE PRACTITIONER

## 2024-04-17 PROCEDURE — 84702 CHORIONIC GONADOTROPIN TEST: CPT | Performed by: NURSE PRACTITIONER

## 2024-04-18 ENCOUNTER — PATIENT MESSAGE (OUTPATIENT)
Dept: ADMINISTRATIVE | Facility: OTHER | Age: 44
End: 2024-04-18
Payer: COMMERCIAL

## 2024-04-18 ENCOUNTER — CLINICAL SUPPORT (OUTPATIENT)
Dept: REHABILITATION | Facility: HOSPITAL | Age: 44
End: 2024-04-18

## 2024-04-18 ENCOUNTER — OFFICE VISIT (OUTPATIENT)
Dept: HEMATOLOGY/ONCOLOGY | Facility: CLINIC | Age: 44
End: 2024-04-18
Payer: COMMERCIAL

## 2024-04-18 ENCOUNTER — INFUSION (OUTPATIENT)
Dept: INFUSION THERAPY | Facility: HOSPITAL | Age: 44
End: 2024-04-18
Payer: COMMERCIAL

## 2024-04-18 ENCOUNTER — PATIENT MESSAGE (OUTPATIENT)
Dept: HEMATOLOGY/ONCOLOGY | Facility: CLINIC | Age: 44
End: 2024-04-18

## 2024-04-18 VITALS — SYSTOLIC BLOOD PRESSURE: 144 MMHG | HEART RATE: 82 BPM | DIASTOLIC BLOOD PRESSURE: 84 MMHG

## 2024-04-18 VITALS
SYSTOLIC BLOOD PRESSURE: 143 MMHG | TEMPERATURE: 98 F | HEART RATE: 65 BPM | BODY MASS INDEX: 22.25 KG/M2 | HEIGHT: 60 IN | WEIGHT: 113.31 LBS | DIASTOLIC BLOOD PRESSURE: 81 MMHG | OXYGEN SATURATION: 95 %

## 2024-04-18 DIAGNOSIS — R11.0 CHEMOTHERAPY-INDUCED NAUSEA: Primary | ICD-10-CM

## 2024-04-18 DIAGNOSIS — C50.311 MALIGNANT NEOPLASM OF LOWER-INNER QUADRANT OF RIGHT BREAST OF FEMALE, ESTROGEN RECEPTOR POSITIVE: ICD-10-CM

## 2024-04-18 DIAGNOSIS — Z79.899 IMMUNODEFICIENCY DUE TO DRUGS: ICD-10-CM

## 2024-04-18 DIAGNOSIS — Z17.0 MALIGNANT NEOPLASM OF LOWER-INNER QUADRANT OF RIGHT BREAST OF FEMALE, ESTROGEN RECEPTOR POSITIVE: Primary | ICD-10-CM

## 2024-04-18 DIAGNOSIS — D84.821 IMMUNODEFICIENCY DUE TO DRUGS: ICD-10-CM

## 2024-04-18 DIAGNOSIS — C50.311 MALIGNANT NEOPLASM OF LOWER-INNER QUADRANT OF RIGHT BREAST OF FEMALE, ESTROGEN RECEPTOR POSITIVE: Primary | ICD-10-CM

## 2024-04-18 DIAGNOSIS — Z15.01 BARD1 GENE MUTATION POSITIVE: ICD-10-CM

## 2024-04-18 DIAGNOSIS — T45.1X5A CHEMOTHERAPY-INDUCED NAUSEA: Primary | ICD-10-CM

## 2024-04-18 DIAGNOSIS — Z17.0 MALIGNANT NEOPLASM OF LOWER-INNER QUADRANT OF RIGHT BREAST OF FEMALE, ESTROGEN RECEPTOR POSITIVE: ICD-10-CM

## 2024-04-18 PROCEDURE — 97814 ACUP 1/> W/ESTIM EA ADDL 15: CPT | Performed by: ACUPUNCTURIST

## 2024-04-18 PROCEDURE — 97813 ACUP 1/> W/ESTIM 1ST 15 MIN: CPT | Performed by: ACUPUNCTURIST

## 2024-04-18 PROCEDURE — 63600175 PHARM REV CODE 636 W HCPCS: Performed by: INTERNAL MEDICINE

## 2024-04-18 PROCEDURE — 99215 OFFICE O/P EST HI 40 MIN: CPT | Mod: S$GLB,,, | Performed by: INTERNAL MEDICINE

## 2024-04-18 PROCEDURE — 3077F SYST BP >= 140 MM HG: CPT | Mod: CPTII,S$GLB,, | Performed by: INTERNAL MEDICINE

## 2024-04-18 PROCEDURE — 3008F BODY MASS INDEX DOCD: CPT | Mod: CPTII,S$GLB,, | Performed by: INTERNAL MEDICINE

## 2024-04-18 PROCEDURE — 3044F HG A1C LEVEL LT 7.0%: CPT | Mod: CPTII,S$GLB,, | Performed by: INTERNAL MEDICINE

## 2024-04-18 PROCEDURE — 3079F DIAST BP 80-89 MM HG: CPT | Mod: CPTII,S$GLB,, | Performed by: INTERNAL MEDICINE

## 2024-04-18 PROCEDURE — 96413 CHEMO IV INFUSION 1 HR: CPT

## 2024-04-18 PROCEDURE — 96367 TX/PROPH/DG ADDL SEQ IV INF: CPT

## 2024-04-18 PROCEDURE — 25000003 PHARM REV CODE 250: Performed by: INTERNAL MEDICINE

## 2024-04-18 PROCEDURE — 99999 PR PBB SHADOW E&M-EST. PATIENT-LVL III: CPT | Mod: PBBFAC,,, | Performed by: INTERNAL MEDICINE

## 2024-04-18 PROCEDURE — 96375 TX/PRO/DX INJ NEW DRUG ADDON: CPT

## 2024-04-18 PROCEDURE — 96417 CHEMO IV INFUS EACH ADDL SEQ: CPT

## 2024-04-18 PROCEDURE — A4216 STERILE WATER/SALINE, 10 ML: HCPCS | Performed by: INTERNAL MEDICINE

## 2024-04-18 RX ORDER — SODIUM CHLORIDE 0.9 % (FLUSH) 0.9 %
10 SYRINGE (ML) INJECTION
Status: DISCONTINUED | OUTPATIENT
Start: 2024-04-18 | End: 2024-04-18 | Stop reason: HOSPADM

## 2024-04-18 RX ORDER — HEPARIN 100 UNIT/ML
500 SYRINGE INTRAVENOUS
Status: CANCELLED | OUTPATIENT
Start: 2024-04-18

## 2024-04-18 RX ORDER — SODIUM CHLORIDE 0.9 % (FLUSH) 0.9 %
10 SYRINGE (ML) INJECTION
Status: CANCELLED | OUTPATIENT
Start: 2024-04-18

## 2024-04-18 RX ORDER — FAMOTIDINE 10 MG/ML
20 INJECTION INTRAVENOUS
Status: CANCELLED | OUTPATIENT
Start: 2024-04-18

## 2024-04-18 RX ORDER — EPINEPHRINE 0.3 MG/.3ML
0.3 INJECTION SUBCUTANEOUS ONCE AS NEEDED
Status: CANCELLED | OUTPATIENT
Start: 2024-04-18

## 2024-04-18 RX ORDER — DIPHENHYDRAMINE HYDROCHLORIDE 50 MG/ML
50 INJECTION INTRAMUSCULAR; INTRAVENOUS ONCE AS NEEDED
Status: CANCELLED | OUTPATIENT
Start: 2024-04-18

## 2024-04-18 RX ORDER — FAMOTIDINE 10 MG/ML
20 INJECTION INTRAVENOUS
Status: COMPLETED | OUTPATIENT
Start: 2024-04-18 | End: 2024-04-18

## 2024-04-18 RX ORDER — PROCHLORPERAZINE EDISYLATE 5 MG/ML
10 INJECTION INTRAMUSCULAR; INTRAVENOUS ONCE AS NEEDED
Status: DISCONTINUED | OUTPATIENT
Start: 2024-04-18 | End: 2024-04-18 | Stop reason: HOSPADM

## 2024-04-18 RX ORDER — HEPARIN 100 UNIT/ML
500 SYRINGE INTRAVENOUS
Status: DISCONTINUED | OUTPATIENT
Start: 2024-04-18 | End: 2024-04-18 | Stop reason: HOSPADM

## 2024-04-18 RX ORDER — EPINEPHRINE 0.3 MG/.3ML
0.3 INJECTION SUBCUTANEOUS ONCE AS NEEDED
Status: DISCONTINUED | OUTPATIENT
Start: 2024-04-18 | End: 2024-04-18 | Stop reason: HOSPADM

## 2024-04-18 RX ORDER — PROCHLORPERAZINE EDISYLATE 5 MG/ML
10 INJECTION INTRAMUSCULAR; INTRAVENOUS ONCE AS NEEDED
Status: CANCELLED
Start: 2024-04-18

## 2024-04-18 RX ORDER — DIPHENHYDRAMINE HYDROCHLORIDE 50 MG/ML
50 INJECTION INTRAMUSCULAR; INTRAVENOUS ONCE AS NEEDED
Status: DISCONTINUED | OUTPATIENT
Start: 2024-04-18 | End: 2024-04-18 | Stop reason: HOSPADM

## 2024-04-18 RX ADMIN — TRASTUZUMAB-ANNS 106 MG: 420 INJECTION, POWDER, LYOPHILIZED, FOR SOLUTION INTRAVENOUS at 09:04

## 2024-04-18 RX ADMIN — DIPHENHYDRAMINE HYDROCHLORIDE 50 MG: 50 INJECTION INTRAMUSCULAR; INTRAVENOUS at 10:04

## 2024-04-18 RX ADMIN — HEPARIN 500 UNITS: 100 SYRINGE at 12:04

## 2024-04-18 RX ADMIN — PACLITAXEL 120 MG: 6 INJECTION, SOLUTION INTRAVENOUS at 11:04

## 2024-04-18 RX ADMIN — DEXAMETHASONE SODIUM PHOSPHATE 20 MG: 4 INJECTION, SOLUTION INTRA-ARTICULAR; INTRALESIONAL; INTRAMUSCULAR; INTRAVENOUS; SOFT TISSUE at 10:04

## 2024-04-18 RX ADMIN — Medication 10 ML: at 12:04

## 2024-04-18 RX ADMIN — FAMOTIDINE 20 MG: 10 INJECTION INTRAVENOUS at 10:04

## 2024-04-18 NOTE — PROGRESS NOTES
[facility]   at [facility] Note:      Chief Complaint:   Encounter Diagnosis   Name Primary?    Malignant neoplasm of lower-inner quadrant of right breast of female, estrogen receptor positive Yes        Cancer Staging   Malignant neoplasm of lower-inner quadrant of right breast of female, estrogen receptor positive  Staging form: Breast, AJCC 8th Edition  - Clinical stage from 10/26/2023: Stage IIA (cT2, cN0, cM0, G3, ER+, KY-, HER2+) - Signed by Linda Mcbride MD on 2023  - Pathologic stage from 2023: Stage IA (pT1c, pN0, cM0, G2, ER+, KY-, HER2+) - Signed by Linda Mcbride MD on 2024    HPI:  Tasha Cornejo is a 44 y.o. female who presents today for Cycle 10, day 1 of . She had an infusion reaction with her first taxol dose, but was able to complete therapy with a slower infusion. No further difficulty for following cycles  She is having her last chemotherapy treatment today.     Neuropathy improving with acupuncture but occurring in feet   Noticing acne on her face        Oncology History  Screening mammogram on 10/5/2023 identified coarse heterogeneous calcifications in a linear distribution seen in the lower outer quadrant of the right breast, spanning 1.5 cm.  Diagnostic mammogram on 10/12/2023 showed coarse heterogeneous calcifications in a regional distribution spanning 3.2 cm long axis   Biopsy 10/26/2023 with pathology revealing infiltrating ductal carcinoma of the breast, ER 70%/ KY-/HER2 3+; Ki67 60%    Bilateral mastectomy 23: IDC, grade 2, 1.5cm in maximum; 0/2 LN+    Adjuvant TH 24    GYN History:  Age of menarche was 9.   Age of menopause n/a.  Patient denies hormonal therapy.   Patient is .     Patient Active Problem List   Diagnosis    Rhinitis, allergic    Allergic conjunctivitis    Vitamin D deficiency    Malignant neoplasm of lower-inner quadrant of right breast of female, estrogen  receptor positive    Chemotherapy-induced nausea    At risk for lymphedema    Stress and adjustment reaction    Physical deconditioning    Immunodeficiency due to drugs    Decreased ROM of right shoulder    Shoulder weakness    Anxiety about health       Current Outpatient Medications   Medication Instructions    fluticasone propionate (FLONASE) 50 mcg/actuation nasal spray 1 spray, Each Nostril, Daily PRN    hydrocortisone 2.5 % cream Topical (Top), 2 times daily    LIDOcaine-prilocaine (EMLA) cream Apply topically to port one hour prior to chemotherapy infusion    multivitamin (THERAGRAN) per tablet 1 tablet, Oral, Daily    mupirocin (BACTROBAN) 2 % ointment Topical (Top), 3 times daily    omeprazole (PRILOSEC) 40 mg, Oral, Every morning    ondansetron (ZOFRAN) 8 mg, Oral, Every 8 hours PRN    prochlorperazine (COMPAZINE) 10 mg, Oral, Every 6 hours PRN    propranoloL (INDERAL) 20 MG tablet Take one tablet by mouth daily as needed for anxiety/panic attack       Review of Systems:   Review of Systems   Respiratory:  Negative for cough and shortness of breath.    Cardiovascular:  Negative for chest pain.   Gastrointestinal:  Positive for diarrhea. Negative for abdominal pain.   Genitourinary:  Positive for dysuria. Negative for frequency.   Musculoskeletal:  Negative for back pain.   Skin:  Negative for rash.   Neurological:  Negative for headaches.   Endo/Heme/Allergies:         Night sweats   Psychiatric/Behavioral:  The patient is nervous/anxious.    All other systems reviewed and are negative.      PHYSICAL EXAM:  BP (!) 143/81 (BP Location: Left arm, Patient Position: Sitting, BP Method: Medium (Automatic))   Pulse 65   Temp 98.1 °F (36.7 °C) (Oral)   Ht 5' (1.524 m)   Wt 51.4 kg (113 lb 5.1 oz)   SpO2 95%   BMI 22.13 kg/m²     Physical Exam  Constitutional:       General: She is not in acute distress.     Appearance: Normal appearance. She is not ill-appearing.   HENT:      Head: Normocephalic and  atraumatic.      Mouth/Throat:      Pharynx: No oropharyngeal exudate or posterior oropharyngeal erythema.   Eyes:      Extraocular Movements: Extraocular movements intact.   Cardiovascular:      Rate and Rhythm: Normal rate and regular rhythm.      Pulses: Normal pulses.      Heart sounds: Normal heart sounds.   Pulmonary:      Effort: Pulmonary effort is normal. No respiratory distress.      Breath sounds: Normal breath sounds.   Chest:      Comments: S/p bilateral mastectomies, incisions well healed, deferred today  Abdominal:      General: Bowel sounds are normal. There is no distension.      Palpations: Abdomen is soft. There is no mass.      Tenderness: There is no abdominal tenderness.      Hernia: No hernia is present.   Musculoskeletal:      Cervical back: Normal range of motion.   Skin:     General: Skin is warm and dry.   Neurological:      General: No focal deficit present.      Mental Status: She is alert and oriented to person, place, and time.     Pertinent Labs & Imaging:  Pathology Results  (Last 30 days)      None            No results found for this or any previous visit (from the past 24 hour(s)).      Assessment & Plan:  1. Malignant neoplasm of lower-inner quadrant of right breast of female, estrogen receptor positive  - Echo; Future    2. Immunodeficiency due to drugs    3. BARD1 gene mutation positive    Final pathology T1cN0  Reviewed echo and labs, ok for treatment today  Continue increased decadron premed at 20 mg today, start taxol at 1/4 rate again given reaction with cycle 1  Follow up with Dr De La Rosa next week. Echo due June 2024  Discussed next steps, plan tamoxifen to start at 2nd q3wk infusion     Route Chart for Scheduling    Med Onc Chart Routing      Follow up with physician . 7 weeks (with trastuzumab day) for tamoxifen discussion   Follow up with BHARGAVI    Infusion scheduling note   trastuzumab q3wk   Injection scheduling note    Labs   Scheduling:  Preferred lab:  Lab interval:  No  labs needed   Imaging ECHO   echo in June   Pharmacy appointment    Other referrals            Treatment Plan Information   OP BREAST TRASTUZUMAB PACLITAXEL WEEKLY   Linda Mcbride MD   Upcoming Treatment Dates - OP BREAST TRASTUZUMAB PACLITAXEL WEEKLY    4/18/2024       Pre-Medications       diphenhydrAMINE (BENADRYL) 50 mg in sodium chloride 0.9% 50 mL IVPB       dexAMETHasone 20 mg in sodium chloride 0.9% 50 mL IVPB       famotidine (PF) injection 20 mg       Chemotherapy       trastuzumab-anns (KANJINTI) 106 mg in sodium chloride 0.9% 250 mL chemo infusion       PACLitaxeL (TAXOL) 80 mg/m2 = 120 mg in sodium chloride 0.9% 250 mL chemo infusion  4/25/2024       Chemotherapy       trastuzumab-anns (KANJINTI) 318 mg in sodium chloride 0.9% 250 mL chemo infusion  5/16/2024       Chemotherapy       trastuzumab-anns (KANJINTI) 318 mg in sodium chloride 0.9% 250 mL chemo infusion  6/6/2024       Chemotherapy       trastuzumab-anns (KANJINTI) 318 mg in sodium chloride 0.9% 250 mL chemo infusion    MDM includes  :    - Acute or chronic illness or injury that poses a threat to life or bodily function  - Independent review and explanation of 2 results from unique tests  - Discussion of management and ordering 2 unique tests  - Extensive discussion of treatment and management  - Prescription drug management  - Drug therapy requiring intensive monitoring for toxicity    Linda Mcbride MD   04/18/2024

## 2024-04-18 NOTE — ANESTHESIA PREPROCEDURE EVALUATION
10/18/2023  Tasha Cornejo is a 43 y.o., female.    Active Problem List with Overview Notes    Diagnosis Date Noted    Vitamin D deficiency 07/15/2021    Rhinitis, allergic 2014    Allergic conjunctivitis 2014     Past Surgical History:   Procedure Laterality Date    COLONOSCOPY N/A 2023    Procedure: COLONOSCOPY;  Surgeon: Fabio Rice MD;  Location: [address]);  Service: Endoscopy;  Laterality: N/A;  pt confirmed earlier time  EB    ESOPHAGOGASTRODUODENOSCOPY N/A 2023    Procedure: EGD (ESOPHAGOGASTRODUODENOSCOPY);  Surgeon: Fabio Rice MD;  Location: [facility] ([address] [place] [address]);  Service: Endoscopy;  Laterality: N/A;  Procedure Timin-4 weeks    referred by Dr. Rice/Prep instructions handed to patient and to portal.  Patient called did not answer- DB     Results for orders placed or performed in visit on 19   EKG 12-lead    Collection Time: 19 12:39 PM    Narrative    Test Reason : R06.02    Vent. Rate : 067 BPM     Atrial Rate : 067 BPM     P-R Int : 138 ms          QRS Dur : 068 ms      QT Int : 400 ms       P-R-T Axes : 024 077 056 degrees     QTc Int : 422 ms    Normal sinus rhythm with sinus arrhythmia  Normal ECG  No previous ECGs available  Confirmed by Ar Jauregui MD ([id number]) on 2019 9:03:09 PM    Referred By: [id number] KYLE HERNÁNDEZ DO           Confirmed By:Ar Jauregui MD       Pre-op Assessment    I have reviewed the Patient Summary Reports.    I have reviewed the NPO Status.   I have reviewed the Medications.     Review of Systems  Anesthesia Hx:  No problems with previous Anesthesia    Hematology/Oncology:  Hematology Normal   Oncology Normal     EENT/Dental:EENT/Dental Normal   Cardiovascular:  Cardiovascular Normal  ECG has been reviewed.    Pulmonary:  Pulmonary Normal    Renal/:  Renal/ Normal     Hepatic/GI:  Hepatic/GI Normal   "Wound Debridement    Date/Time: 4/18/2024 7:00 AM    Performed by: Jacob Bernal DPM  Authorized by: Jacob Bernal DPM    Time out: Immediately prior to procedure a "time out" was called to verify the correct patient, procedure, equipment, support staff and site/side marked as required.    Consent Done?:  Yes (Verbal)  Local anesthesia used?: No      Wound Details:    Location:  Left foot    Location:  Left 2nd Metatarsal Head    Type of Debridement:  Excisional       Length (cm):  0.1       Area (sq cm):  0.01       Width (cm):  0.1       Percent Debrided (%):  100       Depth (cm):  0.1       Total Area Debrided (sq cm):  0.01    Depth of debridement:  Epidermis/Dermis    Tissue debrided:  Dermis    Devitalized tissue debrided:  Fibrin    Instruments:  Blade  Bleeding:  Minimal  Hemostasis Achieved: Yes  Method Used:  Pressure  Patient tolerance:  Patient tolerated the procedure well with no immediate complications    "   Musculoskeletal:  Musculoskeletal Normal    Neurological:  Neurology Normal    Endocrine:  Endocrine Normal    Dermatological:  Skin Normal    Psych:  Psychiatric Normal           Physical Exam  General: Well nourished, Cooperative, Alert and Oriented    Airway:  Mallampati: II   Mouth Opening: Normal  TM Distance: Normal  Tongue: Normal  Neck ROM: Normal ROM    Dental:  Intact    Chest/Lungs:  Normal Respiratory Rate        Anesthesia Plan  Type of Anesthesia, risks & benefits discussed:    Anesthesia Type: Gen Natural Airway  Intra-op Monitoring Plan: Standard ASA Monitors  Post Op Pain Control Plan: multimodal analgesia  Induction:  IV  Informed Consent: Informed consent signed with the Patient and all parties understand the risks and agree with anesthesia plan.  All questions answered.   ASA Score: 1  Day of Surgery Review of History & Physical: H&P Update referred to the surgeon/provider.    Ready For Surgery From Anesthesia Perspective.     .

## 2024-04-18 NOTE — PLAN OF CARE
0900 Labs, hx, and medications reviewed, pt meets parameters for treatment today. Assessment completed and plan of care reviewed. Pt verbalized understanding. PAC accessed with no complications. Pt voices no new complaints or concerns, will continue to monitor for safety.

## 2024-04-18 NOTE — PLAN OF CARE
1215 Pt tolerated Kanjinti/Taxol infusion well today, no complaints or complications,. VSS through duration of treatment. Pt aware to call provider with any questions or concerns and is aware of upcoming appts. Pt ambulatory from clinic with steady gait, no distress noted.

## 2024-04-19 ENCOUNTER — PATIENT MESSAGE (OUTPATIENT)
Dept: ADMINISTRATIVE | Facility: OTHER | Age: 44
End: 2024-04-19
Payer: COMMERCIAL

## 2024-04-20 ENCOUNTER — PATIENT MESSAGE (OUTPATIENT)
Dept: ADMINISTRATIVE | Facility: OTHER | Age: 44
End: 2024-04-20
Payer: COMMERCIAL

## 2024-04-22 ENCOUNTER — PATIENT MESSAGE (OUTPATIENT)
Dept: ADMINISTRATIVE | Facility: OTHER | Age: 44
End: 2024-04-22
Payer: COMMERCIAL

## 2024-04-23 ENCOUNTER — PATIENT MESSAGE (OUTPATIENT)
Dept: ADMINISTRATIVE | Facility: OTHER | Age: 44
End: 2024-04-23
Payer: COMMERCIAL

## 2024-04-23 RX ORDER — EPINEPHRINE 0.3 MG/.3ML
0.3 INJECTION SUBCUTANEOUS ONCE AS NEEDED
Status: CANCELLED | OUTPATIENT
Start: 2024-04-25

## 2024-04-23 RX ORDER — HEPARIN 100 UNIT/ML
500 SYRINGE INTRAVENOUS
Status: CANCELLED | OUTPATIENT
Start: 2024-04-25

## 2024-04-23 RX ORDER — PROCHLORPERAZINE EDISYLATE 5 MG/ML
10 INJECTION INTRAMUSCULAR; INTRAVENOUS ONCE AS NEEDED
Status: CANCELLED
Start: 2024-04-25

## 2024-04-23 RX ORDER — SODIUM CHLORIDE 0.9 % (FLUSH) 0.9 %
10 SYRINGE (ML) INJECTION
Status: CANCELLED | OUTPATIENT
Start: 2024-04-25

## 2024-04-23 RX ORDER — DIPHENHYDRAMINE HYDROCHLORIDE 50 MG/ML
50 INJECTION INTRAMUSCULAR; INTRAVENOUS ONCE AS NEEDED
Status: CANCELLED | OUTPATIENT
Start: 2024-04-25

## 2024-04-23 NOTE — PROGRESS NOTES
Acupuncture Evaluation Note     Name: Tasha Cornejo  [facility] Number: [id number]    Traditional Chinese Medicine (TCM) Diagnosis: Qi Stagnation, Blood Stasis, and Qi Deficiency  Medical Diagnosis:   Encounter Diagnosis   Name Primary?    Malignant neoplasm of lower-inner quadrant of right breast of female, estrogen receptor positive Yes        Evaluation Date: 4/23/2024    Visit #/Visits authorized: self pay, 10    Precautions: Standard and cancer    Subjective     Chief Concern: Breast cancer - 12 rounds of chemo once/week. CIPN - treat and prevent. Night sweats, hip pain at night.     Medical necessity is demonstrated by the following IMPAIRMENTS: Medical Necessity: Decreased quality of life and Nausea and Vomiting              Aggravating Factors:  flexion and extension   Relieving Factors:  stretching/yoga     Symptom Description:     Quality:  Tight  Severity:  1  Frequency:  when active      Treatment     Treatment Principles:  Move qi and blood, nourish qi, calm hernandez    Acupuncture points used:   Ba Ashwin, Ba Taryn, 4 Hill   Bilateral points: ST36, SP9, SP6, KD3, GB34  Unilateral points:   Auricular Treatment:      Needles In: 30  Needles Out: 30  [place] W/ STIM placed: 7:20  Needles W/ STIM removed: 7:50      Assessment     After treatment, patient felt neuropathy present in left hand and both feet.     Patient prognosis is Good.     Patient will continue to benefit from acupuncture treatment to address the deficits listed in the problem list box on initial evaluation, provide patient family education and to maximize pt's level of independence in the home and community environment.     Patient's spiritual, cultural and educational needs considered and pt agreeable to plan of care and goals.     Anticipated barriers to treatment: none    Plan     Recommend 1 /week for 12 weeks - throughout chemo      Education:  Patient is aware of cumulative benefit of acupuncture

## 2024-04-24 ENCOUNTER — PATIENT MESSAGE (OUTPATIENT)
Dept: HEMATOLOGY/ONCOLOGY | Facility: CLINIC | Age: 44
End: 2024-04-24
Payer: COMMERCIAL

## 2024-04-24 ENCOUNTER — LAB VISIT (OUTPATIENT)
Dept: LAB | Facility: HOSPITAL | Age: 44
End: 2024-04-24
Payer: COMMERCIAL

## 2024-04-24 ENCOUNTER — PATIENT MESSAGE (OUTPATIENT)
Dept: ADMINISTRATIVE | Facility: OTHER | Age: 44
End: 2024-04-24
Payer: COMMERCIAL

## 2024-04-24 DIAGNOSIS — C50.311 MALIGNANT NEOPLASM OF LOWER-INNER QUADRANT OF RIGHT BREAST OF FEMALE, ESTROGEN RECEPTOR POSITIVE: ICD-10-CM

## 2024-04-24 DIAGNOSIS — Z17.0 MALIGNANT NEOPLASM OF LOWER-INNER QUADRANT OF RIGHT BREAST OF FEMALE, ESTROGEN RECEPTOR POSITIVE: ICD-10-CM

## 2024-04-24 LAB — HCG INTACT+B SERPL-ACNC: <2.4 MIU/ML

## 2024-04-24 PROCEDURE — 36415 COLL VENOUS BLD VENIPUNCTURE: CPT | Mod: PO | Performed by: INTERNAL MEDICINE

## 2024-04-24 PROCEDURE — 84702 CHORIONIC GONADOTROPIN TEST: CPT | Performed by: INTERNAL MEDICINE

## 2024-04-25 ENCOUNTER — INFUSION (OUTPATIENT)
Dept: INFUSION THERAPY | Facility: HOSPITAL | Age: 44
End: 2024-04-25
Payer: COMMERCIAL

## 2024-04-25 ENCOUNTER — CLINICAL SUPPORT (OUTPATIENT)
Dept: REHABILITATION | Facility: HOSPITAL | Age: 44
End: 2024-04-25

## 2024-04-25 ENCOUNTER — PATIENT MESSAGE (OUTPATIENT)
Dept: ADMINISTRATIVE | Facility: OTHER | Age: 44
End: 2024-04-25
Payer: COMMERCIAL

## 2024-04-25 ENCOUNTER — OFFICE VISIT (OUTPATIENT)
Dept: CARDIOLOGY | Facility: CLINIC | Age: 44
End: 2024-04-25
Payer: COMMERCIAL

## 2024-04-25 VITALS
WEIGHT: 112.88 LBS | HEART RATE: 77 BPM | SYSTOLIC BLOOD PRESSURE: 135 MMHG | HEIGHT: 60 IN | DIASTOLIC BLOOD PRESSURE: 83 MMHG | OXYGEN SATURATION: 98 % | BODY MASS INDEX: 22.16 KG/M2

## 2024-04-25 VITALS
TEMPERATURE: 98 F | SYSTOLIC BLOOD PRESSURE: 124 MMHG | WEIGHT: 112.44 LBS | HEART RATE: 68 BPM | DIASTOLIC BLOOD PRESSURE: 77 MMHG | HEIGHT: 60 IN | BODY MASS INDEX: 22.07 KG/M2 | RESPIRATION RATE: 18 BRPM

## 2024-04-25 DIAGNOSIS — C50.311 MALIGNANT NEOPLASM OF LOWER-INNER QUADRANT OF RIGHT BREAST OF FEMALE, ESTROGEN RECEPTOR POSITIVE: Primary | ICD-10-CM

## 2024-04-25 DIAGNOSIS — T45.1X5A CHEMOTHERAPY-INDUCED NAUSEA: Primary | ICD-10-CM

## 2024-04-25 DIAGNOSIS — Z17.0 MALIGNANT NEOPLASM OF LOWER-INNER QUADRANT OF RIGHT BREAST OF FEMALE, ESTROGEN RECEPTOR POSITIVE: ICD-10-CM

## 2024-04-25 DIAGNOSIS — R11.0 CHEMOTHERAPY-INDUCED NAUSEA: Primary | ICD-10-CM

## 2024-04-25 DIAGNOSIS — Z17.0 MALIGNANT NEOPLASM OF LOWER-INNER QUADRANT OF RIGHT BREAST OF FEMALE, ESTROGEN RECEPTOR POSITIVE: Primary | ICD-10-CM

## 2024-04-25 DIAGNOSIS — R00.2 PALPITATIONS: ICD-10-CM

## 2024-04-25 DIAGNOSIS — C50.311 MALIGNANT NEOPLASM OF LOWER-INNER QUADRANT OF RIGHT BREAST OF FEMALE, ESTROGEN RECEPTOR POSITIVE: ICD-10-CM

## 2024-04-25 PROCEDURE — 1160F RVW MEDS BY RX/DR IN RCRD: CPT | Mod: CPTII,S$GLB,, | Performed by: INTERNAL MEDICINE

## 2024-04-25 PROCEDURE — 3079F DIAST BP 80-89 MM HG: CPT | Mod: CPTII,S$GLB,, | Performed by: INTERNAL MEDICINE

## 2024-04-25 PROCEDURE — 99204 OFFICE O/P NEW MOD 45 MIN: CPT | Mod: S$GLB,,, | Performed by: INTERNAL MEDICINE

## 2024-04-25 PROCEDURE — 97814 ACUP 1/> W/ESTIM EA ADDL 15: CPT | Performed by: ACUPUNCTURIST

## 2024-04-25 PROCEDURE — 99999 PR PBB SHADOW E&M-EST. PATIENT-LVL IV: CPT | Mod: PBBFAC,,, | Performed by: INTERNAL MEDICINE

## 2024-04-25 PROCEDURE — 1159F MED LIST DOCD IN RCRD: CPT | Mod: CPTII,S$GLB,, | Performed by: INTERNAL MEDICINE

## 2024-04-25 PROCEDURE — 3075F SYST BP GE 130 - 139MM HG: CPT | Mod: CPTII,S$GLB,, | Performed by: INTERNAL MEDICINE

## 2024-04-25 PROCEDURE — 25000003 PHARM REV CODE 250: Performed by: INTERNAL MEDICINE

## 2024-04-25 PROCEDURE — 96413 CHEMO IV INFUSION 1 HR: CPT

## 2024-04-25 PROCEDURE — 3044F HG A1C LEVEL LT 7.0%: CPT | Mod: CPTII,S$GLB,, | Performed by: INTERNAL MEDICINE

## 2024-04-25 PROCEDURE — 3008F BODY MASS INDEX DOCD: CPT | Mod: CPTII,S$GLB,, | Performed by: INTERNAL MEDICINE

## 2024-04-25 PROCEDURE — 63600175 PHARM REV CODE 636 W HCPCS: Mod: JG | Performed by: INTERNAL MEDICINE

## 2024-04-25 PROCEDURE — 97813 ACUP 1/> W/ESTIM 1ST 15 MIN: CPT | Performed by: ACUPUNCTURIST

## 2024-04-25 RX ORDER — SODIUM CHLORIDE 0.9 % (FLUSH) 0.9 %
10 SYRINGE (ML) INJECTION
Status: DISCONTINUED | OUTPATIENT
Start: 2024-04-25 | End: 2024-04-25 | Stop reason: HOSPADM

## 2024-04-25 RX ORDER — PROCHLORPERAZINE EDISYLATE 5 MG/ML
10 INJECTION INTRAMUSCULAR; INTRAVENOUS ONCE AS NEEDED
Status: DISCONTINUED | OUTPATIENT
Start: 2024-04-25 | End: 2024-04-25 | Stop reason: HOSPADM

## 2024-04-25 RX ORDER — DIPHENHYDRAMINE HYDROCHLORIDE 50 MG/ML
50 INJECTION INTRAMUSCULAR; INTRAVENOUS ONCE AS NEEDED
Status: DISCONTINUED | OUTPATIENT
Start: 2024-04-25 | End: 2024-04-25 | Stop reason: HOSPADM

## 2024-04-25 RX ORDER — EPINEPHRINE 0.3 MG/.3ML
0.3 INJECTION SUBCUTANEOUS ONCE AS NEEDED
Status: DISCONTINUED | OUTPATIENT
Start: 2024-04-25 | End: 2024-04-25 | Stop reason: HOSPADM

## 2024-04-25 RX ORDER — HEPARIN 100 UNIT/ML
500 SYRINGE INTRAVENOUS
Status: DISCONTINUED | OUTPATIENT
Start: 2024-04-25 | End: 2024-04-25 | Stop reason: HOSPADM

## 2024-04-25 RX ADMIN — TRASTUZUMAB-ANNS 300 MG: 420 INJECTION, POWDER, LYOPHILIZED, FOR SOLUTION INTRAVENOUS at 08:04

## 2024-04-25 RX ADMIN — SODIUM CHLORIDE: 9 INJECTION, SOLUTION INTRAVENOUS at 08:04

## 2024-04-25 NOTE — PLAN OF CARE
Patient tolerated Kanjinti infusion today. NAD noted. VSS. PAC + blood return upon deaccess. Discharged home

## 2024-04-26 ENCOUNTER — PATIENT MESSAGE (OUTPATIENT)
Dept: ADMINISTRATIVE | Facility: OTHER | Age: 44
End: 2024-04-26
Payer: COMMERCIAL

## 2024-04-26 ENCOUNTER — OFFICE VISIT (OUTPATIENT)
Dept: PSYCHIATRY | Facility: CLINIC | Age: 44
End: 2024-04-26
Payer: COMMERCIAL

## 2024-04-26 DIAGNOSIS — R45.89 ANXIETY ABOUT HEALTH: Primary | ICD-10-CM

## 2024-04-26 DIAGNOSIS — C50.311 MALIGNANT NEOPLASM OF LOWER-INNER QUADRANT OF RIGHT BREAST OF FEMALE, ESTROGEN RECEPTOR POSITIVE: ICD-10-CM

## 2024-04-26 DIAGNOSIS — Z17.0 MALIGNANT NEOPLASM OF LOWER-INNER QUADRANT OF RIGHT BREAST OF FEMALE, ESTROGEN RECEPTOR POSITIVE: ICD-10-CM

## 2024-04-26 PROCEDURE — 3044F HG A1C LEVEL LT 7.0%: CPT | Mod: CPTII,S$GLB,, | Performed by: PSYCHOLOGIST

## 2024-04-26 PROCEDURE — 99999 PR PBB SHADOW E&M-EST. PATIENT-LVL II: CPT | Mod: PBBFAC,,, | Performed by: PSYCHOLOGIST

## 2024-04-26 PROCEDURE — 1160F RVW MEDS BY RX/DR IN RCRD: CPT | Mod: CPTII,S$GLB,, | Performed by: PSYCHOLOGIST

## 2024-04-26 PROCEDURE — 90837 PSYTX W PT 60 MINUTES: CPT | Mod: S$GLB,,, | Performed by: PSYCHOLOGIST

## 2024-04-26 PROCEDURE — 1159F MED LIST DOCD IN RCRD: CPT | Mod: CPTII,S$GLB,, | Performed by: PSYCHOLOGIST

## 2024-04-26 NOTE — PROGRESS NOTES
Acupuncture Evaluation Note     Name: Tasha Cornejo  [facility] Number: [id number]    Traditional Chinese Medicine (TCM) Diagnosis: Qi Stagnation, Blood Stasis, and Qi Deficiency  Medical Diagnosis:   Encounter Diagnosis   Name Primary?    Malignant neoplasm of lower-inner quadrant of right breast of female, estrogen receptor positive Yes        Evaluation Date: 4/26/2024    Visit #/Visits authorized: self pay, 11    Precautions: Standard and cancer    Subjective     Chief Concern: Breast cancer - 12 rounds of chemo once/week. CIPN - treat and prevent. Night sweats, hip pain at night.     Medical necessity is demonstrated by the following IMPAIRMENTS: Medical Necessity: Decreased quality of life and Nausea and Vomiting              Aggravating Factors:  flexion and extension   Relieving Factors:  stretching/yoga     Symptom Description:     Quality:  Tight  Severity:  1  Frequency:  when active      Treatment     Treatment Principles:  Move qi and blood, nourish qi, calm hernandez    Acupuncture points used:   Ba Ashwin, Ba Taryn, 4 Hill   Bilateral points: ST36, SP9, SP6, KD3, GB34  Unilateral points:   Auricular Treatment:      Needles In: 30  Needles Out: 30  [place] W/ STIM placed: 7:20  Needles W/ STIM removed: 7:50      Assessment     After treatment, patient felt neuropathy present in left hand and both feet.     Patient prognosis is Good.     Patient will continue to benefit from acupuncture treatment to address the deficits listed in the problem list box on initial evaluation, provide patient family education and to maximize pt's level of independence in the home and community environment.     Patient's spiritual, cultural and educational needs considered and pt agreeable to plan of care and goals.     Anticipated barriers to treatment: none    Plan     Recommend 1 /week as needed for neuropathy follow up and maintenance     Education:  Patient is aware of cumulative benefit of acupuncture

## 2024-04-26 NOTE — PROGRESS NOTES
PSYCHO-ONCOLOGY NOTE/ Individual Psychotherapy     Date: 4/26/2024   Site:  [facility]        Therapeutic Intervention: Met with patient.    Outpatient - Behavior modifying psychotherapy 60 min - CPT code 49273  Patient was last seen by me on 4/4/2024    Problem list  Patient Active Problem List   Diagnosis    Rhinitis, allergic    Allergic conjunctivitis    Vitamin D deficiency    Malignant neoplasm of lower-inner quadrant of right breast of female, estrogen receptor positive    Chemotherapy-induced nausea    At risk for lymphedema    Stress and adjustment reaction    Physical deconditioning    Immunodeficiency due to drugs    Decreased ROM of right shoulder    Shoulder weakness    Anxiety about health    Palpitations       Chief complaint/reason for encounter: anxiety   Met with patient to evaluate psychosocial adaptation to diagnosis/treatment/survivorship of breast cancer    Current Medications  Current Outpatient Medications   Medication Sig Dispense Refill    fluticasone propionate (FLONASE) 50 mcg/actuation nasal spray 1 spray by Each Nare route daily as needed for Rhinitis.      hydrocortisone 2.5 % cream Apply topically 2 (two) times daily. (Patient not taking: Reported on 3/14/2024) 28 g 1    LIDOcaine-prilocaine (EMLA) cream Apply topically to port one hour prior to chemotherapy infusion 30 g 1    multivitamin (THERAGRAN) per tablet Take 1 tablet by mouth once daily.      mupirocin (BACTROBAN) 2 % ointment Apply topically 3 (three) times daily. (Patient not taking: Reported on 4/25/2024) 30 g 0    omeprazole (PRILOSEC) 40 MG capsule Take 1 capsule (40 mg total) by mouth every morning. 90 capsule 3    ondansetron (ZOFRAN) 8 MG tablet Take 1 tablet (8 mg total) by mouth every 8 (eight) hours as needed for Nausea. (Patient not taking: Reported on 3/14/2024) 30 tablet 2    prochlorperazine (COMPAZINE) 5 MG tablet Take 2 tablets (10 mg total) by mouth every 6 (six) hours as needed for Nausea. (Patient not  taking: Reported on 3/14/2024) 20 tablet 5    propranoloL (INDERAL) 20 MG tablet Take one tablet by mouth daily as needed for anxiety/panic attack (Patient not taking: Reported on 4/25/2024) 30 tablet 0     No current facility-administered medications for this visit.     Facility-Administered Medications Ordered in Other Visits   Medication Dose Route Frequency Provider Last Rate Last Admin    ceFAZolin 1 g, gentamicin 80 mg in sodium chloride 0.9% 500 mL irrigation   Irrigation On Call Procedure Allen Damian MD        ceFAZolin 1 g, gentamicin 80 mg in sodium chloride 0.9% 500 mL irrigation   Irrigation On Call Procedure Allen Damian MD           Objective:  Tasha Cornejo arrived promptly for the session.   Ms. Cornejo was independently ambulatory at the time of session. The patient was fully cooperative throughout the session.  Appearance: age appropriate, casually  dressed, well groomed  Behavior/Cooperation: friendly and cooperative  Speech: normal in rate, volume, and tone and appropriate quality, quantity and organization of sentences  Mood: anxious  Affect: constricted  Thought Process: goal-directed, logical  Thought Content: normal,  No delusions or paranoia; did not appear to be responding to internal stimuli during the session  Orientation: grossly intact  Memory: Grossly intact  Attention Span/Concentration: Attends to session without distraction; reports no difficulty  Fund of Knowledge: average  Estimate of Intelligence: above average from verbal skills and history  Cognition: grossly intact  Insight: patient has awareness of illness; good insight into own behavior and behavior of others  Judgment: the patient's behavior is adequate to circumstances    Interval history and content of current session: Patient discussed events and activities since the time of last visit. Discussed current adaptation to disease and treatment status. Reports to be coping with moderate difficulty. Evaluated  "cognitive response, paying particular attention to negative intrusive thoughts of a persistent and detrimental nature. Thoughts of this type are in evidence with moderate distress. Provided cognitive behavioral therapy to address negative cognitions.    She discussed irritability due to anxiety and sense of urgency. She is not currently interested in pharmacotherapy. (Prior medications Zoloft- left her feeling "like a zombie" Wellbutrin- increased mood swings and caused irritability).  Patient tried guided imagery but found it to be "too fast."  PMR practiced in session with benefit.      Identified and evaluated psychosocial and environmental stressors (work demands conflicting with cancer care).  Examined proactive behaviors that may be implemented to minimize or ameliorate psychosocial stressors. Discussed challenges related to setting boundaries.     She has worked on the Anxiety and Worry Workbook.  Encouraged doing the exercises, not just reading the content.       Risk parameters:   Patient reports no suicidal ideation  Patient reports no homicidal ideation  Patient reports no self-injurious behavior  Patient reports no violent behavior   Safety needs:  None at this time      Verbal deficits: None     Patient's response to intervention:The patient's response to intervention is accepting.     Progress toward goals: Progress as Expected        Patient reported outcomes:      Distress thermometer:       4/25/2024     5:05 PM 4/15/2024     5:07 PM 4/3/2024     6:58 AM 3/20/2024    11:57 AM 3/11/2024     7:14 AM 3/10/2024     2:40 PM 3/7/2024     5:41 AM   DISTRESS SCREENING   Distress Score 2 0 - No Distress 0 - No Distress 0 - No Distress 8 8 0 - No Distress   Practical Concerns None of these None of these None of these None of these  Taking care of myself None of these   Social Concerns Relationship with friends or coworkers None of these None of these None of these  None of these None of these   Emotional " Concerns Worry or anxiety;Changes in appearance None of these None of these Worry or anxiety;Fear  Worry or anxiety;Fear Worry or anxiety   Spiritual or Moravian Concerns Sense of meaning or purpose None of these None of these None of these  None of these None of these   Physical Concerns None of these Sleep;Fatigue None of these None of these  Pain None of these   Other Problems      Worry about infection Hemmorhoid with bleeding (improving, not completely resolved); rash on skin around hemmorhoid area (new since 2-3 days)           PHQ-9= Initial visit: not meeting screener  PHQ ANSWERS    Q1. Little interest or pleasure in doing things: Not at all (04/25/24 1708)  Q2. Feeling down, depressed, or hopeless: Not at all (04/25/24 1708)  Q3. Trouble falling or staying asleep, or sleeping too much: Not at all (04/25/24 1708)  Q4. Feeling tired or having little energy: Several days (04/25/24 1708)  Q5. Poor appetite or overeating: Not at all (04/25/24 1708)  Q6. Feeling bad about yourself - or that you are a failure or have let yourself or your family down: Several days (04/25/24 1708)  Q7. Trouble concentrating on things, such as reading the newspaper or watching television: Several days (04/25/24 1708)  Q8. Moving or speaking so slowly that other people could have noticed. Or the opposite - being so fidgety or restless that you have been moving around a lot more than usual: Not at all (04/25/24 1708)  Q9.      PHQ8 Score : 3 (04/25/24 1708)  PHQ-9 Total Score: 3 (04/25/24 1708)   absent     CHEVY-7= Initial visit:11       4/25/2024     5:07 PM 4/3/2024     7:46 AM   GAD7   1. Feeling nervous, anxious, or on edge? 3 2   2. Not being able to stop or control worrying? 0 1   3. Worrying too much about different things? 1 2   4. Trouble relaxing? 1 2   5. Being so restless that it is hard to sit still? 0 1   6. Becoming easily annoyed or irritable? 1 2   7. Feeling afraid as if something awful might happen? 1 1   CHEVY-7  Score 7 11      mild      Client Strengths: verbal, intelligent, successful, good social support, good insight, commitment to wellness      Treatment Plan:individual psychotherapy  Target symptoms: anxiety   Why chosen therapy is appropriate versus another modality: patient responds to this modality  Outcome monitoring methods: self-report, checklist/rating scale  Therapeutic intervention type: behavior modifying psychotherapy  Prognosis: Good    Goals from last visit: Met   Behavioral goals prior to next visit:    Exercise:   Stress management: Meditation class    Try progressive muscle relaxation at home   Social engagement:   Nutrition:   Smoking Cessation:   Therapy: consider boundaries at work    Write down stressors    Work on workbook    Return to clinic: 3 weeks     Length of Service (minutes direct face-to-face contact): 60    Diagnosis:     ICD-10-CM ICD-9-CM   1. Malignant neoplasm of lower-inner quadrant of right breast of female, estrogen receptor positive  C50.311 174.3    Z17.0 V86.0   2. Anxiety about health  R45.89 799.29       [place], PhD  [place] License #[id number]  [place] License #[id number]

## 2024-04-28 ENCOUNTER — PATIENT MESSAGE (OUTPATIENT)
Dept: ADMINISTRATIVE | Facility: OTHER | Age: 44
End: 2024-04-28
Payer: COMMERCIAL

## 2024-04-29 ENCOUNTER — PATIENT MESSAGE (OUTPATIENT)
Dept: PLASTIC SURGERY | Facility: CLINIC | Age: 44
End: 2024-04-29
Payer: COMMERCIAL

## 2024-04-29 ENCOUNTER — PATIENT MESSAGE (OUTPATIENT)
Dept: ADMINISTRATIVE | Facility: OTHER | Age: 44
End: 2024-04-29
Payer: COMMERCIAL

## 2024-04-30 ENCOUNTER — PATIENT MESSAGE (OUTPATIENT)
Dept: ADMINISTRATIVE | Facility: OTHER | Age: 44
End: 2024-04-30
Payer: COMMERCIAL

## 2024-05-01 ENCOUNTER — PATIENT MESSAGE (OUTPATIENT)
Dept: ADMINISTRATIVE | Facility: OTHER | Age: 44
End: 2024-05-01
Payer: COMMERCIAL

## 2024-05-02 ENCOUNTER — CLINICAL SUPPORT (OUTPATIENT)
Dept: REHABILITATION | Facility: HOSPITAL | Age: 44
End: 2024-05-02

## 2024-05-02 ENCOUNTER — PATIENT MESSAGE (OUTPATIENT)
Dept: ADMINISTRATIVE | Facility: OTHER | Age: 44
End: 2024-05-02
Payer: COMMERCIAL

## 2024-05-02 DIAGNOSIS — C50.311 MALIGNANT NEOPLASM OF LOWER-INNER QUADRANT OF RIGHT BREAST OF FEMALE, ESTROGEN RECEPTOR POSITIVE: Primary | ICD-10-CM

## 2024-05-02 DIAGNOSIS — Z17.0 MALIGNANT NEOPLASM OF LOWER-INNER QUADRANT OF RIGHT BREAST OF FEMALE, ESTROGEN RECEPTOR POSITIVE: Primary | ICD-10-CM

## 2024-05-02 PROCEDURE — 97813 ACUP 1/> W/ESTIM 1ST 15 MIN: CPT | Performed by: ACUPUNCTURIST

## 2024-05-02 PROCEDURE — 97814 ACUP 1/> W/ESTIM EA ADDL 15: CPT | Performed by: ACUPUNCTURIST

## 2024-05-02 NOTE — PROGRESS NOTES
Acupuncture Evaluation Note     Name: Tasha Cornejo  [facility] Number: [id number]    Traditional Chinese Medicine (TCM) Diagnosis: Qi Stagnation, Blood Stasis, and Qi Deficiency  Medical Diagnosis:   Encounter Diagnosis   Name Primary?    Malignant neoplasm of lower-inner quadrant of right breast of female, estrogen receptor positive Yes        Evaluation Date: 5/2/2024    Visit #/Visits authorized: self pay, 12    Precautions: Standard and cancer    Subjective     Chief Concern: Breast cancer - 12 rounds of chemo once/week. CIPN - treat and prevent. Night sweats, hip pain at night.     Medical necessity is demonstrated by the following IMPAIRMENTS: Medical Necessity: Decreased quality of life and Nausea and Vomiting              Aggravating Factors:  flexion and extension   Relieving Factors:  stretching/yoga     Symptom Description:     Quality:  Tight  Severity:  1  Frequency:  when active      Treatment     Treatment Principles:  Move qi and blood, nourish qi, calm hernandez    Acupuncture points used:   Ba Ashwin, Ba Taryn, 4 [address]   Bilateral points: ST36, SP9, SP6, KD3, GB34  Unilateral points:   Auricular Treatment:      Needles In: 30  Needles Out: 30  [place] W/ STIM placed: 7:50  Needles W/ STIM removed: 8:20      Assessment     After treatment, patient felt neuropathy still present in left hand and both feet, but lessened over the week     Patient prognosis is Good.     Patient will continue to benefit from acupuncture treatment to address the deficits listed in the problem list box on initial evaluation, provide patient family education and to maximize pt's level of independence in the home and community environment.     Patient's spiritual, cultural and educational needs considered and pt agreeable to plan of care and goals.     Anticipated barriers to treatment: none    Plan     Recommend 1 /week as needed for neuropathy follow up and maintenance     Education:  Patient is aware of cumulative benefit of  acupuncture

## 2024-05-03 ENCOUNTER — PATIENT MESSAGE (OUTPATIENT)
Dept: ADMINISTRATIVE | Facility: OTHER | Age: 44
End: 2024-05-03
Payer: COMMERCIAL

## 2024-05-05 ENCOUNTER — PATIENT MESSAGE (OUTPATIENT)
Dept: ADMINISTRATIVE | Facility: OTHER | Age: 44
End: 2024-05-05
Payer: COMMERCIAL

## 2024-05-06 ENCOUNTER — PATIENT MESSAGE (OUTPATIENT)
Dept: ADMINISTRATIVE | Facility: OTHER | Age: 44
End: 2024-05-06
Payer: COMMERCIAL

## 2024-05-07 ENCOUNTER — PATIENT MESSAGE (OUTPATIENT)
Dept: ADMINISTRATIVE | Facility: OTHER | Age: 44
End: 2024-05-07
Payer: COMMERCIAL

## 2024-05-08 ENCOUNTER — PATIENT MESSAGE (OUTPATIENT)
Dept: HEMATOLOGY/ONCOLOGY | Facility: CLINIC | Age: 44
End: 2024-05-08
Payer: COMMERCIAL

## 2024-05-08 ENCOUNTER — PATIENT MESSAGE (OUTPATIENT)
Dept: ADMINISTRATIVE | Facility: OTHER | Age: 44
End: 2024-05-08
Payer: COMMERCIAL

## 2024-05-09 ENCOUNTER — CLINICAL SUPPORT (OUTPATIENT)
Dept: REHABILITATION | Facility: HOSPITAL | Age: 44
End: 2024-05-09

## 2024-05-09 ENCOUNTER — PATIENT MESSAGE (OUTPATIENT)
Dept: ADMINISTRATIVE | Facility: OTHER | Age: 44
End: 2024-05-09
Payer: COMMERCIAL

## 2024-05-09 DIAGNOSIS — C50.311 MALIGNANT NEOPLASM OF LOWER-INNER QUADRANT OF RIGHT BREAST OF FEMALE, ESTROGEN RECEPTOR POSITIVE: Primary | ICD-10-CM

## 2024-05-09 DIAGNOSIS — Z17.0 MALIGNANT NEOPLASM OF LOWER-INNER QUADRANT OF RIGHT BREAST OF FEMALE, ESTROGEN RECEPTOR POSITIVE: Primary | ICD-10-CM

## 2024-05-09 PROCEDURE — 97813 ACUP 1/> W/ESTIM 1ST 15 MIN: CPT | Performed by: ACUPUNCTURIST

## 2024-05-09 PROCEDURE — 97814 ACUP 1/> W/ESTIM EA ADDL 15: CPT | Performed by: ACUPUNCTURIST

## 2024-05-10 NOTE — PROGRESS NOTES
Acupuncture Evaluation Note     Name: Tasha Cornejo  [facility] Number: [id number]    Traditional Chinese Medicine (TCM) Diagnosis: Qi Stagnation, Blood Stasis, and Qi Deficiency  Medical Diagnosis:   Encounter Diagnosis   Name Primary?    Malignant neoplasm of lower-inner quadrant of right breast of female, estrogen receptor positive Yes        Evaluation Date: 5/10/2024    Visit #/Visits authorized: self pay, 13    Precautions: Standard and cancer    Subjective     Chief Concern: Breast cancer - completed 12 rounds of chemo once/week. Now has immunotherapy. CIPN - treat and prevent. Night sweats, hip pain at night.     Medical necessity is demonstrated by the following IMPAIRMENTS: Medical Necessity: Decreased quality of life and Nausea and Vomiting              Aggravating Factors:  flexion and extension   Relieving Factors:  stretching/yoga     Symptom Description:     Quality:  Tight  Severity:  1  Frequency:  when active      Treatment     Treatment Principles:  Move qi and blood, nourish qi, calm hernandez    Acupuncture points used:   Ba Ashwin, Ba Taryn, 4 [address]   Bilateral points: ST36, SP9, SP6, KD3, GB34  Unilateral points:   Auricular Treatment:      Needles In: 30  Needles Out: 30  [place] W/ STIM placed: 7:20  Needles W/ STIM removed: 7:50      Assessment     After treatment, patient felt neuropathy still present in left hand and both feet, but lessened over the week     Patient prognosis is Good.     Patient will continue to benefit from acupuncture treatment to address the deficits listed in the problem list box on initial evaluation, provide patient family education and to maximize pt's level of independence in the home and community environment.     Patient's spiritual, cultural and educational needs considered and pt agreeable to plan of care and goals.     Anticipated barriers to treatment: none    Plan     Recommend 1 /week as needed for neuropathy follow up and maintenance     Education:  Patient is  aware of cumulative benefit of acupuncture

## 2024-05-12 ENCOUNTER — PATIENT MESSAGE (OUTPATIENT)
Dept: ADMINISTRATIVE | Facility: OTHER | Age: 44
End: 2024-05-12
Payer: COMMERCIAL

## 2024-05-13 ENCOUNTER — PATIENT MESSAGE (OUTPATIENT)
Dept: ADMINISTRATIVE | Facility: OTHER | Age: 44
End: 2024-05-13
Payer: COMMERCIAL

## 2024-05-13 ENCOUNTER — CLINICAL SUPPORT (OUTPATIENT)
Dept: CARDIOLOGY | Facility: HOSPITAL | Age: 44
End: 2024-05-13
Attending: INTERNAL MEDICINE
Payer: COMMERCIAL

## 2024-05-13 DIAGNOSIS — R00.2 PALPITATIONS: ICD-10-CM

## 2024-05-13 PROCEDURE — 93227 XTRNL ECG REC<48 HR R&I: CPT | Mod: ,,, | Performed by: INTERNAL MEDICINE

## 2024-05-13 PROCEDURE — 93226 XTRNL ECG REC<48 HR SCAN A/R: CPT

## 2024-05-13 RX ORDER — DIPHENHYDRAMINE HYDROCHLORIDE 50 MG/ML
50 INJECTION INTRAMUSCULAR; INTRAVENOUS ONCE AS NEEDED
Status: CANCELLED | OUTPATIENT
Start: 2024-05-16

## 2024-05-13 RX ORDER — SODIUM CHLORIDE 0.9 % (FLUSH) 0.9 %
10 SYRINGE (ML) INJECTION
Status: CANCELLED | OUTPATIENT
Start: 2024-05-16

## 2024-05-13 RX ORDER — PROCHLORPERAZINE EDISYLATE 5 MG/ML
10 INJECTION INTRAMUSCULAR; INTRAVENOUS ONCE AS NEEDED
Status: CANCELLED
Start: 2024-05-16

## 2024-05-13 RX ORDER — HEPARIN 100 UNIT/ML
500 SYRINGE INTRAVENOUS
Status: CANCELLED | OUTPATIENT
Start: 2024-05-16

## 2024-05-13 RX ORDER — EPINEPHRINE 0.3 MG/.3ML
0.3 INJECTION SUBCUTANEOUS ONCE AS NEEDED
Status: CANCELLED | OUTPATIENT
Start: 2024-05-16

## 2024-05-14 ENCOUNTER — PATIENT MESSAGE (OUTPATIENT)
Dept: ADMINISTRATIVE | Facility: OTHER | Age: 44
End: 2024-05-14
Payer: COMMERCIAL

## 2024-05-15 ENCOUNTER — OFFICE VISIT (OUTPATIENT)
Dept: PLASTIC SURGERY | Facility: CLINIC | Age: 44
End: 2024-05-15
Attending: PLASTIC SURGERY
Payer: COMMERCIAL

## 2024-05-15 ENCOUNTER — PATIENT MESSAGE (OUTPATIENT)
Dept: HEMATOLOGY/ONCOLOGY | Facility: CLINIC | Age: 44
End: 2024-05-15
Payer: COMMERCIAL

## 2024-05-15 ENCOUNTER — PATIENT MESSAGE (OUTPATIENT)
Dept: ADMINISTRATIVE | Facility: OTHER | Age: 44
End: 2024-05-15
Payer: COMMERCIAL

## 2024-05-15 VITALS — SYSTOLIC BLOOD PRESSURE: 125 MMHG | HEART RATE: 67 BPM | DIASTOLIC BLOOD PRESSURE: 76 MMHG

## 2024-05-15 DIAGNOSIS — C50.919 MALIGNANT NEOPLASM OF FEMALE BREAST, UNSPECIFIED ESTROGEN RECEPTOR STATUS, UNSPECIFIED LATERALITY, UNSPECIFIED SITE OF BREAST: Primary | ICD-10-CM

## 2024-05-15 PROCEDURE — 3078F DIAST BP <80 MM HG: CPT | Mod: CPTII,S$GLB,, | Performed by: PLASTIC SURGERY

## 2024-05-15 PROCEDURE — 3074F SYST BP LT 130 MM HG: CPT | Mod: CPTII,S$GLB,, | Performed by: PLASTIC SURGERY

## 2024-05-15 PROCEDURE — 3044F HG A1C LEVEL LT 7.0%: CPT | Mod: CPTII,S$GLB,, | Performed by: PLASTIC SURGERY

## 2024-05-15 PROCEDURE — 99211 OFF/OP EST MAY X REQ PHY/QHP: CPT | Mod: S$GLB,,, | Performed by: PLASTIC SURGERY

## 2024-05-15 NOTE — PROGRESS NOTES
Patient presents for follow-up. S/p bilateral mastectomy with TE placement.     No concerns, completed chemotherapy. Presents to discuss surgical plans    On exam, breasts are soft to palpation. TE intact. No overlying skin changes/signs of infection/fluid collections  2 SGAP perforators b/l found on doppler exam (see media pictures)    Assessment: stable  Plan: pt to call with desired surgical date for b/l TE to SGAP exchange, likely in July    Omar Chu MD  HO6  PRS  05/15/2024

## 2024-05-16 ENCOUNTER — INFUSION (OUTPATIENT)
Dept: INFUSION THERAPY | Facility: HOSPITAL | Age: 44
End: 2024-05-16
Payer: COMMERCIAL

## 2024-05-16 ENCOUNTER — PATIENT MESSAGE (OUTPATIENT)
Dept: ADMINISTRATIVE | Facility: OTHER | Age: 44
End: 2024-05-16
Payer: COMMERCIAL

## 2024-05-16 VITALS
OXYGEN SATURATION: 99 % | DIASTOLIC BLOOD PRESSURE: 77 MMHG | HEIGHT: 60 IN | SYSTOLIC BLOOD PRESSURE: 143 MMHG | WEIGHT: 112.88 LBS | HEART RATE: 73 BPM | TEMPERATURE: 98 F | RESPIRATION RATE: 18 BRPM | BODY MASS INDEX: 22.16 KG/M2

## 2024-05-16 DIAGNOSIS — T45.1X5A CHEMOTHERAPY-INDUCED NAUSEA: Primary | ICD-10-CM

## 2024-05-16 DIAGNOSIS — Z17.0 MALIGNANT NEOPLASM OF LOWER-INNER QUADRANT OF RIGHT BREAST OF FEMALE, ESTROGEN RECEPTOR POSITIVE: ICD-10-CM

## 2024-05-16 DIAGNOSIS — C50.311 MALIGNANT NEOPLASM OF LOWER-INNER QUADRANT OF RIGHT BREAST OF FEMALE, ESTROGEN RECEPTOR POSITIVE: ICD-10-CM

## 2024-05-16 DIAGNOSIS — R11.0 CHEMOTHERAPY-INDUCED NAUSEA: Primary | ICD-10-CM

## 2024-05-16 PROCEDURE — 96413 CHEMO IV INFUSION 1 HR: CPT

## 2024-05-16 PROCEDURE — 25000003 PHARM REV CODE 250: Performed by: INTERNAL MEDICINE

## 2024-05-16 PROCEDURE — 63600175 PHARM REV CODE 636 W HCPCS: Performed by: INTERNAL MEDICINE

## 2024-05-16 PROCEDURE — A4216 STERILE WATER/SALINE, 10 ML: HCPCS | Performed by: INTERNAL MEDICINE

## 2024-05-16 RX ORDER — EPINEPHRINE 0.3 MG/.3ML
0.3 INJECTION SUBCUTANEOUS ONCE AS NEEDED
Status: DISCONTINUED | OUTPATIENT
Start: 2024-05-16 | End: 2024-05-16 | Stop reason: HOSPADM

## 2024-05-16 RX ORDER — PROCHLORPERAZINE EDISYLATE 5 MG/ML
10 INJECTION INTRAMUSCULAR; INTRAVENOUS ONCE AS NEEDED
Status: DISCONTINUED | OUTPATIENT
Start: 2024-05-16 | End: 2024-05-16 | Stop reason: HOSPADM

## 2024-05-16 RX ORDER — SODIUM CHLORIDE 0.9 % (FLUSH) 0.9 %
10 SYRINGE (ML) INJECTION
Status: DISCONTINUED | OUTPATIENT
Start: 2024-05-16 | End: 2024-05-16 | Stop reason: HOSPADM

## 2024-05-16 RX ORDER — HEPARIN 100 UNIT/ML
500 SYRINGE INTRAVENOUS
Status: DISCONTINUED | OUTPATIENT
Start: 2024-05-16 | End: 2024-05-16 | Stop reason: HOSPADM

## 2024-05-16 RX ORDER — DIPHENHYDRAMINE HYDROCHLORIDE 50 MG/ML
50 INJECTION INTRAMUSCULAR; INTRAVENOUS ONCE AS NEEDED
Status: DISCONTINUED | OUTPATIENT
Start: 2024-05-16 | End: 2024-05-16 | Stop reason: HOSPADM

## 2024-05-16 RX ADMIN — HEPARIN 500 UNITS: 100 SYRINGE at 08:05

## 2024-05-16 RX ADMIN — Medication 10 ML: at 08:05

## 2024-05-16 RX ADMIN — TRASTUZUMAB-ANNS 300 MG: 420 INJECTION, POWDER, LYOPHILIZED, FOR SOLUTION INTRAVENOUS at 08:05

## 2024-05-16 RX ADMIN — SODIUM CHLORIDE: 9 INJECTION, SOLUTION INTRAVENOUS at 08:05

## 2024-05-16 NOTE — PLAN OF CARE
Pt tolerated Kanjinti well. No adverse reaction noted. Pt education reinforced on chemo regimen, side effects, what to expect, and when to call Dr. Mcbride. Pt verbalized understanding. I reviewed pt calendar w/ pt and understanding verbalized.

## 2024-05-17 ENCOUNTER — PATIENT MESSAGE (OUTPATIENT)
Dept: ADMINISTRATIVE | Facility: OTHER | Age: 44
End: 2024-05-17
Payer: COMMERCIAL

## 2024-05-17 LAB
OHS CV EVENT MONITOR DAY: 0
OHS CV HOLTER LENGTH DECIMAL HOURS: 48
OHS CV HOLTER LENGTH HOURS: 48
OHS CV HOLTER LENGTH MINUTES: 0
OHS CV HOLTER SINUS AVERAGE HR: 70
OHS CV HOLTER SINUS MAX HR: 108
OHS CV HOLTER SINUS MIN HR: 50

## 2024-05-18 ENCOUNTER — PATIENT MESSAGE (OUTPATIENT)
Dept: ADMINISTRATIVE | Facility: OTHER | Age: 44
End: 2024-05-18
Payer: COMMERCIAL

## 2024-05-20 ENCOUNTER — PATIENT MESSAGE (OUTPATIENT)
Dept: ADMINISTRATIVE | Facility: OTHER | Age: 44
End: 2024-05-20
Payer: COMMERCIAL

## 2024-05-20 ENCOUNTER — OFFICE VISIT (OUTPATIENT)
Dept: INTERNAL MEDICINE | Facility: CLINIC | Age: 44
End: 2024-05-20
Payer: COMMERCIAL

## 2024-05-20 VITALS
DIASTOLIC BLOOD PRESSURE: 80 MMHG | HEIGHT: 60 IN | BODY MASS INDEX: 22.29 KG/M2 | RESPIRATION RATE: 18 BRPM | HEART RATE: 60 BPM | TEMPERATURE: 97 F | OXYGEN SATURATION: 100 % | SYSTOLIC BLOOD PRESSURE: 118 MMHG | WEIGHT: 113.56 LBS

## 2024-05-20 DIAGNOSIS — C50.311 MALIGNANT NEOPLASM OF LOWER-INNER QUADRANT OF RIGHT BREAST OF FEMALE, ESTROGEN RECEPTOR POSITIVE: ICD-10-CM

## 2024-05-20 DIAGNOSIS — J02.9 PHARYNGITIS, UNSPECIFIED ETIOLOGY: ICD-10-CM

## 2024-05-20 DIAGNOSIS — J32.9 SINUSITIS, UNSPECIFIED CHRONICITY, UNSPECIFIED LOCATION: Primary | ICD-10-CM

## 2024-05-20 DIAGNOSIS — Z17.0 MALIGNANT NEOPLASM OF LOWER-INNER QUADRANT OF RIGHT BREAST OF FEMALE, ESTROGEN RECEPTOR POSITIVE: ICD-10-CM

## 2024-05-20 LAB — SARS-COV-2 RNA RESP QL NAA+PROBE: NOT DETECTED

## 2024-05-20 PROCEDURE — 3074F SYST BP LT 130 MM HG: CPT | Mod: CPTII,S$GLB,, | Performed by: INTERNAL MEDICINE

## 2024-05-20 PROCEDURE — 3079F DIAST BP 80-89 MM HG: CPT | Mod: CPTII,S$GLB,, | Performed by: INTERNAL MEDICINE

## 2024-05-20 PROCEDURE — 1159F MED LIST DOCD IN RCRD: CPT | Mod: CPTII,S$GLB,, | Performed by: INTERNAL MEDICINE

## 2024-05-20 PROCEDURE — 99999 PR PBB SHADOW E&M-EST. PATIENT-LVL IV: CPT | Mod: PBBFAC,,, | Performed by: INTERNAL MEDICINE

## 2024-05-20 PROCEDURE — 3044F HG A1C LEVEL LT 7.0%: CPT | Mod: CPTII,S$GLB,, | Performed by: INTERNAL MEDICINE

## 2024-05-20 PROCEDURE — 3008F BODY MASS INDEX DOCD: CPT | Mod: CPTII,S$GLB,, | Performed by: INTERNAL MEDICINE

## 2024-05-20 PROCEDURE — 99213 OFFICE O/P EST LOW 20 MIN: CPT | Mod: S$GLB,,, | Performed by: INTERNAL MEDICINE

## 2024-05-20 PROCEDURE — 87635 SARS-COV-2 COVID-19 AMP PRB: CPT | Performed by: INTERNAL MEDICINE

## 2024-05-20 RX ORDER — AMOXICILLIN AND CLAVULANATE POTASSIUM 875; 125 MG/1; MG/1
1 TABLET, FILM COATED ORAL EVERY 12 HOURS
Qty: 14 TABLET | Refills: 0 | Status: SHIPPED | OUTPATIENT
Start: 2024-05-20 | End: 2024-06-06

## 2024-05-20 RX ORDER — FLUCONAZOLE 150 MG/1
150 TABLET ORAL DAILY
Qty: 1 TABLET | Refills: 1 | Status: SHIPPED | OUTPATIENT
Start: 2024-05-20 | End: 2024-05-21

## 2024-05-20 NOTE — PROGRESS NOTES
Subjective:       Patient ID: Tasha Cornejo is a 44 y.o. female.    Chief Complaint: Sore Throat and Nasal Congestion    Sore Throat   Pertinent negatives include no abdominal pain, diarrhea, headaches, shortness of breath (PND or orthopnea) or vomiting.   Pt with 5 days of sore throat (no issues swallowing however), nasal congestion and drainage, cough.  Feels achy - no fever, chills or sweats.  Finished chemo 4 weeks ago for breast cancer and is on immunotherapy.  No SOB.  No HA or ear pain.   Review of Systems   HENT:  Positive for sore throat.    Respiratory:  Negative for shortness of breath (PND or orthopnea).    Cardiovascular:  Negative for chest pain (arm pain or jaw pain).   Gastrointestinal:  Negative for abdominal pain, diarrhea, nausea and vomiting.   Genitourinary:  Negative for dysuria.   Neurological:  Negative for seizures, syncope and headaches.       Objective:      Physical Exam  Constitutional:       General: She is not in acute distress.     Appearance: She is well-developed.   HENT:      Head: Normocephalic.      Mouth/Throat:      Mouth: Mucous membranes are moist.      Pharynx: Posterior oropharyngeal erythema present. No oropharyngeal exudate.   Eyes:      Pupils: Pupils are equal, round, and reactive to light.   Neck:      Thyroid: No thyromegaly.      Vascular: No JVD.   Cardiovascular:      Rate and Rhythm: Normal rate and regular rhythm.      Heart sounds: Normal heart sounds. No murmur heard.     No friction rub. No gallop.   Pulmonary:      Effort: Pulmonary effort is normal.      Breath sounds: Normal breath sounds. No wheezing or rales.   Abdominal:      General: Bowel sounds are normal. There is no distension.      Palpations: Abdomen is soft. There is no mass.      Tenderness: There is no abdominal tenderness. There is no guarding or rebound.   Musculoskeletal:      Cervical back: Neck supple.   Lymphadenopathy:      Cervical: No cervical adenopathy.   Skin:     General: Skin  is warm and dry.   Neurological:      Mental Status: She is alert and oriented to person, place, and time.      Deep Tendon Reflexes: Reflexes are normal and symmetric.   Psychiatric:         Behavior: Behavior normal.         Thought Content: Thought content normal.         Judgment: Judgment normal.         Assessment:       1. Sinusitis, unspecified chronicity, unspecified location    2. Pharyngitis, unspecified etiology    3. Malignant neoplasm of lower-inner quadrant of right breast of female, estrogen receptor positive        Plan:   Sinusitis, unspecified chronicity, unspecified location/Pharyngitis, unspecified etiology  -     amoxicillin-clavulanate 875-125mg (AUGMENTIN) 875-125 mg per tablet; Take 1 tablet by mouth every 12 (twelve) hours.  Dispense: 14 tablet; Refill: 0  -     fluconazole (DIFLUCAN) 150 MG Tab; Take 1 tablet (150 mg total) by mouth once daily. for 1 day  Dispense: 1 tablet; Refill: 1  Malignant neoplasm of lower-inner quadrant of right breast of female, estrogen receptor positive  Message sent to Dr. Mcbride - feels labs not needed at this time  Other orders  -     COVID-19 Routine Screening  -

## 2024-05-21 ENCOUNTER — PATIENT MESSAGE (OUTPATIENT)
Dept: HEMATOLOGY/ONCOLOGY | Facility: CLINIC | Age: 44
End: 2024-05-21
Payer: COMMERCIAL

## 2024-05-21 ENCOUNTER — PATIENT MESSAGE (OUTPATIENT)
Dept: ADMINISTRATIVE | Facility: OTHER | Age: 44
End: 2024-05-21
Payer: COMMERCIAL

## 2024-05-22 ENCOUNTER — PATIENT MESSAGE (OUTPATIENT)
Dept: ADMINISTRATIVE | Facility: OTHER | Age: 44
End: 2024-05-22
Payer: COMMERCIAL

## 2024-05-23 ENCOUNTER — PATIENT MESSAGE (OUTPATIENT)
Dept: PLASTIC SURGERY | Facility: CLINIC | Age: 44
End: 2024-05-23
Payer: COMMERCIAL

## 2024-05-23 ENCOUNTER — PATIENT MESSAGE (OUTPATIENT)
Dept: ADMINISTRATIVE | Facility: OTHER | Age: 44
End: 2024-05-23
Payer: COMMERCIAL

## 2024-05-24 ENCOUNTER — PATIENT MESSAGE (OUTPATIENT)
Dept: ADMINISTRATIVE | Facility: OTHER | Age: 44
End: 2024-05-24
Payer: COMMERCIAL

## 2024-05-25 ENCOUNTER — PATIENT MESSAGE (OUTPATIENT)
Dept: ADMINISTRATIVE | Facility: OTHER | Age: 44
End: 2024-05-25
Payer: COMMERCIAL

## 2024-05-26 ENCOUNTER — PATIENT MESSAGE (OUTPATIENT)
Dept: ADMINISTRATIVE | Facility: OTHER | Age: 44
End: 2024-05-26
Payer: COMMERCIAL

## 2024-05-27 ENCOUNTER — PATIENT MESSAGE (OUTPATIENT)
Dept: ADMINISTRATIVE | Facility: OTHER | Age: 44
End: 2024-05-27
Payer: COMMERCIAL

## 2024-05-28 ENCOUNTER — PATIENT MESSAGE (OUTPATIENT)
Dept: ADMINISTRATIVE | Facility: OTHER | Age: 44
End: 2024-05-28
Payer: COMMERCIAL

## 2024-05-29 ENCOUNTER — PATIENT MESSAGE (OUTPATIENT)
Dept: ADMINISTRATIVE | Facility: OTHER | Age: 44
End: 2024-05-29
Payer: COMMERCIAL

## 2024-05-30 ENCOUNTER — PATIENT MESSAGE (OUTPATIENT)
Dept: ADMINISTRATIVE | Facility: OTHER | Age: 44
End: 2024-05-30
Payer: COMMERCIAL

## 2024-05-30 ENCOUNTER — CLINICAL SUPPORT (OUTPATIENT)
Dept: REHABILITATION | Facility: HOSPITAL | Age: 44
End: 2024-05-30

## 2024-05-30 DIAGNOSIS — Z17.0 MALIGNANT NEOPLASM OF LOWER-INNER QUADRANT OF RIGHT BREAST OF FEMALE, ESTROGEN RECEPTOR POSITIVE: Primary | ICD-10-CM

## 2024-05-30 DIAGNOSIS — C50.311 MALIGNANT NEOPLASM OF LOWER-INNER QUADRANT OF RIGHT BREAST OF FEMALE, ESTROGEN RECEPTOR POSITIVE: Primary | ICD-10-CM

## 2024-05-30 PROCEDURE — 97814 ACUP 1/> W/ESTIM EA ADDL 15: CPT | Performed by: ACUPUNCTURIST

## 2024-05-30 PROCEDURE — 97813 ACUP 1/> W/ESTIM 1ST 15 MIN: CPT | Performed by: ACUPUNCTURIST

## 2024-05-30 NOTE — PROGRESS NOTES
Acupuncture Evaluation Note     Name: Tasha Cornejo  [facility] Number: [id number]    Traditional Chinese Medicine (TCM) Diagnosis: Qi Stagnation, Blood Stasis, and Qi Deficiency  Medical Diagnosis:   Encounter Diagnosis   Name Primary?    Malignant neoplasm of lower-inner quadrant of right breast of female, estrogen receptor positive Yes        Evaluation Date: 5/30/2024    Visit #/Visits authorized: self pay, 14    Precautions: Standard and cancer    Subjective     Chief Concern: Breast cancer - completed 12 rounds of chemo. Now has immunotherapy, scheduling surgery. CIPN - treat and prevent.     Medical necessity is demonstrated by the following IMPAIRMENTS: Medical Necessity: Decreased quality of life and Nausea and Vomiting              Aggravating Factors:  flexion and extension   Relieving Factors:  stretching/yoga     Symptom Description:     Quality:  Tight  Severity:  1  Frequency:  when active      Treatment     Treatment Principles:  Move qi and blood, nourish qi, calm hernandez    Acupuncture points used:   Ba Ashwin, Ba Taryn, 4 [address], [place]  Bilateral points: ST36, SP9, SP6, KD3, GB34, LI20  Unilateral points: LU7, KD6, PC6, SP4  Auricular Treatment:    [place] Men, [place]   Needles In: 30  Needles Out: 30  [place] W/ STIM placed: 7:50  Needles W/ STIM removed: 8:20      Assessment     After treatment, patient felt neuropathy present in left hand and left foot, right side is clear.     Patient prognosis is Good.     Patient will continue to benefit from acupuncture treatment to address the deficits listed in the problem list box on initial evaluation, provide patient family education and to maximize pt's level of independence in the home and community environment.     Patient's spiritual, cultural and educational needs considered and pt agreeable to plan of care and goals.     Anticipated barriers to treatment: none    Plan     Recommend 1 /week as needed for neuropathy follow up and maintenance      Education:  Patient is aware of cumulative benefit of acupuncture

## 2024-05-31 ENCOUNTER — PATIENT MESSAGE (OUTPATIENT)
Dept: ADMINISTRATIVE | Facility: OTHER | Age: 44
End: 2024-05-31
Payer: COMMERCIAL

## 2024-06-01 ENCOUNTER — PATIENT MESSAGE (OUTPATIENT)
Dept: ADMINISTRATIVE | Facility: OTHER | Age: 44
End: 2024-06-01
Payer: COMMERCIAL

## 2024-06-02 ENCOUNTER — PATIENT MESSAGE (OUTPATIENT)
Dept: ADMINISTRATIVE | Facility: OTHER | Age: 44
End: 2024-06-02
Payer: COMMERCIAL

## 2024-06-03 ENCOUNTER — PATIENT MESSAGE (OUTPATIENT)
Dept: ADMINISTRATIVE | Facility: OTHER | Age: 44
End: 2024-06-03
Payer: COMMERCIAL

## 2024-06-04 ENCOUNTER — PATIENT MESSAGE (OUTPATIENT)
Dept: ADMINISTRATIVE | Facility: OTHER | Age: 44
End: 2024-06-04
Payer: COMMERCIAL

## 2024-06-05 ENCOUNTER — PATIENT MESSAGE (OUTPATIENT)
Dept: ADMINISTRATIVE | Facility: OTHER | Age: 44
End: 2024-06-05
Payer: COMMERCIAL

## 2024-06-06 ENCOUNTER — HOSPITAL ENCOUNTER (OUTPATIENT)
Dept: CARDIOLOGY | Facility: HOSPITAL | Age: 44
Discharge: HOME | End: 2024-06-06
Attending: INTERNAL MEDICINE
Payer: COMMERCIAL

## 2024-06-06 ENCOUNTER — INFUSION (OUTPATIENT)
Dept: INFUSION THERAPY | Facility: HOSPITAL | Age: 44
End: 2024-06-06
Payer: COMMERCIAL

## 2024-06-06 ENCOUNTER — OFFICE VISIT (OUTPATIENT)
Dept: HEMATOLOGY/ONCOLOGY | Facility: CLINIC | Age: 44
End: 2024-06-06
Payer: COMMERCIAL

## 2024-06-06 ENCOUNTER — PATIENT MESSAGE (OUTPATIENT)
Dept: ADMINISTRATIVE | Facility: OTHER | Age: 44
End: 2024-06-06
Payer: COMMERCIAL

## 2024-06-06 VITALS
DIASTOLIC BLOOD PRESSURE: 80 MMHG | HEIGHT: 60 IN | BODY MASS INDEX: 22.19 KG/M2 | SYSTOLIC BLOOD PRESSURE: 118 MMHG | HEART RATE: 70 BPM | WEIGHT: 113 LBS

## 2024-06-06 VITALS
DIASTOLIC BLOOD PRESSURE: 79 MMHG | TEMPERATURE: 98 F | HEART RATE: 64 BPM | RESPIRATION RATE: 18 BRPM | BODY MASS INDEX: 21.99 KG/M2 | SYSTOLIC BLOOD PRESSURE: 150 MMHG | HEIGHT: 60 IN | WEIGHT: 112 LBS

## 2024-06-06 VITALS
WEIGHT: 112 LBS | SYSTOLIC BLOOD PRESSURE: 150 MMHG | HEART RATE: 64 BPM | TEMPERATURE: 98 F | BODY MASS INDEX: 21.99 KG/M2 | DIASTOLIC BLOOD PRESSURE: 79 MMHG | HEIGHT: 60 IN | OXYGEN SATURATION: 97 %

## 2024-06-06 DIAGNOSIS — Z17.0 MALIGNANT NEOPLASM OF LOWER-INNER QUADRANT OF RIGHT BREAST OF FEMALE, ESTROGEN RECEPTOR POSITIVE: ICD-10-CM

## 2024-06-06 DIAGNOSIS — C50.311 MALIGNANT NEOPLASM OF LOWER-INNER QUADRANT OF RIGHT BREAST OF FEMALE, ESTROGEN RECEPTOR POSITIVE: ICD-10-CM

## 2024-06-06 DIAGNOSIS — R11.0 CHEMOTHERAPY-INDUCED NAUSEA: Primary | ICD-10-CM

## 2024-06-06 DIAGNOSIS — T45.1X5A CHEMOTHERAPY-INDUCED NAUSEA: Primary | ICD-10-CM

## 2024-06-06 DIAGNOSIS — Z17.0 MALIGNANT NEOPLASM OF LOWER-INNER QUADRANT OF RIGHT BREAST OF FEMALE, ESTROGEN RECEPTOR POSITIVE: Primary | ICD-10-CM

## 2024-06-06 DIAGNOSIS — C50.311 MALIGNANT NEOPLASM OF LOWER-INNER QUADRANT OF RIGHT BREAST OF FEMALE, ESTROGEN RECEPTOR POSITIVE: Primary | ICD-10-CM

## 2024-06-06 DIAGNOSIS — Z15.01 BARD1 GENE MUTATION POSITIVE: ICD-10-CM

## 2024-06-06 LAB
BSA FOR ECHO PROCEDURE: 1.47 M2
CV ECHO LV RWT: 0.22 CM
ECHO LV POSTERIOR WALL: 0.5 CM (ref 0.6–1.1)
EJECTION FRACTION: 55 %
FRACTIONAL SHORTENING: 24 % (ref 28–44)
GLOBAL LONGITUIDAL STRAIN: 18 %
INTERVENTRICULAR SEPTUM: 0.5 CM (ref 0.6–1.1)
LEFT ATRIUM VOLUME INDEX MOD: 37 ML/M2
LEFT ATRIUM VOLUME MOD: 54 CM3
LEFT INTERNAL DIMENSION IN SYSTOLE: 3.4 CM (ref 2.1–4)
LEFT VENTRICLE MASS INDEX: 43 G/M2
LEFT VENTRICULAR INTERNAL DIMENSION IN DIASTOLE: 4.5 CM (ref 3.5–6)
LEFT VENTRICULAR MASS: 63.21 G
OHS CV RV/LV RATIO: 0.73 CM
PISA TR MAX VEL: 2 M/S
RA PRESSURE ESTIMATED: 3 MMHG
RIGHT VENTRICULAR END-DIASTOLIC DIMENSION: 3.3 CM
RV TB RVSP: 5 MMHG
TDI LATERAL: 0.16 M/S
TDI SEPTAL: 0.11 M/S
TDI: 0.14 M/S
TR MAX PG: 16 MMHG
TV REST PULMONARY ARTERY PRESSURE: 19 MMHG
Z-SCORE OF LEFT VENTRICULAR DIMENSION IN END DIASTOLE: 0.24
Z-SCORE OF LEFT VENTRICULAR DIMENSION IN END SYSTOLE: 1.74

## 2024-06-06 PROCEDURE — 93356 MYOCRD STRAIN IMG SPCKL TRCK: CPT

## 2024-06-06 PROCEDURE — 3044F HG A1C LEVEL LT 7.0%: CPT | Mod: CPTII,S$GLB,, | Performed by: INTERNAL MEDICINE

## 2024-06-06 PROCEDURE — 3077F SYST BP >= 140 MM HG: CPT | Mod: CPTII,S$GLB,, | Performed by: INTERNAL MEDICINE

## 2024-06-06 PROCEDURE — G2211 COMPLEX E/M VISIT ADD ON: HCPCS | Mod: S$GLB,,, | Performed by: INTERNAL MEDICINE

## 2024-06-06 PROCEDURE — 99215 OFFICE O/P EST HI 40 MIN: CPT | Mod: S$GLB,,, | Performed by: INTERNAL MEDICINE

## 2024-06-06 PROCEDURE — 96413 CHEMO IV INFUSION 1 HR: CPT

## 2024-06-06 PROCEDURE — 93356 MYOCRD STRAIN IMG SPCKL TRCK: CPT | Mod: ,,, | Performed by: STUDENT IN AN ORGANIZED HEALTH CARE EDUCATION/TRAINING PROGRAM

## 2024-06-06 PROCEDURE — 3008F BODY MASS INDEX DOCD: CPT | Mod: CPTII,S$GLB,, | Performed by: INTERNAL MEDICINE

## 2024-06-06 PROCEDURE — 99999 PR PBB SHADOW E&M-EST. PATIENT-LVL III: CPT | Mod: PBBFAC,,, | Performed by: INTERNAL MEDICINE

## 2024-06-06 PROCEDURE — 3078F DIAST BP <80 MM HG: CPT | Mod: CPTII,S$GLB,, | Performed by: INTERNAL MEDICINE

## 2024-06-06 PROCEDURE — 63600175 PHARM REV CODE 636 W HCPCS: Performed by: INTERNAL MEDICINE

## 2024-06-06 PROCEDURE — A4216 STERILE WATER/SALINE, 10 ML: HCPCS | Performed by: INTERNAL MEDICINE

## 2024-06-06 PROCEDURE — 93306 TTE W/DOPPLER COMPLETE: CPT | Mod: 26,,, | Performed by: STUDENT IN AN ORGANIZED HEALTH CARE EDUCATION/TRAINING PROGRAM

## 2024-06-06 PROCEDURE — 25000003 PHARM REV CODE 250: Performed by: INTERNAL MEDICINE

## 2024-06-06 RX ORDER — EPINEPHRINE 0.3 MG/.3ML
0.3 INJECTION SUBCUTANEOUS ONCE AS NEEDED
OUTPATIENT
Start: 2024-06-27

## 2024-06-06 RX ORDER — SODIUM CHLORIDE 0.9 % (FLUSH) 0.9 %
10 SYRINGE (ML) INJECTION
OUTPATIENT
Start: 2024-06-27

## 2024-06-06 RX ORDER — HEPARIN 100 UNIT/ML
500 SYRINGE INTRAVENOUS
Status: DISCONTINUED | OUTPATIENT
Start: 2024-06-06 | End: 2024-06-06 | Stop reason: HOSPADM

## 2024-06-06 RX ORDER — SODIUM CHLORIDE 0.9 % (FLUSH) 0.9 %
10 SYRINGE (ML) INJECTION
Status: CANCELLED | OUTPATIENT
Start: 2024-06-06

## 2024-06-06 RX ORDER — HEPARIN 100 UNIT/ML
500 SYRINGE INTRAVENOUS
Status: CANCELLED | OUTPATIENT
Start: 2024-06-06

## 2024-06-06 RX ORDER — DIPHENHYDRAMINE HYDROCHLORIDE 50 MG/ML
50 INJECTION INTRAMUSCULAR; INTRAVENOUS ONCE AS NEEDED
OUTPATIENT
Start: 2024-06-27

## 2024-06-06 RX ORDER — DIPHENHYDRAMINE HYDROCHLORIDE 50 MG/ML
50 INJECTION INTRAMUSCULAR; INTRAVENOUS ONCE AS NEEDED
Status: DISCONTINUED | OUTPATIENT
Start: 2024-06-06 | End: 2024-06-06 | Stop reason: HOSPADM

## 2024-06-06 RX ORDER — DIPHENHYDRAMINE HYDROCHLORIDE 50 MG/ML
50 INJECTION INTRAMUSCULAR; INTRAVENOUS ONCE AS NEEDED
Status: CANCELLED | OUTPATIENT
Start: 2024-06-06

## 2024-06-06 RX ORDER — EPINEPHRINE 0.3 MG/.3ML
0.3 INJECTION SUBCUTANEOUS ONCE AS NEEDED
Status: DISCONTINUED | OUTPATIENT
Start: 2024-06-06 | End: 2024-06-06 | Stop reason: HOSPADM

## 2024-06-06 RX ORDER — TAMOXIFEN CITRATE 20 MG/1
20 TABLET ORAL DAILY
Qty: 30 TABLET | Refills: 11 | Status: SHIPPED | OUTPATIENT
Start: 2024-06-06 | End: 2025-06-06

## 2024-06-06 RX ORDER — HEPARIN 100 UNIT/ML
500 SYRINGE INTRAVENOUS
OUTPATIENT
Start: 2024-06-27

## 2024-06-06 RX ORDER — PROCHLORPERAZINE EDISYLATE 5 MG/ML
10 INJECTION INTRAMUSCULAR; INTRAVENOUS ONCE AS NEEDED
Status: CANCELLED
Start: 2024-06-06

## 2024-06-06 RX ORDER — PROCHLORPERAZINE EDISYLATE 5 MG/ML
10 INJECTION INTRAMUSCULAR; INTRAVENOUS ONCE AS NEEDED
Status: DISCONTINUED | OUTPATIENT
Start: 2024-06-06 | End: 2024-06-06 | Stop reason: HOSPADM

## 2024-06-06 RX ORDER — SODIUM CHLORIDE 0.9 % (FLUSH) 0.9 %
10 SYRINGE (ML) INJECTION
Status: DISCONTINUED | OUTPATIENT
Start: 2024-06-06 | End: 2024-06-06 | Stop reason: HOSPADM

## 2024-06-06 RX ORDER — EPINEPHRINE 0.3 MG/.3ML
0.3 INJECTION SUBCUTANEOUS ONCE AS NEEDED
Status: CANCELLED | OUTPATIENT
Start: 2024-06-06

## 2024-06-06 RX ORDER — PROCHLORPERAZINE EDISYLATE 5 MG/ML
10 INJECTION INTRAMUSCULAR; INTRAVENOUS ONCE AS NEEDED
Start: 2024-06-27

## 2024-06-06 RX ADMIN — Medication 10 ML: at 10:06

## 2024-06-06 RX ADMIN — HEPARIN 500 UNITS: 100 SYRINGE at 10:06

## 2024-06-06 RX ADMIN — TRASTUZUMAB-ANNS 300 MG: 420 INJECTION, POWDER, LYOPHILIZED, FOR SOLUTION INTRAVENOUS at 10:06

## 2024-06-06 RX ADMIN — SODIUM CHLORIDE: 9 INJECTION, SOLUTION INTRAVENOUS at 09:06

## 2024-06-06 NOTE — PROGRESS NOTES
[facility]/ [facility]   at [facility] Note:      Chief Complaint:   Encounter Diagnoses   Name Primary?    Malignant neoplasm of lower-inner quadrant of right breast of female, estrogen receptor positive Yes    BARD1 gene mutation positive         Cancer Staging   Malignant neoplasm of lower-inner quadrant of right breast of female, estrogen receptor positive  Staging form: Breast, AJCC 8th Edition  - Clinical stage from 10/26/2023: Stage IIA (cT2, cN0, cM0, G3, ER+, IA-, HER2+) - Signed by Linda Mcbride MD on 2023  - Pathologic stage from 2023: Stage IA (pT1c, pN0, cM0, G2, ER+, IA-, HER2+) - Signed by Linda Mcbride MD on 2024    HPI:  Tasha Cornejo is a 44 y.o. female who presents today for adjuvant kanjinti. She has been feeling well. Had a sinus infection and still some residual runny nose. Otherwise no new issues         Oncology History  Screening mammogram on 10/5/2023 identified coarse heterogeneous calcifications in a linear distribution seen in the lower outer quadrant of the right breast, spanning 1.5 cm.  Diagnostic mammogram on 10/12/2023 showed coarse heterogeneous calcifications in a regional distribution spanning 3.2 cm long axis   Biopsy 10/26/2023 with pathology revealing infiltrating ductal carcinoma of the breast, ER 70%/ IA-/HER2 3+; Ki67 60%    Bilateral mastectomy 23: IDC, grade 2, 1.5cm in maximum; 0/2 LN+    Adjuvant TH 24    GYN History:  Age of menarche was 9.   Age of menopause n/a.  Patient denies hormonal therapy.   Patient is .     Patient Active Problem List   Diagnosis    Rhinitis, allergic    Allergic conjunctivitis    Vitamin D deficiency    Malignant neoplasm of lower-inner quadrant of right breast of female, estrogen receptor positive    Chemotherapy-induced nausea    At risk for lymphedema    Stress and adjustment reaction    Physical deconditioning    Immunodeficiency due to  drugs    Decreased ROM of right shoulder    Shoulder weakness    Anxiety about health    Palpitations       Current Outpatient Medications   Medication Instructions    fluticasone propionate (FLONASE) 50 mcg/actuation nasal spray 1 spray, Each Nostril, Daily PRN    LIDOcaine-prilocaine (EMLA) cream Apply topically to port one hour prior to chemotherapy infusion    multivitamin (THERAGRAN) per tablet 1 tablet, Oral, Daily    omeprazole (PRILOSEC) 40 mg, Oral, Every morning    ondansetron (ZOFRAN) 8 mg, Oral, Every 8 hours PRN       Review of Systems:   Review of Systems   Respiratory:  Negative for cough and shortness of breath.    Cardiovascular:  Negative for chest pain.   Gastrointestinal:  Positive for diarrhea. Negative for abdominal pain.   Genitourinary:  Positive for dysuria. Negative for frequency.   Musculoskeletal:  Negative for back pain.   Skin:  Negative for rash.   Neurological:  Negative for headaches.   Endo/Heme/Allergies:         Night sweats   Psychiatric/Behavioral:  The patient is nervous/anxious.    All other systems reviewed and are negative.      PHYSICAL EXAM:  BP (!) 150/79 (BP Location: Right arm, Patient Position: Sitting, BP Method: Medium (Automatic))   Pulse 64   Temp 98 °F (36.7 °C) (Oral)   Ht 5' (1.524 m)   Wt 50.8 kg (111 lb 15.9 oz)   LMP  (LMP Unknown)   SpO2 97%   BMI 21.87 kg/m²     General Appearance:    Alert, cooperative, no distress, appears stated age   Head:    Normocephalic, without obvious abnormality, atraumatic   Eyes:    PERRL, conjunctiva/corneas clear   Nose:   Nares normal, septum midline   Throat:   Lips, mucosa, and tongue normal; teeth and gums normal   Lungs:     Respirations unlabored   Extremities:   Extremities normal, atraumatic, no cyanosis or edema   Pulses:   2+ and symmetric all extremities   Skin:   Skin color, texture, turgor normal, no rashes or lesions   Neurologic:   CNII-XII intact, normal gait           Pertinent Labs &  Imaging:  Pathology Results  (Last 30 days)      None            Recent Results (from the past 24 hour(s))   Echo    Collection Time: 06/06/24  7:57 AM   Result Value Ref Range    BSA 1.47 m2         Assessment & Plan:  1. Malignant neoplasm of lower-inner quadrant of right breast of female, estrogen receptor positive    2. BARD1 gene mutation positive    Final pathology T1cN0  Reviewed echo. ok for treatment today  Repeat labs July 2024  We discussed tamoxifen is the optimal treatment for premenopausal estrogen positive breast cancer. Hot flashes and joint pain are common side effects. Pain and aching in the bones and joints can range from mild discomfort that goes away by itself, to severe achiness that may require medication. We have suggested using over-the-counter, non-steroidal anti-inflammatory medications like Ibuprofen if she were to experience this side effect.    Plan to follow up in 6 weeks for review of symptoms on tamoxifen     Route Chart for Scheduling    Med Onc Chart Routing      Follow up with physician 3 months.   Follow up with BHARGAVI 6 weeks.   Infusion scheduling note   q3wk infusion   Injection scheduling note    Labs CBC and CMP   Scheduling:  Preferred lab:  Lab interval: every 6 weeks     Imaging    Pharmacy appointment    Other referrals                Treatment Plan Information   OP BREAST TRASTUZUMAB PACLITAXEL WEEKLY   Linda Mcbride MD   Upcoming Treatment Dates - OP BREAST TRASTUZUMAB PACLITAXEL WEEKLY    6/6/2024       Chemotherapy       trastuzumab-anns (KANJINTI) 318 mg in sodium chloride 0.9% 250 mL chemo infusion  6/27/2024       Chemotherapy       trastuzumab-anns (KANJINTI) 318 mg in sodium chloride 0.9% 250 mL chemo infusion  7/18/2024       Chemotherapy       trastuzumab-anns (KANJINTI) 318 mg in sodium chloride 0.9% 250 mL chemo infusion  8/8/2024       Chemotherapy       trastuzumab-anns (KANJINTI) 318 mg in sodium chloride 0.9% 250 mL chemo infusion    MDM includes  :     - Acute or chronic illness or injury that poses a threat to life or bodily function  - Independent review and explanation of 2 results from unique tests  - Discussion of management and ordering 2 unique tests  - Extensive discussion of treatment and management  - Prescription drug management  - Drug therapy requiring intensive monitoring for toxicity    Linda Mcbride MD   06/06/2024

## 2024-06-07 ENCOUNTER — PATIENT MESSAGE (OUTPATIENT)
Dept: ADMINISTRATIVE | Facility: OTHER | Age: 44
End: 2024-06-07
Payer: COMMERCIAL

## 2024-06-08 ENCOUNTER — PATIENT MESSAGE (OUTPATIENT)
Dept: ADMINISTRATIVE | Facility: OTHER | Age: 44
End: 2024-06-08
Payer: COMMERCIAL

## 2024-06-09 ENCOUNTER — PATIENT MESSAGE (OUTPATIENT)
Dept: ADMINISTRATIVE | Facility: OTHER | Age: 44
End: 2024-06-09
Payer: COMMERCIAL

## 2024-06-10 ENCOUNTER — PATIENT MESSAGE (OUTPATIENT)
Dept: ADMINISTRATIVE | Facility: OTHER | Age: 44
End: 2024-06-10
Payer: COMMERCIAL

## 2024-06-11 ENCOUNTER — PATIENT MESSAGE (OUTPATIENT)
Dept: ADMINISTRATIVE | Facility: OTHER | Age: 44
End: 2024-06-11
Payer: COMMERCIAL

## 2024-06-12 ENCOUNTER — PATIENT MESSAGE (OUTPATIENT)
Dept: ADMINISTRATIVE | Facility: OTHER | Age: 44
End: 2024-06-12
Payer: COMMERCIAL

## 2024-06-12 ENCOUNTER — OFFICE VISIT (OUTPATIENT)
Dept: PSYCHIATRY | Facility: CLINIC | Age: 44
End: 2024-06-12
Payer: COMMERCIAL

## 2024-06-12 DIAGNOSIS — Z17.0 MALIGNANT NEOPLASM OF LOWER-INNER QUADRANT OF RIGHT BREAST OF FEMALE, ESTROGEN RECEPTOR POSITIVE: ICD-10-CM

## 2024-06-12 DIAGNOSIS — R45.89 ANXIETY ABOUT HEALTH: Primary | ICD-10-CM

## 2024-06-12 DIAGNOSIS — C50.311 MALIGNANT NEOPLASM OF LOWER-INNER QUADRANT OF RIGHT BREAST OF FEMALE, ESTROGEN RECEPTOR POSITIVE: ICD-10-CM

## 2024-06-12 PROCEDURE — 99999 PR PBB SHADOW E&M-EST. PATIENT-LVL II: CPT | Mod: PBBFAC,,, | Performed by: PSYCHOLOGIST

## 2024-06-12 PROCEDURE — 3044F HG A1C LEVEL LT 7.0%: CPT | Mod: CPTII,S$GLB,, | Performed by: PSYCHOLOGIST

## 2024-06-12 PROCEDURE — 90837 PSYTX W PT 60 MINUTES: CPT | Mod: S$GLB,,, | Performed by: PSYCHOLOGIST

## 2024-06-12 PROCEDURE — 1160F RVW MEDS BY RX/DR IN RCRD: CPT | Mod: CPTII,S$GLB,, | Performed by: PSYCHOLOGIST

## 2024-06-12 PROCEDURE — 1159F MED LIST DOCD IN RCRD: CPT | Mod: CPTII,S$GLB,, | Performed by: PSYCHOLOGIST

## 2024-06-12 NOTE — PROGRESS NOTES
PSYCHO-ONCOLOGY NOTE/ Individual Psychotherapy     Date: 6/12/2024   Site:  [facility]        Therapeutic Intervention: Met with patient.    Outpatient - Behavior modifying psychotherapy 60 min - CPT code 93689  The patient's last visit with me was on 4/26/2024.    Problem list  Patient Active Problem List   Diagnosis    Rhinitis, allergic    Allergic conjunctivitis    Vitamin D deficiency    Malignant neoplasm of lower-inner quadrant of right breast of female, estrogen receptor positive    Chemotherapy-induced nausea    At risk for lymphedema    Stress and adjustment reaction    Physical deconditioning    Immunodeficiency due to drugs    Decreased ROM of right shoulder    Shoulder weakness    Anxiety about health    Palpitations       Chief complaint/reason for encounter: anxiety   Met with patient to evaluate psychosocial adaptation to diagnosis/treatment/survivorship of breast cancer    Current Medications  Current Outpatient Medications   Medication    fluticasone propionate (FLONASE) 50 mcg/actuation nasal spray    LIDOcaine-prilocaine (EMLA) cream    multivitamin (THERAGRAN) per tablet    omeprazole (PRILOSEC) 40 MG capsule    ondansetron (ZOFRAN) 8 MG tablet    tamoxifen (NOLVADEX) 20 MG Tab     No current facility-administered medications for this visit.     Facility-Administered Medications Ordered in Other Visits   Medication Frequency    ceFAZolin 1 g, gentamicin 80 mg in sodium chloride 0.9% 500 mL irrigation On Call Procedure    ceFAZolin 1 g, gentamicin 80 mg in sodium chloride 0.9% 500 mL irrigation On Call Procedure       Objective:  Tasha Cornejo arrived promptly for the session.   Ms. Cornejo was independently ambulatory at the time of session. The patient was fully cooperative throughout the session.  Appearance: age appropriate, casually  dressed, well groomed  Behavior/Cooperation: friendly and cooperative  Speech: normal in rate, volume, and tone and appropriate quality, quantity and  "organization of sentences  Mood: anxious  Affect: constricted  Thought Process: goal-directed, logical  Thought Content: normal,  No delusions or paranoia; did not appear to be responding to internal stimuli during the session  Orientation: grossly intact  Memory: Grossly intact  Attention Span/Concentration: Attends to session without distraction; reports no difficulty  Fund of Knowledge: average  Estimate of Intelligence: above average from verbal skills and history  Cognition: grossly intact  Insight: patient has awareness of illness; good insight into own behavior and behavior of others  Judgment: the patient's behavior is adequate to circumstances    Interval history and content of current session: Patient discussed events and activities since the time of last visit. Discussed current adaptation to disease and treatment status. Reports to be coping  with mild difficulty . Evaluated cognitive response, paying particular attention to negative intrusive thoughts of a persistent and detrimental nature. Thoughts of this type are in evidence with mild distress. Provided cognitive behavioral therapy to address negative cognitions. Discussed her responses to others in social situations when her cancer (or changed appearance) comes up ("people only want positive information").     She discussed irritability due to anxiety and sense of urgency. She is not currently interested in pharmacotherapy. (Prior medications Zoloft- left her feeling "like a zombie" Wellbutrin- increased mood swings and caused irritability).  Patient tried guided imagery but found it to be "too fast."  PMR practiced in prior session with benefit.  She finished the meditation class and has established a daily meditation practice at home.    Identified and evaluated psychosocial and environmental stressors (anxious about the anxiety book).  Examined proactive behaviors that may be implemented to minimize or ameliorate psychosocial stressors (15 " minutes only working on book).     She started Tamoxifen 1 week ago. Some increased temp, no real hot flashes yet, sleep is disrupted (not currently taking melatonin for onset)     Risk parameters:   Patient reports no suicidal ideation  Patient reports no homicidal ideation  Patient reports no self-injurious behavior  Patient reports no violent behavior   Safety needs:  None at this time      Verbal deficits: None     Patient's response to intervention:The patient's response to intervention is accepting.     Progress toward goals: Progress as Expected        Patient reported outcomes:      Distress thermometer:       6/12/2024     8:42 AM 5/30/2024     3:12 PM 5/16/2024     7:48 AM 4/25/2024     5:05 PM 4/15/2024     5:07 PM 4/3/2024     6:58 AM 3/20/2024    11:57 AM   DISTRESS SCREENING   Distress Score 0 - No Distress 0 - No Distress 0 - No Distress 2 0 - No Distress 0 - No Distress 0 - No Distress   Practical Concerns None of these None of these None of these None of these None of these None of these None of these   Social Concerns None of these None of these None of these Relationship with friends or coworkers None of these None of these None of these   Emotional Concerns Changes in appearance None of these None of these Worry or anxiety;Changes in appearance None of these None of these Worry or anxiety;Fear   Spiritual or Zoroastrianism Concerns Death, dying, or afterlife None of these  Sense of meaning or purpose None of these None of these None of these   Physical Concerns Fatigue None of these None of these None of these Sleep;Fatigue None of these None of these           PHQ-9= Initial visit: not meeting screener  PHQ ANSWERS    Q1. Little interest or pleasure in doing things: (P) Not at all (06/12/24 0844)  Q2. Feeling down, depressed, or hopeless: (P) Not at all (06/12/24 0844)  Q3. Trouble falling or staying asleep, or sleeping too much: (P) Several days (06/12/24 0844)  Q4. Feeling tired or having little  energy: (P) Not at all (06/12/24 0844)  Q5. Poor appetite or overeating: (P) Not at all (06/12/24 0844)  Q6. Feeling bad about yourself - or that you are a failure or have let yourself or your family down: (P) Not at all (06/12/24 0844)  Q7. Trouble concentrating on things, such as reading the newspaper or watching television: (P) Not at all (06/12/24 0844)  Q8. Moving or speaking so slowly that other people could have noticed. Or the opposite - being so fidgety or restless that you have been moving around a lot more than usual: (P) Not at all (06/12/24 0844)  Q9.      PHQ8 Score : (P) 1 (06/12/24 0844)  PHQ-9 Total Score: (P) 1 (06/12/24 0844)   absent     CHEVY-7= Initial visit:11       6/12/2024     8:42 AM 4/25/2024     5:07 PM 4/3/2024     7:46 AM   GAD7   1. Feeling nervous, anxious, or on edge? 0 3 2   2. Not being able to stop or control worrying? 0 0 1   3. Worrying too much about different things? 0 1 2   4. Trouble relaxing? 0 1 2   5. Being so restless that it is hard to sit still? 0 0 1   6. Becoming easily annoyed or irritable? 0 1 2   7. Feeling afraid as if something awful might happen? 0 1 1   CHEVY-7 Score 0 7 11      mild      Client Strengths: verbal, intelligent, successful, good social support, good insight, commitment to wellness      Treatment Plan:individual psychotherapy  Target symptoms: anxiety   Why chosen therapy is appropriate versus another modality: patient responds to this modality  Outcome monitoring methods: self-report, checklist/rating scale  Therapeutic intervention type: behavior modifying psychotherapy  Prognosis: Good    Goals from last visit: Met   Behavioral goals prior to next visit:    Exercise: continue    Stress management: Meditation at home daily    Activities that bring byron   Social engagement:   Nutrition:   Smoking Cessation:   Therapy: Work on workbook (15 minutes only)    Back to melatonin? Discuss with Dr. Mcbride hot flashes/mood changes, if needed    Return to  clinic: as needed     Length of Service (minutes direct face-to-face contact): 60    Diagnosis:     ICD-10-CM ICD-9-CM   1. Anxiety about health  R45.89 799.29   2. Malignant neoplasm of lower-inner quadrant of right breast of female, estrogen receptor positive  C50.311 174.3    Z17.0 V86.0       [place], PhD  [place] License #[id number]  [place] License #[id number]

## 2024-06-13 ENCOUNTER — PATIENT MESSAGE (OUTPATIENT)
Dept: ADMINISTRATIVE | Facility: OTHER | Age: 44
End: 2024-06-13
Payer: COMMERCIAL

## 2024-06-14 ENCOUNTER — PATIENT MESSAGE (OUTPATIENT)
Dept: ADMINISTRATIVE | Facility: OTHER | Age: 44
End: 2024-06-14
Payer: COMMERCIAL

## 2024-06-19 ENCOUNTER — PATIENT MESSAGE (OUTPATIENT)
Dept: ADMINISTRATIVE | Facility: OTHER | Age: 44
End: 2024-06-19
Payer: COMMERCIAL

## 2024-06-19 ENCOUNTER — OFFICE VISIT (OUTPATIENT)
Dept: SURGERY | Facility: CLINIC | Age: 44
End: 2024-06-19
Payer: COMMERCIAL

## 2024-06-19 VITALS
HEART RATE: 57 BPM | BODY MASS INDEX: 21.79 KG/M2 | SYSTOLIC BLOOD PRESSURE: 140 MMHG | HEIGHT: 60 IN | DIASTOLIC BLOOD PRESSURE: 75 MMHG | WEIGHT: 111 LBS

## 2024-06-19 DIAGNOSIS — Z17.0 MALIGNANT NEOPLASM OF LOWER-INNER QUADRANT OF RIGHT BREAST OF FEMALE, ESTROGEN RECEPTOR POSITIVE: Primary | ICD-10-CM

## 2024-06-19 DIAGNOSIS — C50.311 MALIGNANT NEOPLASM OF LOWER-INNER QUADRANT OF RIGHT BREAST OF FEMALE, ESTROGEN RECEPTOR POSITIVE: Primary | ICD-10-CM

## 2024-06-19 DIAGNOSIS — Z15.01 BARD1 GENE MUTATION POSITIVE: ICD-10-CM

## 2024-06-19 PROCEDURE — 3077F SYST BP >= 140 MM HG: CPT | Mod: CPTII,S$GLB,, | Performed by: SURGERY

## 2024-06-19 PROCEDURE — 3078F DIAST BP <80 MM HG: CPT | Mod: CPTII,S$GLB,, | Performed by: SURGERY

## 2024-06-19 PROCEDURE — 1159F MED LIST DOCD IN RCRD: CPT | Mod: CPTII,S$GLB,, | Performed by: SURGERY

## 2024-06-19 PROCEDURE — 1160F RVW MEDS BY RX/DR IN RCRD: CPT | Mod: CPTII,S$GLB,, | Performed by: SURGERY

## 2024-06-19 PROCEDURE — 3008F BODY MASS INDEX DOCD: CPT | Mod: CPTII,S$GLB,, | Performed by: SURGERY

## 2024-06-19 PROCEDURE — 99213 OFFICE O/P EST LOW 20 MIN: CPT | Mod: S$GLB,,, | Performed by: SURGERY

## 2024-06-19 PROCEDURE — 3044F HG A1C LEVEL LT 7.0%: CPT | Mod: CPTII,S$GLB,, | Performed by: SURGERY

## 2024-06-19 PROCEDURE — 99999 PR PBB SHADOW E&M-EST. PATIENT-LVL III: CPT | Mod: PBBFAC,,, | Performed by: SURGERY

## 2024-06-20 ENCOUNTER — PATIENT MESSAGE (OUTPATIENT)
Dept: ADMINISTRATIVE | Facility: OTHER | Age: 44
End: 2024-06-20
Payer: COMMERCIAL

## 2024-06-21 ENCOUNTER — PATIENT MESSAGE (OUTPATIENT)
Dept: ADMINISTRATIVE | Facility: OTHER | Age: 44
End: 2024-06-21
Payer: COMMERCIAL

## 2024-06-22 ENCOUNTER — PATIENT MESSAGE (OUTPATIENT)
Dept: ADMINISTRATIVE | Facility: OTHER | Age: 44
End: 2024-06-22
Payer: COMMERCIAL

## 2024-06-23 ENCOUNTER — PATIENT MESSAGE (OUTPATIENT)
Dept: ADMINISTRATIVE | Facility: OTHER | Age: 44
End: 2024-06-23
Payer: COMMERCIAL

## 2024-06-24 ENCOUNTER — PATIENT MESSAGE (OUTPATIENT)
Dept: ADMINISTRATIVE | Facility: OTHER | Age: 44
End: 2024-06-24
Payer: COMMERCIAL

## 2024-06-24 NOTE — PROGRESS NOTES
Date of Service:  06/19/2024    DIAGNOSIS:   This is a 44 y.o. female with a history of pT1c N0 MX grade 2 ER + AR - HER2 + invasive ductal carcinoma with associated DCIS of the right breast.     TREATMENT:   Bilateral nipple sparing mastectomy and sentinel lymph node biopsy 12/14/2023, Dr. Mcleod   [facility], Dr. Mcbride.    HISTORY OF PRESENT ILLNESS:   Tasha Cornejo is a 44 y.o. female comes in for oncological follow up.  She denies change in her breast self-exam specifically denying new masses, skin or nipple changes, or nipple discharge. Past medical and surgical history is updated with no new changes. There have been no changes to family history.     IMAGING:   NA    PHYSICAL EXAM:   BP (!) 140/75   Pulse (!) 57   Ht 5' (1.524 m)   Wt 50.3 kg (111 lb)   LMP  (LMP Unknown)   BMI 21.68 kg/m²   General: The patient appears well and is in no acute distress.   Neuro: Alert & oriented x3.   Breasts: The exam was done with the patient seated and supine. Left breast - status post nipple sparing mastectomy with tissue expander based reconstruction.  No palpable masses and no abnormal skin or nipple findings. No supraclavicular or axillary lymphadenopathy on the left side.   Right breast - status post nipple sparing mastectomy with tissue expander based reconstruction. No palpable masses and no abnormal skin or nipple findings. No supraclavicular or axillary lymphadenopathy on the right side.  Extremities: No arm  lymphedema.     ASSESSMENT:   This is a 44 y.o. female without evidence of recurrence by exam, history or imaging.       PLAN:   May desires port removal in clinic once completed anti HER2 medications.  She will let us know how she would like to proceed.  1. Continue monthly self breast exams and call the clinic with any changes or problems.  2. No standard imaging recommended in setting of bilateral mastectomy.  3. Return to clinic in one year. The patient is in agreement with the plan. Questions were  encouraged and answered to patient's satisfaction. Tasha will call our office with any questions or concerns.     20 minutes were spent on this encounter, 15 of which was face to face counseling and 5 minutes were spent on chart review and coordination of care.

## 2024-06-25 ENCOUNTER — PATIENT MESSAGE (OUTPATIENT)
Dept: ADMINISTRATIVE | Facility: OTHER | Age: 44
End: 2024-06-25
Payer: COMMERCIAL

## 2024-06-26 ENCOUNTER — PATIENT MESSAGE (OUTPATIENT)
Dept: ADMINISTRATIVE | Facility: OTHER | Age: 44
End: 2024-06-26
Payer: COMMERCIAL

## 2024-06-27 ENCOUNTER — INFUSION (OUTPATIENT)
Dept: INFUSION THERAPY | Facility: HOSPITAL | Age: 44
End: 2024-06-27
Payer: COMMERCIAL

## 2024-06-27 ENCOUNTER — PATIENT MESSAGE (OUTPATIENT)
Dept: ADMINISTRATIVE | Facility: OTHER | Age: 44
End: 2024-06-27
Payer: COMMERCIAL

## 2024-06-27 VITALS
TEMPERATURE: 98 F | DIASTOLIC BLOOD PRESSURE: 83 MMHG | HEART RATE: 65 BPM | WEIGHT: 112 LBS | HEIGHT: 60 IN | SYSTOLIC BLOOD PRESSURE: 145 MMHG | BODY MASS INDEX: 21.99 KG/M2 | RESPIRATION RATE: 18 BRPM

## 2024-06-27 DIAGNOSIS — T45.1X5A CHEMOTHERAPY-INDUCED NAUSEA: Primary | ICD-10-CM

## 2024-06-27 DIAGNOSIS — C50.311 MALIGNANT NEOPLASM OF LOWER-INNER QUADRANT OF RIGHT BREAST OF FEMALE, ESTROGEN RECEPTOR POSITIVE: ICD-10-CM

## 2024-06-27 DIAGNOSIS — R11.0 CHEMOTHERAPY-INDUCED NAUSEA: Primary | ICD-10-CM

## 2024-06-27 DIAGNOSIS — Z17.0 MALIGNANT NEOPLASM OF LOWER-INNER QUADRANT OF RIGHT BREAST OF FEMALE, ESTROGEN RECEPTOR POSITIVE: ICD-10-CM

## 2024-06-27 RX ORDER — PROCHLORPERAZINE EDISYLATE 5 MG/ML
10 INJECTION INTRAMUSCULAR; INTRAVENOUS ONCE AS NEEDED
Status: DISCONTINUED | OUTPATIENT
Start: 2024-06-27 | End: 2024-06-27 | Stop reason: HOSPADM

## 2024-06-27 RX ORDER — EPINEPHRINE 0.3 MG/.3ML
0.3 INJECTION SUBCUTANEOUS ONCE AS NEEDED
Status: DISCONTINUED | OUTPATIENT
Start: 2024-06-27 | End: 2024-06-27 | Stop reason: HOSPADM

## 2024-06-27 RX ORDER — SODIUM CHLORIDE 0.9 % (FLUSH) 0.9 %
10 SYRINGE (ML) INJECTION
Status: DISCONTINUED | OUTPATIENT
Start: 2024-06-27 | End: 2024-06-27 | Stop reason: HOSPADM

## 2024-06-27 RX ORDER — HEPARIN 100 UNIT/ML
500 SYRINGE INTRAVENOUS
Status: DISCONTINUED | OUTPATIENT
Start: 2024-06-27 | End: 2024-06-27 | Stop reason: HOSPADM

## 2024-06-27 RX ORDER — DIPHENHYDRAMINE HYDROCHLORIDE 50 MG/ML
50 INJECTION INTRAMUSCULAR; INTRAVENOUS ONCE AS NEEDED
Status: DISCONTINUED | OUTPATIENT
Start: 2024-06-27 | End: 2024-06-27 | Stop reason: HOSPADM

## 2024-06-27 RX ADMIN — Medication 10 ML: at 08:06

## 2024-06-27 RX ADMIN — HEPARIN 500 UNITS: 100 SYRINGE at 08:06

## 2024-06-28 ENCOUNTER — PATIENT MESSAGE (OUTPATIENT)
Dept: ADMINISTRATIVE | Facility: OTHER | Age: 44
End: 2024-06-28
Payer: COMMERCIAL

## 2024-07-05 ENCOUNTER — PATIENT MESSAGE (OUTPATIENT)
Dept: ADMINISTRATIVE | Facility: OTHER | Age: 44
End: 2024-07-05
Payer: COMMERCIAL

## 2024-07-06 ENCOUNTER — PATIENT MESSAGE (OUTPATIENT)
Dept: ADMINISTRATIVE | Facility: OTHER | Age: 44
End: 2024-07-06
Payer: COMMERCIAL

## 2024-07-07 ENCOUNTER — PATIENT MESSAGE (OUTPATIENT)
Dept: ADMINISTRATIVE | Facility: OTHER | Age: 44
End: 2024-07-07
Payer: COMMERCIAL

## 2024-07-08 ENCOUNTER — PATIENT MESSAGE (OUTPATIENT)
Dept: ADMINISTRATIVE | Facility: OTHER | Age: 44
End: 2024-07-08
Payer: COMMERCIAL

## 2024-07-09 ENCOUNTER — PATIENT MESSAGE (OUTPATIENT)
Dept: ADMINISTRATIVE | Facility: OTHER | Age: 44
End: 2024-07-09
Payer: COMMERCIAL

## 2024-07-10 ENCOUNTER — PATIENT MESSAGE (OUTPATIENT)
Dept: ADMINISTRATIVE | Facility: OTHER | Age: 44
End: 2024-07-10
Payer: COMMERCIAL

## 2024-07-12 ENCOUNTER — PATIENT MESSAGE (OUTPATIENT)
Dept: ADMINISTRATIVE | Facility: OTHER | Age: 44
End: 2024-07-12
Payer: COMMERCIAL

## 2024-07-13 ENCOUNTER — PATIENT MESSAGE (OUTPATIENT)
Dept: ADMINISTRATIVE | Facility: OTHER | Age: 44
End: 2024-07-13
Payer: COMMERCIAL

## 2024-07-14 ENCOUNTER — PATIENT MESSAGE (OUTPATIENT)
Dept: ADMINISTRATIVE | Facility: OTHER | Age: 44
End: 2024-07-14
Payer: COMMERCIAL

## 2024-07-15 ENCOUNTER — PATIENT MESSAGE (OUTPATIENT)
Dept: ADMINISTRATIVE | Facility: OTHER | Age: 44
End: 2024-07-15
Payer: COMMERCIAL

## 2024-07-18 ENCOUNTER — OFFICE VISIT (OUTPATIENT)
Dept: HEMATOLOGY/ONCOLOGY | Facility: CLINIC | Age: 44
End: 2024-07-18
Payer: COMMERCIAL

## 2024-07-18 ENCOUNTER — INFUSION (OUTPATIENT)
Dept: INFUSION THERAPY | Facility: HOSPITAL | Age: 44
End: 2024-07-18
Payer: COMMERCIAL

## 2024-07-18 ENCOUNTER — LAB VISIT (OUTPATIENT)
Dept: LAB | Facility: HOSPITAL | Age: 44
End: 2024-07-18
Attending: INTERNAL MEDICINE
Payer: COMMERCIAL

## 2024-07-18 ENCOUNTER — TELEPHONE (OUTPATIENT)
Dept: HEMATOLOGY/ONCOLOGY | Facility: CLINIC | Age: 44
End: 2024-07-18
Payer: COMMERCIAL

## 2024-07-18 ENCOUNTER — PATIENT MESSAGE (OUTPATIENT)
Dept: ADMINISTRATIVE | Facility: OTHER | Age: 44
End: 2024-07-18
Payer: COMMERCIAL

## 2024-07-18 VITALS
RESPIRATION RATE: 16 BRPM | BODY MASS INDEX: 22.03 KG/M2 | WEIGHT: 112.19 LBS | SYSTOLIC BLOOD PRESSURE: 150 MMHG | OXYGEN SATURATION: 100 % | DIASTOLIC BLOOD PRESSURE: 83 MMHG | HEIGHT: 60 IN | TEMPERATURE: 98 F | HEART RATE: 51 BPM

## 2024-07-18 VITALS
HEIGHT: 60 IN | SYSTOLIC BLOOD PRESSURE: 127 MMHG | TEMPERATURE: 98 F | HEART RATE: 56 BPM | DIASTOLIC BLOOD PRESSURE: 80 MMHG | WEIGHT: 112.19 LBS | BODY MASS INDEX: 22.03 KG/M2 | OXYGEN SATURATION: 99 % | RESPIRATION RATE: 18 BRPM

## 2024-07-18 DIAGNOSIS — T45.1X5A CHEMOTHERAPY-INDUCED NAUSEA: Primary | ICD-10-CM

## 2024-07-18 DIAGNOSIS — C50.311 MALIGNANT NEOPLASM OF LOWER-INNER QUADRANT OF RIGHT BREAST OF FEMALE, ESTROGEN RECEPTOR POSITIVE: ICD-10-CM

## 2024-07-18 DIAGNOSIS — R11.0 CHEMOTHERAPY-INDUCED NAUSEA: Primary | ICD-10-CM

## 2024-07-18 DIAGNOSIS — Z17.0 MALIGNANT NEOPLASM OF LOWER-INNER QUADRANT OF RIGHT BREAST OF FEMALE, ESTROGEN RECEPTOR POSITIVE: Primary | ICD-10-CM

## 2024-07-18 DIAGNOSIS — Z17.0 MALIGNANT NEOPLASM OF LOWER-INNER QUADRANT OF RIGHT BREAST OF FEMALE, ESTROGEN RECEPTOR POSITIVE: ICD-10-CM

## 2024-07-18 DIAGNOSIS — Z15.01 BARD1 GENE MUTATION POSITIVE: ICD-10-CM

## 2024-07-18 DIAGNOSIS — C50.311 MALIGNANT NEOPLASM OF LOWER-INNER QUADRANT OF RIGHT BREAST OF FEMALE, ESTROGEN RECEPTOR POSITIVE: Primary | ICD-10-CM

## 2024-07-18 LAB
ALBUMIN SERPL BCP-MCNC: 3.7 G/DL (ref 3.5–5.2)
ALP SERPL-CCNC: 46 U/L (ref 55–135)
ALT SERPL W/O P-5'-P-CCNC: 14 U/L (ref 10–44)
ANION GAP SERPL CALC-SCNC: 6 MMOL/L (ref 8–16)
AST SERPL-CCNC: 18 U/L (ref 10–40)
B-HCG UR QL: NEGATIVE
BASOPHILS # BLD AUTO: 0.07 K/UL (ref 0–0.2)
BASOPHILS NFR BLD: 1.4 % (ref 0–1.9)
BILIRUB SERPL-MCNC: 0.3 MG/DL (ref 0.1–1)
BUN SERPL-MCNC: 17 MG/DL (ref 6–20)
CALCIUM SERPL-MCNC: 9 MG/DL (ref 8.7–10.5)
CHLORIDE SERPL-SCNC: 108 MMOL/L (ref 95–110)
CO2 SERPL-SCNC: 25 MMOL/L (ref 23–29)
CREAT SERPL-MCNC: 0.9 MG/DL (ref 0.5–1.4)
DIFFERENTIAL METHOD BLD: NORMAL
EOSINOPHIL # BLD AUTO: 0.2 K/UL (ref 0–0.5)
EOSINOPHIL NFR BLD: 3.8 % (ref 0–8)
ERYTHROCYTE [DISTWIDTH] IN BLOOD BY AUTOMATED COUNT: 12.3 % (ref 11.5–14.5)
EST. GFR  (NO RACE VARIABLE): >60 ML/MIN/1.73 M^2
GLUCOSE SERPL-MCNC: 75 MG/DL (ref 70–110)
HCT VFR BLD AUTO: 41.5 % (ref 37–48.5)
HGB BLD-MCNC: 13.4 G/DL (ref 12–16)
IMM GRANULOCYTES # BLD AUTO: 0.01 K/UL (ref 0–0.04)
IMM GRANULOCYTES NFR BLD AUTO: 0.2 % (ref 0–0.5)
LYMPHOCYTES # BLD AUTO: 1.8 K/UL (ref 1–4.8)
LYMPHOCYTES NFR BLD: 35.6 % (ref 18–48)
MCH RBC QN AUTO: 30.1 PG (ref 27–31)
MCHC RBC AUTO-ENTMCNC: 32.3 G/DL (ref 32–36)
MCV RBC AUTO: 93 FL (ref 82–98)
MONOCYTES # BLD AUTO: 0.5 K/UL (ref 0.3–1)
MONOCYTES NFR BLD: 10.9 % (ref 4–15)
NEUTROPHILS # BLD AUTO: 2.4 K/UL (ref 1.8–7.7)
NEUTROPHILS NFR BLD: 48.1 % (ref 38–73)
NRBC BLD-RTO: 0 /100 WBC
PLATELET # BLD AUTO: 208 K/UL (ref 150–450)
PMV BLD AUTO: 10.2 FL (ref 9.2–12.9)
POTASSIUM SERPL-SCNC: 4.3 MMOL/L (ref 3.5–5.1)
PROT SERPL-MCNC: 6.7 G/DL (ref 6–8.4)
RBC # BLD AUTO: 4.45 M/UL (ref 4–5.4)
SODIUM SERPL-SCNC: 139 MMOL/L (ref 136–145)
WBC # BLD AUTO: 4.95 K/UL (ref 3.9–12.7)

## 2024-07-18 PROCEDURE — 3074F SYST BP LT 130 MM HG: CPT | Mod: CPTII,S$GLB,, | Performed by: NURSE PRACTITIONER

## 2024-07-18 PROCEDURE — 25000003 PHARM REV CODE 250: Performed by: NURSE PRACTITIONER

## 2024-07-18 PROCEDURE — 99215 OFFICE O/P EST HI 40 MIN: CPT | Mod: S$GLB,,, | Performed by: NURSE PRACTITIONER

## 2024-07-18 PROCEDURE — 3044F HG A1C LEVEL LT 7.0%: CPT | Mod: CPTII,S$GLB,, | Performed by: NURSE PRACTITIONER

## 2024-07-18 PROCEDURE — 80053 COMPREHEN METABOLIC PANEL: CPT | Performed by: INTERNAL MEDICINE

## 2024-07-18 PROCEDURE — 3008F BODY MASS INDEX DOCD: CPT | Mod: CPTII,S$GLB,, | Performed by: NURSE PRACTITIONER

## 2024-07-18 PROCEDURE — 3079F DIAST BP 80-89 MM HG: CPT | Mod: CPTII,S$GLB,, | Performed by: NURSE PRACTITIONER

## 2024-07-18 PROCEDURE — 63600175 PHARM REV CODE 636 W HCPCS: Mod: JG | Performed by: NURSE PRACTITIONER

## 2024-07-18 PROCEDURE — 99999 PR PBB SHADOW E&M-EST. PATIENT-LVL III: CPT | Mod: PBBFAC,,, | Performed by: NURSE PRACTITIONER

## 2024-07-18 PROCEDURE — 81025 URINE PREGNANCY TEST: CPT | Performed by: NURSE PRACTITIONER

## 2024-07-18 PROCEDURE — 1159F MED LIST DOCD IN RCRD: CPT | Mod: CPTII,S$GLB,, | Performed by: NURSE PRACTITIONER

## 2024-07-18 PROCEDURE — 36415 COLL VENOUS BLD VENIPUNCTURE: CPT | Performed by: INTERNAL MEDICINE

## 2024-07-18 PROCEDURE — G2211 COMPLEX E/M VISIT ADD ON: HCPCS | Mod: S$GLB,,, | Performed by: NURSE PRACTITIONER

## 2024-07-18 PROCEDURE — 85025 COMPLETE CBC W/AUTO DIFF WBC: CPT | Performed by: INTERNAL MEDICINE

## 2024-07-18 PROCEDURE — A4216 STERILE WATER/SALINE, 10 ML: HCPCS | Performed by: NURSE PRACTITIONER

## 2024-07-18 PROCEDURE — 96413 CHEMO IV INFUSION 1 HR: CPT

## 2024-07-18 RX ORDER — SODIUM CHLORIDE 0.9 % (FLUSH) 0.9 %
10 SYRINGE (ML) INJECTION
Status: DISCONTINUED | OUTPATIENT
Start: 2024-07-18 | End: 2024-07-18 | Stop reason: HOSPADM

## 2024-07-18 RX ORDER — EPINEPHRINE 0.3 MG/.3ML
0.3 INJECTION SUBCUTANEOUS ONCE AS NEEDED
Status: CANCELLED | OUTPATIENT
Start: 2024-07-18

## 2024-07-18 RX ORDER — SODIUM CHLORIDE 0.9 % (FLUSH) 0.9 %
10 SYRINGE (ML) INJECTION
Status: CANCELLED | OUTPATIENT
Start: 2024-07-18

## 2024-07-18 RX ORDER — PROCHLORPERAZINE EDISYLATE 5 MG/ML
10 INJECTION INTRAMUSCULAR; INTRAVENOUS ONCE AS NEEDED
Status: DISCONTINUED | OUTPATIENT
Start: 2024-07-18 | End: 2024-07-18 | Stop reason: HOSPADM

## 2024-07-18 RX ORDER — PROCHLORPERAZINE EDISYLATE 5 MG/ML
10 INJECTION INTRAMUSCULAR; INTRAVENOUS ONCE AS NEEDED
Status: CANCELLED
Start: 2024-07-18

## 2024-07-18 RX ORDER — HEPARIN 100 UNIT/ML
500 SYRINGE INTRAVENOUS
Status: DISCONTINUED | OUTPATIENT
Start: 2024-07-18 | End: 2024-07-18 | Stop reason: HOSPADM

## 2024-07-18 RX ORDER — DIPHENHYDRAMINE HYDROCHLORIDE 50 MG/ML
50 INJECTION INTRAMUSCULAR; INTRAVENOUS ONCE AS NEEDED
Status: DISCONTINUED | OUTPATIENT
Start: 2024-07-18 | End: 2024-07-18 | Stop reason: HOSPADM

## 2024-07-18 RX ORDER — HEPARIN 100 UNIT/ML
500 SYRINGE INTRAVENOUS
Status: CANCELLED | OUTPATIENT
Start: 2024-07-18

## 2024-07-18 RX ORDER — DIPHENHYDRAMINE HYDROCHLORIDE 50 MG/ML
50 INJECTION INTRAMUSCULAR; INTRAVENOUS ONCE AS NEEDED
Status: CANCELLED | OUTPATIENT
Start: 2024-07-18

## 2024-07-18 RX ORDER — EPINEPHRINE 0.3 MG/.3ML
0.3 INJECTION SUBCUTANEOUS ONCE AS NEEDED
Status: DISCONTINUED | OUTPATIENT
Start: 2024-07-18 | End: 2024-07-18 | Stop reason: HOSPADM

## 2024-07-18 RX ADMIN — HEPARIN 500 UNITS: 100 SYRINGE at 01:07

## 2024-07-18 RX ADMIN — SODIUM CHLORIDE: 9 INJECTION, SOLUTION INTRAVENOUS at 10:07

## 2024-07-18 RX ADMIN — Medication 10 ML: at 01:07

## 2024-07-18 RX ADMIN — TRASTUZUMAB-ANNS 300 MG: 420 INJECTION, POWDER, LYOPHILIZED, FOR SOLUTION INTRAVENOUS at 12:07

## 2024-07-18 NOTE — PLAN OF CARE
Patient seated in chair, VSS, assessment done.  Port accessed, flushed, blood return noted, started NS @ 50 cc/hr KVO while waiting for Urine preg. Test to result.  Pt. Scheduled for Kanjinti infusion today.  TM for safety

## 2024-07-18 NOTE — TELEPHONE ENCOUNTER
"----- Message from Jey Moraleson sent at 7/18/2024 11:14 AM CDT -----  Consult/Advisory    [facility]    Contact Preference?:  [id number]    Provider Name: Chilo    Does patient feel the need to be seen today? No    What is the nature of the call?: Requesting for task to be finalized in Epic regarding pt's pregnancy urine sample    Additional Notes:  "Thank you for all that you do for our patients"  "

## 2024-07-18 NOTE — PLAN OF CARE
Patient completed Kanjinti infusion and subsequent flush, tolerated well.  Port de accessed, flushed, blood return noted, heparin locked.  RTC 8/8/24  Patient ambulated off floor independently, NAD.

## 2024-07-18 NOTE — PROGRESS NOTES
[facility]/ [facility]   at [facility] Note:      Chief Complaint:   Encounter Diagnoses   Name Primary?    Malignant neoplasm of lower-inner quadrant of right breast of female, estrogen receptor positive Yes    BARD1 gene mutation positive       Cancer Staging   Malignant neoplasm of lower-inner quadrant of right breast of female, estrogen receptor positive  Staging form: Breast, AJCC 8th Edition  - Clinical stage from 10/26/2023: Stage IIA (cT2, cN0, cM0, G3, ER+, WV-, HER2+) - Signed by Linda Mcbride MD on 2023  - Pathologic stage from 2023: Stage IA (pT1c, pN0, cM0, G2, ER+, WV-, HER2+) - Signed by Linda Mcbride MD on 2024    HPI:  Tasha Cornejo is a 44 y.o. female who presents today for adjuvant kanjinti. Also started on tamoxifen after last visit.    more hot flashes recently since starting tamoxifen.  Day and night., Affecting sleep  Mood is ok  Notes vaginal dryness  Having some pain in the back of her neck and frontal headaches, thinks allergy related, but also would like to try massage therapy  Weight is stable  Appetite is good  Bowel movements normal    Per Dr. Mcbride's note:  Oncology History  Screening mammogram on 10/5/2023 identified coarse heterogeneous calcifications in a linear distribution seen in the lower outer quadrant of the right breast, spanning 1.5 cm.  Diagnostic mammogram on 10/12/2023 showed coarse heterogeneous calcifications in a regional distribution spanning 3.2 cm long axis   Biopsy 10/26/2023 with pathology revealing infiltrating ductal carcinoma of the breast, ER 70%/ WV-/HER2 3+; Ki67 60%    Bilateral mastectomy 23: IDC, grade 2, 1.5cm in maximum; 0/2 LN+    Adjuvant TH 24  Adjuvant tamoxifen:  2024    GYN History:  Age of menarche was 9.   Age of menopause n/a.  Patient denies hormonal therapy.   Patient is .     Patient Active Problem List   Diagnosis    Rhinitis, allergic     Allergic conjunctivitis    Vitamin D deficiency    Malignant neoplasm of lower-inner quadrant of right breast of female, estrogen receptor positive    Chemotherapy-induced nausea    At risk for lymphedema    Stress and adjustment reaction    Physical deconditioning    Immunodeficiency due to drugs    Decreased ROM of right shoulder    Shoulder weakness    Anxiety about health    Palpitations       Current Outpatient Medications   Medication Instructions    fluticasone propionate (FLONASE) 50 mcg/actuation nasal spray 1 spray, Each Nostril, Daily PRN    LIDOcaine-prilocaine (EMLA) cream Apply topically to port one hour prior to chemotherapy infusion    multivitamin (THERAGRAN) per tablet 1 tablet, Oral, Daily    omeprazole (PRILOSEC) 40 mg, Oral, Every morning    ondansetron (ZOFRAN) 8 mg, Oral, Every 8 hours PRN    tamoxifen (NOLVADEX) 20 mg, Oral, Daily       Review of Systems:   Review of Systems   Respiratory:  Negative for cough and shortness of breath.    Cardiovascular:  Negative for chest pain.   Gastrointestinal:  Negative for abdominal pain and diarrhea.   Genitourinary:  Negative for dysuria and frequency.   Musculoskeletal:  Negative for back pain.   Skin:  Negative for rash.   Neurological:  Positive for headaches.   Endo/Heme/Allergies:         Night sweats   Psychiatric/Behavioral:  The patient is nervous/anxious.    All other systems reviewed and are negative.      PHYSICAL EXAM:  /80 (BP Location: Left arm, Patient Position: Sitting, BP Method: Large (Automatic))   Pulse (!) 56   Temp 98.3 °F (36.8 °C) (Oral)   Resp 18   Ht 5' (1.524 m)   Wt 50.9 kg (112 lb 3.4 oz)   SpO2 99%   BMI 21.92 kg/m²     General Appearance:    Alert, cooperative, no distress, appears stated age   Head:    Normocephalic, without obvious abnormality, atraumatic   Eyes:    PERRL, conjunctiva/corneas clear   Nose:   Nares normal, septum midline   Throat:   Lips, mucosa, and tongue normal; teeth and gums normal    Breasts:  Hx of bilateral mastectomy with expanders.  No new skin changes, masses or adenopathy noted   Lungs:     Respirations unlabored   Extremities:   Extremities normal, atraumatic, no cyanosis or edema   Pulses:   2+ and symmetric all extremities   Skin:   Skin color, texture, turgor normal, no rashes or lesions   Neurologic:   CNII-XII intact, normal gait         Pertinent Labs & Imaging:  Pathology Results  (Last 30 days)      None            Recent Results (from the past 24 hour(s))   CBC W/ AUTO DIFFERENTIAL    Collection Time: 07/18/24  8:19 AM   Result Value Ref Range    WBC 4.95 3.90 - 12.70 K/uL    RBC 4.45 4.00 - 5.40 M/uL    Hemoglobin 13.4 12.0 - 16.0 g/dL    Hematocrit 41.5 37.0 - 48.5 %    MCV 93 82 - 98 fL    MCH 30.1 27.0 - 31.0 pg    MCHC 32.3 32.0 - 36.0 g/dL    RDW 12.3 11.5 - 14.5 %    Platelets 208 150 - 450 K/uL    MPV 10.2 9.2 - 12.9 fL    Immature Granulocytes 0.2 0.0 - 0.5 %    Gran # (ANC) 2.4 1.8 - 7.7 K/uL    Immature Grans (Abs) 0.01 0.00 - 0.04 K/uL    Lymph # 1.8 1.0 - 4.8 K/uL    Mono # 0.5 0.3 - 1.0 K/uL    Eos # 0.2 0.0 - 0.5 K/uL    Baso # 0.07 0.00 - 0.20 K/uL    nRBC 0 0 /100 WBC    Gran % 48.1 38.0 - 73.0 %    Lymph % 35.6 18.0 - 48.0 %    Mono % 10.9 4.0 - 15.0 %    Eosinophil % 3.8 0.0 - 8.0 %    Basophil % 1.4 0.0 - 1.9 %    Differential Method Automated    CMP    Collection Time: 07/18/24  8:19 AM   Result Value Ref Range    Sodium 139 136 - 145 mmol/L    Potassium 4.3 3.5 - 5.1 mmol/L    Chloride 108 95 - 110 mmol/L    CO2 25 23 - 29 mmol/L    Glucose 75 70 - 110 mg/dL    BUN 17 6 - 20 mg/dL    Creatinine 0.9 0.5 - 1.4 mg/dL    Calcium 9.0 8.7 - 10.5 mg/dL    Total Protein 6.7 6.0 - 8.4 g/dL    Albumin 3.7 3.5 - 5.2 g/dL    Total Bilirubin 0.3 0.1 - 1.0 mg/dL    Alkaline Phosphatase 46 (L) 55 - 135 U/L    AST 18 10 - 40 U/L    ALT 14 10 - 44 U/L    eGFR >60.0 >60 mL/min/1.73 m^2    Anion Gap 6 (L) 8 - 16 mmol/L   Pregnancy, urine rapid    Collection Time: 07/18/24  10:52 AM   Result Value Ref Range    Preg Test, Ur Negative        Assessment & Plan:  1. Malignant neoplasm of lower-inner quadrant of right breast of female, estrogen receptor positive  - Pregnancy, urine rapid  - Pregnancy, urine rapid; Standing    2. BARD1 gene mutation positive    Final pathology T1cN0  Reviewed last echo. ok for treatment today with sara  Repeat labs July 2024  Continue tamoxifen.  Tolerating fairly well today  Discussed effexor for hot flashes.  Pt would like to hold off.  She is taking magnesium, considering acupuncture again, but hast to self pay  Daily vaginal moisturizer for vaginal dryness, can refer to gyn if this does not work  Trial of massage or neck pain, headaches.  She will let us know if this worsens or does not improve    Plan to follow up in 6 weeks for review of symptoms on tamoxifen, 3 weeks for infusion    Route Chart for Scheduling    Med Onc Chart Routing  Urgent    Follow up with physician 6 weeks.   Follow up with BHARGAVI . 12 weeks   Infusion scheduling note    Injection scheduling note    Labs CBC and CMP   Scheduling:  Preferred lab:  Lab interval:  every 6 weeks cbc and cmp, every 3 week urine pregnancy test   Imaging ECHO   echo beginning of september   Pharmacy appointment    Other referrals                Treatment Plan Information   OP BREAST TRASTUZUMAB PACLITAXEL WEEKLY   Linda Mcbride MD   Upcoming Treatment Dates - OP BREAST TRASTUZUMAB PACLITAXEL WEEKLY    8/8/2024       Chemotherapy       trastuzumab-anns (KANJINTI) 318 mg in 0.9% NaCl 250 mL chemo infusion  8/29/2024       Chemotherapy       trastuzumab-anns (KANJINTI) 318 mg in 0.9% NaCl 250 mL chemo infusion  9/19/2024       Chemotherapy       trastuzumab-anns (KANJINTI) 318 mg in 0.9% NaCl 250 mL chemo infusion  10/10/2024       Chemotherapy       trastuzumab-anns (KANJINTI) 318 mg in 0.9% NaCl 250 mL chemo infusion    MDM includes  :    - Acute or chronic illness or injury that poses a threat to  life or bodily function  - Independent review and explanation of 2 results from unique tests  - Discussion of management and ordering 2 unique tests  - Extensive discussion of treatment and management  - Prescription drug management  - Drug therapy requiring intensive monitoring for toxicity    Gabrielle Woo NP   07/18/2024

## 2024-07-20 ENCOUNTER — PATIENT MESSAGE (OUTPATIENT)
Dept: ADMINISTRATIVE | Facility: OTHER | Age: 44
End: 2024-07-20
Payer: COMMERCIAL

## 2024-07-21 ENCOUNTER — PATIENT MESSAGE (OUTPATIENT)
Dept: ADMINISTRATIVE | Facility: OTHER | Age: 44
End: 2024-07-21
Payer: COMMERCIAL

## 2024-07-22 ENCOUNTER — PATIENT MESSAGE (OUTPATIENT)
Dept: HEMATOLOGY/ONCOLOGY | Facility: CLINIC | Age: 44
End: 2024-07-22
Payer: COMMERCIAL

## 2024-07-22 ENCOUNTER — PATIENT MESSAGE (OUTPATIENT)
Dept: ADMINISTRATIVE | Facility: OTHER | Age: 44
End: 2024-07-22
Payer: COMMERCIAL

## 2024-07-23 ENCOUNTER — PATIENT MESSAGE (OUTPATIENT)
Dept: ADMINISTRATIVE | Facility: OTHER | Age: 44
End: 2024-07-23
Payer: COMMERCIAL

## 2024-07-24 ENCOUNTER — PATIENT MESSAGE (OUTPATIENT)
Dept: ADMINISTRATIVE | Facility: OTHER | Age: 44
End: 2024-07-24
Payer: COMMERCIAL

## 2024-07-24 ENCOUNTER — TELEPHONE (OUTPATIENT)
Dept: HEMATOLOGY/ONCOLOGY | Facility: CLINIC | Age: 44
End: 2024-07-24
Payer: COMMERCIAL

## 2024-07-25 ENCOUNTER — PATIENT MESSAGE (OUTPATIENT)
Dept: SPORTS MEDICINE | Facility: CLINIC | Age: 44
End: 2024-07-25
Payer: COMMERCIAL

## 2024-07-25 ENCOUNTER — PATIENT MESSAGE (OUTPATIENT)
Dept: ADMINISTRATIVE | Facility: OTHER | Age: 44
End: 2024-07-25
Payer: COMMERCIAL

## 2024-07-25 DIAGNOSIS — M25.551 RIGHT HIP PAIN: Primary | ICD-10-CM

## 2024-07-26 ENCOUNTER — PATIENT MESSAGE (OUTPATIENT)
Dept: ADMINISTRATIVE | Facility: OTHER | Age: 44
End: 2024-07-26
Payer: COMMERCIAL

## 2024-07-26 ENCOUNTER — TELEPHONE (OUTPATIENT)
Dept: SPORTS MEDICINE | Facility: CLINIC | Age: 44
End: 2024-07-26
Payer: COMMERCIAL

## 2024-07-26 NOTE — TELEPHONE ENCOUNTER
----- Message from Clayton Solomon sent at 7/26/2024  9:25 AM CDT -----  Consult/Advisory    Name Of Caller:Tahsa       Contact Preference:[phone]    Nature of call: Ptn called she has questions regarding her xray please call to assist

## 2024-07-26 NOTE — TELEPHONE ENCOUNTER
Spoke to the Pt about her appt for the xrays.  She informed me of the past surgical history and was inquiring on the xrays.  We confirmed that info.

## 2024-07-27 ENCOUNTER — PATIENT MESSAGE (OUTPATIENT)
Dept: ADMINISTRATIVE | Facility: OTHER | Age: 44
End: 2024-07-27
Payer: COMMERCIAL

## 2024-07-27 ENCOUNTER — PATIENT MESSAGE (OUTPATIENT)
Dept: PLASTIC SURGERY | Facility: CLINIC | Age: 44
End: 2024-07-27
Payer: COMMERCIAL

## 2024-07-30 ENCOUNTER — PATIENT MESSAGE (OUTPATIENT)
Dept: ADMINISTRATIVE | Facility: OTHER | Age: 44
End: 2024-07-30
Payer: COMMERCIAL

## 2024-07-30 NOTE — PROGRESS NOTES
Subjective:     Tasha Cornejo     Chief Complaint   Patient presents with    Right Hip - Pain       HPI    Tasha is a 44 y.o. female coming in today for right hip pain that began about 2 week(s) ago, referred by self. Pt. describes the pain as a 2/10 currently, 8/10 at worst achy pain that does not radiate. There was not a fall/injury/ or trauma associated with the onset of symptoms. Pt notes hip pain after running, She has been resting from this for 2 weeks and just doing yoga. Pt would like to return to running. Pt also cycles, able to do this without pain. The pain is better with rest and worse with running (after a run), standing after sitting at desk, sleeping on side, twisting. Pt. Denies any other musculoskeletal complaints at this time. Of note, pt currently on immunotherapy for breast cancer, but notes that her right hip pain has been off and on long before starting chemotherapy for her breast cancer.    Joint instability? no  Mechanical locking/clicking? no  Affecting ADL's? yes  Affecting sleep? yes    Occupation:     Review of Systems   Constitutional:  Negative for chills and fever.   Musculoskeletal:  Positive for joint pain and myalgias. Negative for back pain, falls and neck pain.   Neurological:  Negative for dizziness, tingling, focal weakness, weakness and headaches.       PAST MEDICAL HISTORY:   Past Medical History:   Diagnosis Date    Acid reflux     [facility]      of psychiatric care     Malignant neoplasm of lower-inner quadrant of right breast of female, estrogen receptor positive 11/06/2023    Therapy      PAST SURGICAL HISTORY:   Past Surgical History:   Procedure Laterality Date    BILATERAL MASTECTOMY Bilateral 12/14/2023    Procedure: MASTECTOMY, BILATERAL;  Surgeon: Tasha Mcleod MD;  Location: [facility];  Service: General;  Laterality: Bilateral;  3.5 HOURS / EMAIL SENT 12-4 @ 1:03 LK    COLONOSCOPY N/A 6/2/2023    Procedure: COLONOSCOPY;  Surgeon: Fabio Rice MD;   Location: [facility] (4TH FLR);  Service: Endoscopy;  Laterality: N/A;  pt confirmed earlier time  EB    ESOPHAGOGASTRODUODENOSCOPY N/A 2023    Procedure: EGD (ESOPHAGOGASTRODUODENOSCOPY);  Surgeon: Fabio Rice MD;  Location: [facility] ENDO (4TH FLR);  Service: Endoscopy;  Laterality: N/A;  Procedure Timin-4 weeks    referred by Dr. Rice/Prep instructions handed to patient and to portal.  Patient called did not answer- DB    ESOPHAGOGASTRODUODENOSCOPY N/A 10/18/2023    Procedure: EGD (ESOPHAGOGASTRODUODENOSCOPY);  Surgeon: Fabio Rice MD;  Location: [facility] (4TH FLR);  Service: Endoscopy;  Laterality: N/A;  ref Ray  timeframe 5-12 weeks   Dr. Rice patient  portal instruction and given to patient jm  10/11-precall complete-MS    INJECTION FOR SENTINEL NODE IDENTIFICATION Right 2023    Procedure: INJECTION, FOR SENTINEL NODE IDENTIFICATION;  Surgeon: Tasha Mcleod MD;  Location: [facility];  Service: General;  Laterality: Right;    INSERTION OF BREAST TISSUE EXPANDER Bilateral 2023    Procedure: INSERTION, TISSUE EXPANDER, BREAST;  Surgeon: Allen Damian MD;  Location: [facility];  Service: Plastics;  Laterality: Bilateral;  2 HOURS    INSERTION, CATHETER, TUNNELED Left 2023    Procedure: INSERTION, CATHETER, TUNNELED;  Surgeon: Tasha Mcleod MD;  Location: [facility] OR;  Service: General;  Laterality: Left;    SENTINEL LYMPH NODE BIOPSY Right 2023    Procedure: BIOPSY, LYMPH NODE, SENTINEL;  Surgeon: Tasah Mcleod MD;  Location: [facility];  Service: General;  Laterality: Right;     FAMILY HISTORY:   Family History   Problem Relation Name Age of Onset    Solid tumor Mother          uterine polyps    Other Mother          abnormal cells of cervix    Hypertension Father Fahad     Prostate cancer Father Fahad 67        tx: XRT seeds    Pancreatic cancer Paternal Uncle Vern 68        subtype unk; tx: unk    Cancer Maternal Grandmother Kasandra 68        mother thinks that a cancer dx was on  death cert.    Cancer Maternal Grandfather Harry         esophageal (abn. cells at 54, tumor at 62)    Heart failure Paternal Grandmother      Albinism Paternal Grandmother      Alzheimer's disease Paternal Grandmother      Diabetes Paternal Grandfather      Heart failure Paternal Grandfather      Other Other Marnie         reportedly (-) cancer-related genetic testing    Breast cancer Other Johanna         dx.late 50s or 60s (tx: unk)    Cancer Other Nichole         cervical or ovarian, dx.mid-60s (tx: unk)    Cancer Other E.J.         lung, dx.mid-60s    Cancer Other Luke III         lung, dx. early 60s    Solid tumor Other          stomach tumor (noncancerous)    Other Other          reportedly (-) cancer-related genetic testing    Cataracts Neg Hx      Glaucoma Neg Hx      Macular degeneration Neg Hx      Colon cancer Neg Hx       SOCIAL HISTORY:   Social History     Socioeconomic History    Marital status:      Spouse name: Oseas   Occupational History    Occupation:    Tobacco Use    Smoking status: Never    Smokeless tobacco: Never   Substance and Sexual Activity    Alcohol use: Yes     Comment: rarely    Drug use: No    Sexual activity: Not Currently     Social Determinants of Health     Financial Resource Strain: Low Risk  (6/5/2024)    Received from [facility]    Overall Financial Resource Strain (CARDIA)     Difficulty of Paying Living Expenses: Not hard at all   Food Insecurity: No Food Insecurity (6/5/2024)    Received from [facility]    Hunger Vital Sign     Worried About Running Out of Food in the Last Year: Never true     Ran Out of Food in the Last Year: Never true   Transportation Needs: No Transportation Needs (6/5/2024)    Received from [facility]    PRAPARE - Transportation     Lack of Transportation (Medical): No     Lack of Transportation (Non-Medical): No   Physical Activity: Sufficiently Active (6/5/2024)    Received from [facility]    Exercise Vital Sign     Days of  Exercise per Week: 5 days     Minutes of Exercise per Session: 40 min   Stress: No Stress Concern Present (6/5/2024)    Received from [facility] Occupational Health - Occupational Stress Questionnaire     Feeling of Stress : Only a little   Recent Concern: Stress - Stress Concern Present (4/28/2024)    Received from [facility] Occupational Health - Occupational Stress Questionnaire     Feeling of Stress : Rather much   Housing Stability: Low Risk  (3/4/2024)    Housing Stability Vital Sign     Unable to Pay for Housing in the Last Year: No     Number of Places Lived in the Last Year: 1     Unstable Housing in the Last Year: No     MEDICATIONS:   Current Outpatient Medications:     fluticasone propionate (FLONASE) 50 mcg/actuation nasal spray, 1 spray by Each Nare route daily as needed for Rhinitis., Disp: , Rfl:     LIDOcaine-prilocaine (EMLA) cream, Apply topically to port one hour prior to chemotherapy infusion, Disp: 30 g, Rfl: 1    multivitamin (THERAGRAN) per tablet, Take 1 tablet by mouth once daily., Disp: , Rfl:     tamoxifen (NOLVADEX) 20 MG Tab, Take 1 tablet (20 mg total) by mouth once daily., Disp: 30 tablet, Rfl: 11    omeprazole (PRILOSEC) 40 MG capsule, Take 1 capsule (40 mg total) by mouth every morning., Disp: 90 capsule, Rfl: 3    ondansetron (ZOFRAN) 8 MG tablet, Take 1 tablet (8 mg total) by mouth every 8 (eight) hours as needed for Nausea., Disp: 30 tablet, Rfl: 2  No current facility-administered medications for this visit.    Facility-Administered Medications Ordered in Other Visits:     ceFAZolin 1 g, gentamicin 80 mg in sodium chloride 0.9% 500 mL irrigation, , Irrigation, On Call Procedure, Allen Damian MD    ceFAZolin 1 g, gentamicin 80 mg in sodium chloride 0.9% 500 mL irrigation, , Irrigation, On Call Procedure, Allen Damian MD    ALLERGIES:   Review of patient's allergies indicates:   Allergen Reactions    Aleve [naproxen  sodium] Other (See Comments)     Throat swelling       Objective:     VITAL SIGNS: /79   Ht 5' (1.524 m)   Wt 50.9 kg (112 lb 3.4 oz)   BMI 21.92 kg/m²    General    Nursing note and vitals reviewed.  Constitutional: She is oriented to person, place, and time. She appears well-developed and well-nourished.   HENT:   Head: Normocephalic and atraumatic.   no nasal discharge, no external ear redness or discharge   Eyes:   EOM is full and smooth  No eye redness or discharge   Neck: Neck supple. No tracheal deviation present.   Cardiovascular:  Normal rate.            2+ Radial pulse bilaterally  2+ Dorsalis Pedis pulse bilaterally  No LE edema appreciated   Pulmonary/Chest: Effort normal. No respiratory distress.   Abdominal: She exhibits no distension.   No rigidity   Neurological: She is alert and oriented to person, place, and time. She exhibits normal muscle tone. Coordination normal.   See details below   Psychiatric: She has a normal mood and affect. Her behavior is normal.             MUSCULOSKELETAL EXAM  HIP: right HIP  The affected hip is compared to the contralateral hip.    Observation:    There is no edema, erythema, or ecchymosis in the lumbosacral region.   There is no Trendelenburg sign on either side  No obvious pelvic obliquity while standing.    No thoracolumbar scoliosis observed.    No midline skin abnormalities.  No atrophy noted in the lower limbs.  Gait: Non-antalgic with Neutral ankle mechanics and Neutral medial arch  Adequate bilateral pelvic stability without any hip hiking compensatory pattern noted with single leg raise    ROM (* = with pain):  Passive hip flexion to 120° on left and 120° on right  Passive hip internal rotation to 45° on left and 45° on right  Passive hip external rotation to 45° on left and 45° on right   Passive hip abduction to 45° on left and 45° on right  All motions above are without pain.    Tenderness To Palpation:  No tenderness at the ASIS, AIIS, PSIS,  PIIS, iliac crest, pubic bones, ischial tuberosity.  No tenderness throughout the lumbar spine, iliolumbar region, or posterior pelvis.  No tenderness throughout the sacrum or piriformis  No tenderness over the greater trochanteric bursa or greater/lesser trochanters.  No tenderness at the glut attachments on the greater trochanter  No tenderness over proximal IT band or hip flexor musculature.  + right glut priyanka muscle tenderness on right    Strength Testing (* = with pain):  Hip flexion - 5/5 on left and 5/5 on right  Hip extension - 5/5 on left and 5/5 on right  Hip adduction - 5/5 on left and 5/5 on right  Hip abduction - 5/5 on left and 5/5 on right  Knee flexion - 5/5 on left and 5/5 on right  Knee extension - 5/5 on left and 5/5 on right  Glutaeus medius - 5/5 on left and 5/5 on right    Special Tests:  Standing Trendelenburg test - negative    Seated straight leg raise - negative  Supine straight leg raise - negative  Hamstring flexibility symmetric    Log roll - negative  ELICEO - negative  FADIR - negative  Scour test - negative  No pain with posterior hip capsule compression    ASIS compression test - negative  SI drawer test - negative   Thigh thrust test - negative     Piriformis test (Bonnet's) - negative  Ely's test - negative  Quadriceps flexibility symmetric.  Rogerio test - positive  Justin compression test - negative    Fulcrum test - negative    Leg lengths symmetric following OMT to the pelvis.     Neurovascular Exam:  Normal gait without Trendelenburg or antalgia.  2+ femoral, DP, and PT pulses BL.  No skin changes, no abnormal hair distribution.  Sensation intact to light touch throughout the obturator and medial/lateral/posterior femoral cutaneous nerves.  2+/4 reflexes at L4 and S1 dermatomes  Capillary refill intact to <2 seconds in all lower limb digits.    TART (Tissue texture abnormality, Asymmetry,  Restriction of motion and/or Tenderness) changes:     Thoracic Spine   T1 Neutral   T2  Neutral   T3 Neutral   T4 Neutral   T5 Neutral   T6 Neutral   T7 Neutral   T8 Neutral   T9 Neutral   T10 Neutral   T11 Neutral   T12 TTA RIGHT     Rib cage: R11-12 TTA on right     Lumbar Spine   L1 TTA RIGHT   L2 Neutral   L3 Neutral   L4 FRS RIGHT   L5 FRS RIGHT       Pelvis:  Innominate:Right anterior rotation  Pubic bone:Right inferior pubic shear    Sacrum:Left on Right sacral torsion    Lower extremity: right anterior talus, right lateral gluteus priyanka Herniated trigger point (HTP) fascial distortion       Key   F= Flexed   E = Extended   R = Rotated   S = Sidebent   TTA = tissue texture abnormality     IMAGIN. X-ray ordered due to right hip pain. (AP pelvis and frogleg lateral  right views) taken today.   2. X-ray images were reviewed personally by me and then directly with patient.  3. FINDINGS: X-ray images obtained demonstrate that the hip joint spaces are normally maintained on both sides, without narrowing.  No conventional radiographic evidence to specifically suggest avascular necrosis of either femoral head.  No evidence of recent or healing fracture, lytic destructive process, or femoroacetabular impingement noted.  SI joints appear unremarkable.     4. IMPRESSION: No pathology or irregularities appreciated.     Assessment:      Encounter Diagnoses   Name Primary?    Chronic right hip pain Yes    Myalgia     Somatic dysfunction of lumbar region     Sacral region somatic dysfunction     Somatic dysfunction of thoracic region     Somatic dysfunction of pelvic region     Somatic dysfunction of lower extremity         Plan:   1. Acute on chronic right posterior lateral hip pain likely secondary to biomechanical restrictions of lower kinetic chain with compensatory firing patterns.  No associated lumbar radicular symptoms or right intra-articular symptoms on exam today.  - OMT performed today to address associated biomechanical restrictions  and HEP started.   - discussed proper posture at work  desk  - recommend continuing magnesium glycinate supplementation, increasing to twice a day, if tolerated  - if cleared by oncologists, recommend starting ibuprofen 600 mg p.o. t.i.d. with food x 7 days, then as needed for pain control.  Of note, patient allergic to Aleve, but known to tolerate ibuprofen.  - cleared to exercise as tolerated, staying below threshold of increased symptoms    -  X-ray images of right hip taken today (AP pelvis and frogleg lateral  right views) showed no abnormalities. Images were personally reviewed with patient.    2. OMT 5-6 regions. Oral consent obtained.  Reviewed benefits and potential side effects, including bruising at site of treatment and increased soreness for the next 24-48 hours. Pt. Instructed to increased water intake by 1 L today and tomorrow to help with any residual soreness.   - OMT indicated today due to signs and symptoms as well as local and remote somatic dysfunction findings and their related neurokinetic, lymphatic, fascial and/or arteriovenous body connections.   - OMT techniques used: Myofascial Release, Muscle Energy, Fascial Distortion Model, and Articulatory   - Treatment was tolerated well. Improvement noted in segmental mobility post-treatment in dysfunctional regions. There were no adverse events and no complications immediately following treatment.     3. Pt. Given the following HEP:  A)  Pelvic clock exercises given to do from the 6-12 o'clock positions:10-15 reps, twice daily. Hand out of exercise also given.   B) Seated anterior pelvic tilt exercise: rotate pelvis forward to sit on ischial tuberosities while maintaining neutral shoulder positioning. Repeat frequently throughout the day.   C) Lower abdominal muscle isotonic exercises: Rotate pelvis into the 6 o'clock position and holding it to engage lower abdominal muscles. Then lift up leg, one at a time, alternating for 10-15 reps. Repeat exercises 1-2 times daily. Handout given.   D) IT band  rolling: recommend using rolling stick or foam roller to roll out IT band tension bilaterally, 1-2 times a day.   E) Eccentric calf stretches:  Lower heels off of a step slowly until reaching end rand plantarflexion, repeating 10 reps twice daily.  Handout given  F) Isotonic calf contraction:  Hold toe raise for 30 sec, repeating 10 reps twice daily.  Handout given    [address] HOME EXERCISE PROGRAM (HEP):  The patient was taught a homegoing physical therapy regimen as described above. The patient demonstrated understanding of the exercises and proper technique of their execution. This interaction took 15 minutes.     4. Follow-up in 3-4 weeks for reevaluation    5. Patient agreeable to today's plan and all questions were answered    This note is dictated using the M*Modal Fluency Direct word recognition program. There are word recognition mistakes that are occasionally missed on review.

## 2024-07-31 ENCOUNTER — PATIENT MESSAGE (OUTPATIENT)
Dept: HEMATOLOGY/ONCOLOGY | Facility: CLINIC | Age: 44
End: 2024-07-31
Payer: COMMERCIAL

## 2024-07-31 ENCOUNTER — PATIENT MESSAGE (OUTPATIENT)
Dept: ADMINISTRATIVE | Facility: OTHER | Age: 44
End: 2024-07-31
Payer: COMMERCIAL

## 2024-07-31 ENCOUNTER — OFFICE VISIT (OUTPATIENT)
Dept: SPORTS MEDICINE | Facility: CLINIC | Age: 44
End: 2024-07-31
Payer: COMMERCIAL

## 2024-07-31 ENCOUNTER — HOSPITAL ENCOUNTER (OUTPATIENT)
Dept: RADIOLOGY | Facility: HOSPITAL | Age: 44
Discharge: HOME | End: 2024-07-31
Attending: NEUROMUSCULOSKELETAL MEDICINE & OMM
Payer: COMMERCIAL

## 2024-07-31 VITALS
WEIGHT: 112.19 LBS | SYSTOLIC BLOOD PRESSURE: 127 MMHG | BODY MASS INDEX: 22.03 KG/M2 | HEIGHT: 60 IN | DIASTOLIC BLOOD PRESSURE: 79 MMHG

## 2024-07-31 DIAGNOSIS — M25.551 CHRONIC RIGHT HIP PAIN: Primary | ICD-10-CM

## 2024-07-31 DIAGNOSIS — M99.02 SOMATIC DYSFUNCTION OF THORACIC REGION: ICD-10-CM

## 2024-07-31 DIAGNOSIS — M99.03 SOMATIC DYSFUNCTION OF LUMBAR REGION: ICD-10-CM

## 2024-07-31 DIAGNOSIS — M99.05 SOMATIC DYSFUNCTION OF PELVIC REGION: ICD-10-CM

## 2024-07-31 DIAGNOSIS — M25.551 RIGHT HIP PAIN: ICD-10-CM

## 2024-07-31 DIAGNOSIS — M99.04 SACRAL REGION SOMATIC DYSFUNCTION: ICD-10-CM

## 2024-07-31 DIAGNOSIS — G89.29 CHRONIC RIGHT HIP PAIN: Primary | ICD-10-CM

## 2024-07-31 DIAGNOSIS — M79.10 MYALGIA: ICD-10-CM

## 2024-07-31 DIAGNOSIS — M99.08 SOMATIC DYSFUNCTION OF RIB CAGE REGION: ICD-10-CM

## 2024-07-31 DIAGNOSIS — M99.06 SOMATIC DYSFUNCTION OF LOWER EXTREMITY: ICD-10-CM

## 2024-07-31 PROCEDURE — 98927 OSTEOPATH MANJ 5-6 REGIONS: CPT | Mod: S$GLB,,, | Performed by: NEUROMUSCULOSKELETAL MEDICINE & OMM

## 2024-07-31 PROCEDURE — 73502 X-RAY EXAM HIP UNI 2-3 VIEWS: CPT | Mod: TC,PO,RT

## 2024-07-31 PROCEDURE — 73502 X-RAY EXAM HIP UNI 2-3 VIEWS: CPT | Mod: 26,RT,, | Performed by: RADIOLOGY

## 2024-07-31 PROCEDURE — 99204 OFFICE O/P NEW MOD 45 MIN: CPT | Mod: 25,S$GLB,, | Performed by: NEUROMUSCULOSKELETAL MEDICINE & OMM

## 2024-07-31 PROCEDURE — 99999 PR PBB SHADOW E&M-EST. PATIENT-LVL III: CPT | Mod: PBBFAC,,, | Performed by: NEUROMUSCULOSKELETAL MEDICINE & OMM

## 2024-07-31 PROCEDURE — 3044F HG A1C LEVEL LT 7.0%: CPT | Mod: CPTII,S$GLB,, | Performed by: NEUROMUSCULOSKELETAL MEDICINE & OMM

## 2024-07-31 PROCEDURE — 3074F SYST BP LT 130 MM HG: CPT | Mod: CPTII,S$GLB,, | Performed by: NEUROMUSCULOSKELETAL MEDICINE & OMM

## 2024-07-31 PROCEDURE — 3078F DIAST BP <80 MM HG: CPT | Mod: CPTII,S$GLB,, | Performed by: NEUROMUSCULOSKELETAL MEDICINE & OMM

## 2024-07-31 PROCEDURE — 3008F BODY MASS INDEX DOCD: CPT | Mod: CPTII,S$GLB,, | Performed by: NEUROMUSCULOSKELETAL MEDICINE & OMM

## 2024-07-31 PROCEDURE — 1160F RVW MEDS BY RX/DR IN RCRD: CPT | Mod: CPTII,S$GLB,, | Performed by: NEUROMUSCULOSKELETAL MEDICINE & OMM

## 2024-07-31 PROCEDURE — 1159F MED LIST DOCD IN RCRD: CPT | Mod: CPTII,S$GLB,, | Performed by: NEUROMUSCULOSKELETAL MEDICINE & OMM

## 2024-07-31 PROCEDURE — 97110 THERAPEUTIC EXERCISES: CPT | Mod: S$GLB,,, | Performed by: NEUROMUSCULOSKELETAL MEDICINE & OMM

## 2024-08-02 ENCOUNTER — PATIENT MESSAGE (OUTPATIENT)
Dept: ADMINISTRATIVE | Facility: OTHER | Age: 44
End: 2024-08-02
Payer: COMMERCIAL

## 2024-08-02 RX ORDER — SODIUM CHLORIDE 0.9 % (FLUSH) 0.9 %
10 SYRINGE (ML) INJECTION
OUTPATIENT
Start: 2024-08-08

## 2024-08-02 RX ORDER — DIPHENHYDRAMINE HYDROCHLORIDE 50 MG/ML
50 INJECTION INTRAMUSCULAR; INTRAVENOUS ONCE AS NEEDED
OUTPATIENT
Start: 2024-08-08

## 2024-08-02 RX ORDER — HEPARIN 100 UNIT/ML
500 SYRINGE INTRAVENOUS
OUTPATIENT
Start: 2024-08-08

## 2024-08-02 RX ORDER — EPINEPHRINE 0.3 MG/.3ML
0.3 INJECTION SUBCUTANEOUS ONCE AS NEEDED
OUTPATIENT
Start: 2024-08-08

## 2024-08-02 RX ORDER — PROCHLORPERAZINE EDISYLATE 5 MG/ML
10 INJECTION INTRAMUSCULAR; INTRAVENOUS ONCE AS NEEDED
Start: 2024-08-08

## 2024-08-04 ENCOUNTER — PATIENT MESSAGE (OUTPATIENT)
Dept: ADMINISTRATIVE | Facility: OTHER | Age: 44
End: 2024-08-04
Payer: COMMERCIAL

## 2024-08-05 ENCOUNTER — PATIENT MESSAGE (OUTPATIENT)
Dept: ADMINISTRATIVE | Facility: OTHER | Age: 44
End: 2024-08-05
Payer: COMMERCIAL

## 2024-08-06 ENCOUNTER — PATIENT MESSAGE (OUTPATIENT)
Dept: ADMINISTRATIVE | Facility: OTHER | Age: 44
End: 2024-08-06
Payer: COMMERCIAL

## 2024-08-07 ENCOUNTER — PATIENT MESSAGE (OUTPATIENT)
Dept: ADMINISTRATIVE | Facility: OTHER | Age: 44
End: 2024-08-07
Payer: COMMERCIAL

## 2024-08-08 ENCOUNTER — INFUSION (OUTPATIENT)
Dept: INFUSION THERAPY | Facility: HOSPITAL | Age: 44
End: 2024-08-08
Payer: COMMERCIAL

## 2024-08-08 ENCOUNTER — LAB VISIT (OUTPATIENT)
Dept: LAB | Facility: HOSPITAL | Age: 44
End: 2024-08-08
Payer: COMMERCIAL

## 2024-08-08 ENCOUNTER — PATIENT MESSAGE (OUTPATIENT)
Dept: ADMINISTRATIVE | Facility: OTHER | Age: 44
End: 2024-08-08
Payer: COMMERCIAL

## 2024-08-08 VITALS
HEART RATE: 63 BPM | OXYGEN SATURATION: 100 % | HEIGHT: 60 IN | BODY MASS INDEX: 22.03 KG/M2 | TEMPERATURE: 98 F | SYSTOLIC BLOOD PRESSURE: 129 MMHG | RESPIRATION RATE: 18 BRPM | DIASTOLIC BLOOD PRESSURE: 83 MMHG | WEIGHT: 112.19 LBS

## 2024-08-08 DIAGNOSIS — R11.0 CHEMOTHERAPY-INDUCED NAUSEA: Primary | ICD-10-CM

## 2024-08-08 DIAGNOSIS — C50.311 MALIGNANT NEOPLASM OF LOWER-INNER QUADRANT OF RIGHT BREAST OF FEMALE, ESTROGEN RECEPTOR POSITIVE: ICD-10-CM

## 2024-08-08 DIAGNOSIS — Z17.0 MALIGNANT NEOPLASM OF LOWER-INNER QUADRANT OF RIGHT BREAST OF FEMALE, ESTROGEN RECEPTOR POSITIVE: ICD-10-CM

## 2024-08-08 DIAGNOSIS — T45.1X5A CHEMOTHERAPY-INDUCED NAUSEA: Primary | ICD-10-CM

## 2024-08-08 LAB — B-HCG UR QL: NEGATIVE

## 2024-08-08 PROCEDURE — A4216 STERILE WATER/SALINE, 10 ML: HCPCS | Performed by: INTERNAL MEDICINE

## 2024-08-08 PROCEDURE — 81025 URINE PREGNANCY TEST: CPT | Performed by: NURSE PRACTITIONER

## 2024-08-08 PROCEDURE — 96413 CHEMO IV INFUSION 1 HR: CPT

## 2024-08-08 PROCEDURE — 25000003 PHARM REV CODE 250: Performed by: INTERNAL MEDICINE

## 2024-08-08 PROCEDURE — 63600175 PHARM REV CODE 636 W HCPCS: Mod: JG | Performed by: INTERNAL MEDICINE

## 2024-08-08 RX ORDER — HEPARIN 100 UNIT/ML
500 SYRINGE INTRAVENOUS
Status: DISCONTINUED | OUTPATIENT
Start: 2024-08-08 | End: 2024-08-08 | Stop reason: HOSPADM

## 2024-08-08 RX ORDER — PROCHLORPERAZINE EDISYLATE 5 MG/ML
10 INJECTION INTRAMUSCULAR; INTRAVENOUS ONCE AS NEEDED
Status: DISCONTINUED | OUTPATIENT
Start: 2024-08-08 | End: 2024-08-08 | Stop reason: HOSPADM

## 2024-08-08 RX ORDER — SODIUM CHLORIDE 0.9 % (FLUSH) 0.9 %
10 SYRINGE (ML) INJECTION
Status: DISCONTINUED | OUTPATIENT
Start: 2024-08-08 | End: 2024-08-08 | Stop reason: HOSPADM

## 2024-08-08 RX ORDER — DIPHENHYDRAMINE HYDROCHLORIDE 50 MG/ML
50 INJECTION INTRAMUSCULAR; INTRAVENOUS ONCE AS NEEDED
Status: DISCONTINUED | OUTPATIENT
Start: 2024-08-08 | End: 2024-08-08 | Stop reason: HOSPADM

## 2024-08-08 RX ORDER — EPINEPHRINE 0.3 MG/.3ML
0.3 INJECTION SUBCUTANEOUS ONCE AS NEEDED
Status: DISCONTINUED | OUTPATIENT
Start: 2024-08-08 | End: 2024-08-08 | Stop reason: HOSPADM

## 2024-08-08 RX ADMIN — Medication 10 ML: at 08:08

## 2024-08-08 RX ADMIN — SODIUM CHLORIDE: 9 INJECTION, SOLUTION INTRAVENOUS at 07:08

## 2024-08-08 RX ADMIN — HEPARIN 500 UNITS: 100 SYRINGE at 08:08

## 2024-08-08 RX ADMIN — TRASTUZUMAB-ANNS 300 MG: 420 INJECTION, POWDER, LYOPHILIZED, FOR SOLUTION INTRAVENOUS at 08:08

## 2024-08-11 ENCOUNTER — PATIENT MESSAGE (OUTPATIENT)
Dept: ADMINISTRATIVE | Facility: OTHER | Age: 44
End: 2024-08-11
Payer: COMMERCIAL

## 2024-08-12 ENCOUNTER — PATIENT MESSAGE (OUTPATIENT)
Dept: ADMINISTRATIVE | Facility: OTHER | Age: 44
End: 2024-08-12
Payer: COMMERCIAL

## 2024-08-13 ENCOUNTER — PATIENT MESSAGE (OUTPATIENT)
Dept: ADMINISTRATIVE | Facility: OTHER | Age: 44
End: 2024-08-13
Payer: COMMERCIAL

## 2024-08-14 ENCOUNTER — PATIENT MESSAGE (OUTPATIENT)
Dept: ADMINISTRATIVE | Facility: OTHER | Age: 44
End: 2024-08-14
Payer: COMMERCIAL

## 2024-08-15 ENCOUNTER — PATIENT MESSAGE (OUTPATIENT)
Dept: ADMINISTRATIVE | Facility: OTHER | Age: 44
End: 2024-08-15
Payer: COMMERCIAL

## 2024-08-17 ENCOUNTER — PATIENT MESSAGE (OUTPATIENT)
Dept: ADMINISTRATIVE | Facility: OTHER | Age: 44
End: 2024-08-17
Payer: COMMERCIAL

## 2024-08-19 ENCOUNTER — PATIENT MESSAGE (OUTPATIENT)
Dept: ADMINISTRATIVE | Facility: OTHER | Age: 44
End: 2024-08-19
Payer: COMMERCIAL

## 2024-08-21 ENCOUNTER — HOSPITAL ENCOUNTER (OUTPATIENT)
Dept: RADIOLOGY | Facility: HOSPITAL | Age: 44
Discharge: HOME | End: 2024-08-21
Attending: NEUROMUSCULOSKELETAL MEDICINE & OMM
Payer: COMMERCIAL

## 2024-08-21 ENCOUNTER — OFFICE VISIT (OUTPATIENT)
Dept: SPORTS MEDICINE | Facility: CLINIC | Age: 44
End: 2024-08-21
Payer: COMMERCIAL

## 2024-08-21 VITALS — DIASTOLIC BLOOD PRESSURE: 86 MMHG | HEART RATE: 71 BPM | SYSTOLIC BLOOD PRESSURE: 141 MMHG

## 2024-08-21 DIAGNOSIS — M79.672 LEFT FOOT PAIN: ICD-10-CM

## 2024-08-21 DIAGNOSIS — M99.06 SOMATIC DYSFUNCTION OF LOWER EXTREMITY: ICD-10-CM

## 2024-08-21 DIAGNOSIS — S93.312A CUBOID SYNDROME OF LEFT FOOT: Primary | ICD-10-CM

## 2024-08-21 PROCEDURE — 73630 X-RAY EXAM OF FOOT: CPT | Mod: 26,LT,, | Performed by: RADIOLOGY

## 2024-08-21 PROCEDURE — 3079F DIAST BP 80-89 MM HG: CPT | Mod: CPTII,S$GLB,, | Performed by: NEUROMUSCULOSKELETAL MEDICINE & OMM

## 2024-08-21 PROCEDURE — 3077F SYST BP >= 140 MM HG: CPT | Mod: CPTII,S$GLB,, | Performed by: NEUROMUSCULOSKELETAL MEDICINE & OMM

## 2024-08-21 PROCEDURE — 1159F MED LIST DOCD IN RCRD: CPT | Mod: CPTII,S$GLB,, | Performed by: NEUROMUSCULOSKELETAL MEDICINE & OMM

## 2024-08-21 PROCEDURE — 99999 PR PBB SHADOW E&M-EST. PATIENT-LVL III: CPT | Mod: PBBFAC,,, | Performed by: NEUROMUSCULOSKELETAL MEDICINE & OMM

## 2024-08-21 PROCEDURE — 73630 X-RAY EXAM OF FOOT: CPT | Mod: TC,PO,LT

## 2024-08-21 PROCEDURE — 98925 OSTEOPATH MANJ 1-2 REGIONS: CPT | Mod: S$GLB,,, | Performed by: NEUROMUSCULOSKELETAL MEDICINE & OMM

## 2024-08-21 PROCEDURE — 1160F RVW MEDS BY RX/DR IN RCRD: CPT | Mod: CPTII,S$GLB,, | Performed by: NEUROMUSCULOSKELETAL MEDICINE & OMM

## 2024-08-21 PROCEDURE — 3044F HG A1C LEVEL LT 7.0%: CPT | Mod: CPTII,S$GLB,, | Performed by: NEUROMUSCULOSKELETAL MEDICINE & OMM

## 2024-08-21 PROCEDURE — 99214 OFFICE O/P EST MOD 30 MIN: CPT | Mod: 25,S$GLB,, | Performed by: NEUROMUSCULOSKELETAL MEDICINE & OMM

## 2024-08-21 NOTE — PROGRESS NOTES
"Subjective:     Tasha Cornejo     Chief Complaint   Patient presents with    Right Hip - Pain       HPI    Tasha is a 44 y.o. female coming in today for right hip pain. Since last visit the pain has Improved.  Pt is compliant with HEP and reports no pain in her hip currently.    Pt does c/o left foot pain today. This has been going on for several months intermittently but acutely worsened after a walk yesterday and pt notes a new "lump" at her dorsolateral foot. The pain is better with rest, tylenol, ice and worse with walking, running, being on feet. Pt. describes the pain as a 5/10 achy pain that does not radiate. There has not been any new a fall/injury/ or traumas since last visit.  Pt. denies any new musculoskeletal complaints at this time.     Office note from 24 reviewed    Occupation:     PAST MEDICAL HISTORY:   Past Medical History:   Diagnosis Date    Acid reflux     Chalazion     Hx of psychiatric care     Malignant neoplasm of lower-inner quadrant of right breast of female, estrogen receptor positive 2023    Therapy      PAST SURGICAL HISTORY:   Past Surgical History:   Procedure Laterality Date    BILATERAL MASTECTOMY Bilateral 2023    Procedure: MASTECTOMY, BILATERAL;  Surgeon: Tasha Mcleod MD;  Location: [facility];  Service: General;  Laterality: Bilateral;  3.5 HOURS / EMAIL SENT  @ 1:03 LK    COLONOSCOPY N/A 2023    Procedure: COLONOSCOPY;  Surgeon: Fabio Rice MD;  [facility]  Service: Endoscopy;  Laterality: N/A;  pt confirmed earlier time  EB    ESOPHAGOGASTRODUODENOSCOPY N/A 2023    Procedure: EGD (ESOPHAGOGASTRODUODENOSCOPY);  Surgeon: Fabio Rice MD;  Location: [facility] ([facility]  Service: Endoscopy;  Laterality: N/A;  Procedure Timin-4 weeks    referred by Dr. Rice/Danita instructions handed to patient and to portal.  Patient called did not answer- DB    ESOPHAGOGASTRODUODENOSCOPY N/A 10/18/2023    Procedure: EGD " (ESOPHAGOGASTRODUODENOSCOPY);  Surgeon: Fabio Rice MD;  Location: [facility] ENDO (4TH FLR);  Service: Endoscopy;  Laterality: N/A;  ref Dwayne  timeframe 5-12 weeks   Dr. Rice patient  portal instruction and given to patient jm  10/11-precall complete-MS    INJECTION FOR SENTINEL NODE IDENTIFICATION Right 12/14/2023    Procedure: INJECTION, FOR SENTINEL NODE IDENTIFICATION;  Surgeon: Tasha Mcleod MD;  Location: [facility];  Service: General;  Laterality: Right;    INSERTION OF BREAST TISSUE EXPANDER Bilateral 12/14/2023    Procedure: INSERTION, TISSUE EXPANDER, BREAST;  Surgeon: Allen Damian MD;  Location: [facility] OR;  Service: Plastics;  Laterality: Bilateral;  2 HOURS    INSERTION, CATHETER, TUNNELED Left 12/14/2023    Procedure: INSERTION, CATHETER, TUNNELED;  Surgeon: Tasha Mcleod MD;  Location: [facility];  Service: General;  Laterality: Left;    SENTINEL LYMPH NODE BIOPSY Right 12/14/2023    Procedure: BIOPSY, LYMPH NODE, SENTINEL;  Surgeon: Tasha Mcleod MD;  Location: [facility];  Service: General;  Laterality: Right;     FAMILY HISTORY:   Family History   Problem Relation Name Age of Onset    Solid tumor Mother          uterine polyps    Other Mother          abnormal cells of cervix    Hypertension Father Fahad     Prostate cancer Father Fahad 67        tx: XRT seeds    Pancreatic cancer Paternal Uncle Vern 68        subtype unk; tx: unk    Cancer Maternal Grandmother Kasandra 68        mother thinks that a cancer dx was on death cert.    Cancer Maternal Grandfather Harry         esophageal (abn. cells at 54, tumor at 62)    Heart failure Paternal Grandmother      Albinism Paternal Grandmother      Alzheimer's disease Paternal Grandmother      Diabetes Paternal Grandfather      Heart failure Paternal Grandfather      Other Other Marnie         reportedly (-) cancer-related genetic testing    Breast cancer Other Johanna         dx.late 50s or 60s (tx: unk)    Cancer Other Nichole         cervical or ovarian, dx.mid-60s  (tx: unk)    Cancer Other E.J.         lung, dx.mid-60s    Cancer Other Luke III         lung, dx. early 60s    Solid tumor Other          stomach tumor (noncancerous)    Other Other          reportedly (-) cancer-related genetic testing    Cataracts Neg Hx      Glaucoma Neg Hx      Macular degeneration Neg Hx      Colon cancer Neg Hx       SOCIAL HISTORY:   Social History     Socioeconomic History    Marital status:      Spouse name: Oseas   Occupational History    Occupation:    Tobacco Use    Smoking status: Never    Smokeless tobacco: Never   Substance and Sexual Activity    Alcohol use: Yes     Comment: rarely    Drug use: No    Sexual activity: Not Currently     Social Determinants of Health     Financial Resource Strain: Low Risk  (6/5/2024)    Received from [facility]    Overall Financial Resource Strain (CARDIA)     Difficulty of Paying Living Expenses: Not hard at all   Food Insecurity: No Food Insecurity (6/5/2024)    Received from [facility]    Hunger Vital Sign     Worried About Running Out of Food in the Last Year: Never true     Ran Out of Food in the Last Year: Never true   Transportation Needs: No Transportation Needs (6/5/2024)    Received from [facility]    PRAPARE - Transportation     Lack of Transportation (Medical): No     Lack of Transportation (Non-Medical): No   Physical Activity: Sufficiently Active (6/5/2024)    Received from [facility]    Exercise Vital Sign     Days of Exercise per Week: 5 days     Minutes of Exercise per Session: 40 min   Stress: No Stress Concern Present (6/5/2024)    Received from [facility] beds at the [facility] - Occupational Stress Questionnaire     Feeling of Stress : Only a little   Recent Concern: Stress - Stress Concern Present (4/28/2024)    Received from [facility] at the [facility] - Occupational Stress Questionnaire     Feeling of Stress : Rather much   Housing Stability: Low  Risk  (3/4/2024)    Housing Stability Vital Sign     Unable to Pay for Housing in the Last Year: No     Number of Places Lived in the Last Year: 1     Unstable Housing in the Last Year: No     MEDICATIONS:   Current Outpatient Medications:     fluticasone propionate (FLONASE) 50 mcg/actuation nasal spray, 1 spray by Each Nare route daily as needed for Rhinitis., Disp: , Rfl:     LIDOcaine-prilocaine (EMLA) cream, Apply topically to port one hour prior to chemotherapy infusion, Disp: 30 g, Rfl: 1    multivitamin (THERAGRAN) per tablet, Take 1 tablet by mouth once daily., Disp: , Rfl:     tamoxifen (NOLVADEX) 20 MG Tab, Take 1 tablet (20 mg total) by mouth once daily., Disp: 30 tablet, Rfl: 11  No current facility-administered medications for this visit.    Facility-Administered Medications Ordered in Other Visits:     ceFAZolin 1 g, gentamicin 80 mg in sodium chloride 0.9% 500 mL irrigation, , Irrigation, On Call Procedure, Allen Damian MD    ceFAZolin 1 g, gentamicin 80 mg in sodium chloride 0.9% 500 mL irrigation, , Irrigation, On Call Procedure, Allen Daiman MD    ALLERGIES:   Review of patient's allergies indicates:   Allergen Reactions    Aleve [naproxen sodium] Other (See Comments)     Throat swelling     Objective:     VITAL SIGNS: BP (!) 141/86   Pulse 71    General    Vitals reviewed.  Constitutional: She is oriented to person, place, and time. She appears well-developed and well-nourished.   Neurological: She is alert and oriented to person, place, and time.   Psychiatric: She has a normal mood and affect. Her behavior is normal.           MUSCULOSKELETAL EXAM    FOOT/ANKLE: left FOOT  The affected foot is compared to the contralateral side    Observation:    There is no edema, erythema, or ecchymosis.   Shoes reveal a normal wear pattern.  + bilateral bunionette its with callus formation    Achilles tendon and calcaneal insertion reveals no deformities  No leg or intrinsic foot muscle  atrophy.  Squatting reveals symmetric pronation of the bilateral feet.   Able to rise on toes with symmetric supination.    Normal gait without evidence of antalgia.    ROM (* = with pain):  Active dorsiflexion to 20° on left and 20° on right  Active plantarflexion to 50° on left and 50° on right    Active ankle inversion to 35° on left and 35° on right  Active ankle eversion to 15° on left and 15° on right  Full active flexion/extension of the toes bilaterally.   Heel cords without tightness bilaterally.    Tenderness To Palpation:  No tenderness at the ATFL, CFL, PTFL, or deltoid ligaments  No tenderness over the distal anterior syndesmosis, distal tibia/fibula, fibular head/shaft  No tenderness at medial or lateral malleoli   No tenderness at navicular, cuneiforms, talus, or calcaneous  + cuboid tenderness on left  No tenderness along the metatarsals or phalanges  No tenderness at the Achilles tendon calcaneal insertion  No tenderness at the posterior tibial or peroneal tendons    Strength Testing (* = with pain):  Dorsiflexion - 5/5 on left and 5/5 on right  Platarflexion - 5/5 on left and 5/5 on right  Resisted Inversion - 5/5 on left and 5/5 on right  Resisted Eversion - 5/5 on left and 5/5 on right  Great Toe Extension - 5/5 on left and 5/5 on right  Great Toe Flexion - 5/5 on left and 5/5 on right    Special Tests:  Anterior talar drawer - negative and without dimpling  Talar tilt - negative  Reverse Talar tilt - negative    Heel tap test - negative  Distal tib/fib squeeze test - negative  External rotation stress (Kleiger) test - negative  Messer squeeze test - negative    Metatarsal squeeze test - negative  Midfoot stress test - negative  Calcaneal squeeze test - negative    No subluxation of the peroneal tendons with resisted eversion.    Vascular/Sensory Exam:  DP and PT pulses intact bilaterally  No skin changes, no abnormal hair distribution  Sensation intact to light touch throughout the saphenous,  sural, superficial peroneal, deep peroneal, and tibial nerve distributions  Negative Tinel's test over tarsal tunnel  2+/4 reflexes at L4 and S1 dermatomes  Capillary refill intact <2 seconds in all toes bilaterally.   TART (Tissue texture abnormality, Asymmetry,  Restriction of motion and/or Tenderness) changes:    Lower Extremity:  Leg lengths symmetric    Location/joint Finding/restriction   Fibular head Posterior on left   Tibia Neutral   Talocrural joint Left   Subtalar Joint Neutral   Cuboid Left   Talo-navicular joint Neutral   Navicular-cuneiform joint Neutral   2nd, 3rd Cuneiform-metatarsal joint Left   2nd, 3rd metatarsal Left   1st, 2nd, 3rd, 4th, 5th phalange Neutral     Key   F= Flexed   E = Extended   R = Rotated   S = Sidebent   TTA = tissue texture abnormality     IMAGIN. X-ray ordered due to left foot pain. (AP, lateral, and oblique views) taken today.   2. X-ray images were reviewed personally by me and then directly with patient.  3. FINDINGS: X-ray images obtained demonstrate no cortical irregularities, sclerosis, osteophyte formation, or subchonral cysts. There is no joint space narrowing.  4. IMPRESSION: No pathology or irregularities appreciated.       Assessment:      Encounter Diagnoses   Name Primary?    Cuboid syndrome of left foot Yes    Left foot pain     Somatic dysfunction of lower extremity         Plan:   1. Acute left foot pain likely secondary to biomechanical restrictions of the lower extremity, particularly at the cuboid bone, with no abnormalities appreciated on today's foot x-rays.  - OMT performed today to address associated biomechanical restrictions  and HEP started.   - recommend firm soled, supportive shoe wear for the next 1-2 weeks, then as needed for pain control  - recommend holding off on running for 1 week, then starting slow progression from walking to running as tolerated  - recommend over-the-counter Voltaren 1% gel up to 4 times a day as needed for pain  control as well as ice to 20 minutes at a time  -  X-ray images of left foot taken today (AP, lateral, and oblique views) showed no acute abnormalities. Images were personally reviewed with patient.    2. OMT 1-2 regions. Oral consent obtained.  Reviewed benefits and potential side effects, including bruising at site of treatment and increased soreness for the next 24-48 hours. Pt. Instructed to increased water intake by 1 L today and tomorrow to help with any residual soreness.   - OMT indicated today due to signs and symptoms as well as local and remote somatic dysfunction findings and their related neurokinetic, lymphatic, fascial and/or arteriovenous body connections.   - OMT techniques used: Muscle Energy and Articulatory   - Treatment was tolerated well. Improvement noted in segmental mobility post-treatment in dysfunctional regions. There were no adverse events and no complications immediately following treatment.     3. Follow-up as needed if pain deteriorates or new issue arises    4. Patient agreeable to today's plan and all questions were answered    This note is dictated using the M*Modal Fluency Direct word recognition program. There are word recognition mistakes that are occasionally missed on review.

## 2024-08-22 ENCOUNTER — PATIENT MESSAGE (OUTPATIENT)
Dept: ADMINISTRATIVE | Facility: OTHER | Age: 44
End: 2024-08-22
Payer: COMMERCIAL

## 2024-08-23 ENCOUNTER — PATIENT MESSAGE (OUTPATIENT)
Dept: HEMATOLOGY/ONCOLOGY | Facility: CLINIC | Age: 44
End: 2024-08-23
Payer: COMMERCIAL

## 2024-08-23 ENCOUNTER — PATIENT MESSAGE (OUTPATIENT)
Dept: ADMINISTRATIVE | Facility: OTHER | Age: 44
End: 2024-08-23
Payer: COMMERCIAL

## 2024-08-26 ENCOUNTER — PATIENT MESSAGE (OUTPATIENT)
Dept: ADMINISTRATIVE | Facility: OTHER | Age: 44
End: 2024-08-26
Payer: COMMERCIAL

## 2024-08-27 RX ORDER — HEPARIN 100 UNIT/ML
500 SYRINGE INTRAVENOUS
Status: CANCELLED | OUTPATIENT
Start: 2024-08-29

## 2024-08-27 RX ORDER — DIPHENHYDRAMINE HYDROCHLORIDE 50 MG/ML
50 INJECTION INTRAMUSCULAR; INTRAVENOUS ONCE AS NEEDED
Status: CANCELLED | OUTPATIENT
Start: 2024-08-29 | End: 2036-01-26

## 2024-08-27 RX ORDER — PROCHLORPERAZINE EDISYLATE 5 MG/ML
10 INJECTION INTRAMUSCULAR; INTRAVENOUS ONCE AS NEEDED
Status: CANCELLED
Start: 2024-08-29

## 2024-08-27 RX ORDER — EPINEPHRINE 0.3 MG/.3ML
0.3 INJECTION SUBCUTANEOUS ONCE AS NEEDED
Status: CANCELLED | OUTPATIENT
Start: 2024-08-29

## 2024-08-27 RX ORDER — SODIUM CHLORIDE 0.9 % (FLUSH) 0.9 %
10 SYRINGE (ML) INJECTION
Status: CANCELLED | OUTPATIENT
Start: 2024-08-29

## 2024-08-28 ENCOUNTER — PATIENT MESSAGE (OUTPATIENT)
Dept: ADMINISTRATIVE | Facility: OTHER | Age: 44
End: 2024-08-28
Payer: COMMERCIAL

## 2024-08-29 ENCOUNTER — LAB VISIT (OUTPATIENT)
Dept: LAB | Facility: HOSPITAL | Age: 44
End: 2024-08-29
Payer: COMMERCIAL

## 2024-08-29 ENCOUNTER — PATIENT MESSAGE (OUTPATIENT)
Dept: ADMINISTRATIVE | Facility: OTHER | Age: 44
End: 2024-08-29
Payer: COMMERCIAL

## 2024-08-29 ENCOUNTER — INFUSION (OUTPATIENT)
Dept: INFUSION THERAPY | Facility: HOSPITAL | Age: 44
End: 2024-08-29
Payer: COMMERCIAL

## 2024-08-29 VITALS
SYSTOLIC BLOOD PRESSURE: 137 MMHG | WEIGHT: 108.25 LBS | TEMPERATURE: 99 F | HEIGHT: 60 IN | BODY MASS INDEX: 21.25 KG/M2 | OXYGEN SATURATION: 100 % | HEART RATE: 70 BPM | DIASTOLIC BLOOD PRESSURE: 77 MMHG | RESPIRATION RATE: 17 BRPM

## 2024-08-29 DIAGNOSIS — C50.311 MALIGNANT NEOPLASM OF LOWER-INNER QUADRANT OF RIGHT BREAST OF FEMALE, ESTROGEN RECEPTOR POSITIVE: ICD-10-CM

## 2024-08-29 DIAGNOSIS — Z17.0 MALIGNANT NEOPLASM OF LOWER-INNER QUADRANT OF RIGHT BREAST OF FEMALE, ESTROGEN RECEPTOR POSITIVE: ICD-10-CM

## 2024-08-29 DIAGNOSIS — R11.0 CHEMOTHERAPY-INDUCED NAUSEA: Primary | ICD-10-CM

## 2024-08-29 DIAGNOSIS — T45.1X5A CHEMOTHERAPY-INDUCED NAUSEA: Primary | ICD-10-CM

## 2024-08-29 LAB — B-HCG UR QL: NEGATIVE

## 2024-08-29 PROCEDURE — A4216 STERILE WATER/SALINE, 10 ML: HCPCS | Performed by: INTERNAL MEDICINE

## 2024-08-29 PROCEDURE — 25000003 PHARM REV CODE 250: Performed by: INTERNAL MEDICINE

## 2024-08-29 PROCEDURE — 81025 URINE PREGNANCY TEST: CPT | Performed by: NURSE PRACTITIONER

## 2024-08-29 PROCEDURE — 63600175 PHARM REV CODE 636 W HCPCS: Mod: JG | Performed by: INTERNAL MEDICINE

## 2024-08-29 PROCEDURE — 96413 CHEMO IV INFUSION 1 HR: CPT

## 2024-08-29 RX ORDER — SODIUM CHLORIDE 0.9 % (FLUSH) 0.9 %
10 SYRINGE (ML) INJECTION
Status: DISCONTINUED | OUTPATIENT
Start: 2024-08-29 | End: 2024-08-29 | Stop reason: HOSPADM

## 2024-08-29 RX ORDER — EPINEPHRINE 0.3 MG/.3ML
0.3 INJECTION SUBCUTANEOUS ONCE AS NEEDED
Status: DISCONTINUED | OUTPATIENT
Start: 2024-08-29 | End: 2024-08-29 | Stop reason: HOSPADM

## 2024-08-29 RX ORDER — PROCHLORPERAZINE EDISYLATE 5 MG/ML
10 INJECTION INTRAMUSCULAR; INTRAVENOUS ONCE AS NEEDED
Status: DISCONTINUED | OUTPATIENT
Start: 2024-08-29 | End: 2024-08-29 | Stop reason: HOSPADM

## 2024-08-29 RX ORDER — DIPHENHYDRAMINE HYDROCHLORIDE 50 MG/ML
50 INJECTION INTRAMUSCULAR; INTRAVENOUS ONCE AS NEEDED
Status: DISCONTINUED | OUTPATIENT
Start: 2024-08-29 | End: 2024-08-29 | Stop reason: HOSPADM

## 2024-08-29 RX ORDER — HEPARIN 100 UNIT/ML
500 SYRINGE INTRAVENOUS
Status: DISCONTINUED | OUTPATIENT
Start: 2024-08-29 | End: 2024-08-29 | Stop reason: HOSPADM

## 2024-08-29 RX ADMIN — SODIUM CHLORIDE, PRESERVATIVE FREE 10 ML: 5 INJECTION INTRAVENOUS at 11:08

## 2024-08-29 RX ADMIN — TRASTUZUMAB-ANNS 300 MG: 420 INJECTION, POWDER, LYOPHILIZED, FOR SOLUTION INTRAVENOUS at 11:08

## 2024-08-29 RX ADMIN — HEPARIN 500 UNITS: 100 SYRINGE at 11:08

## 2024-08-29 RX ADMIN — SODIUM CHLORIDE: 9 INJECTION, SOLUTION INTRAVENOUS at 10:08

## 2024-08-29 NOTE — PLAN OF CARE
Pt ambulatory to clinic alone for [facility] infusion. Denies any complaints. Port accessed without difficulty. Pt tolerated infusion well. Port deaccessed after flushing. Mild redness noted under dressing. Probably could benefit from Cavilon skin protectant. Pt aware. Ambulatory from clinic in [facility].

## 2024-09-05 ENCOUNTER — HOSPITAL ENCOUNTER (OUTPATIENT)
Dept: CARDIOLOGY | Facility: HOSPITAL | Age: 44
Discharge: HOME | End: 2024-09-05
Attending: NURSE PRACTITIONER
Payer: COMMERCIAL

## 2024-09-05 VITALS
SYSTOLIC BLOOD PRESSURE: 120 MMHG | HEART RATE: 61 BPM | WEIGHT: 112 LBS | HEIGHT: 60 IN | DIASTOLIC BLOOD PRESSURE: 60 MMHG | BODY MASS INDEX: 21.99 KG/M2

## 2024-09-05 PROCEDURE — 93306 TTE W/DOPPLER COMPLETE: CPT | Mod: PO

## 2024-09-05 PROCEDURE — 93306 TTE W/DOPPLER COMPLETE: CPT | Mod: 26,,, | Performed by: INTERNAL MEDICINE

## 2024-09-10 ENCOUNTER — PATIENT MESSAGE (OUTPATIENT)
Dept: HEMATOLOGY/ONCOLOGY | Facility: CLINIC | Age: 44
End: 2024-09-10
Payer: COMMERCIAL

## 2024-09-17 ENCOUNTER — OFFICE VISIT (OUTPATIENT)
Dept: INTERNAL MEDICINE | Facility: CLINIC | Age: 44
End: 2024-09-17
Payer: COMMERCIAL

## 2024-09-17 VITALS
WEIGHT: 110.25 LBS | HEIGHT: 60 IN | HEART RATE: 65 BPM | TEMPERATURE: 98 F | BODY MASS INDEX: 21.65 KG/M2 | OXYGEN SATURATION: 98 % | SYSTOLIC BLOOD PRESSURE: 126 MMHG | DIASTOLIC BLOOD PRESSURE: 86 MMHG

## 2024-09-17 DIAGNOSIS — C50.311 MALIGNANT NEOPLASM OF LOWER-INNER QUADRANT OF RIGHT BREAST OF FEMALE, ESTROGEN RECEPTOR POSITIVE: ICD-10-CM

## 2024-09-17 DIAGNOSIS — Z17.0 MALIGNANT NEOPLASM OF LOWER-INNER QUADRANT OF RIGHT BREAST OF FEMALE, ESTROGEN RECEPTOR POSITIVE: ICD-10-CM

## 2024-09-17 DIAGNOSIS — Z00.00 ANNUAL PHYSICAL EXAM: Primary | ICD-10-CM

## 2024-09-17 PROCEDURE — 99999 PR PBB SHADOW E&M-EST. PATIENT-LVL IV: CPT | Mod: PBBFAC,,, | Performed by: INTERNAL MEDICINE

## 2024-09-17 PROCEDURE — 3008F BODY MASS INDEX DOCD: CPT | Mod: CPTII,S$GLB,, | Performed by: INTERNAL MEDICINE

## 2024-09-17 PROCEDURE — 99396 PREV VISIT EST AGE 40-64: CPT | Mod: S$GLB,,, | Performed by: INTERNAL MEDICINE

## 2024-09-17 PROCEDURE — 3074F SYST BP LT 130 MM HG: CPT | Mod: CPTII,S$GLB,, | Performed by: INTERNAL MEDICINE

## 2024-09-17 PROCEDURE — 3079F DIAST BP 80-89 MM HG: CPT | Mod: CPTII,S$GLB,, | Performed by: INTERNAL MEDICINE

## 2024-09-17 PROCEDURE — 3044F HG A1C LEVEL LT 7.0%: CPT | Mod: CPTII,S$GLB,, | Performed by: INTERNAL MEDICINE

## 2024-09-17 NOTE — PROGRESS NOTES
The patient is a 44 y.o. old female who presents to the office for a physical.    BREAST CANCER HISTORY:  She was diagnosed with breast cancer in mid-October of last year, detected through screening mammogram. She has completed chemotherapy and is currently undergoing immunotherapy treatment. She denies significant family history of breast cancer, noting only her father's aunt had the condition. Her treatments are going well overall.    CURRENT SYMPTOMS:  She reports experiencing fatigue, which she believes may be related to her ongoing immunotherapy. She also notes a persistent runny nose and relatively frequent coughing, sometimes productive.    CARDIOVASCULAR HEALTH:  She expresses concerns about her echocardiogram results, which were reviewed by an oncology cardiologist and stated to be consistent with past echoes. She denies chest pain or palpitations. Her blood pressure has been relatively higher recently, with systolic readings between 120 and 140, which she considers above her usual range.    MEDICATIONS AND ALLERGIES:  She is currently taking Flonase as needed, Emla cream, a multivitamin, and Tamoxifen. She reports an allergy to Aleve but denies any other medication allergies.    PREVENTIVE CARE:  Her last cholesterol check was on August 30, 2023. She had a colonoscopy on June 2, 2023, and her last Pap smear was on May 8, 2024. Her most recent eye exam was in November of last year, with another scheduled for this November. Her last mammogram in October of last year led to her breast cancer diagnosis.    MENSTRUAL HISTORY:  She reports regular menstrual periods, with her last period occurring on September 5th.    SOCIAL HISTORY:  She denies smoking cigarettes, drinking alcohol, or using illegal drugs.    UPCOMING APPOINTMENTS:  She has labs scheduled for October 10th.      ROS:  Constitutional: +fatigue  ENT: +rhinorrhea  Respiratory: +cough  Cardiovascular: -chest pain, -palpitations  Allergic: -allergic  reactions       PAST MEDICAL HISTORY  Past Medical History:   Diagnosis Date    Acid reflux     Chalazion      of [facility]     Malignant neoplasm of lower-inner quadrant of right breast of female, estrogen receptor positive 2023    Therapy        SURGICAL HISTORY:  Past Surgical History:   Procedure Laterality Date    BILATERAL MASTECTOMY Bilateral 2023    Procedure: MASTECTOMY, BILATERAL;  Surgeon: Tasha Mcleod MD;  Location: [facility];  Service: General;  Laterality: Bilateral;  3.5 HOURS / EMAIL SENT - @ 1:03 LK    COLONOSCOPY N/A 2023    Procedure: COLONOSCOPY;  Surgeon: Fabio Rice MD;  Location: [facility] ENDO (4TH FLR);  Service: Endoscopy;  Laterality: N/A;  pt confirmed earlier time  EB    ESOPHAGOGASTRODUODENOSCOPY N/A 2023    Procedure: EGD (ESOPHAGOGASTRODUODENOSCOPY);  Surgeon: Fabio Rice MD;  Location: [facility] ENDO (4TH FLR);  Service: Endoscopy;  Laterality: N/A;  Procedure Timin-4 weeks    referred by Dr. Rice/Prep instructions handed to patient and to portal.  Patient called did not answer- DB    ESOPHAGOGASTRODUODENOSCOPY N/A 10/18/2023    Procedure: EGD (ESOPHAGOGASTRODUODENOSCOPY);  Surgeon: Fabio Rice MD;  Location: [facility] ENDO (4TH FLR);  Service: Endoscopy;  Laterality: N/A;  ref Ray  timeframe 5-12 weeks   Dr. Rice patient  portal instruction and given to patient jm  10/11-precall complete-MS    INJECTION FOR SENTINEL NODE IDENTIFICATION Right 2023    Procedure: INJECTION, FOR SENTINEL NODE IDENTIFICATION;  Surgeon: Tasha Mcleod MD;  Location: [facility];  Service: General;  Laterality: Right;    INSERTION OF BREAST TISSUE EXPANDER Bilateral 2023    Procedure: INSERTION, TISSUE EXPANDER, BREAST;  Surgeon: Allen Damian MD;  Location: [facility];  Service: Plastics;  Laterality: Bilateral;  2 HOURS    INSERTION, CATHETER, TUNNELED Left 2023    Procedure: INSERTION, CATHETER, TUNNELED;  Surgeon: Tasha Mcleod MD;  Location: [facility];   Service: General;  Laterality: Left;    SENTINEL LYMPH NODE BIOPSY Right 12/14/2023    Procedure: BIOPSY, LYMPH NODE, SENTINEL;  Surgeon: Tasha Mcleod MD;  Location: [facility];  Service: General;  Laterality: Right;         MEDS:  Medcard reviewed and updated    ALLERGIES: Allergy Card reviewed and updated    PE:   Vitals:  Vitals:    09/17/24 0816   BP: (!) 142/88   Pulse: 65   Temp: 97.5 °F (36.4 °C)       APPEARANCE: Well nourished, well developed, in no acute distress.    EYES: Sclerae anicteric. PERRL. EOMI.      EARS: TM's intact. No retraction or perforation.    NOSE: Mucosa pink. Airway clear.  MOUTH & THROAT: No tonsillar enlargement. No pharyngeal erythema or exudate. No stridor.  NECK: Supple, no thyromegaly.  CHEST: Lungs clear to auscultation with unlabored respirations.  CARDIOVASCULAR: Normal S1, S2. No murmurs. No carotid bruits. No pedal edema.  ABDOMEN: Bowel sounds normal. Not distended. Soft. No tenderness or masses.   MUSCULOSKELETAL:  Normal gait, no cyanosis or clubbing.   SKIN: Normal skin turgor, warm and dry.  NEUROLOGIC: Cranial Nerves: Intact.  PSYCHIATRIC: The patient is oriented to person, place, and time and has a pleasant affect.        ASSESSMENT/PLAN:  Tasha was seen today for annual exam, hypertension and anxiety.    Diagnoses and all orders for this visit:    Annual physical exam  -     Lipid Panel; Future  -     TSH; Future  -     Hemoglobin A1C; Future  -     Urinalysis; Future    Malignant neoplasm of lower-inner quadrant of right breast of female, estrogen receptor positive  -     stable, followed by Heme-Onc    Reviewed recent echocardiogram results, noting normal EF and mild aortic valve sclerosis  Assessed blood pressure, initially elevated but improved on recheck (126/86)  Decided against starting blood pressure medication, opting for home monitoring  Evaluated ongoing cancer treatment, noting patient reports fatigue possibly related to immunotherapy    AORTIC VALVE  SCLEROSIS:  - Explained that mild aortic valve sclerosis is a noisy valve but does not impede heart function.    HYPERTENSION:  - Discussed blood pressure treatment thresholds (over 140/90).  - Tasha to monitor blood pressure at home 1 or 2 times per week at different times of day.  - Tasha to report if top number consistently exceeds 140 or bottom number exceeds 90.  - Contact the office if blood pressure consistently exceeds 140/90 during home monitoring.    LABS:  - CBC, CMP, troponin ordered to be done at upcoming appointment on October 10th.    FOLLOW UP:  - Follow up on October 10th as scheduled for lab appointment.

## 2024-09-19 ENCOUNTER — OFFICE VISIT (OUTPATIENT)
Dept: HEMATOLOGY/ONCOLOGY | Facility: CLINIC | Age: 44
End: 2024-09-19
Payer: COMMERCIAL

## 2024-09-19 ENCOUNTER — INFUSION (OUTPATIENT)
Dept: INFUSION THERAPY | Facility: HOSPITAL | Age: 44
End: 2024-09-19
Payer: COMMERCIAL

## 2024-09-19 ENCOUNTER — LAB VISIT (OUTPATIENT)
Dept: LAB | Facility: HOSPITAL | Age: 44
End: 2024-09-19
Payer: COMMERCIAL

## 2024-09-19 VITALS — WEIGHT: 108 LBS | BODY MASS INDEX: 21.1 KG/M2

## 2024-09-19 VITALS
HEART RATE: 67 BPM | TEMPERATURE: 98 F | OXYGEN SATURATION: 99 % | DIASTOLIC BLOOD PRESSURE: 91 MMHG | SYSTOLIC BLOOD PRESSURE: 158 MMHG | HEIGHT: 60 IN | BODY MASS INDEX: 21.2 KG/M2 | WEIGHT: 108 LBS

## 2024-09-19 DIAGNOSIS — T45.1X5A CHEMOTHERAPY-INDUCED NAUSEA: Primary | ICD-10-CM

## 2024-09-19 DIAGNOSIS — R11.0 CHEMOTHERAPY-INDUCED NAUSEA: Primary | ICD-10-CM

## 2024-09-19 DIAGNOSIS — C50.311 MALIGNANT NEOPLASM OF LOWER-INNER QUADRANT OF RIGHT BREAST OF FEMALE, ESTROGEN RECEPTOR POSITIVE: ICD-10-CM

## 2024-09-19 DIAGNOSIS — Z17.0 MALIGNANT NEOPLASM OF LOWER-INNER QUADRANT OF RIGHT BREAST OF FEMALE, ESTROGEN RECEPTOR POSITIVE: ICD-10-CM

## 2024-09-19 DIAGNOSIS — Z17.0 MALIGNANT NEOPLASM OF LOWER-INNER QUADRANT OF RIGHT BREAST OF FEMALE, ESTROGEN RECEPTOR POSITIVE: Primary | ICD-10-CM

## 2024-09-19 DIAGNOSIS — Z15.01 BARD1 GENE MUTATION POSITIVE: ICD-10-CM

## 2024-09-19 DIAGNOSIS — Z79.810 CARE RELATED TO CURRENT TAMOXIFEN USE: ICD-10-CM

## 2024-09-19 DIAGNOSIS — C50.311 MALIGNANT NEOPLASM OF LOWER-INNER QUADRANT OF RIGHT BREAST OF FEMALE, ESTROGEN RECEPTOR POSITIVE: Primary | ICD-10-CM

## 2024-09-19 LAB — B-HCG UR QL: NEGATIVE

## 2024-09-19 PROCEDURE — 63600175 PHARM REV CODE 636 W HCPCS: Performed by: INTERNAL MEDICINE

## 2024-09-19 PROCEDURE — 3008F BODY MASS INDEX DOCD: CPT | Mod: CPTII,S$GLB,, | Performed by: INTERNAL MEDICINE

## 2024-09-19 PROCEDURE — 99999 PR PBB SHADOW E&M-EST. PATIENT-LVL III: CPT | Mod: PBBFAC,,, | Performed by: INTERNAL MEDICINE

## 2024-09-19 PROCEDURE — 3044F HG A1C LEVEL LT 7.0%: CPT | Mod: CPTII,S$GLB,, | Performed by: INTERNAL MEDICINE

## 2024-09-19 PROCEDURE — 25000003 PHARM REV CODE 250: Performed by: INTERNAL MEDICINE

## 2024-09-19 PROCEDURE — A4216 STERILE WATER/SALINE, 10 ML: HCPCS | Performed by: INTERNAL MEDICINE

## 2024-09-19 PROCEDURE — 99215 OFFICE O/P EST HI 40 MIN: CPT | Mod: S$GLB,,, | Performed by: INTERNAL MEDICINE

## 2024-09-19 PROCEDURE — 96413 CHEMO IV INFUSION 1 HR: CPT

## 2024-09-19 PROCEDURE — 3077F SYST BP >= 140 MM HG: CPT | Mod: CPTII,S$GLB,, | Performed by: INTERNAL MEDICINE

## 2024-09-19 PROCEDURE — 3080F DIAST BP >= 90 MM HG: CPT | Mod: CPTII,S$GLB,, | Performed by: INTERNAL MEDICINE

## 2024-09-19 PROCEDURE — 81025 URINE PREGNANCY TEST: CPT | Performed by: NURSE PRACTITIONER

## 2024-09-19 PROCEDURE — G2211 COMPLEX E/M VISIT ADD ON: HCPCS | Mod: S$GLB,,, | Performed by: INTERNAL MEDICINE

## 2024-09-19 RX ORDER — SODIUM CHLORIDE 0.9 % (FLUSH) 0.9 %
10 SYRINGE (ML) INJECTION
Status: DISCONTINUED | OUTPATIENT
Start: 2024-09-19 | End: 2024-09-19 | Stop reason: HOSPADM

## 2024-09-19 RX ORDER — DIPHENHYDRAMINE HYDROCHLORIDE 50 MG/ML
50 INJECTION INTRAMUSCULAR; INTRAVENOUS ONCE AS NEEDED
OUTPATIENT
Start: 2024-10-31

## 2024-09-19 RX ORDER — EPINEPHRINE 0.3 MG/.3ML
0.3 INJECTION SUBCUTANEOUS ONCE AS NEEDED
OUTPATIENT
Start: 2024-10-10

## 2024-09-19 RX ORDER — PROCHLORPERAZINE EDISYLATE 5 MG/ML
10 INJECTION INTRAMUSCULAR; INTRAVENOUS ONCE AS NEEDED
Status: DISCONTINUED | OUTPATIENT
Start: 2024-09-19 | End: 2024-09-19 | Stop reason: HOSPADM

## 2024-09-19 RX ORDER — DIPHENHYDRAMINE HYDROCHLORIDE 50 MG/ML
50 INJECTION INTRAMUSCULAR; INTRAVENOUS ONCE AS NEEDED
Status: CANCELLED | OUTPATIENT
Start: 2024-09-19

## 2024-09-19 RX ORDER — SODIUM CHLORIDE 0.9 % (FLUSH) 0.9 %
10 SYRINGE (ML) INJECTION
Status: CANCELLED | OUTPATIENT
Start: 2024-09-19

## 2024-09-19 RX ORDER — DIPHENHYDRAMINE HYDROCHLORIDE 50 MG/ML
50 INJECTION INTRAMUSCULAR; INTRAVENOUS ONCE AS NEEDED
OUTPATIENT
Start: 2024-10-10

## 2024-09-19 RX ORDER — EPINEPHRINE 0.3 MG/.3ML
0.3 INJECTION SUBCUTANEOUS ONCE AS NEEDED
Status: CANCELLED | OUTPATIENT
Start: 2024-09-19

## 2024-09-19 RX ORDER — HEPARIN 100 UNIT/ML
500 SYRINGE INTRAVENOUS
Status: DISCONTINUED | OUTPATIENT
Start: 2024-09-19 | End: 2024-09-19 | Stop reason: HOSPADM

## 2024-09-19 RX ORDER — EPINEPHRINE 0.3 MG/.3ML
0.3 INJECTION SUBCUTANEOUS ONCE AS NEEDED
Status: DISCONTINUED | OUTPATIENT
Start: 2024-09-19 | End: 2024-09-19 | Stop reason: HOSPADM

## 2024-09-19 RX ORDER — HEPARIN 100 UNIT/ML
500 SYRINGE INTRAVENOUS
Status: CANCELLED | OUTPATIENT
Start: 2024-09-19

## 2024-09-19 RX ORDER — HEPARIN 100 UNIT/ML
500 SYRINGE INTRAVENOUS
OUTPATIENT
Start: 2024-10-31

## 2024-09-19 RX ORDER — EPINEPHRINE 0.3 MG/.3ML
0.3 INJECTION SUBCUTANEOUS ONCE AS NEEDED
OUTPATIENT
Start: 2024-10-31

## 2024-09-19 RX ORDER — SODIUM CHLORIDE 0.9 % (FLUSH) 0.9 %
10 SYRINGE (ML) INJECTION
OUTPATIENT
Start: 2024-10-31

## 2024-09-19 RX ORDER — HEPARIN 100 UNIT/ML
500 SYRINGE INTRAVENOUS
OUTPATIENT
Start: 2024-10-10

## 2024-09-19 RX ORDER — DIPHENHYDRAMINE HYDROCHLORIDE 50 MG/ML
50 INJECTION INTRAMUSCULAR; INTRAVENOUS ONCE AS NEEDED
Status: DISCONTINUED | OUTPATIENT
Start: 2024-09-19 | End: 2024-09-19 | Stop reason: HOSPADM

## 2024-09-19 RX ORDER — SODIUM CHLORIDE 0.9 % (FLUSH) 0.9 %
10 SYRINGE (ML) INJECTION
OUTPATIENT
Start: 2024-10-10

## 2024-09-19 RX ORDER — PROCHLORPERAZINE EDISYLATE 5 MG/ML
10 INJECTION INTRAMUSCULAR; INTRAVENOUS ONCE AS NEEDED
Start: 2024-10-31

## 2024-09-19 RX ORDER — PROCHLORPERAZINE EDISYLATE 5 MG/ML
10 INJECTION INTRAMUSCULAR; INTRAVENOUS ONCE AS NEEDED
Start: 2024-10-10

## 2024-09-19 RX ORDER — PROCHLORPERAZINE EDISYLATE 5 MG/ML
10 INJECTION INTRAMUSCULAR; INTRAVENOUS ONCE AS NEEDED
Status: CANCELLED
Start: 2024-09-19

## 2024-09-19 RX ADMIN — SODIUM CHLORIDE: 9 INJECTION, SOLUTION INTRAVENOUS at 09:09

## 2024-09-19 RX ADMIN — HEPARIN 500 UNITS: 100 SYRINGE at 10:09

## 2024-09-19 RX ADMIN — TRASTUZUMAB-ANNS 300 MG: 420 INJECTION, POWDER, LYOPHILIZED, FOR SOLUTION INTRAVENOUS at 10:09

## 2024-09-19 RX ADMIN — Medication 10 ML: at 10:09

## 2024-09-19 NOTE — PROGRESS NOTES
[facility]/ [facility]   at [facility] Note:      Chief Complaint:   Encounter Diagnoses   Name Primary?    Malignant neoplasm of lower-inner quadrant of right breast of female, estrogen receptor positive Yes    BARD1 gene mutation positive     Care related to current tamoxifen use         Cancer Staging   Malignant neoplasm of lower-inner quadrant of right breast of female, estrogen receptor positive  Staging form: Breast, AJCC 8th Edition  - Clinical stage from 10/26/2023: Stage IIA (cT2, cN0, cM0, G3, ER+, AZ-, HER2+) - Signed by Linda Mcbride MD on 2023  - Pathologic stage from 2023: Stage IA (pT1c, pN0, cM0, G2, ER+, AZ-, HER2+) - Signed by Linda Mcbride MD on 2024    HPI:  Tasha Cornejo is a 44 y.o. female who presents today for adjuvant kanjinti. She has been on tamoxifen and doing well.   She has noticed some thinning in her eyebrows that started about a month ago (2024). She had them return after chemotherapy, but they have started to thin again  She has also noticed a change in taste/smell that started about a month ago. She is seeing ENT next week.       Oncology History  Screening mammogram on 10/5/2023 identified coarse heterogeneous calcifications in a linear distribution seen in the lower outer quadrant of the right breast, spanning 1.5 cm.  Diagnostic mammogram on 10/12/2023 showed coarse heterogeneous calcifications in a regional distribution spanning 3.2 cm long axis   Biopsy 10/26/2023 with pathology revealing infiltrating ductal carcinoma of the breast, ER 70%/ AZ-/HER2 3+; Ki67 60%    Bilateral mastectomy 23: IDC, grade 2, 1.5cm in maximum; 0/2 LN+    Adjuvant TH 24  Adjuvant tamoxifen:  2024    GYN History:  Age of menarche was 9.   Age of menopause n/a.  Patient denies hormonal therapy.   Patient is .     Patient Active Problem List   Diagnosis    Rhinitis, allergic    Allergic  conjunctivitis    Vitamin D deficiency    Malignant neoplasm of lower-inner quadrant of right breast of female, estrogen receptor positive    Chemotherapy-induced nausea    At risk for lymphedema    Stress and adjustment reaction    Physical deconditioning    Immunodeficiency due to drugs    Decreased ROM of right shoulder    Shoulder weakness    Anxiety about health    Palpitations       Current Outpatient Medications   Medication Instructions    fluticasone propionate (FLONASE) 50 mcg/actuation nasal spray 1 spray, Each Nostril, Daily PRN    LIDOcaine-prilocaine (EMLA) cream Apply topically to port one hour prior to chemotherapy infusion    multivitamin (THERAGRAN) per tablet 1 tablet, Oral, Daily    tamoxifen (NOLVADEX) 20 mg, Oral, Daily       Review of Systems:   Review of Systems   Respiratory:  Negative for cough and shortness of breath.    Cardiovascular:  Negative for chest pain.   Gastrointestinal:  Negative for abdominal pain and diarrhea.   Genitourinary:  Negative for dysuria and frequency.   Musculoskeletal:  Negative for back pain.   Skin:  Negative for rash.   Neurological:  Positive for headaches.   Endo/Heme/Allergies:         Night sweats   Psychiatric/Behavioral:  The patient is nervous/anxious.    All other systems reviewed and are negative.      PHYSICAL EXAM:  BP (!) 158/91 (BP Location: Right arm, Patient Position: Sitting, BP Method: Medium (Automatic))   Pulse 67   Temp 98 °F (36.7 °C) (Oral)   Ht 5' (1.524 m)   Wt 49 kg (108 lb 0.4 oz)   LMP 09/05/2024 (Exact Date)   SpO2 99%   BMI 21.10 kg/m²     General Appearance:    Alert, cooperative, no distress, appears stated age   Head:    Normocephalic, without obvious abnormality, atraumatic   Eyes:    PERRL, conjunctiva/corneas clear   Nose:   Nares normal, septum midline   Throat:   Lips, mucosa, and tongue normal; teeth and gums normal   Lungs:     Respirations unlabored   Extremities:   Extremities normal, atraumatic, no cyanosis or  edema   Pulses:   2+ and symmetric all extremities   Skin:   Skin color, texture, turgor normal, no rashes or lesions   Neurologic:   CNII-XII intact, normal gait         Pertinent Labs & Imaging:  Pathology Results  (Last 30 days)      None            No results found for this or any previous visit (from the past 24 hour(s)).      Assessment & Plan:  1. Malignant neoplasm of lower-inner quadrant of right breast of female, estrogen receptor positive  - Echo; Future    2. BARD1 gene mutation positive  - Echo; Future    3. Care related to current tamoxifen use    Final pathology T1cN0  Reviewed Sept echo. ok for treatment today with Kanjinti. Repeat echo December 2024  Repeat labs November 2024  Continue tamoxifen.  Tolerating fairly well today  Will monitor eyebrow loss. Follow up with ENT for taste/ smell    Will plan to continue on routine follow up. Already scheduled     Route Chart for Scheduling    Med Onc Chart Routing      Follow up with physician . Schedule December   Follow up with BHARGAVI . Scheduled November   Infusion scheduling note    Injection scheduling note    Labs    Imaging ECHO   december   Pharmacy appointment    Other referrals                Treatment Plan Information   OP BREAST TRASTUZUMAB PACLITAXEL WEEKLY Linda Mcbride MD   Associated diagnosis: Chemotherapy-induced nausea   noted on 11/13/2023  Associated diagnosis: Malignant neoplasm of lower-inner quadrant of right breast of female, estrogen receptor positive Stage IIA, Stage IA cT2, cN0, cM0, G3, ER+, WV-, HER2+, pT1c, pN0, cM0, G2, ER+, WV-, HER2+ noted on 11/6/2023   Line of treatment: Adjuvant  Treatment Goal: Curative     Upcoming Treatment Dates - OP BREAST TRASTUZUMAB PACLITAXEL WEEKLY    10/10/2024       Chemotherapy       trastuzumab-anns (KANJINTI) 318 mg in 0.9% NaCl 250 mL chemo infusion  10/31/2024       Chemotherapy       trastuzumab-anns (KANJINTI) 318 mg in 0.9% NaCl 250 mL chemo infusion  11/21/2024       Chemotherapy        trastuzumab-anns (KANJINTI) 318 mg in 0.9% NaCl 250 mL chemo infusion  12/12/2024       Chemotherapy       trastuzumab-anns (KANJINTI) 318 mg in 0.9% NaCl 250 mL chemo infusion    MDM includes  :    - Acute or chronic illness or injury that poses a threat to life or bodily function  - Independent review and explanation of 2 results from unique tests  - Discussion of management and ordering 2 unique tests  - Extensive discussion of treatment and management  - Prescription drug management  - Drug therapy requiring intensive monitoring for toxicity    Linda Mcbride MD   09/19/2024

## 2024-09-19 NOTE — PLAN OF CARE
Pt ambulatory to clinic. Port accessed. Pt tolerated kanjinti infusion. VSS. Pt has no complaints. Port flushed and de-accessed. Pt ambulatory to exit.    Dupixent Counseling: I discussed with the patient the risks of dupilumab including but not limited to eye infection and irritation, cold sores, injection site reactions, worsening of asthma, allergic reactions and increased risk of parasitic infection.  Live vaccines should be avoided while taking dupilumab. Dupilumab will also interact with certain medications such as warfarin and cyclosporine. The patient understands that monitoring is required and they must alert us or the primary physician if symptoms of infection or other concerning signs are noted.

## 2024-09-23 ENCOUNTER — PATIENT MESSAGE (OUTPATIENT)
Dept: INTERNAL MEDICINE | Facility: CLINIC | Age: 44
End: 2024-09-23
Payer: COMMERCIAL

## 2024-09-23 NOTE — TELEPHONE ENCOUNTER
Pt is requesting vitamin D and iron labs be added to her already ordered labs. Due to her decrease in energy and change in diet.

## 2024-09-24 ENCOUNTER — OFFICE VISIT (OUTPATIENT)
Dept: OTOLARYNGOLOGY | Facility: CLINIC | Age: 44
End: 2024-09-24
Payer: COMMERCIAL

## 2024-09-24 VITALS
BODY MASS INDEX: 21.23 KG/M2 | SYSTOLIC BLOOD PRESSURE: 155 MMHG | DIASTOLIC BLOOD PRESSURE: 89 MMHG | WEIGHT: 108.69 LBS | HEART RATE: 58 BPM

## 2024-09-24 DIAGNOSIS — R43.9 SMELL AND TASTE DISORDER: ICD-10-CM

## 2024-09-24 DIAGNOSIS — J32.8 OTHER CHRONIC SINUSITIS: ICD-10-CM

## 2024-09-24 DIAGNOSIS — J31.0 CHRONIC RHINITIS: Primary | ICD-10-CM

## 2024-09-24 PROCEDURE — 87102 FUNGUS ISOLATION CULTURE: CPT | Performed by: OTOLARYNGOLOGY

## 2024-09-24 PROCEDURE — 3079F DIAST BP 80-89 MM HG: CPT | Mod: CPTII,S$GLB,, | Performed by: OTOLARYNGOLOGY

## 2024-09-24 PROCEDURE — 3077F SYST BP >= 140 MM HG: CPT | Mod: CPTII,S$GLB,, | Performed by: OTOLARYNGOLOGY

## 2024-09-24 PROCEDURE — 1160F RVW MEDS BY RX/DR IN RCRD: CPT | Mod: CPTII,S$GLB,, | Performed by: OTOLARYNGOLOGY

## 2024-09-24 PROCEDURE — 99999 PR PBB SHADOW E&M-EST. PATIENT-LVL III: CPT | Mod: PBBFAC,,, | Performed by: OTOLARYNGOLOGY

## 2024-09-24 PROCEDURE — 1159F MED LIST DOCD IN RCRD: CPT | Mod: CPTII,S$GLB,, | Performed by: OTOLARYNGOLOGY

## 2024-09-24 PROCEDURE — 99204 OFFICE O/P NEW MOD 45 MIN: CPT | Mod: 25,S$GLB,, | Performed by: OTOLARYNGOLOGY

## 2024-09-24 PROCEDURE — 3008F BODY MASS INDEX DOCD: CPT | Mod: CPTII,S$GLB,, | Performed by: OTOLARYNGOLOGY

## 2024-09-24 PROCEDURE — 3044F HG A1C LEVEL LT 7.0%: CPT | Mod: CPTII,S$GLB,, | Performed by: OTOLARYNGOLOGY

## 2024-09-24 PROCEDURE — 87075 CULTR BACTERIA EXCEPT BLOOD: CPT | Performed by: OTOLARYNGOLOGY

## 2024-09-24 PROCEDURE — 87070 CULTURE OTHR SPECIMN AEROBIC: CPT | Performed by: OTOLARYNGOLOGY

## 2024-09-24 PROCEDURE — 31231 NASAL ENDOSCOPY DX: CPT | Mod: S$GLB,,, | Performed by: OTOLARYNGOLOGY

## 2024-09-24 RX ORDER — BUDESONIDE 0.5 MG/2ML
0.5 INHALANT ORAL DAILY
Qty: 60 ML | Refills: 0 | Status: SHIPPED | OUTPATIENT
Start: 2024-09-24 | End: 2024-10-24

## 2024-09-24 NOTE — PROCEDURES
Nasal/sinus endoscopy    Date/Time: 9/24/2024 11:00 AM    Performed by: Khoa Hart MD  Authorized by: Khoa Hart MD    Anesthesia:     Local anesthetic:  Lidocaine 4% and Karlos-Synephrine 1/2%    Patient tolerance:  Patient tolerated the procedure well with no immediate complications  Nose:     Procedure Performed:  Nasal Endoscopy  External:      No external nasal deformity  Intranasal:      Mucosa no polyps     Mucosa ulcers not present     No mucosa lesions present     Enlarged turbinates     No septum gross deformity  Nasopharynx:      No mucosa lesions     Adenoids not present     Posterior choanae patent     Eustachian tube not patent     Thick mucopurulent exudate from right middle meatus  Left side wnl  Olfactory clefts clear

## 2024-09-24 NOTE — PROGRESS NOTES
Subjective:      Tasha Cornejo is a 44 y.o. female who was self-referred for reduced sense of smell.    She was in her usual state of health until early June when she noted the new onset of a distorted sense of smell and taste.  In particular, she describes that chicken, coffee, oats, peanuts and dog poop all smell the same. This has created an appetite aversion.  Shortly prior to this she relates a URI that resulted in transient loss of smell for about 2 weeks with spontaneous recovery.  She tested negative for covid x2 and so was treated with antibiotics.  She also completed 12 weeks of chemotherapy for breast cancer in April.  She denies nasal blockage, facial pain or notable sinus infections previously.    Current sinonasal medications include flonase infrequently.  The last course of antibiotics was as above.  She does not regularly use nasal decongestant sprays.    She does not recall previously having allergy testing .    She denies a history of asthma.    She denies a history of reflux symptoms.    She denies a diagnosis of obstructive sleep apnea.     She has not had sinonasal surgery.  She has not had a tonsillectomy.    She does not recall a prior history of nasal trauma.    SNOT-22 score: : (P) 43  NOSE score:: (P) 25%  ETDQ-7 score:: (P) 1.4      Past Medical History  She has a past medical history of Acid reflux, Chalazion, psychiatric care, Malignant neoplasm of lower-inner quadrant of right breast of female, estrogen receptor positive, and Therapy.    Past Surgical History  She has a past surgical history that includes Esophagogastroduodenoscopy (N/A, 6/2/2023); Colonoscopy (N/A, 6/2/2023); Esophagogastroduodenoscopy (N/A, 10/18/2023); Insertion of breast tissue expander (Bilateral, 12/14/2023); Bilateral mastectomy (Bilateral, 12/14/2023); [place] lymph node biopsy (Right, 12/14/2023); Injection for sentinel node identification (Right, 12/14/2023); and insertion, catheter, tunneled (Left,  12/14/2023).    Family History  Her family history includes Albinism in her paternal grandmother; Alzheimer's disease in her paternal grandmother; Breast cancer in an other family member; Cancer in her maternal grandfather and other family members; Cancer (age of onset: 68) in her maternal grandmother; Diabetes in her paternal grandfather; Heart failure in her paternal grandfather and paternal grandmother; Hypertension in her father; Other in her mother and other family members; Pancreatic cancer (age of onset: 68) in her paternal uncle; Prostate cancer (age of onset: 67) in her father; Solid tumor in her mother and another family member.    Social History  She reports that she has never smoked. She has never used smokeless tobacco. She reports current alcohol use. She reports that she does not use drugs.    Allergies  She is allergic to aleve [naproxen sodium].    Medications   She has a current medication list which includes the following prescription(s): fluticasone propionate, lidocaine-prilocaine, multivitamin, tamoxifen, and budesonide, and the following Facility-Administered Medications: ceFAZolin 1 g, gentamicin 80 mg in sodium chloride 0.9% 500 mL irrigation and ceFAZolin 1 g, gentamicin 80 mg in sodium chloride 0.9% 500 mL irrigation.    Review of Systems     Constitutional: Positive for fatigue.  Negative for appetite change, chills, fever and unexpected weight loss.      HENT: Positive for nosebleeds, postnasal drip, sinus pressure, sore throat and dental problems.  Negative for ear discharge, ear infection, ear pain, facial swelling, hearing loss, mouth sores, ringing in the ears, runny nose, sinus infection, stuffy nose, tonsil infection, trouble swallowing and voice change.      Eyes:  Negative for change in eyesight, eye drainage, eye itching and photophobia.     Respiratory:  Negative for cough, shortness of breath, sleep apnea, snoring and wheezing.      Cardiovascular:  Negative for chest pain,  foot swelling, irregular heartbeat and swollen veins.     Gastrointestinal:  Negative for abdominal pain, acid reflux, constipation, diarrhea, heartburn and vomiting.     Genitourinary: Negative for difficulty urinating, sexual problems and frequent urination.     Musc: Negative for aching joints, aching muscles, back pain and neck pain.     Skin: Negative for rash.     Allergy: Positive for seasonal allergies. Negative for food allergies.     Endocrine: Negative for cold intolerance and heat intolerance.      Neurological: Negative for dizziness, headaches, light-headedness, seizures and tremors.      Hematologic: Positive for bruises/bleeds easily. Negative for swollen glands.      Psychiatric: Positive for nervous/anxious. Negative for decreased concentration, depression and sleep disturbance.               Objective:     BP (!) 155/89 (BP Location: Right arm, Patient Position: Sitting)   Pulse (!) 58   Wt 49.3 kg (108 lb 11 oz)   LMP 09/05/2024 (Exact Date)   BMI 21.23 kg/m²        Constitutional:   She appears well-developed. She is cooperative. Normal speech.  No hoarse voice.      Head:  Normocephalic. Salivary glands normal.  Facial strength is normal.      Ears:    Right Ear: No drainage or tenderness. Tympanic membrane is not perforated. Tympanic membrane mobility is normal. No middle ear effusion. No decreased hearing is noted.   Left Ear: No drainage or tenderness. Tympanic membrane is not perforated. Tympanic membrane mobility is normal.  No middle ear effusion. No decreased hearing is noted.     Nose:  Mucosal edema present. No rhinorrhea, septal deviation or polyps. No epistaxis. Turbinates normal, no turbinate masses and no turbinate hypertrophy.  Right sinus exhibits no maxillary sinus tenderness and no frontal sinus tenderness. Left sinus exhibits no maxillary sinus tenderness and no frontal sinus tenderness.     Mouth/Throat  Oropharynx clear and moist without lesions or asymmetry and normal  uvula midline. She does not have dentures. Normal dentition. No oral lesions or mucous membrane lesions. No oropharyngeal exudate or posterior oropharyngeal erythema. Tonsils present, +1.  Mirror exam not performed due to patient tolerance.  Mirror exam not performed due to patient tolerance.      Neck:  Neck normal without thyromegaly masses, asymmetry, normal tracheal structure, crepitus, and tenderness, thyroid normal, trachea normal and no adenopathy. Normal range of motion present.     She has no cervical adenopathy.     Cardiovascular:    Regular rhythm.              Pulmonary/Chest:   Effort normal.     Psychiatric:   She has a normal mood and affect. Her speech is normal and behavior is normal.     Neurological:   No cranial nerve deficit.     Skin:   No rash noted.       Procedure    Nasal endoscopy performed.  See procedure note.        Data Reviewed    WBC (K/uL)   Date Value   08/29/2024 5.72     Eosinophil % (%)   Date Value   08/29/2024 2.6     Eos # (K/uL)   Date Value   08/29/2024 0.2     Platelets (K/uL)   Date Value   08/29/2024 241     Glucose (mg/dL)   Date Value   08/29/2024 88     Total IgE (IU/mL)   Date Value   10/21/2014 69       No sinus imaging available.       Assessment:     1. Chronic rhinitis    2. Other chronic sinusitis    3. Smell and taste disorder         Plan:     I had a long discussion with the patient regarding her condition and the further workup and management options.  I reassured her as to the benign nature of today's findings, which are consistent with a sinus infection.  I swabbed her sinus exudate for culture and will use the results to direct antibiotic therapy as indicated.  I prescribed the daily use of budesonide in isotonic saline irrigation to treat her recalcitrant sinonasal inflammation.  She was counseled on the off-label nature of this therapy and she consented to its use.    Follow up if symptoms worsen or fail to improve.

## 2024-09-26 LAB
BACTERIA SPEC AEROBE CULT: NORMAL
BACTERIA SPEC ANAEROBE CULT: NORMAL

## 2024-09-30 ENCOUNTER — PATIENT MESSAGE (OUTPATIENT)
Dept: OTOLARYNGOLOGY | Facility: CLINIC | Age: 44
End: 2024-09-30
Payer: COMMERCIAL

## 2024-09-30 DIAGNOSIS — J32.8 OTHER CHRONIC SINUSITIS: Primary | ICD-10-CM

## 2024-09-30 RX ORDER — CEFDINIR 300 MG/1
300 CAPSULE ORAL 2 TIMES DAILY
Qty: 20 CAPSULE | Refills: 0 | Status: SHIPPED | OUTPATIENT
Start: 2024-09-30 | End: 2024-10-10

## 2024-10-01 ENCOUNTER — PATIENT MESSAGE (OUTPATIENT)
Dept: INTERNAL MEDICINE | Facility: CLINIC | Age: 44
End: 2024-10-01
Payer: COMMERCIAL

## 2024-10-01 DIAGNOSIS — R53.83 FATIGUE, UNSPECIFIED TYPE: ICD-10-CM

## 2024-10-01 DIAGNOSIS — Z00.00 ANNUAL PHYSICAL EXAM: Primary | ICD-10-CM

## 2024-10-01 NOTE — TELEPHONE ENCOUNTER
Pt has a lab appointment on 10/10, states she is always tired and low energy. Pt states she eat mostly a vegetarian doet. Pt is requesting iron and vit d to be drawn as well. Pended for review

## 2024-10-02 ENCOUNTER — PATIENT MESSAGE (OUTPATIENT)
Dept: PLASTIC SURGERY | Facility: CLINIC | Age: 44
End: 2024-10-02
Payer: COMMERCIAL

## 2024-10-04 ENCOUNTER — PATIENT MESSAGE (OUTPATIENT)
Dept: HEMATOLOGY/ONCOLOGY | Facility: CLINIC | Age: 44
End: 2024-10-04
Payer: COMMERCIAL

## 2024-10-08 ENCOUNTER — PATIENT MESSAGE (OUTPATIENT)
Dept: OTOLARYNGOLOGY | Facility: CLINIC | Age: 44
End: 2024-10-08
Payer: COMMERCIAL

## 2024-10-10 ENCOUNTER — LAB VISIT (OUTPATIENT)
Dept: LAB | Facility: HOSPITAL | Age: 44
End: 2024-10-10
Attending: INTERNAL MEDICINE
Payer: COMMERCIAL

## 2024-10-10 ENCOUNTER — INFUSION (OUTPATIENT)
Dept: INFUSION THERAPY | Facility: HOSPITAL | Age: 44
End: 2024-10-10
Payer: COMMERCIAL

## 2024-10-10 VITALS
WEIGHT: 109.56 LBS | TEMPERATURE: 98 F | SYSTOLIC BLOOD PRESSURE: 127 MMHG | RESPIRATION RATE: 18 BRPM | HEART RATE: 72 BPM | BODY MASS INDEX: 21.51 KG/M2 | HEIGHT: 60 IN | DIASTOLIC BLOOD PRESSURE: 70 MMHG

## 2024-10-10 DIAGNOSIS — R11.0 CHEMOTHERAPY-INDUCED NAUSEA: Primary | ICD-10-CM

## 2024-10-10 DIAGNOSIS — Z00.00 ANNUAL PHYSICAL EXAM: ICD-10-CM

## 2024-10-10 DIAGNOSIS — Z17.0 MALIGNANT NEOPLASM OF LOWER-INNER QUADRANT OF RIGHT BREAST OF FEMALE, ESTROGEN RECEPTOR POSITIVE: ICD-10-CM

## 2024-10-10 DIAGNOSIS — T45.1X5A CHEMOTHERAPY-INDUCED NAUSEA: Primary | ICD-10-CM

## 2024-10-10 DIAGNOSIS — C50.311 MALIGNANT NEOPLASM OF LOWER-INNER QUADRANT OF RIGHT BREAST OF FEMALE, ESTROGEN RECEPTOR POSITIVE: ICD-10-CM

## 2024-10-10 LAB
25(OH)D3+25(OH)D2 SERPL-MCNC: 28 NG/ML (ref 30–96)
ALBUMIN SERPL BCP-MCNC: 3.8 G/DL (ref 3.5–5.2)
ALP SERPL-CCNC: 43 U/L (ref 55–135)
ALT SERPL W/O P-5'-P-CCNC: 14 U/L (ref 10–44)
ANION GAP SERPL CALC-SCNC: 6 MMOL/L (ref 8–16)
AST SERPL-CCNC: 21 U/L (ref 10–40)
BASOPHILS # BLD AUTO: 0.07 K/UL (ref 0–0.2)
BASOPHILS NFR BLD: 1.4 % (ref 0–1.9)
BILIRUB SERPL-MCNC: 0.4 MG/DL (ref 0.1–1)
BUN SERPL-MCNC: 18 MG/DL (ref 6–20)
CALCIUM SERPL-MCNC: 9 MG/DL (ref 8.7–10.5)
CHLORIDE SERPL-SCNC: 110 MMOL/L (ref 95–110)
CHOLEST SERPL-MCNC: 124 MG/DL (ref 120–199)
CHOLEST/HDLC SERPL: 2.6 {RATIO} (ref 2–5)
CO2 SERPL-SCNC: 23 MMOL/L (ref 23–29)
CREAT SERPL-MCNC: 1 MG/DL (ref 0.5–1.4)
DIFFERENTIAL METHOD BLD: NORMAL
EOSINOPHIL # BLD AUTO: 0.1 K/UL (ref 0–0.5)
EOSINOPHIL NFR BLD: 2.7 % (ref 0–8)
ERYTHROCYTE [DISTWIDTH] IN BLOOD BY AUTOMATED COUNT: 13.1 % (ref 11.5–14.5)
EST. GFR  (NO RACE VARIABLE): >60 ML/MIN/1.73 M^2
ESTIMATED AVG GLUCOSE: 97 MG/DL (ref 68–131)
FERRITIN SERPL-MCNC: 79 NG/ML (ref 20–300)
GLUCOSE SERPL-MCNC: 86 MG/DL (ref 70–110)
HBA1C MFR BLD: 5 % (ref 4–5.6)
HCT VFR BLD AUTO: 37.6 % (ref 37–48.5)
HDLC SERPL-MCNC: 48 MG/DL (ref 40–75)
HDLC SERPL: 38.7 % (ref 20–50)
HGB BLD-MCNC: 12.8 G/DL (ref 12–16)
IMM GRANULOCYTES # BLD AUTO: 0.01 K/UL (ref 0–0.04)
IMM GRANULOCYTES NFR BLD AUTO: 0.2 % (ref 0–0.5)
IRON SERPL-MCNC: 106 UG/DL (ref 30–160)
LDLC SERPL CALC-MCNC: 67 MG/DL (ref 63–159)
LYMPHOCYTES # BLD AUTO: 1.2 K/UL (ref 1–4.8)
LYMPHOCYTES NFR BLD: 25.6 % (ref 18–48)
MCH RBC QN AUTO: 30.8 PG (ref 27–31)
MCHC RBC AUTO-ENTMCNC: 34 G/DL (ref 32–36)
MCV RBC AUTO: 91 FL (ref 82–98)
MONOCYTES # BLD AUTO: 0.5 K/UL (ref 0.3–1)
MONOCYTES NFR BLD: 9.9 % (ref 4–15)
NEUTROPHILS # BLD AUTO: 2.9 K/UL (ref 1.8–7.7)
NEUTROPHILS NFR BLD: 60.2 % (ref 38–73)
NONHDLC SERPL-MCNC: 76 MG/DL
NRBC BLD-RTO: 0 /100 WBC
PLATELET # BLD AUTO: 249 K/UL (ref 150–450)
PMV BLD AUTO: 10.2 FL (ref 9.2–12.9)
POTASSIUM SERPL-SCNC: 4.8 MMOL/L (ref 3.5–5.1)
PROT SERPL-MCNC: 6.8 G/DL (ref 6–8.4)
RBC # BLD AUTO: 4.15 M/UL (ref 4–5.4)
SATURATED IRON: 31 % (ref 20–50)
SODIUM SERPL-SCNC: 139 MMOL/L (ref 136–145)
TOTAL IRON BINDING CAPACITY: 337 UG/DL (ref 250–450)
TRANSFERRIN SERPL-MCNC: 228 MG/DL (ref 200–375)
TRIGL SERPL-MCNC: 45 MG/DL (ref 30–150)
TROPONIN I SERPL DL<=0.01 NG/ML-MCNC: <0.006 NG/ML (ref 0–0.03)
TSH SERPL DL<=0.005 MIU/L-ACNC: 1.94 UIU/ML (ref 0.4–4)
WBC # BLD AUTO: 4.84 K/UL (ref 3.9–12.7)

## 2024-10-10 PROCEDURE — A4216 STERILE WATER/SALINE, 10 ML: HCPCS | Performed by: INTERNAL MEDICINE

## 2024-10-10 PROCEDURE — 83036 HEMOGLOBIN GLYCOSYLATED A1C: CPT | Performed by: INTERNAL MEDICINE

## 2024-10-10 PROCEDURE — 80053 COMPREHEN METABOLIC PANEL: CPT | Performed by: INTERNAL MEDICINE

## 2024-10-10 PROCEDURE — 82306 VITAMIN D 25 HYDROXY: CPT | Performed by: INTERNAL MEDICINE

## 2024-10-10 PROCEDURE — 84443 ASSAY THYROID STIM HORMONE: CPT | Performed by: INTERNAL MEDICINE

## 2024-10-10 PROCEDURE — 82728 ASSAY OF FERRITIN: CPT | Performed by: INTERNAL MEDICINE

## 2024-10-10 PROCEDURE — 63600175 PHARM REV CODE 636 W HCPCS: Mod: JG | Performed by: INTERNAL MEDICINE

## 2024-10-10 PROCEDURE — 83540 ASSAY OF IRON: CPT | Performed by: INTERNAL MEDICINE

## 2024-10-10 PROCEDURE — 84484 ASSAY OF TROPONIN QUANT: CPT | Performed by: INTERNAL MEDICINE

## 2024-10-10 PROCEDURE — 96413 CHEMO IV INFUSION 1 HR: CPT

## 2024-10-10 PROCEDURE — 85025 COMPLETE CBC W/AUTO DIFF WBC: CPT | Performed by: INTERNAL MEDICINE

## 2024-10-10 PROCEDURE — 25000003 PHARM REV CODE 250: Performed by: INTERNAL MEDICINE

## 2024-10-10 PROCEDURE — 80061 LIPID PANEL: CPT | Performed by: INTERNAL MEDICINE

## 2024-10-10 RX ORDER — DIPHENHYDRAMINE HYDROCHLORIDE 50 MG/ML
50 INJECTION INTRAMUSCULAR; INTRAVENOUS ONCE AS NEEDED
Status: DISCONTINUED | OUTPATIENT
Start: 2024-10-10 | End: 2024-10-10 | Stop reason: HOSPADM

## 2024-10-10 RX ORDER — SODIUM CHLORIDE 0.9 % (FLUSH) 0.9 %
10 SYRINGE (ML) INJECTION
Status: DISCONTINUED | OUTPATIENT
Start: 2024-10-10 | End: 2024-10-10 | Stop reason: HOSPADM

## 2024-10-10 RX ORDER — EPINEPHRINE 0.3 MG/.3ML
0.3 INJECTION SUBCUTANEOUS ONCE AS NEEDED
Status: DISCONTINUED | OUTPATIENT
Start: 2024-10-10 | End: 2024-10-10 | Stop reason: HOSPADM

## 2024-10-10 RX ORDER — HEPARIN 100 UNIT/ML
500 SYRINGE INTRAVENOUS
Status: DISCONTINUED | OUTPATIENT
Start: 2024-10-10 | End: 2024-10-10 | Stop reason: HOSPADM

## 2024-10-10 RX ORDER — PROCHLORPERAZINE EDISYLATE 5 MG/ML
10 INJECTION INTRAMUSCULAR; INTRAVENOUS ONCE AS NEEDED
Status: DISCONTINUED | OUTPATIENT
Start: 2024-10-10 | End: 2024-10-10 | Stop reason: HOSPADM

## 2024-10-10 RX ADMIN — Medication 10 ML: at 10:10

## 2024-10-10 RX ADMIN — SODIUM CHLORIDE: 9 INJECTION, SOLUTION INTRAVENOUS at 10:10

## 2024-10-10 RX ADMIN — HEPARIN 500 UNITS: 100 SYRINGE at 10:10

## 2024-10-10 RX ADMIN — TRASTUZUMAB-ANNS 300 MG: 420 INJECTION, POWDER, LYOPHILIZED, FOR SOLUTION INTRAVENOUS at 10:10

## 2024-10-10 NOTE — PLAN OF CARE
1058-Patient tolerated treatment well. Discharged without complaints or S/S of adverse event.  Instructed to call provider for any questions or concerns.

## 2024-10-10 NOTE — PLAN OF CARE
0927-Labs , hx, and medications reviewed. Assessment completed. Discussed plan of care with patient. Patient in agreement. Chair reclined and warm blanket and snack offered.

## 2024-10-17 ENCOUNTER — PATIENT MESSAGE (OUTPATIENT)
Dept: PLASTIC SURGERY | Facility: CLINIC | Age: 44
End: 2024-10-17
Payer: COMMERCIAL

## 2024-10-19 ENCOUNTER — OFFICE VISIT (OUTPATIENT)
Dept: SPORTS MEDICINE | Facility: CLINIC | Age: 44
End: 2024-10-19
Payer: COMMERCIAL

## 2024-10-19 VITALS
WEIGHT: 109 LBS | HEIGHT: 60 IN | DIASTOLIC BLOOD PRESSURE: 84 MMHG | HEART RATE: 62 BPM | BODY MASS INDEX: 21.4 KG/M2 | SYSTOLIC BLOOD PRESSURE: 127 MMHG

## 2024-10-19 DIAGNOSIS — M99.05 SOMATIC DYSFUNCTION OF PELVIC REGION: ICD-10-CM

## 2024-10-19 DIAGNOSIS — M99.03 SOMATIC DYSFUNCTION OF LUMBAR REGION: ICD-10-CM

## 2024-10-19 DIAGNOSIS — M99.04 SACRAL REGION SOMATIC DYSFUNCTION: ICD-10-CM

## 2024-10-19 DIAGNOSIS — M25.551 LATERAL PAIN OF RIGHT HIP: Primary | ICD-10-CM

## 2024-10-19 DIAGNOSIS — M99.02 SOMATIC DYSFUNCTION OF THORACIC REGION: ICD-10-CM

## 2024-10-19 DIAGNOSIS — M79.10 MYALGIA: ICD-10-CM

## 2024-10-19 DIAGNOSIS — M99.06 SOMATIC DYSFUNCTION OF LOWER EXTREMITY: ICD-10-CM

## 2024-10-19 PROCEDURE — 99999 PR PBB SHADOW E&M-EST. PATIENT-LVL III: CPT | Mod: PBBFAC,,, | Performed by: NEUROMUSCULOSKELETAL MEDICINE & OMM

## 2024-10-19 PROCEDURE — 3008F BODY MASS INDEX DOCD: CPT | Mod: CPTII,S$GLB,, | Performed by: NEUROMUSCULOSKELETAL MEDICINE & OMM

## 2024-10-19 PROCEDURE — 3079F DIAST BP 80-89 MM HG: CPT | Mod: CPTII,S$GLB,, | Performed by: NEUROMUSCULOSKELETAL MEDICINE & OMM

## 2024-10-19 PROCEDURE — 3044F HG A1C LEVEL LT 7.0%: CPT | Mod: CPTII,S$GLB,, | Performed by: NEUROMUSCULOSKELETAL MEDICINE & OMM

## 2024-10-19 PROCEDURE — 98927 OSTEOPATH MANJ 5-6 REGIONS: CPT | Mod: S$GLB,,, | Performed by: NEUROMUSCULOSKELETAL MEDICINE & OMM

## 2024-10-19 PROCEDURE — 99213 OFFICE O/P EST LOW 20 MIN: CPT | Mod: 25,S$GLB,, | Performed by: NEUROMUSCULOSKELETAL MEDICINE & OMM

## 2024-10-19 PROCEDURE — 1159F MED LIST DOCD IN RCRD: CPT | Mod: CPTII,S$GLB,, | Performed by: NEUROMUSCULOSKELETAL MEDICINE & OMM

## 2024-10-19 PROCEDURE — 97110 THERAPEUTIC EXERCISES: CPT | Mod: S$GLB,,, | Performed by: NEUROMUSCULOSKELETAL MEDICINE & OMM

## 2024-10-19 PROCEDURE — 1160F RVW MEDS BY RX/DR IN RCRD: CPT | Mod: CPTII,S$GLB,, | Performed by: NEUROMUSCULOSKELETAL MEDICINE & OMM

## 2024-10-19 PROCEDURE — 3074F SYST BP LT 130 MM HG: CPT | Mod: CPTII,S$GLB,, | Performed by: NEUROMUSCULOSKELETAL MEDICINE & OMM

## 2024-10-19 NOTE — PROGRESS NOTES
Subjective:     Tasha Cornejo     Chief Complaint   Patient presents with    Right Hip - Pain     HPI    Tasha is a 44 y.o. female coming in today for right hip pain. Since last visit the pain has Improved then deteriorated with a run 1 week ago.  Pt is compliant with HEP- IT band rolling and calf stretches but has not been doing core exercises. The pain is better with rest and worse with running, standing after sitting at desk, sleeping on side, twisting. Pt. describes the pain as a 3/10 achy pain that does not radiate. There has not been any new a fall/injury/ or traumas since last visit. Pt currently on immunotherapy and tamoxifen. Pt. denies any new musculoskeletal complaints at this time.     Office note from 24 reviewed    Joint instability? no  Mechanical locking/clicking? no  Affecting ADL's? yes  Affecting sleep? yes    Occupation:     PAST MEDICAL HISTORY:   Past Medical History:   Diagnosis Date    Acid reflux     Chalazion     Hx of psychiatric care     Malignant neoplasm of lower-inner quadrant of right breast of female, estrogen receptor positive 2023    Therapy      PAST SURGICAL HISTORY:   Past Surgical History:   Procedure Laterality Date    BILATERAL MASTECTOMY Bilateral 2023    Procedure: MASTECTOMY, BILATERAL;  Surgeon: Tasha Mcleod MD;  Location: [facility];  Service: General;  Laterality: Bilateral;  3.5 HOURS / EMAIL SENT  @ 1:03 LK    COLONOSCOPY N/A 2023    Procedure: COLONOSCOPY;  Surgeon: Fabio Rice MD;  [facility]  Service: Endoscopy;  Laterality: N/A;  pt confirmed earlier time  EB    ESOPHAGOGASTRODUODENOSCOPY N/A 2023    Procedure: EGD (ESOPHAGOGASTRODUODENOSCOPY);  Surgeon: Fabio Rice MD;  Location: [facility] (4TH FLR);  Service: Endoscopy;  Laterality: N/A;  Procedure Timin-4 weeks    referred by Dr. Rice/Danita instructions handed to patient and to portal.  Patient called did not answer- DB     ESOPHAGOGASTRODUODENOSCOPY N/A 10/18/2023    Procedure: EGD (ESOPHAGOGASTRODUODENOSCOPY);  Surgeon: Fabio Rice MD;  Location: [address]);  Service: Endoscopy;  Laterality: N/A;  ref Ray  timeframe 5-12 weeks   Dr. Rice patient  portal instruction and given to patient jm  10/11-precall complete-MS    INJECTION FOR SENTINEL NODE IDENTIFICATION Right 12/14/2023    Procedure: INJECTION, FOR SENTINEL NODE IDENTIFICATION;  Surgeon: Tasha Mcleod MD;  Location: [facility];  Service: General;  Laterality: Right;    INSERTION OF BREAST TISSUE EXPANDER Bilateral 12/14/2023    Procedure: INSERTION, TISSUE EXPANDER, BREAST;  Surgeon: Allen Damian MD;  Location: [facility];  Service: Plastics;  Laterality: Bilateral;  2 HOURS    INSERTION, CATHETER, TUNNELED Left 12/14/2023    Procedure: INSERTION, CATHETER, TUNNELED;  Surgeon: Tasha Mcleod MD;  Location: [facility];  Service: General;  Laterality: Left;    SENTINEL LYMPH NODE BIOPSY Right 12/14/2023    Procedure: BIOPSY, LYMPH NODE, SENTINEL;  Surgeon: Tasha Mcleod MD;  Location: [facility];  Service: General;  Laterality: Right;     FAMILY HISTORY:   Family History   Problem Relation Name Age of Onset    Solid tumor Mother          uterine polyps    Other Mother          abnormal cells of cervix    Hypertension Father Fahad     Prostate cancer Father Fahad 67        tx: XRT seeds    Pancreatic cancer Paternal Uncle Vern 68        subtype unk; tx: unk    Cancer Maternal Grandmother Kasandra 68        mother thinks that a cancer dx was on death cert.    Cancer Maternal Grandfather Harry         esophageal (abn. cells at 54, tumor at 62)    Heart failure Paternal Grandmother      Albinism Paternal Grandmother      Alzheimer's disease Paternal Grandmother      Diabetes Paternal Grandfather      Heart failure Paternal Grandfather      Other Other Marnie         reportedly (-) cancer-related genetic testing    Breast cancer Other Johanna         dx.late 50s or 60s (tx: unk)     Cancer Other Nichole         cervical or ovarian, dx.mid-60s (tx: unk)    Cancer Other E.J.         lung, dx.mid-60s    Cancer Other Luke III         lung, dx. early 60s    Solid tumor Other          stomach tumor (noncancerous)    Other Other          reportedly (-) cancer-related genetic testing    Cataracts Neg Hx      Glaucoma Neg Hx      Macular degeneration Neg Hx      Colon cancer Neg Hx       SOCIAL HISTORY:   Social History     Socioeconomic History    Marital status:      Spouse name: Oseas   Occupational History    Occupation:    Tobacco Use    Smoking status: Never    Smokeless tobacco: Never   Substance and Sexual Activity    Alcohol use: Yes     Comment: rarely    Drug use: No    Sexual activity: Not Currently     Social Drivers of Health     Financial Resource Strain: Low Risk  (6/5/2024)    Received from [facility]    Overall Financial Resource Strain (CARDIA)     Difficulty of Paying Living Expenses: Not hard at all   Food Insecurity: No Food Insecurity (6/5/2024)    Received from [facility]    Hunger Vital Sign     Worried About Running Out of Food in the Last Year: Never true     Ran Out of Food in the Last Year: Never true   Transportation Needs: No Transportation Needs (6/5/2024)    Received from [facility]    PRAPARE - Transportation     Lack of Transportation (Medical): No     Lack of Transportation (Non-Medical): No   Physical Activity: Sufficiently Active (6/5/2024)    Received from [facility]    Exercise Vital Sign     Days of Exercise per Week: 5 days     Minutes of Exercise per Session: 40 min   Stress: No Stress Concern Present (6/5/2024)    Received from [facility] - Occupational Stress Questionnaire     Feeling of Stress : Only a little   Recent Concern: Stress - Stress Concern Present (4/28/2024)    Received from [facility] - Occupational Stress Questionnaire      Feeling of Stress : Rather much   Housing Stability: Low Risk  (3/4/2024)    Housing Stability Vital Sign     Unable to Pay for Housing in the Last Year: No     Number of Places Lived in the Last Year: 1     Unstable Housing in the Last Year: No     MEDICATIONS:   Current Outpatient Medications:     budesonide (PULMICORT) 0.5 mg/2 mL nebulizer solution, Take 2 mLs (0.5 mg total) by nebulization once daily. For use in saline irrigation as directed., Disp: 60 mL, Rfl: 0    fluticasone propionate (FLONASE) 50 mcg/actuation nasal spray, 1 spray by Each Nare route daily as needed for Rhinitis., Disp: , Rfl:     LIDOcaine-prilocaine (EMLA) cream, Apply topically to port one hour prior to chemotherapy infusion, Disp: 30 g, Rfl: 1    multivitamin (THERAGRAN) per tablet, Take 1 tablet by mouth once daily., Disp: , Rfl:     tamoxifen (NOLVADEX) 20 MG Tab, Take 1 tablet (20 mg total) by mouth once daily., Disp: 30 tablet, Rfl: 11  No current facility-administered medications for this visit.    Facility-Administered Medications Ordered in Other Visits:     ceFAZolin 1 g, gentamicin 80 mg in sodium chloride 0.9% 500 mL irrigation, , Irrigation, On Call Procedure, Allen Damian MD    ceFAZolin 1 g, gentamicin 80 mg in sodium chloride 0.9% 500 mL irrigation, , Irrigation, On Call Procedure, Allen Damian MD    ALLERGIES:   Review of patient's allergies indicates:   Allergen Reactions    Aleve [naproxen sodium] Other (See Comments)     Throat swelling     Objective:   VITAL SIGNS: /84   Pulse 62   Ht 5' (1.524 m)   Wt 49.4 kg (109 lb)   BMI 21.29 kg/m²    General    Vitals reviewed.  Constitutional: She is oriented to person, place, and time. She appears well-developed and well-nourished.   Neurological: She is alert and oriented to person, place, and time.   Psychiatric: She has a normal mood and affect. Her behavior is normal.           MUSCULOSKELETAL EXAM  HIP: right HIP  The affected hip is compared to the  contralateral hip.    Observation:    There is no edema, erythema, or ecchymosis in the lumbosacral region.   There is no Trendelenburg sign on either side  No obvious pelvic obliquity while standing.    No thoracolumbar scoliosis observed.    No midline skin abnormalities.  No atrophy noted in the lower limbs.  Gait: Non-antalgic with Neutral ankle mechanics and Neutral medial arch  Adequate bilateral pelvic stability without any hip hiking compensatory pattern noted with single leg raise    ROM (* = with pain):  Passive hip flexion to 120° on left and 120° on right  Passive hip internal rotation to 45° on left and 45° on right  Passive hip external rotation to 45° on left and 45° on right   Passive hip abduction to 45° on left and 45° on right  All motions above are without pain.    Tenderness To Palpation:  No tenderness at the ASIS, AIIS, PSIS, PIIS, iliac crest, pubic bones, ischial tuberosity.  No tenderness throughout the lumbar spine, iliolumbar region, or posterior pelvis.  No tenderness throughout the sacrum or piriformis  No tenderness over the greater trochanteric bursa or greater/lesser trochanters.  No tenderness at the glut attachments on the greater trochanter  No tenderness over proximal IT band or hip flexor musculature.    Strength Testing (* = with pain):  Hip flexion - 5/5 on left and 5/5 on right  Hip extension - 5/5 on left and 5/5 on right  Hip adduction - 5/5 on left and 5/5 on right  Hip abduction - 5/5 on left and 5/5 on right  Knee flexion - 5/5 on left and 5/5 on right  Knee extension - 5/5 on left and 5/5 on right  Glutaeus medius - 5/5 on left and 5/5 on right    Special Tests:  Standing Trendelenburg test - negative    Seated straight leg raise - negative  Supine straight leg raise - negative  Hamstring flexibility symmetric    Log roll - negative  ELICEO - negative  FADIR - negative  Scour test - negative  No pain with posterior hip capsule compression    ASIS compression test -  negative  SI drawer test - negative   Thigh thrust test - negative     Piriformis test (Bonnet's) - negative  Ely's test - negative  Quadriceps flexibility symmetric.  Rogerio test - positive  Justin compression test - negative    Fulcrum test - negative    Leg lengths symmetric following OMT to the pelvis.     Neurovascular Exam:  Normal gait without Trendelenburg or antalgia.  2+ femoral, DP, and PT pulses BL.  No skin changes, no abnormal hair distribution.  Sensation intact to light touch throughout the obturator and medial/lateral/posterior femoral cutaneous nerves.  2+/4 reflexes at L4 and S1 dermatomes  Capillary refill intact to <2 seconds in all lower limb digits.    TART (Tissue texture abnormality, Asymmetry,  Restriction of motion and/or Tenderness) changes:     Thoracic Spine   T1 Neutral   T2 Neutral   T3 Neutral   T4 Neutral   T5 Neutral   T6 Neutral   T7 Neutral   T8 Neutral   T9 Neutral   T10 Neutral   T11 NS-left,R-right   T12 NS-left,R-right     Rib cage:neutral     Lumbar Spine   L1 NS-left,R-right   L2 NS-left,R-right   L3 FRS LEFT   L4 FRS LEFT   L5 FRS LEFT     Pelvis:  Innominate:Left superior shear  Pubic bone:Left superior pubic shear    Sacrum:Right on Left sacral torsion    Lower extremity: left anterior talus    Key   F= Flexed   E = Extended   R = Rotated   S = Sidebent   TTA = tissue texture abnormality     Assessment:      Encounter Diagnoses   Name Primary?    Lateral pain of right hip Yes    Myalgia     Somatic dysfunction of lumbar region     Sacral region somatic dysfunction     Somatic dysfunction of thoracic region     Somatic dysfunction of pelvic region     Somatic dysfunction of lower extremity         Plan:   1. Recurrent right posterior lateral hip pain likely secondary to biomechanical restrictions of lower kinetic chain with compensatory firing patterns.  Improve his OMT in the past.  - OMT performed again today to address associated biomechanical restrictions  and HEP  reviewed  - continue to work on proper posture at work desk  - recommend over-the-counter Tylenol as needed for pain control  - cleared to exercise as tolerated, staying below threshold of increased symptoms.  Also recommend adding in since training to workout routine.   -  X-ray images of right hip taken 7/31/24 (AP pelvis and frogleg lateral  right views) showed no abnormalities. Images were personally reviewed with patient.    2. OMT 5-6 regions. Oral consent obtained.  Reviewed benefits and potential side effects, including bruising at site of treatment and increased soreness for the next 24-48 hours. Pt. Instructed to increased water intake by 1 L today and tomorrow to help with any residual soreness.   - OMT indicated today due to signs and symptoms as well as local and remote somatic dysfunction findings and their related neurokinetic, lymphatic, fascial and/or arteriovenous body connections.   - OMT techniques used: Myofascial Release, Muscle Energy, Fascial Distortion Model, and Articulatory   - Treatment was tolerated well. Improvement noted in segmental mobility post-treatment in dysfunctional regions. There were no adverse events and no complications immediately following treatment.     3. Pt. Given the following HEP:  A)  Pelvic clock exercises given to do from the 6-12 o'clock positions:10-15 reps, twice daily. Hand out of exercise also given.   B)  Lower abdominal muscle isotonic exercises: Rotate pelvis into the 6 o'clock position and holding it to engage lower abdominal muscles. Then lift up leg, one at a time, alternating for 10-15 reps. Repeat exercises 1-2 times daily. Handout given.   C) Clam shell exercises bilaterally: hold leg in abducted and externally rotated position for 5-10 seconds, repeat 5-10 times  Continue:  D) IT band rolling: recommend using rolling stick or foam roller to roll out IT band tension bilaterally, 1-2 times a day.     20375 HOME EXERCISE PROGRAM (HEP):  The patient was  taught a homegoing physical therapy regimen as described above. The patient demonstrated understanding of the exercises and proper technique of their execution. This interaction took 15 minutes.     4. Follow-up in 3-4 weeks for reevaluation    5. Patient agreeable to today's plan and all questions were answered    This note is dictated using the M*Modal Fluency Direct word recognition program. There are word recognition mistakes that are occasionally missed on review.

## 2024-10-21 ENCOUNTER — PATIENT MESSAGE (OUTPATIENT)
Dept: HEMATOLOGY/ONCOLOGY | Facility: CLINIC | Age: 44
End: 2024-10-21
Payer: COMMERCIAL

## 2024-10-22 ENCOUNTER — HOSPITAL ENCOUNTER (OUTPATIENT)
Dept: RADIOLOGY | Facility: HOSPITAL | Age: 44
Discharge: HOME | End: 2024-10-22
Attending: OTOLARYNGOLOGY
Payer: COMMERCIAL

## 2024-10-22 ENCOUNTER — OFFICE VISIT (OUTPATIENT)
Dept: OTOLARYNGOLOGY | Facility: CLINIC | Age: 44
End: 2024-10-22
Payer: COMMERCIAL

## 2024-10-22 VITALS
DIASTOLIC BLOOD PRESSURE: 89 MMHG | HEART RATE: 69 BPM | BODY MASS INDEX: 21.7 KG/M2 | SYSTOLIC BLOOD PRESSURE: 146 MMHG | WEIGHT: 111.13 LBS

## 2024-10-22 DIAGNOSIS — J32.8 OTHER CHRONIC SINUSITIS: ICD-10-CM

## 2024-10-22 DIAGNOSIS — R09.82 POSTNASAL DRIP: ICD-10-CM

## 2024-10-22 DIAGNOSIS — R43.9 SMELL AND TASTE DISORDER: ICD-10-CM

## 2024-10-22 DIAGNOSIS — J31.0 CHRONIC RHINITIS: Primary | ICD-10-CM

## 2024-10-22 PROCEDURE — 99999 PR PBB SHADOW E&M-EST. PATIENT-LVL III: CPT | Mod: PBBFAC,,, | Performed by: OTOLARYNGOLOGY

## 2024-10-22 PROCEDURE — 1159F MED LIST DOCD IN RCRD: CPT | Mod: CPTII,S$GLB,, | Performed by: OTOLARYNGOLOGY

## 2024-10-22 PROCEDURE — 70486 CT MAXILLOFACIAL W/O DYE: CPT | Mod: 26,,, | Performed by: RADIOLOGY

## 2024-10-22 PROCEDURE — 3044F HG A1C LEVEL LT 7.0%: CPT | Mod: CPTII,S$GLB,, | Performed by: OTOLARYNGOLOGY

## 2024-10-22 PROCEDURE — 3079F DIAST BP 80-89 MM HG: CPT | Mod: CPTII,S$GLB,, | Performed by: OTOLARYNGOLOGY

## 2024-10-22 PROCEDURE — 3077F SYST BP >= 140 MM HG: CPT | Mod: CPTII,S$GLB,, | Performed by: OTOLARYNGOLOGY

## 2024-10-22 PROCEDURE — 31575 DIAGNOSTIC LARYNGOSCOPY: CPT | Mod: S$GLB,,, | Performed by: OTOLARYNGOLOGY

## 2024-10-22 PROCEDURE — 70486 CT MAXILLOFACIAL W/O DYE: CPT | Mod: TC

## 2024-10-22 PROCEDURE — 99214 OFFICE O/P EST MOD 30 MIN: CPT | Mod: 25,S$GLB,, | Performed by: OTOLARYNGOLOGY

## 2024-10-22 PROCEDURE — 3008F BODY MASS INDEX DOCD: CPT | Mod: CPTII,S$GLB,, | Performed by: OTOLARYNGOLOGY

## 2024-10-22 NOTE — PROGRESS NOTES
Subjective:      Tasha is a 44 y.o. female who comes for follow-up of an olfactory disturbance. Her last visit with me was on 9/24/2024. Used cefdinir for 10 days, now budesonide rinse daily for nearly 1 month, notes some improvement in congestion but minimal change in olfaction.  Also notes more coughing up phlegm.  Occasional blood in sinus rinse.  No nasal blockage.    SNOT-22 score: : (Patient-Rptd) (P) 22  NOSE score:: (Patient-Rptd) (P) 15%  ETDQ-7 score:: (Patient-Rptd) (P) 1.3    The patient's medications, allergies, past medical, surgical, social and family histories were reviewed and updated as appropriate.    A detailed review of systems was obtained with pertinent positives as per the above HPI, and otherwise negative.        Objective:     BP (!) 146/89 (BP Location: Left arm, Patient Position: Sitting)   Pulse 69   Wt 50.4 kg (111 lb 1.8 oz)   BMI 21.70 kg/m²        Constitutional:   She appears well-developed. She is cooperative.     Head:  Normocephalic.     Nose:  No mucosal edema, rhinorrhea, septal deviation or polyps. No epistaxis. Turbinates normal, no turbinate masses and no turbinate hypertrophy.  Right sinus exhibits no maxillary sinus tenderness and no frontal sinus tenderness. Left sinus exhibits no maxillary sinus tenderness and no frontal sinus tenderness.     Mouth/Throat  Oropharynx clear and moist without lesions or asymmetry. No oropharyngeal exudate or posterior oropharyngeal erythema.     Neck:  No adenopathy. Normal range of motion present.     She has no cervical adenopathy.       Procedure    Flexible laryngoscopy performed.  See procedure note.        Data Reviewed    WBC (K/uL)   Date Value   10/10/2024 4.84     Eosinophil % (%)   Date Value   10/10/2024 2.7     Eos # (K/uL)   Date Value   10/10/2024 0.1     Platelets (K/uL)   Date Value   10/10/2024 249     Glucose (mg/dL)   Date Value   10/10/2024 86     Total IgE (IU/mL)   Date Value   10/21/2014 69       I independently  reviewed the images of the CT sinuses dated today in clinic. Pertinent findings include focal mucus accumulation and thickening in the medial right maxillary antrum and trailing into the pharynx.      Assessment:     1. Chronic rhinitis    2. Other chronic sinusitis    3. Smell and taste disorder    4. Postnasal drip         Plan:     She would benefit from endoscopic sinus surgery for the treatment of her condition.  This would include  the right maxillary sinus.  Inferior turbinate reduction would not be included.  I discussed the risks, benefits and alternatives to surgery with the patient, as well as the expected postoperative course.  I gave her the opportunity to ask questions and I answered all of them.  I provided relevant printed information on her condition for her to review at home.  Same-day discharge is anticipated.  She may have anesthesia triage by telephone.   She will call when she decides to schedule surgery.  Regarding the olfactory disturbance, I counseled the patient on the basics of olfactory training, which has been shown in some cases to restore olfactory function over courses of up to 12 weeks of daily therapy.  I provided information on essential oils and a suggested protocol, and provided printed information for review at home.  Follow up for surgery.

## 2024-10-22 NOTE — PROCEDURES
Laryngoscopy    Date/Time: 10/22/2024 1:15 PM    Performed by: Khoa Hart MD  Authorized by: Khoa Hart MD    Consent Done?:  Yes (Verbal)  Anesthesia:     Local anesthetic:  Lidocaine 4% and Karlos-Synephrine 1/2%    Patient tolerance:  Patient tolerated the procedure well with no immediate complications  Laryngoscopy:     Areas examined:  Nasopharynx, oropharynx, hypopharynx, larynx, vocal cords and nasal cavities    Laryngoscope size:  4 mm  Nose Intranasal:      Mucosa no polyps     No mucosa lesions present     No septum gross deformity     Turbinates not enlarged  Nasopharynx:      No mucosa lesions     Adenoids not present     Posterior choanae patent     Eustachian tube patent  Larynx/hypopharynx:      No epiglottis lesions     No epiglottis edema     No AE folds lesions     No vocal cord polyps     Equal and normal bilateral     No hypopharynx lesions     No piriform sinus pooling     No piriform sinus lesions     No post cricoid edema     No post cricoid erythema     Focal white trail of thick mucus from right middle meatus as PND into throat  Larynx wnl

## 2024-10-23 ENCOUNTER — PATIENT MESSAGE (OUTPATIENT)
Dept: HEMATOLOGY/ONCOLOGY | Facility: CLINIC | Age: 44
End: 2024-10-23
Payer: COMMERCIAL

## 2024-10-23 ENCOUNTER — TELEPHONE (OUTPATIENT)
Dept: HEMATOLOGY/ONCOLOGY | Facility: CLINIC | Age: 44
End: 2024-10-23
Payer: COMMERCIAL

## 2024-10-24 ENCOUNTER — PATIENT MESSAGE (OUTPATIENT)
Dept: HEMATOLOGY/ONCOLOGY | Facility: CLINIC | Age: 44
End: 2024-10-24
Payer: COMMERCIAL

## 2024-10-30 ENCOUNTER — LAB VISIT (OUTPATIENT)
Dept: LAB | Facility: HOSPITAL | Age: 44
End: 2024-10-30
Payer: COMMERCIAL

## 2024-10-30 DIAGNOSIS — C50.311 MALIGNANT NEOPLASM OF LOWER-INNER QUADRANT OF RIGHT BREAST OF FEMALE, ESTROGEN RECEPTOR POSITIVE: ICD-10-CM

## 2024-10-30 DIAGNOSIS — Z17.0 MALIGNANT NEOPLASM OF LOWER-INNER QUADRANT OF RIGHT BREAST OF FEMALE, ESTROGEN RECEPTOR POSITIVE: ICD-10-CM

## 2024-10-30 LAB — B-HCG UR QL: NEGATIVE

## 2024-10-30 PROCEDURE — 81025 URINE PREGNANCY TEST: CPT | Performed by: NURSE PRACTITIONER

## 2024-10-31 ENCOUNTER — INFUSION (OUTPATIENT)
Dept: INFUSION THERAPY | Facility: HOSPITAL | Age: 44
End: 2024-10-31
Payer: COMMERCIAL

## 2024-10-31 VITALS
HEART RATE: 64 BPM | DIASTOLIC BLOOD PRESSURE: 86 MMHG | HEIGHT: 60 IN | BODY MASS INDEX: 22.21 KG/M2 | WEIGHT: 113.13 LBS | SYSTOLIC BLOOD PRESSURE: 143 MMHG

## 2024-10-31 DIAGNOSIS — C50.311 MALIGNANT NEOPLASM OF LOWER-INNER QUADRANT OF RIGHT BREAST OF FEMALE, ESTROGEN RECEPTOR POSITIVE: ICD-10-CM

## 2024-10-31 DIAGNOSIS — R11.0 CHEMOTHERAPY-INDUCED NAUSEA: Primary | ICD-10-CM

## 2024-10-31 DIAGNOSIS — Z17.0 MALIGNANT NEOPLASM OF LOWER-INNER QUADRANT OF RIGHT BREAST OF FEMALE, ESTROGEN RECEPTOR POSITIVE: ICD-10-CM

## 2024-10-31 DIAGNOSIS — T45.1X5A CHEMOTHERAPY-INDUCED NAUSEA: Primary | ICD-10-CM

## 2024-10-31 PROCEDURE — 63600175 PHARM REV CODE 636 W HCPCS: Mod: JG | Performed by: INTERNAL MEDICINE

## 2024-10-31 PROCEDURE — A4216 STERILE WATER/SALINE, 10 ML: HCPCS | Performed by: INTERNAL MEDICINE

## 2024-10-31 PROCEDURE — 25000003 PHARM REV CODE 250: Performed by: INTERNAL MEDICINE

## 2024-10-31 RX ORDER — EPINEPHRINE 0.3 MG/.3ML
0.3 INJECTION SUBCUTANEOUS ONCE AS NEEDED
Status: DISCONTINUED | OUTPATIENT
Start: 2024-10-31 | End: 2024-10-31 | Stop reason: HOSPADM

## 2024-10-31 RX ORDER — HEPARIN 100 UNIT/ML
500 SYRINGE INTRAVENOUS
Status: DISCONTINUED | OUTPATIENT
Start: 2024-10-31 | End: 2024-10-31 | Stop reason: HOSPADM

## 2024-10-31 RX ORDER — PROCHLORPERAZINE EDISYLATE 5 MG/ML
10 INJECTION INTRAMUSCULAR; INTRAVENOUS ONCE AS NEEDED
Status: DISCONTINUED | OUTPATIENT
Start: 2024-10-31 | End: 2024-10-31 | Stop reason: HOSPADM

## 2024-10-31 RX ORDER — DIPHENHYDRAMINE HYDROCHLORIDE 50 MG/ML
50 INJECTION INTRAMUSCULAR; INTRAVENOUS ONCE AS NEEDED
Status: DISCONTINUED | OUTPATIENT
Start: 2024-10-31 | End: 2024-10-31 | Stop reason: HOSPADM

## 2024-10-31 RX ORDER — SODIUM CHLORIDE 0.9 % (FLUSH) 0.9 %
10 SYRINGE (ML) INJECTION
Status: DISCONTINUED | OUTPATIENT
Start: 2024-10-31 | End: 2024-10-31 | Stop reason: HOSPADM

## 2024-10-31 RX ADMIN — TRASTUZUMAB-ANNS 300 MG: 420 INJECTION, POWDER, LYOPHILIZED, FOR SOLUTION INTRAVENOUS at 08:10

## 2024-10-31 RX ADMIN — SODIUM CHLORIDE: 9 INJECTION, SOLUTION INTRAVENOUS at 08:10

## 2024-10-31 RX ADMIN — Medication 10 ML: at 08:10

## 2024-10-31 RX ADMIN — HEPARIN 500 UNITS: 100 SYRINGE at 08:10

## 2024-11-13 ENCOUNTER — OFFICE VISIT (OUTPATIENT)
Dept: SPORTS MEDICINE | Facility: CLINIC | Age: 44
End: 2024-11-13
Payer: COMMERCIAL

## 2024-11-13 VITALS — SYSTOLIC BLOOD PRESSURE: 130 MMHG | HEART RATE: 68 BPM | DIASTOLIC BLOOD PRESSURE: 94 MMHG

## 2024-11-13 DIAGNOSIS — M99.03 SOMATIC DYSFUNCTION OF LUMBAR REGION: ICD-10-CM

## 2024-11-13 DIAGNOSIS — M79.10 MYALGIA: ICD-10-CM

## 2024-11-13 DIAGNOSIS — M99.04 SACRAL REGION SOMATIC DYSFUNCTION: ICD-10-CM

## 2024-11-13 DIAGNOSIS — M99.06 SOMATIC DYSFUNCTION OF LOWER EXTREMITY: ICD-10-CM

## 2024-11-13 DIAGNOSIS — M25.551 LATERAL PAIN OF RIGHT HIP: Primary | ICD-10-CM

## 2024-11-13 PROCEDURE — 98926 OSTEOPATH MANJ 3-4 REGIONS: CPT | Mod: S$GLB,,, | Performed by: NEUROMUSCULOSKELETAL MEDICINE & OMM

## 2024-11-13 PROCEDURE — 3075F SYST BP GE 130 - 139MM HG: CPT | Mod: CPTII,S$GLB,, | Performed by: NEUROMUSCULOSKELETAL MEDICINE & OMM

## 2024-11-13 PROCEDURE — 99213 OFFICE O/P EST LOW 20 MIN: CPT | Mod: 25,S$GLB,, | Performed by: NEUROMUSCULOSKELETAL MEDICINE & OMM

## 2024-11-13 PROCEDURE — 1159F MED LIST DOCD IN RCRD: CPT | Mod: CPTII,S$GLB,, | Performed by: NEUROMUSCULOSKELETAL MEDICINE & OMM

## 2024-11-13 PROCEDURE — 3080F DIAST BP >= 90 MM HG: CPT | Mod: CPTII,S$GLB,, | Performed by: NEUROMUSCULOSKELETAL MEDICINE & OMM

## 2024-11-13 PROCEDURE — 99999 PR PBB SHADOW E&M-EST. PATIENT-LVL III: CPT | Mod: PBBFAC,,, | Performed by: NEUROMUSCULOSKELETAL MEDICINE & OMM

## 2024-11-13 PROCEDURE — 1160F RVW MEDS BY RX/DR IN RCRD: CPT | Mod: CPTII,S$GLB,, | Performed by: NEUROMUSCULOSKELETAL MEDICINE & OMM

## 2024-11-13 PROCEDURE — 3044F HG A1C LEVEL LT 7.0%: CPT | Mod: CPTII,S$GLB,, | Performed by: NEUROMUSCULOSKELETAL MEDICINE & OMM

## 2024-11-13 NOTE — PROGRESS NOTES
Subjective:     Tasha Cornejo     Chief Complaint   Patient presents with    Right Hip - Pain     HPI    Tasha is a 44 y.o. female coming in today for right hip pain. Since last visit the pain has remained unchanged. Pt reports persistent discomfort and feels like her progress has plateaued. Pt is compliant with HEP- IT band rolling and calf stretches but has not been doing core exercises. The pain is better with rest and worse with running, standing after sitting at desk, sleeping on side, twisting. Pt. describes the pain as a 4/10 achy pain that does not radiate. There has not been any new a fall/injury/ or traumas since last visit. Pt currently on immunotherapy and tamoxifen. Pt. denies any new musculoskeletal complaints at this time.     Office note from 10/19/24 reviewed    Joint instability? no  Mechanical locking/clicking? no  Affecting ADL's? yes  Affecting sleep? yes    Occupation:     PAST MEDICAL HISTORY:   Past Medical History:   Diagnosis Date    Acid reflux     Chalazion     Food allergy     discontinued dairy to help eosinophilic esophagitis    Hx of psychiatric care     Malignant neoplasm of lower-inner quadrant of right breast of female, estrogen receptor positive 11/06/2023    Seasonal allergies ongoing/unsure    Therapy      PAST SURGICAL HISTORY:   Past Surgical History:   Procedure Laterality Date    ADENOIDECTOMY  1983    BILATERAL MASTECTOMY Bilateral 12/14/2023    Procedure: MASTECTOMY, BILATERAL;  Surgeon: Tasha Mcleod MD;  Location: [facility];  Service: General;  Laterality: Bilateral;  3.5 HOURS / EMAIL SENT 12-4 @ 1:03 LK    COLONOSCOPY N/A 06/02/2023    Procedure: COLONOSCOPY;  Surgeon: Fabio Rice MD;  Location: [facility] (4TH FLR);  Service: Endoscopy;  Laterality: N/A;  pt confirmed earlier time 6/1 EB    ESOPHAGOGASTRODUODENOSCOPY N/A 06/02/2023    Procedure: EGD (ESOPHAGOGASTRODUODENOSCOPY);  Surgeon: Fabio Rice MD;  Location: [facility] (4TH FLR);  Service:  Endoscopy;  Laterality: N/A;  Procedure Timin-4 weeks    referred by Dr. Rice/Prep instructions handed to patient and to portal.  Patient called did not answer- DB    ESOPHAGOGASTRODUODENOSCOPY N/A 10/18/2023    Procedure: EGD (ESOPHAGOGASTRODUODENOSCOPY);  Surgeon: Fabio Rice MD;  Location: [address]);  Service: Endoscopy;  Laterality: N/A;  ref Ray  timeframe 5-12 weeks   Dr. Rice patient  portal instruction and given to patient jm  10/11-precall complete-MS    INJECTION FOR SENTINEL NODE IDENTIFICATION Right 2023    Procedure: INJECTION, FOR SENTINEL NODE IDENTIFICATION;  Surgeon: Tasha Mcleod MD;  Location: [facility];  Service: General;  Laterality: Right;    INSERTION OF BREAST TISSUE EXPANDER Bilateral 2023    Procedure: INSERTION, TISSUE EXPANDER, BREAST;  Surgeon: Allen Damian MD;  Location: [facility];  Service: Plastics;  Laterality: Bilateral;  2 HOURS    INSERTION, CATHETER, TUNNELED Left 2023    Procedure: INSERTION, CATHETER, TUNNELED;  Surgeon: Tasha Mcleod MD;  Location: [facility];  Service: General;  Laterality: Left;    SENTINEL LYMPH NODE BIOPSY Right 2023    Procedure: BIOPSY, LYMPH NODE, SENTINEL;  Surgeon: Tasha Mcleod MD;  Location: [facility];  Service: General;  Laterality: Right;    TYMPANOSTOMY TUBE PLACEMENT       FAMILY HISTORY:   Family History   Problem Relation Name Age of Onset    Solid tumor Mother Father         uterine polyps    Cancer Mother Father     Hypertension Mother Father     Hypertension Father Fahad     Prostate cancer Father Fahad 67        tx: XRT seeds    Pancreatic cancer Paternal Uncle Vern 68        subtype unk; tx: unk    Cancer Maternal Grandmother Kasandra 68        mother thinks that a cancer dx was on death cert.    Cancer Maternal Grandfather Maternal Grandfather         esophageal (abn. cells at 54, tumor at 62)    Heart failure Paternal Grandmother Paternal Grandmother     Albinism Paternal Grandmother  Paternal Grandmother     Alzheimer's disease Paternal Grandmother Paternal Grandmother     Diabetes Paternal Grandfather Paternal Grandfather     Heart failure Paternal Grandfather Paternal Grandfather     Stroke Paternal Grandfather Paternal Grandfather     Breast cancer Other Johanna         dx.late 50s or 60s (tx: unk)    Cancer Other Nichole         cervical or ovarian, dx.mid-60s (tx: unk)    Cancer Other E.J.         lung, dx.mid-60s    Cancer Other Luke III         lung, dx. early 60s    Solid tumor Other          stomach tumor (noncancerous)    Cataracts Neg Hx      Glaucoma Neg Hx      Macular degeneration Neg Hx      Colon cancer Neg Hx       SOCIAL HISTORY:   Social History     Socioeconomic History    Marital status:      Spouse name: Oseas   Occupational History    Occupation:    Tobacco Use    Smoking status: Never    Smokeless tobacco: Never   Substance and Sexual Activity    Alcohol use: Not Currently     Comment: Less than a drink/monthly    Drug use: Never    Sexual activity: Not Currently     Social Drivers of Health     Financial Resource Strain: Low Risk  (6/5/2024)    Received from [facility]    Overall Financial Resource Strain (CARDIA)     Difficulty of Paying Living Expenses: Not hard at all   Food Insecurity: No Food Insecurity (6/5/2024)    Received from [facility]    Hunger Vital Sign     Worried About Running Out of Food in the Last Year: Never true     Ran Out of Food in the Last Year: Never true   Transportation Needs: No Transportation Needs (6/5/2024)    Received from [facility]    PRAPARE - Transportation     Lack of Transportation (Medical): No     Lack of Transportation (Non-Medical): No   Physical Activity: Sufficiently Active (6/5/2024)    Received from [facility]    Exercise Vital Sign     Days of Exercise per Week: 5 days     Minutes of Exercise per Session: 40 min   Stress: No Stress Concern Present (6/5/2024)    Received from [facility]    [place]  [place] [employer] - Occupational Stress Questionnaire     Feeling of Stress : Only a little   Recent Concern: Stress - Stress Concern Present (4/28/2024)    Received from [facility] [place] [employer] - Occupational Stress Questionnaire     Feeling of Stress : Rather much   Housing Stability: Low Risk  (3/4/2024)    Housing Stability Vital Sign     Unable to Pay for Housing in the Last Year: No     Number of Places Lived in the Last Year: 1     Unstable Housing in the Last Year: No     MEDICATIONS:   Current Outpatient Medications:     LIDOcaine-prilocaine (EMLA) cream, Apply topically to port one hour prior to chemotherapy infusion, Disp: 30 g, Rfl: 1    multivitamin (THERAGRAN) per tablet, Take 1 tablet by mouth once daily., Disp: , Rfl:     tamoxifen (NOLVADEX) 20 MG Tab, Take 1 tablet (20 mg total) by mouth once daily., Disp: 30 tablet, Rfl: 11    budesonide (PULMICORT) 0.5 mg/2 mL nebulizer solution, Take 2 mLs (0.5 mg total) by nebulization once daily. For use in saline irrigation as directed., Disp: 60 mL, Rfl: 0    fluticasone propionate (FLONASE) 50 mcg/actuation nasal spray, 1 spray by Each Nare route daily as needed for Rhinitis. (Patient not taking: Reported on 11/13/2024), Disp: , Rfl:   No current facility-administered medications for this visit.    Facility-Administered Medications Ordered in Other Visits:     ceFAZolin 1 g, gentamicin 80 mg in sodium chloride 0.9% 500 mL irrigation, , Irrigation, On Call Procedure, Allen Damian MD    ceFAZolin 1 g, gentamicin 80 mg in sodium chloride 0.9% 500 mL irrigation, , Irrigation, On Call Procedure, Allen Damian MD    ALLERGIES:   Review of patient's allergies indicates:   Allergen Reactions    Aleve [naproxen sodium] Other (See Comments)     Throat swelling     Objective:   VITAL SIGNS: BP (!) 130/94 (Patient Position: Sitting)   Pulse 68    General    Vitals reviewed.  Constitutional: She is oriented to  person, place, and time. She appears well-developed and well-nourished.   Neurological: She is alert and oriented to person, place, and time.   Psychiatric: She has a normal mood and affect. Her behavior is normal.           MUSCULOSKELETAL EXAM  HIP: right HIP  The affected hip is compared to the contralateral hip.    Observation:    There is no edema, erythema, or ecchymosis in the lumbosacral region.   There is no Trendelenburg sign on either side  No obvious pelvic obliquity while standing.    No thoracolumbar scoliosis observed.    No midline skin abnormalities.  No atrophy noted in the lower limbs.  Gait: Non-antalgic with Neutral ankle mechanics and Neutral medial arch  Adequate bilateral pelvic stability without any hip hiking compensatory pattern noted with single leg raise    ROM (* = with pain):  Passive hip flexion to 120° on left and 120° on right  Passive hip internal rotation to 45° on left and 45° on right  Passive hip external rotation to 45° on left and 45° on right   Passive hip abduction to 45° on left and 45° on right  All motions above are without pain.    Tenderness To Palpation:  No tenderness at the ASIS, AIIS, PSIS, PIIS, iliac crest, pubic bones, ischial tuberosity.  No tenderness throughout the lumbar spine, iliolumbar region, or posterior pelvis.  No tenderness throughout the sacrum or piriformis  No tenderness over the greater trochanteric bursa or greater/lesser trochanters.  No tenderness at the glut attachments on the greater trochanter  No tenderness over proximal IT band or hip flexor musculature.  + R TFL and glutaeus priyanka tenderness    Strength Testing (* = with pain):  Hip flexion - 5/5 on left and 5/5 on right  Hip extension - 5/5 on left and 5/5 on right  Hip adduction - 5/5 on left and 5/5 on right  Hip abduction - 5/5 on left and 5/5 on right  Knee flexion - 5/5 on left and 5/5 on right  Knee extension - 5/5 on left and 5/5 on right    Special Tests:  Standing  Trendelenburg test - negative    Seated straight leg raise - negative  Supine straight leg raise - negative  Hamstring flexibility symmetric    Log roll - negative  ELICEO - negative  FADIR - negative  Scour test - negative  No pain with posterior hip capsule compression    ASIS compression test - negative  SI drawer test - negative   Thigh thrust test - negative     Piriformis test (Bonnet's) - negative  Ely's test - negative  Quadriceps flexibility symmetric.  Rogerio test - positive  Justin compression test - negative    Fulcrum test - negative    Leg lengths symmetric following OMT to the pelvis.     Neurovascular Exam:  Normal gait without Trendelenburg or antalgia.  2+ femoral, DP, and PT pulses BL.  No skin changes, no abnormal hair distribution.  Sensation intact to light touch throughout the obturator and medial/lateral/posterior femoral cutaneous nerves.  2+/4 reflexes at L4 and S1 dermatomes  Capillary refill intact to <2 seconds in all lower limb digits.    TART (Tissue texture abnormality, Asymmetry,  Restriction of motion and/or Tenderness) changes:     Lumbar Spine   L1 Neutral   L2 Neutral   L3 Neutral   L4 FRS LEFT   L5 FRS LEFT     Pelvis:  Innominate:Neutral  Pubic bone:Neutral    Sacrum:Right on Left sacral torsion    Lower extremity: left TFL Herniated trigger point (HTP) fascial distortion     Key   F= Flexed   E = Extended   R = Rotated   S = Sidebent   TTA = tissue texture abnormality     Assessment:      Encounter Diagnoses   Name Primary?    Lateral pain of right hip Yes    Myalgia         Plan:   1. Recurrent, persistent  right posterior lateral hip pain likely secondary to biomechanical and fascial restrictions of lower kinetic chain with compensatory firing patterns.   - OMT performed again today to address associated biomechanical restrictions  and HEP reviewed  - continue to work on proper posture at work desk  - recommend over-the-counter Tylenol as needed for pain control  - Referral to  outpatient PT ([facility]) for increase pelvic stability with core and gluteal strengthening, home exercise program and dry needling  -  X-ray images of right hip taken 7/31/24 (AP pelvis and frogleg lateral  right views) showed no abnormalities. Images were personally reviewed with patient.    2. OMT 3-4 regions. Oral consent obtained.  Reviewed benefits and potential side effects, including bruising at site of treatment and increased soreness for the next 24-48 hours. Pt. Instructed to increased water intake by 1 L today and tomorrow to help with any residual soreness.   - OMT indicated today due to signs and symptoms as well as local and remote somatic dysfunction findings and their related neurokinetic, lymphatic, fascial and/or arteriovenous body connections.   - OMT techniques used: Myofascial Release, Muscle Energy, and Fascial Distortion Model   - Treatment was tolerated well. Improvement noted in segmental mobility post-treatment in dysfunctional regions. There were no adverse events and no complications immediately following treatment.     3. Continue the following HEP:  A)  Pelvic clock exercises given to do from the 6-12 o'clock positions:10-15 reps, twice daily. Hand out of exercise also given.   B)  Lower abdominal muscle isotonic exercises: Rotate pelvis into the 6 o'clock position and holding it to engage lower abdominal muscles. Then lift up leg, one at a time, alternating for 10-15 reps. Repeat exercises 1-2 times daily. Handout given.   C) Clam shell exercises bilaterally: hold leg in abducted and externally rotated position for 5-10 seconds, repeat 5-10 times  Continue:  D) IT band rolling: recommend using rolling stick or foam roller to roll out IT band tension bilaterally, 1-2 times a day.      The patient was taught a homegoing physical therapy regimen as described above. The patient demonstrated understanding of the exercises and proper technique of their execution.      4. Follow-up  upon completion of PT if pain persist or deteriorates    5. Patient agreeable to today's plan and all questions were answered    This note is dictated using the M*Modal Fluency Direct word recognition program. There are word recognition mistakes that are occasionally missed on review.

## 2024-11-14 ENCOUNTER — CLINICAL SUPPORT (OUTPATIENT)
Dept: REHABILITATION | Facility: HOSPITAL | Age: 44
End: 2024-11-14
Payer: COMMERCIAL

## 2024-11-14 ENCOUNTER — TELEPHONE (OUTPATIENT)
Dept: OPTOMETRY | Facility: CLINIC | Age: 44
End: 2024-11-14
Payer: COMMERCIAL

## 2024-11-14 DIAGNOSIS — M25.551 LATERAL PAIN OF RIGHT HIP: ICD-10-CM

## 2024-11-14 DIAGNOSIS — G89.29 CHRONIC RIGHT HIP PAIN: Primary | ICD-10-CM

## 2024-11-14 DIAGNOSIS — M79.10 MYALGIA: ICD-10-CM

## 2024-11-14 DIAGNOSIS — M25.551 CHRONIC RIGHT HIP PAIN: Primary | ICD-10-CM

## 2024-11-14 DIAGNOSIS — R29.898 WEAKNESS OF RIGHT LOWER EXTREMITY: ICD-10-CM

## 2024-11-14 PROCEDURE — 97162 PT EVAL MOD COMPLEX 30 MIN: CPT

## 2024-11-14 PROCEDURE — 97530 THERAPEUTIC ACTIVITIES: CPT

## 2024-11-15 ENCOUNTER — OFFICE VISIT (OUTPATIENT)
Dept: OPTOMETRY | Facility: CLINIC | Age: 44
End: 2024-11-15
Payer: COMMERCIAL

## 2024-11-15 DIAGNOSIS — G43.801 OCULAR MIGRAINE WITH STATUS MIGRAINOSUS: ICD-10-CM

## 2024-11-15 DIAGNOSIS — Z04.9 DISEASE RULED OUT AFTER EXAMINATION: Primary | ICD-10-CM

## 2024-11-15 DIAGNOSIS — H52.533 ACCOMMODATION SPASM, BILATERAL: ICD-10-CM

## 2024-11-15 DIAGNOSIS — H52.03 HYPEROPIA, BILATERAL: ICD-10-CM

## 2024-11-15 DIAGNOSIS — H52.4 PRESBYOPIA: ICD-10-CM

## 2024-11-15 PROCEDURE — 99999 PR PBB SHADOW E&M-EST. PATIENT-LVL II: CPT | Mod: PBBFAC,,, | Performed by: OPTOMETRIST

## 2024-11-15 NOTE — PLAN OF CARE
[facility]   Physical Therapy Initial Evaluation      Date: 11/14/2024   Name: Tasha Cornejo  Clinic Number: [id number]    Therapy Diagnosis:   Encounter Diagnoses   Name Primary?    Lateral pain of right hip     Myalgia     Chronic right hip pain Yes    Weakness of right lower extremity       Physician: Myrna Sofia DO     Physician Orders: PT Eval and Treat  Medical Diagnosis from Referral:   M25.551 (ICD-10-CM) - Lateral pain of right hip   M79.10 (ICD-10-CM) - Myalgia     Evaluation Date: 11/14/2024  Authorization Period Expiration: 11/13/2025  Plan of Care Expiration: 2/6/2025  Progress Note Due: 12/10/2024  Visit # / Visits authorized: 1/1   FOTO: 1/3 (last performed on 11/14/2024)    Precautions: Standard and cancer    Time In: 11:00 am   Time Out: 12:00 pm   Total Billable Time (timed & untimed codes): 60 minutes    Subjective     Date of onset: off and on for a while but increased about one month ago     History of current condition - Tasha reports she has been experiencing right hip pain on and off for the past few years, but it increased about one month ago when she increased her running mileage. She states she did 5 miles one day instead of 4 miles. She states it was on the same terrain but felt a sharp pain on the outside of her right hip during the 5 mile run. Localizes pain to lateral aspect of her right hip that she states occasionally radiates down the lateral aspect of her right leg but never to or past her knee. The pain sometimes wakes her up at night while laying on it and hurts when standing up after sitting for a prolonged time. She notices an ache when sedentary throughout the day. 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 She only ran twice within the past few weeks but only 2-3 miles, and she walks for a few minutes after every mile (typically). Did not have pain during these runs but felt pain a few hours post run. Denies changes in symptoms  throughout the day but states it seems better with movement. She does not perform any other exercise outside of running other than walking her dog daily. She is currently receiving chemotherapy and immunotherapy (3 times per week) for breast cancer. She had a bilateral mastectomy and will have surgery on 12/11/2024 which will require 6 weeks of recovery with lifting restrictions. Her goal is to decrease her hip pain with ADLs and running.     Falls: none     Imaging: [x] Xray [] MRI [] CT: Performed on: 7/31/2024  FINDINGS:  Hip joint spaces are normally maintained on both sides, without narrowing.  No conventional radiographic evidence to specifically suggest avascular necrosis of either femoral head.  No evidence of recent or healing fracture, lytic destructive process, or femoroacetabular impingement noted.  SI joints appear unremarkable.     Impression:  No significant conventional radiographic abnormality referable to the hips is appreciated.    Pain:  Current 3/10, worst 8/10, best 1/10   Location: [x] Right   [] Left:  hip   Description: dull and sometimes sharp, persistent pain   Aggravating Factors: running, being sedentary   Easing Factors: activity avoidance, rest, tylenol     Prior Therapy:   [x] N/A    [] Yes:   Social History: Pt lives with her  and dog.   Sport/Hobbies: running  Occupation: Pt is  (sedentary).   Prior Level of Function: Independent, intermittent right hip pain over the past few years limiting exercise   Current Level of Function: Independent, increased right hip pain that is limiting her with running     Pts goals: Pt reported goals are to decrease hip pain to continue running.     Medical History:   Past Medical History:   Diagnosis Date    Acid reflux     Chalazion     Food allergy     discontinued dairy to help eosinophilic esophagitis    Hx of psychiatric care     Malignant neoplasm of lower-inner quadrant of right breast of female, estrogen receptor  positive 11/06/2023    Seasonal allergies ongoing/unsure    Therapy        Surgical History:   Tasha Cornejo  has a past surgical history that includes Esophagogastroduodenoscopy (N/A, 06/02/2023); Colonoscopy (N/A, 06/02/2023); Esophagogastroduodenoscopy (N/A, 10/18/2023); Insertion of breast tissue expander (Bilateral, 12/14/2023); Bilateral mastectomy (Bilateral, 12/14/2023); [place] lymph node biopsy (Right, 12/14/2023); Injection for sentinel node identification (Right, 12/14/2023); insertion, catheter, tunneled (Left, 12/14/2023); Adenoidectomy (1983); and Tympanostomy tube placement (1983).    Medications:   Tasha has a current medication list which includes the following prescription(s): lidocaine-prilocaine, multivitamin, and tamoxifen, and the following Facility-Administered Medications: ceFAZolin 1 g, gentamicin 80 mg in sodium chloride 0.9% 500 mL irrigation and ceFAZolin 1 g, gentamicin 80 mg in sodium chloride 0.9% 500 mL irrigation.    Allergies:   Review of patient's allergies indicates:   Allergen Reactions    Aleve [naproxen sodium] Other (See Comments)     Throat swelling        Objective      Gait: mild pelvic drop on left during right stance phase, otherwise unremarkable     Functional Testing:  Squat: shifts into deeper hip flexion on left side, no pain   SLS: mild left pelvic drop when standing on RLE  SL Squat: can squat parallel to knee for depth bilaterally, mild discomfort on right side with mild femoral adduction and IR     Lumbar Range of Motion:    % Limitation Pain   Flexion 0   none     Extension 0   none     Left Side Bending 0 none     Right Side Bending 0 none     Left rotation   0 none     Right Rotation   0 none         Hip Passive Range of Motion:   Right  Left    Flexion 120 120   Extension 10 10   Ext. Rotation 45 45   Int. Rotation 30 40       Knee Range of Motion:   Right  Left    Passive 10-0-135 10-0-135   Active  10-0-135 10-0-135        Lower Extremity Strength:    "Right  Left    Quadriceps: 5/5 5/5   Hamstring at 30 de/5 4/5   Iliopsoas (supine):  4-/5 4/5   Hip extension:  5/5 5/5   Hip Abduction: 3+/5 4/5   Hip Internal Rotation: 5/5 5/5   Hip External Rotation:  4/5 4/5       Special Tests:   Right Left   Hip Scour  (-)  (-)    ELICEO (-)  "Feels tight" (-)    FADDIR  (-)  (-)    Log Roll (-) (-)    Burrows's Compression Test   (-)  (-)        Joint Mobility: normal inferior, inferior-lateral, posterior, and anterior glide of both hips      Flexibility:   Rogerio's: R (+) ; L (+)    Hamstrings: R (-) ; L  (-)     External Rotators: R (-) ; L (-)       Palpation: no TTP to lateral femoral condyle on right, little to no TTP of right greater trochanter     Sensation: intact light touch     Function:     Intake Outcome Measure for FOTO Hip Survey    Therapist reviewed FOTO scores for Tasha on 2024.   FOTO report - see Media section or FOTO account for episode details    Intake Score: 61%         Treatment     Treatment Time In: 11:45 am   Treatment Time Out: 12:00 pm   Total Treatment time separate from Evaluation: 15 minutes    Tasha participated in dynamic functional therapeutic activities to improve functional performance for 15 minutes, including:  HEP:  - Captain Brad's 3x30"   - Hip pretzels 2x15 B   - Marching bridge 10x3" hold B     Patient Education:  - see below    Patient Education and Home Exercises     Education provided:   Prognosis, activity modification, goals for therapy, role of therapy for care, exercises/HEP  Running progressions/modifications   Walking   Anatomy     Written Home Exercises Provided: yes.  Exercises were reviewed and Tasha was able to demonstrate them prior to the end of the session.  Tasha demonstrated good  understanding of the education provided. See EMR under Patient Instructions for exercises provided during therapy sessions.    Assessment     Tasha is a 44 y.o. female referred to outpatient Physical Therapy with a medical diagnosis of " "lateral pain of right hip and myalgia. Pt presents with localized lateral hip pain with prolonged positions and after activity, weakness of hip musculature, decrease flexibility, and impairments with functional movement patterns that are limiting her ADLs and exercise capability. Signs and symptoms suggest gluteal tendinopathy based on subjective complaints and objective assessment. Pt will benefit from physical therapy to address these deficits with hopes to decrease pain and improve quality of life.     Pt prognosis is Good  Pt will benefit from skilled outpatient Physical Therapy to address the deficits stated above and in the chart below, provide pt/family education, and to maximize pt's level of independence.     Plan of care discussed with patient: Yes  Pt's spiritual, cultural and educational needs considered and patient is agreeable to the plan of care and goals as stated below:     Anticipated Barriers for therapy: chronicity of condition    Medical Necessity is demonstrated by the following  History  Co-morbidities and personal factors that may impact the plan of care [] LOW: no personal factors / co-morbidities  [x] MODERATE: 1-2 personal factors / co-morbidities  [] HIGH: 3+ personal factors / co-morbidities    Moderate / High Support Documentation:   Past Medical History:   Diagnosis Date    Acid reflux     Chalazion     Food allergy     discontinued dairy to help eosinophilic esophagitis    Hx of psychiatric care     Malignant neoplasm of lower-inner quadrant of right breast of female, estrogen receptor positive 11/06/2023    Seasonal allergies ongoing/unsure    Therapy         Examination  Body Structures and Functions, activity limitations and participation restrictions that may impact the plan of care [] LOW: addressing 1-2 elements  [x] MODERATE: 3+ elements  [] HIGH: 4+ elements (please support below)    Moderate / High Support Documentation: See above in "Current Level of Function"      Clinical " Presentation [] LOW: stable  [x] MODERATE: Evolving  [] HIGH: Unstable     Decision Making/ Complexity Score: moderate         Goals:  Short Term Goals: 4-6 weeks  1. Pt will be compliant with HEP 50% of prescribed amount.   2. Pt to demo at least 10 lateral step downs without pelvic drop to demo improvement in gluteal strength with functional activities.   3.  Pt to demo at least 1/3 grade of improvement on hip manual muscle testing to improve activity tolerance.   4. Pt will report worst pain of </= 3/10 in order to progress toward max functional ability and improve quality of life.  5. Pt to run 2-3 miles independently with minimal hip pain.     Long Term Goals: 6-12 weeks   Pt will be compliant with % of prescribed amount.   Patient will improve their FOTO limitation score to at least 77 as evidence of clinically significant improvements in their function.  Pt to demo at least 10 single leg squats without compensations to improve running tolerance.   Pt to demo at least 4+/5 of hip manual muscle testing to demo improvement in tolerance to activity.   Pt to demo LSI >/= 95% on hip abduction strength to improve running tolerance.   Pt goal: be able to run 4 days a week without limitations from her right hip.     Plan     Plan of care Certification: 11/14/2024 to 2/6/2025.    Outpatient Physical Therapy 1-2 times weekly for 12 weeks to include any combination of the following interventions: virtual visits, dry needling, modalities, electrical stimulation (IFC, Pre-Mod, Attended with Functional Dry Needling), Gait Training, Manual Therapy, Moist Heat/ Ice, Neuromuscular Re-ed, Patient Education, Self Care, Therapeutic Exercise, and Therapeutic Activites     Celeste Aguiar, PT, DPT

## 2024-11-15 NOTE — PROGRESS NOTES
[facility]    MEG: 11/10/2023 - Dr. Jarquin     CC: Pt is here today for a routine eye exam. Pt is unsure of any visual   changes since her last exam.     (-)Dryness  (-)Burning  (-)Itchiness  (-)Tearing  (-)Flashes  (-)Floaters   (-)Photophobia  (-)Eye Pain      Diabetic: no    Contact Lens: no    Eye Meds: none    PD: 64.0    Last edited by Rosa Long MA on 11/15/2024  2:52 PM.            Assessment /Plan     For exam results, see Encounter Report.    Disease ruled out after examination    Ocular migraine with status migrainosus               Good internal ocular health, monitor yearly     Presbyopia  Hyperopia, bilateral  Accommodation spasm, bilateral    Rx specs, PAL    RTC 1 year

## 2024-11-20 NOTE — PROGRESS NOTES
[facility]   at [facility] Note:      Chief Complaint:   Encounter Diagnoses   Name Primary?    Malignant neoplasm of lower-inner quadrant of right breast of female, estrogen receptor positive Yes    BARD1 gene mutation positive     Care related to current tamoxifen use       Cancer Staging   Malignant neoplasm of lower-inner quadrant of right breast of female, estrogen receptor positive  Staging form: Breast, AJCC 8th Edition  - Clinical stage from 10/26/2023: Stage IIA (cT2, cN0, cM0, G3, ER+, MN-, HER2+) - Signed by Linda Mcbride MD on 11/6/2023  - Pathologic stage from 12/14/2023: Stage IA (pT1c, pN0, cM0, G2, ER+, MN-, HER2+) - Signed by Linda Mcbride MD on 1/5/2024    HPI:  Tasha Cornejo is a 44 y.o. female who presents today for adjuvant kanjinti. She has been on tamoxifen and doing well.   She has noticed some thinning in her eyebrows that started about a month ago (August 2024). She had them return after chemotherapy, but they have started to thin again  She has also noticed a change in taste/smell that started about a month ago. She saw ENT who did a scope and said there was a fungal infection.  Tried antibiotics and nasal sprays, but with cont symptoms.  ENT is recommending endoscopic sinus surgery    Hot flashes are increasing  Says she feels more on edge currently  Eating and drinking normally, weight is stable  No bowel problems  No fevers or s/s of infection  Does have hip pain, was coming and going with running, saw sports medicine for this and is doing physical therapy, this was occurring prior to her breast cancer diagnosis    Per Dr. Mcbride's note:  Oncology History  Screening mammogram on 10/5/2023 identified coarse heterogeneous calcifications in a linear distribution seen in the lower outer quadrant of the right breast, spanning 1.5 cm.  Diagnostic mammogram on 10/12/2023 showed coarse heterogeneous calcifications  in a regional distribution spanning 3.2 cm long axis   Biopsy 10/26/2023 with pathology revealing infiltrating ductal carcinoma of the breast, ER 70%/ IL-/HER2 3+; Ki67 60%    Bilateral mastectomy 23: IDC, grade 2, 1.5cm in maximum; 0/2 LN+    Adjuvant TH 24  Adjuvant tamoxifen:  2024    GYN History:  Age of menarche was 9.   Age of menopause n/a.  Patient denies hormonal therapy.   Patient is .     Patient Active Problem List   Diagnosis    Rhinitis, allergic    Allergic conjunctivitis    Vitamin D deficiency    Malignant neoplasm of lower-inner quadrant of right breast of female, estrogen receptor positive    Chemotherapy-induced nausea    At risk for lymphedema    Stress and adjustment reaction    Physical deconditioning    Immunodeficiency due to drugs    Decreased ROM of right shoulder    Shoulder weakness    Anxiety about health    Palpitations    Chronic right hip pain    Weakness of right lower extremity       Current Outpatient Medications   Medication Instructions    LIDOcaine-prilocaine (EMLA) cream Apply topically to port one hour prior to chemotherapy infusion    multivitamin (THERAGRAN) per tablet 1 tablet, Daily    tamoxifen (NOLVADEX) 20 mg, Oral, Daily       Review of Systems:   Review of Systems   Respiratory:  Negative for cough and shortness of breath.    Cardiovascular:  Negative for chest pain.   Gastrointestinal:  Negative for abdominal pain and diarrhea.   Genitourinary:  Negative for dysuria and frequency.   Musculoskeletal:  Positive for joint pain. Negative for back pain.   Skin:  Negative for rash.   Neurological:  Positive for headaches.   Endo/Heme/Allergies:         Night sweats   Psychiatric/Behavioral:  The patient is nervous/anxious.    All other systems reviewed and are negative.      PHYSICAL EXAM:  BP (!) 140/85 (BP Location: Left arm, Patient Position: Sitting)   Pulse 68   Temp 97 °F (36.1 °C) (Oral)   Ht 5' (1.524 m)   Wt 51 kg (112 lb 7 oz)   SpO2 99%    BMI 21.96 kg/m²     General Appearance:    Alert, cooperative, no distress, appears stated age   Head:    Normocephalic, without obvious abnormality, atraumatic   Eyes:    PERRL, conjunctiva/corneas clear   Nose:   Nares normal, septum midline   Throat:   Lips, mucosa, and tongue normal; teeth and gums normal   Lungs:     Respirations unlabored   Extremities:   Extremities normal, atraumatic, no cyanosis or edema   Pulses:   2+ and symmetric all extremities   Skin:   Skin color, texture, turgor normal, no rashes or lesions   Neurologic:   CNII-XII intact, normal gait     Pertinent Labs & Imaging:  Pathology Results  (Last 30 days)      None            No results found for this or any previous visit (from the past 24 hours).      Assessment & Plan:  1. Malignant neoplasm of lower-inner quadrant of right breast of female, estrogen receptor positive    2. BARD1 gene mutation positive    3. Care related to current tamoxifen use      Final pathology T1cN0  Reviewed Sept echo. ok for treatment today with Kanjinti. Repeat echo December 2024  Repeat labs Feb 2024  Continue tamoxifen.  Tolerating fairly well today  Will monitor eyebrow loss. Follow up with ENT for taste/ smell  Offered effexor to help with hot flashes and mood, pt would like to wait till after her breast reconstruction surgery which is scheduled for 12/11  Offered psychology follow up, pt will consider    Will plan to continue on routine follow up. Already scheduled     Route Chart for Scheduling    Med Onc Chart Routing      Follow up with physician . Scheduled 12/26   Follow up with BHARGAVI    Infusion scheduling note   every 3 weeks x 2 more (scheduled around reconstruction surgery already)   Injection scheduling note    Labs   Scheduling:  Preferred lab:  Lab interval:  Urinary pregnancy test every 3 weeks x 2   Imaging ECHO   should be scheduled in December prior to her surgery on 12/11   Pharmacy appointment    Other referrals                  Treatment  Plan Information   OP BREAST TRASTUZUMAB PACLITAXEL WEEKLY Linda Mcbride MD   Associated diagnosis: Chemotherapy-induced nausea   noted on 11/13/2023  Associated diagnosis: Malignant neoplasm of lower-inner quadrant of right breast of female, estrogen receptor positive Stage IIA, Stage IA cT2, cN0, cM0, G3, ER+, RI-, HER2+, pT1c, pN0, cM0, G2, ER+, RI-, HER2+ noted on 11/6/2023   Line of treatment: Adjuvant  Treatment Goal: Curative     Upcoming Treatment Dates - OP BREAST TRASTUZUMAB PACLITAXEL WEEKLY    11/21/2024       Chemotherapy       trastuzumab-anns (KANJINTI) 318 mg in 0.9% NaCl 250 mL chemo infusion  12/12/2024       Chemotherapy       trastuzumab-anns (KANJINTI) 318 mg in 0.9% NaCl 250 mL chemo infusion  1/2/2025       Chemotherapy       trastuzumab-anns (KANJINTI) 318 mg in 0.9% NaCl 250 mL chemo infusion    MDM includes  :    - Acute or chronic illness or injury that poses a threat to life or bodily function  - Independent review and explanation of 2 results from unique tests  - Discussion of management and ordering 2 unique tests  - Extensive discussion of treatment and management  - Prescription drug management  - Drug therapy requiring intensive monitoring for toxicity    Gabrielle Woo, BREN   11/21/2024

## 2024-11-21 ENCOUNTER — OFFICE VISIT (OUTPATIENT)
Dept: HEMATOLOGY/ONCOLOGY | Facility: CLINIC | Age: 44
End: 2024-11-21
Payer: COMMERCIAL

## 2024-11-21 ENCOUNTER — TELEPHONE (OUTPATIENT)
Dept: HEMATOLOGY/ONCOLOGY | Facility: CLINIC | Age: 44
End: 2024-11-21

## 2024-11-21 ENCOUNTER — ANESTHESIA EVENT (OUTPATIENT)
Dept: SURGERY | Facility: OTHER | Age: 44
End: 2024-11-21
Payer: COMMERCIAL

## 2024-11-21 ENCOUNTER — INFUSION (OUTPATIENT)
Dept: INFUSION THERAPY | Facility: HOSPITAL | Age: 44
End: 2024-11-21
Payer: COMMERCIAL

## 2024-11-21 ENCOUNTER — LAB VISIT (OUTPATIENT)
Dept: LAB | Facility: HOSPITAL | Age: 44
End: 2024-11-21
Attending: INTERNAL MEDICINE
Payer: COMMERCIAL

## 2024-11-21 VITALS
SYSTOLIC BLOOD PRESSURE: 154 MMHG | TEMPERATURE: 97 F | OXYGEN SATURATION: 100 % | HEIGHT: 60 IN | RESPIRATION RATE: 18 BRPM | BODY MASS INDEX: 22.07 KG/M2 | WEIGHT: 112.44 LBS | DIASTOLIC BLOOD PRESSURE: 84 MMHG | HEART RATE: 62 BPM

## 2024-11-21 VITALS
OXYGEN SATURATION: 99 % | TEMPERATURE: 97 F | DIASTOLIC BLOOD PRESSURE: 85 MMHG | HEART RATE: 68 BPM | WEIGHT: 112.44 LBS | BODY MASS INDEX: 22.07 KG/M2 | SYSTOLIC BLOOD PRESSURE: 140 MMHG | HEIGHT: 60 IN

## 2024-11-21 DIAGNOSIS — Z17.0 MALIGNANT NEOPLASM OF LOWER-INNER QUADRANT OF RIGHT BREAST OF FEMALE, ESTROGEN RECEPTOR POSITIVE: Primary | ICD-10-CM

## 2024-11-21 DIAGNOSIS — Z17.0 MALIGNANT NEOPLASM OF LOWER-INNER QUADRANT OF RIGHT BREAST OF FEMALE, ESTROGEN RECEPTOR POSITIVE: ICD-10-CM

## 2024-11-21 DIAGNOSIS — R11.0 CHEMOTHERAPY-INDUCED NAUSEA: Primary | ICD-10-CM

## 2024-11-21 DIAGNOSIS — Z15.01 BARD1 GENE MUTATION POSITIVE: ICD-10-CM

## 2024-11-21 DIAGNOSIS — C50.311 MALIGNANT NEOPLASM OF LOWER-INNER QUADRANT OF RIGHT BREAST OF FEMALE, ESTROGEN RECEPTOR POSITIVE: ICD-10-CM

## 2024-11-21 DIAGNOSIS — Z79.810 CARE RELATED TO CURRENT TAMOXIFEN USE: ICD-10-CM

## 2024-11-21 DIAGNOSIS — T45.1X5A CHEMOTHERAPY-INDUCED NAUSEA: Primary | ICD-10-CM

## 2024-11-21 DIAGNOSIS — C50.311 MALIGNANT NEOPLASM OF LOWER-INNER QUADRANT OF RIGHT BREAST OF FEMALE, ESTROGEN RECEPTOR POSITIVE: Primary | ICD-10-CM

## 2024-11-21 LAB
ALBUMIN SERPL BCP-MCNC: 4 G/DL (ref 3.5–5.2)
ALP SERPL-CCNC: 42 U/L (ref 40–150)
ALT SERPL W/O P-5'-P-CCNC: 15 U/L (ref 10–44)
ANION GAP SERPL CALC-SCNC: 6 MMOL/L (ref 8–16)
AST SERPL-CCNC: 24 U/L (ref 10–40)
BASOPHILS # BLD AUTO: 0.08 K/UL (ref 0–0.2)
BASOPHILS NFR BLD: 1.9 % (ref 0–1.9)
BILIRUB SERPL-MCNC: 0.3 MG/DL (ref 0.1–1)
BUN SERPL-MCNC: 19 MG/DL (ref 6–20)
CALCIUM SERPL-MCNC: 9.3 MG/DL (ref 8.7–10.5)
CHLORIDE SERPL-SCNC: 110 MMOL/L (ref 95–110)
CO2 SERPL-SCNC: 26 MMOL/L (ref 23–29)
CREAT SERPL-MCNC: 0.9 MG/DL (ref 0.5–1.4)
DIFFERENTIAL METHOD BLD: NORMAL
EOSINOPHIL # BLD AUTO: 0.2 K/UL (ref 0–0.5)
EOSINOPHIL NFR BLD: 5.5 % (ref 0–8)
ERYTHROCYTE [DISTWIDTH] IN BLOOD BY AUTOMATED COUNT: 12.3 % (ref 11.5–14.5)
EST. GFR  (NO RACE VARIABLE): >60 ML/MIN/1.73 M^2
GLUCOSE SERPL-MCNC: 86 MG/DL (ref 70–110)
HCG INTACT+B SERPL-ACNC: <2.4 MIU/ML
HCT VFR BLD AUTO: 42 % (ref 37–48.5)
HGB BLD-MCNC: 13.5 G/DL (ref 12–16)
IMM GRANULOCYTES # BLD AUTO: 0 K/UL (ref 0–0.04)
IMM GRANULOCYTES NFR BLD AUTO: 0 % (ref 0–0.5)
LYMPHOCYTES # BLD AUTO: 1.6 K/UL (ref 1–4.8)
LYMPHOCYTES NFR BLD: 37.4 % (ref 18–48)
MCH RBC QN AUTO: 29.7 PG (ref 27–31)
MCHC RBC AUTO-ENTMCNC: 32.1 G/DL (ref 32–36)
MCV RBC AUTO: 92 FL (ref 82–98)
MONOCYTES # BLD AUTO: 0.4 K/UL (ref 0.3–1)
MONOCYTES NFR BLD: 10.5 % (ref 4–15)
NEUTROPHILS # BLD AUTO: 1.9 K/UL (ref 1.8–7.7)
NEUTROPHILS NFR BLD: 44.7 % (ref 38–73)
NRBC BLD-RTO: 0 /100 WBC
PLATELET # BLD AUTO: 233 K/UL (ref 150–450)
PMV BLD AUTO: 10.4 FL (ref 9.2–12.9)
POTASSIUM SERPL-SCNC: 5.2 MMOL/L (ref 3.5–5.1)
PROT SERPL-MCNC: 7 G/DL (ref 6–8.4)
RBC # BLD AUTO: 4.55 M/UL (ref 4–5.4)
SODIUM SERPL-SCNC: 142 MMOL/L (ref 136–145)
WBC # BLD AUTO: 4.2 K/UL (ref 3.9–12.7)

## 2024-11-21 PROCEDURE — 63600175 PHARM REV CODE 636 W HCPCS: Mod: JG | Performed by: INTERNAL MEDICINE

## 2024-11-21 PROCEDURE — A4216 STERILE WATER/SALINE, 10 ML: HCPCS | Performed by: INTERNAL MEDICINE

## 2024-11-21 PROCEDURE — 85025 COMPLETE CBC W/AUTO DIFF WBC: CPT | Performed by: INTERNAL MEDICINE

## 2024-11-21 PROCEDURE — 3044F HG A1C LEVEL LT 7.0%: CPT | Mod: CPTII,S$GLB,, | Performed by: NURSE PRACTITIONER

## 2024-11-21 PROCEDURE — 25000003 PHARM REV CODE 250: Performed by: INTERNAL MEDICINE

## 2024-11-21 PROCEDURE — 3079F DIAST BP 80-89 MM HG: CPT | Mod: CPTII,S$GLB,, | Performed by: NURSE PRACTITIONER

## 2024-11-21 PROCEDURE — 3008F BODY MASS INDEX DOCD: CPT | Mod: CPTII,S$GLB,, | Performed by: NURSE PRACTITIONER

## 2024-11-21 PROCEDURE — 99215 OFFICE O/P EST HI 40 MIN: CPT | Mod: S$GLB,,, | Performed by: NURSE PRACTITIONER

## 2024-11-21 PROCEDURE — G2211 COMPLEX E/M VISIT ADD ON: HCPCS | Mod: S$GLB,,, | Performed by: NURSE PRACTITIONER

## 2024-11-21 PROCEDURE — 99999 PR PBB SHADOW E&M-EST. PATIENT-LVL III: CPT | Mod: PBBFAC,,, | Performed by: NURSE PRACTITIONER

## 2024-11-21 PROCEDURE — 84702 CHORIONIC GONADOTROPIN TEST: CPT | Performed by: INTERNAL MEDICINE

## 2024-11-21 PROCEDURE — 36415 COLL VENOUS BLD VENIPUNCTURE: CPT | Performed by: INTERNAL MEDICINE

## 2024-11-21 PROCEDURE — 3077F SYST BP >= 140 MM HG: CPT | Mod: CPTII,S$GLB,, | Performed by: NURSE PRACTITIONER

## 2024-11-21 PROCEDURE — 80053 COMPREHEN METABOLIC PANEL: CPT | Performed by: INTERNAL MEDICINE

## 2024-11-21 PROCEDURE — 96413 CHEMO IV INFUSION 1 HR: CPT

## 2024-11-21 RX ORDER — PROCHLORPERAZINE EDISYLATE 5 MG/ML
10 INJECTION INTRAMUSCULAR; INTRAVENOUS ONCE AS NEEDED
Status: DISCONTINUED | OUTPATIENT
Start: 2024-11-21 | End: 2024-11-21 | Stop reason: HOSPADM

## 2024-11-21 RX ORDER — SODIUM CHLORIDE 0.9 % (FLUSH) 0.9 %
10 SYRINGE (ML) INJECTION
Status: CANCELLED | OUTPATIENT
Start: 2024-11-21

## 2024-11-21 RX ORDER — HEPARIN 100 UNIT/ML
500 SYRINGE INTRAVENOUS
Status: DISCONTINUED | OUTPATIENT
Start: 2024-11-21 | End: 2024-11-21 | Stop reason: HOSPADM

## 2024-11-21 RX ORDER — PROCHLORPERAZINE EDISYLATE 5 MG/ML
10 INJECTION INTRAMUSCULAR; INTRAVENOUS ONCE AS NEEDED
Status: CANCELLED
Start: 2024-11-21

## 2024-11-21 RX ORDER — SODIUM CHLORIDE 0.9 % (FLUSH) 0.9 %
10 SYRINGE (ML) INJECTION
Status: DISCONTINUED | OUTPATIENT
Start: 2024-11-21 | End: 2024-11-21 | Stop reason: HOSPADM

## 2024-11-21 RX ORDER — DIPHENHYDRAMINE HYDROCHLORIDE 50 MG/ML
50 INJECTION INTRAMUSCULAR; INTRAVENOUS ONCE AS NEEDED
Status: DISCONTINUED | OUTPATIENT
Start: 2024-11-21 | End: 2024-11-21 | Stop reason: HOSPADM

## 2024-11-21 RX ORDER — HEPARIN 100 UNIT/ML
500 SYRINGE INTRAVENOUS
Status: CANCELLED | OUTPATIENT
Start: 2024-11-21

## 2024-11-21 RX ORDER — EPINEPHRINE 0.3 MG/.3ML
0.3 INJECTION SUBCUTANEOUS ONCE AS NEEDED
Status: DISCONTINUED | OUTPATIENT
Start: 2024-11-21 | End: 2024-11-21 | Stop reason: HOSPADM

## 2024-11-21 RX ORDER — DIPHENHYDRAMINE HYDROCHLORIDE 50 MG/ML
50 INJECTION INTRAMUSCULAR; INTRAVENOUS ONCE AS NEEDED
Status: CANCELLED | OUTPATIENT
Start: 2024-11-21

## 2024-11-21 RX ORDER — EPINEPHRINE 0.3 MG/.3ML
0.3 INJECTION SUBCUTANEOUS ONCE AS NEEDED
Status: CANCELLED | OUTPATIENT
Start: 2024-11-21

## 2024-11-21 RX ADMIN — HEPARIN 500 UNITS: 100 SYRINGE at 10:11

## 2024-11-21 RX ADMIN — SODIUM CHLORIDE: 9 INJECTION, SOLUTION INTRAVENOUS at 09:11

## 2024-11-21 RX ADMIN — Medication 10 ML: at 10:11

## 2024-11-21 RX ADMIN — TRASTUZUMAB-ANNS 300 MG: 420 INJECTION, POWDER, LYOPHILIZED, FOR SOLUTION INTRAVENOUS at 10:11

## 2024-11-22 ENCOUNTER — CLINICAL SUPPORT (OUTPATIENT)
Dept: REHABILITATION | Facility: HOSPITAL | Age: 44
End: 2024-11-22
Payer: COMMERCIAL

## 2024-11-22 ENCOUNTER — PATIENT MESSAGE (OUTPATIENT)
Dept: HEMATOLOGY/ONCOLOGY | Facility: CLINIC | Age: 44
End: 2024-11-22
Payer: COMMERCIAL

## 2024-11-22 DIAGNOSIS — R29.898 WEAKNESS OF RIGHT LOWER EXTREMITY: ICD-10-CM

## 2024-11-22 DIAGNOSIS — C50.311 MALIGNANT NEOPLASM OF LOWER-INNER QUADRANT OF RIGHT BREAST OF FEMALE, ESTROGEN RECEPTOR POSITIVE: Primary | ICD-10-CM

## 2024-11-22 DIAGNOSIS — Z17.0 MALIGNANT NEOPLASM OF LOWER-INNER QUADRANT OF RIGHT BREAST OF FEMALE, ESTROGEN RECEPTOR POSITIVE: Primary | ICD-10-CM

## 2024-11-22 DIAGNOSIS — M25.551 CHRONIC RIGHT HIP PAIN: Primary | ICD-10-CM

## 2024-11-22 DIAGNOSIS — G89.29 CHRONIC RIGHT HIP PAIN: Primary | ICD-10-CM

## 2024-11-22 PROCEDURE — 97112 NEUROMUSCULAR REEDUCATION: CPT

## 2024-11-22 PROCEDURE — 97110 THERAPEUTIC EXERCISES: CPT

## 2024-11-22 NOTE — PROGRESS NOTES
"[facility]   Physical Therapy Treatment Note        Name: Tasha Cornejo  Clinic Number: [id number]    Therapy Diagnosis: No diagnosis found.  Physician: Myrna Sofia DO    Visit Date: 11/22/2024    Physician Orders: PT Eval and Treat  Medical Diagnosis from Referral:   M25.551 (ICD-10-CM) - Lateral pain of right hip   M79.10 (ICD-10-CM) - Myalgia      Evaluation Date: 11/14/2024  Authorization Period Expiration: 11/13/2025  Plan of Care Expiration: 2/6/2025  Progress Note Due: 12/10/2024  Visit # / Visits authorized: 1/20  FOTO: 1/3 (last performed on 11/14/2024)     Precautions: Standard and cancer    Time In: 8:00 am   Time Out: 8:57 am  Total Billable Time: 57 minutes     Subjective     Patient reports: ran 2.5 miles twice since eval and didn't have pain during the run but has pain after the run. No discomfort with HEP.     She was compliant with home exercise program.  Response to previous treatment: no pain with running   Functional change: no pain with running     Pain: 2/10     Location: right hip     Objective      Objective Measures updated at progress report or POC update only unless otherwise noted.       Treatment       Tasha received the treatments listed below:      Therapeutic exercises to develop strength, endurance, ROM, flexibility, and posture for 15 minutes including:  Modified Side plank 4x30" B   SL calf raises 4x15 B - between 2 minute rest breaks     Manual therapy techniques: Joint mobilizations and Soft tissue Mobilization were applied to the: right hip for 5 minutes, including:  Reassessment     Therapeutic activities to improve functional performance for 0  minutes, including:      Neuromuscular re-education activities to improve: Balance, Coordination, Kinesthetic, Sense, Proprioception, and Posture for 37 minutes. The following activities were included:  Captain Salinas's 5x45" with 2 minutes rest between    Hip pretzels 3x15 w/ 2#   Marching bridge " "3x10x3" hold B     Patient Education and Home Exercises       Home Exercises Provided and Patient Education Provided     Education provided:   Activity modifications, HEP (1-2 sets before runs then 2-3 sets post run)      Written Home Exercises Provided: yes.  Exercises were reviewed and Tasha was able to demonstrate them prior to the end of the session.  Tasha demonstrated good  understanding of the education provided. See EMR under Patient Instructions for exercises provided during therapy sessions.    Assessment     Tasha presents after initial eval reporting no symptoms with running but increase lateral hip pain after running. Focused session on isometric loading for tendinopathy. No pain throughout session but noted muscular fatigue. Pt advised on performed isometrics pre and post run to manage symptoms. Will monitor response and progress as tolerated.     Tasha is progressing well towards her goals.   Patient prognosis is Good.     Patient will continue to benefit from skilled outpatient physical therapy to address the deficits listed in the problem list box on initial evaluation, provide pt/family education and to maximize patient's level of independence in the home and community environment.     Patient's spiritual, cultural and educational needs considered and pt agreeable to plan of care and goals.     Anticipated barriers: chronicity of condition      Goals:   Short Term Goals: 4-6 weeks  1. Pt will be compliant with HEP 50% of prescribed amount.   2. Pt to demo at least 10 lateral step downs without pelvic drop to demo improvement in gluteal strength with functional activities.   3.  Pt to demo at least 1/3 grade of improvement on hip manual muscle testing to improve activity tolerance.   4. Pt will report worst pain of </= 3/10 in order to progress toward max functional ability and improve quality of life.  5. Pt to run 2-3 miles independently with minimal hip pain.      Long Term Goals: 6-12 weeks   Pt will be " compliant with % of prescribed amount.   Patient will improve their FOTO limitation score to at least 77 as evidence of clinically significant improvements in their function.  Pt to demo at least 10 single leg squats without compensations to improve running tolerance.   Pt to demo at least 4+/5 of hip manual muscle testing to demo improvement in tolerance to activity.   Pt to demo LSI >/= 95% on hip abduction strength to improve running tolerance.   Pt goal: be able to run 4 days a week without limitations from her right hip.     Plan     Continue Plan of Care (POC) and progress per patient tolerance.     Celeste Aguiar, PT , DPT

## 2024-11-25 RX ORDER — PREGABALIN 75 MG/1
75 CAPSULE ORAL ONCE
OUTPATIENT
Start: 2024-11-25 | End: 2024-11-25

## 2024-11-25 RX ORDER — LIDOCAINE HYDROCHLORIDE 10 MG/ML
0.5 INJECTION, SOLUTION EPIDURAL; INFILTRATION; INTRACAUDAL; PERINEURAL ONCE
OUTPATIENT
Start: 2024-11-25 | End: 2024-11-25

## 2024-11-25 RX ORDER — SODIUM CHLORIDE, SODIUM LACTATE, POTASSIUM CHLORIDE, CALCIUM CHLORIDE 600; 310; 30; 20 MG/100ML; MG/100ML; MG/100ML; MG/100ML
INJECTION, SOLUTION INTRAVENOUS CONTINUOUS
OUTPATIENT
Start: 2024-11-25

## 2024-11-25 RX ORDER — ACETAMINOPHEN 500 MG
1000 TABLET ORAL
OUTPATIENT
Start: 2024-11-25 | End: 2024-11-25

## 2024-11-27 ENCOUNTER — LAB VISIT (OUTPATIENT)
Dept: LAB | Facility: HOSPITAL | Age: 44
End: 2024-11-27
Payer: COMMERCIAL

## 2024-11-27 DIAGNOSIS — Z17.0 MALIGNANT NEOPLASM OF LOWER-INNER QUADRANT OF RIGHT BREAST OF FEMALE, ESTROGEN RECEPTOR POSITIVE: ICD-10-CM

## 2024-11-27 DIAGNOSIS — C50.311 MALIGNANT NEOPLASM OF LOWER-INNER QUADRANT OF RIGHT BREAST OF FEMALE, ESTROGEN RECEPTOR POSITIVE: ICD-10-CM

## 2024-11-27 LAB
ALBUMIN SERPL BCP-MCNC: 4.1 G/DL (ref 3.5–5.2)
ALP SERPL-CCNC: 39 U/L (ref 40–150)
ALT SERPL W/O P-5'-P-CCNC: 15 U/L (ref 10–44)
ANION GAP SERPL CALC-SCNC: 8 MMOL/L (ref 8–16)
AST SERPL-CCNC: 22 U/L (ref 10–40)
BILIRUB SERPL-MCNC: 0.4 MG/DL (ref 0.1–1)
BUN SERPL-MCNC: 18 MG/DL (ref 6–20)
CALCIUM SERPL-MCNC: 9 MG/DL (ref 8.7–10.5)
CHLORIDE SERPL-SCNC: 107 MMOL/L (ref 95–110)
CO2 SERPL-SCNC: 25 MMOL/L (ref 23–29)
CREAT SERPL-MCNC: 0.9 MG/DL (ref 0.5–1.4)
EST. GFR  (NO RACE VARIABLE): >60 ML/MIN/1.73 M^2
GLUCOSE SERPL-MCNC: 88 MG/DL (ref 70–110)
POTASSIUM SERPL-SCNC: 4.9 MMOL/L (ref 3.5–5.1)
PROT SERPL-MCNC: 7.2 G/DL (ref 6–8.4)
SODIUM SERPL-SCNC: 140 MMOL/L (ref 136–145)

## 2024-11-27 PROCEDURE — 80053 COMPREHEN METABOLIC PANEL: CPT | Performed by: NURSE PRACTITIONER

## 2024-11-27 PROCEDURE — 36415 COLL VENOUS BLD VENIPUNCTURE: CPT | Mod: PO | Performed by: NURSE PRACTITIONER

## 2024-11-29 ENCOUNTER — CLINICAL SUPPORT (OUTPATIENT)
Dept: REHABILITATION | Facility: HOSPITAL | Age: 44
End: 2024-11-29
Payer: COMMERCIAL

## 2024-11-29 DIAGNOSIS — M25.551 CHRONIC RIGHT HIP PAIN: Primary | ICD-10-CM

## 2024-11-29 DIAGNOSIS — R29.898 WEAKNESS OF RIGHT LOWER EXTREMITY: ICD-10-CM

## 2024-11-29 DIAGNOSIS — G89.29 CHRONIC RIGHT HIP PAIN: Primary | ICD-10-CM

## 2024-11-29 PROCEDURE — 97112 NEUROMUSCULAR REEDUCATION: CPT

## 2024-11-29 PROCEDURE — 97140 MANUAL THERAPY 1/> REGIONS: CPT

## 2024-11-29 PROCEDURE — 97110 THERAPEUTIC EXERCISES: CPT

## 2024-11-29 NOTE — PROGRESS NOTES
"[facility]   Physical Therapy Treatment Note        Name: Tasha Cornejo  Clinic Number: [id number]    Therapy Diagnosis:   Encounter Diagnoses   Name Primary?    Chronic right hip pain Yes    Weakness of right lower extremity      Physician: Myrna Sofia DO    Visit Date: 11/29/2024    Physician Orders: PT Eval and Treat  Medical Diagnosis from Referral:   M25.551 (ICD-10-CM) - Lateral pain of right hip   M79.10 (ICD-10-CM) - Myalgia      Evaluation Date: 11/14/2024  Authorization Period Expiration: 11/13/2025  Plan of Care Expiration: 2/6/2025  Progress Note Due: 12/10/2024  Visit # / Visits authorized: 2/20  FOTO: 1/3 (last performed on 11/14/2024)     Precautions: Standard and cancer    Time In: 8:00 am   Time Out: 9:00 am  Total Billable Time: 60 minutes     Subjective     Patient reports: ran 2.5 miles last weekend and had hip pain about half way into the run. Increased to 5/10 at end of run. Able to find a little relief with HEP. Still hurts after being sedentary for a while then getting up.      She was compliant with home exercise program.  Response to previous treatment: no pain with running   Functional change: no pain with running     Pain: 2/10     Location: right hip     Objective      Objective Measures updated at progress report or POC update only unless otherwise noted.       Treatment       Tasha received the treatments listed below:      Therapeutic exercises to develop strength, endurance, ROM, flexibility, and posture for 30 minutes including:    Modified side plank with hip abduction 2# [id number]"   SL bridge 3x10 B  Plank with hip extension 3x10 B  SL press 1.5 bands 2x10   DL press 3 bands 3x10     Not today:   Modified Side plank 4x30" B   SL calf raises 4x15 B - between 2 minute rest breaks     Manual therapy techniques: Joint mobilizations and Soft tissue Mobilization were applied to the: right hip for 8 minutes, including:  Reassessment  MET hip intrinsics " "4x6      Therapeutic activities to improve functional performance for 0  minutes, including:      Neuromuscular re-education activities to improve: Balance, Coordination, Kinesthetic, Sense, Proprioception, and Posture for 22 minutes. The following activities were included:    Lateral band walks GTB 3x10   Monster band walks GTB 3x10   Wall clams GTB 3x10       Not today:   Captain Zamudio 5x45" with 2 minutes rest between    Hip pretzels 3x15 w/ 2#   Marching bridge 3x10x3" hold B     Patient Education and Home Exercises       Home Exercises Provided and Patient Education Provided     Education provided:   Activity modifications, HEP (1-2 sets before runs then 2-3 sets post run)      Written Home Exercises Provided: yes.  Exercises were reviewed and Tasha was able to demonstrate them prior to the end of the session.  Tasha demonstrated good  understanding of the education provided. See EMR under Patient Instructions for exercises provided during therapy sessions.    Assessment     Tasha reports increase hip symptoms with running but sxs still seem to correlate to tendinopathy. Noted on lateral tightness that was relieved with METs. Progressed strengthening of lateral and hip rotators of the hip with good tolerance. Appropriate muscular challenge noted but no reproduction of hip pain. Pt educated to continue with HEP and modify run/walk distance for now. Will monitor response and progress as tolerated.     Tasha is progressing well towards her goals.   Patient prognosis is Good.     Patient will continue to benefit from skilled outpatient physical therapy to address the deficits listed in the problem list box on initial evaluation, provide pt/family education and to maximize patient's level of independence in the home and community environment.     Patient's spiritual, cultural and educational needs considered and pt agreeable to plan of care and goals.     Anticipated barriers: chronicity of condition      Goals:   Short " Term Goals: 4-6 weeks  1. Pt will be compliant with HEP 50% of prescribed amount.   2. Pt to demo at least 10 lateral step downs without pelvic drop to demo improvement in gluteal strength with functional activities.   3.  Pt to demo at least 1/3 grade of improvement on hip manual muscle testing to improve activity tolerance.   4. Pt will report worst pain of </= 3/10 in order to progress toward max functional ability and improve quality of life.  5. Pt to run 2-3 miles independently with minimal hip pain.      Long Term Goals: 6-12 weeks   Pt will be compliant with % of prescribed amount.   Patient will improve their FOTO limitation score to at least 77 as evidence of clinically significant improvements in their function.  Pt to demo at least 10 single leg squats without compensations to improve running tolerance.   Pt to demo at least 4+/5 of hip manual muscle testing to demo improvement in tolerance to activity.   Pt to demo LSI >/= 95% on hip abduction strength to improve running tolerance.   Pt goal: be able to run 4 days a week without limitations from her right hip.     Plan     Continue Plan of Care (POC) and progress per patient tolerance.     Celeste Aguiar, PT , DPT

## 2024-12-03 ENCOUNTER — HOSPITAL ENCOUNTER (OUTPATIENT)
Dept: PREADMISSION TESTING | Facility: OTHER | Age: 44
Discharge: HOME | End: 2024-12-03
Attending: PLASTIC SURGERY
Payer: COMMERCIAL

## 2024-12-03 VITALS
OXYGEN SATURATION: 100 % | SYSTOLIC BLOOD PRESSURE: 157 MMHG | BODY MASS INDEX: 21.99 KG/M2 | DIASTOLIC BLOOD PRESSURE: 91 MMHG | HEART RATE: 65 BPM | WEIGHT: 112 LBS | TEMPERATURE: 98 F | HEIGHT: 60 IN | RESPIRATION RATE: 16 BRPM

## 2024-12-03 DIAGNOSIS — Z01.818 PREOP TESTING: Primary | ICD-10-CM

## 2024-12-03 LAB
ABO + RH BLD: NORMAL
BLD GP AB SCN CELLS X3 SERPL QL: NORMAL
SPECIMEN OUTDATE: NORMAL

## 2024-12-03 PROCEDURE — 86901 BLOOD TYPING SEROLOGIC RH(D): CPT | Performed by: ANESTHESIOLOGY

## 2024-12-03 RX ORDER — MINERAL OIL
180 ENEMA (ML) RECTAL DAILY
COMMUNITY

## 2024-12-03 NOTE — ANESTHESIA PREPROCEDURE EVALUATION
12/03/2024  Tasha Cornejo is a 44 y.o., female.      Pre-op Assessment    I have reviewed the Patient Summary Reports.     I have reviewed the Nursing Notes. I have reviewed the NPO Status.   I have reviewed the Medications.     Review of Systems  Anesthesia Hx:  No problems with previous Anesthesia   History of prior surgery of interest to airway management or planning:  Previous anesthesia: General Confucianism 1 yr ago with general anesthesia.  Procedure performed at an [facility].       Denies Family Hx of Anesthesia complications.    Denies Personal Hx of Anesthesia complications.                    Social:  Non-Smoker, Social Alcohol Use       Hematology/Oncology:  Hematology Normal                     Current/Recent Cancer.  --  Cancer in past history:       Breast       surgery and chemotherapy       EENT/Dental:  chronic allergic rhinitis           Cardiovascular:  Cardiovascular Normal Exercise tolerance: good                  Normal echo 11/2023                           Pulmonary:  Pulmonary Normal                       Renal/:  Renal/ Normal                 Hepatic/GI:     GERD, well controlled                Musculoskeletal:  Musculoskeletal Normal                Neurological:  Neurology Normal                                      Endocrine:  Endocrine Normal            Psych:  Psychiatric History                Physical Exam  General: Well nourished, Cooperative, Alert and Oriented    Airway:  Mallampati: II   Mouth Opening: Normal  TM Distance: Normal  Neck ROM: Normal ROM    Dental:  Intact      Anesthesia Plan  Type of Anesthesia, risks & benefits discussed:    Anesthesia Type: Gen ETT  Intra-op Monitoring Plan: Standard ASA Monitors  Post Op Pain Control Plan: multimodal analgesia  Induction:  IV  Airway Plan: Video  Informed Consent: Informed consent signed with the Patient and  all parties understand the risks and agree with anesthesia plan.  All questions answered.   ASA Score: 2  Day of Surgery Review of History & Physical: H&P Update referred to the surgeon/provider.  Anesthesia Plan Notes: Here last December  Labs ion Epic pending T&S    Ready For Surgery From Anesthesia Perspective.     .

## 2024-12-03 NOTE — DISCHARGE INSTRUCTIONS
Information to Prepare you for your Surgery    PRE-ADMIT TESTING -  [phone]   -[facility]  [address] [place] [facility] [address]  [facility]      Your surgery has been scheduled at [facility]. We are pleased to have the opportunity to serve you. For Further Information please call [phone].    On the day of surgery please report to the Information Desk on the 1st floor.    CONTACT YOUR PHYSICIAN'S OFFICE THE DAY PRIOR TO YOUR SURGERY TO OBTAIN YOUR ARRIVAL TIME.     The evening before surgery do not eat anything after 9 p.m. ( this includes hard candy, chewing gum and mints).  You may only have GATORADE, POWERADE AND WATER  from 9 p.m. until you leave your home.    AT LEAST 12-24 OUNCES BEFORE YOU LEAVE YOU HOUSE IN THE MORNING TO COME TO SURGERY.    DO NOT DRINK ANY LIQUIDS ON THE WAY TO THE HOSPITAL.      Why does your anesthesiologist allow you to drink Gatorade/Powerade before surgery?  Gatorade/Powerade helps to increase your comfort before surgery and to decrease your nausea after surgery. The carbohydrates in Gatorade/Powerade help reduce your body's stress response to surgery.      Outpatient Surgery- May allow 2 adult (18 and older) Support Persons (1 being the designated ) for all surgical/procedural patients. A breastfeeding mother will be allowed her infant and 2 adult Support Persons. No one under the age of 18 will be allowed in the building. No swapping out of visitors in the [facility].      SPECIAL MEDICATION INSTRUCTIONS: TAKE medications checked off by the Anesthesiologist on your Medication List.        Surgery Patients:    If you take ASPIRIN - Your PHYSICIAN/SURGEON will need to inform you IF/OR when you need to stop taking aspirin prior to your surgery.     The week prior to surgery do not ot take any medications containing IBUPROFEN or NSAIDS ( Advil, Motrin, Goodys, BC, Aleve, Naproxen,   Ketorolac, Meloxicam, Mobic, Toradol,etc) If you are not sure if you should take a medicine please call your surgeon's office.  Ok to take Tylenol    Do Not Wear any make-up (especially eye make-up) to surgery. Please remove any false eyelashes or eyelash extensions. If you arrive the day of surgery with makeup/eyelashes on you will be required to remove prior to surgery. (There is a risk of corneal abrasions if eye makeup/eyelash extensions are not removed)      Leave all valuables at home.   Do Not wear any jewelry or watches, including any metal in body piercings. Jewelry must be removed prior to coming to the hospital.  There is a possibility that rings that are unable to be removed may be cut off if they are on the surgical extremity.    Please remove all hair extensions, wigs, clips and any other metal accessories/ ornaments from your hair.  These items may pose a flammable/fire risk in Surgery and must be removed.    Do not shave your surgical area at least 5 days prior to your surgery. The surgical prep will be performed at the hospital according to Infection Control regulations.    Contact Lens must be removed before surgery. Either do not wear the contact lens or bring a case and solution for storage.  Please bring a container for eyeglasses or dentures as required.  Bring any paperwork your physician has provided, such as consent forms,  history and physicals, doctor's orders, etc.   Bring comfortable clothes that are loose fitting to wear upon discharge. Take into consideration the type of surgery being performed.  Maintain your diet as advised per your physician the day prior to surgery.      Adequate rest the night before surgery is advised.   Park in the Parking lot behind the hospital or in the [employer] across the street from the parking lot. Parking is complimentary.  If you will be discharged the same day as your procedure, please arrange for a responsible adult to drive you home or  to accompany you if traveling by taxi.   YOU WILL NOT BE PERMITTED TO DRIVE OR TO LEAVE THE HOSPITAL ALONE AFTER SURGERY.   If you are being discharged the same day, it is strongly recommended that you arrange for someone to remain with you for the first 24 hrs following your surgery.    The Surgeon will speak to your family/visitor after your surgery regarding the outcome of your surgery and post op care.  The Surgeon may speak to you after your surgery, but there is a possibility you may not remember the details.  Please check with your family members regarding the conversation with the Surgeon.    We strongly recommend whoever is bringing you home be present for discharge instructions.  This will ensure a thorough understanding for your post op home care.    If the patient has fever, cough, or signs/symptoms of Flu or Covid please do not come in for your surgery. Contact your surgeon and your primary care physician for further instructions.           Thank you for your cooperation.  The Staff of [facility].            Bathing Instructions with Hibiclens or Dial Soap    Shower the evening before and morning of your procedure with Chlorhexidine (Hibiclens)  do not use Chlorhexidine on your face or genitals. Do not get in your eyes.  Wash your face with water and your regular face wash/soap  Use your regular shampoo  Apply Chlorhexidine (Hibiclens) directly on your skin or on a wet washcloth and wash gently. When showering: Move away from the shower stream when applying Chlorhexidine (Hibiclens) to avoid rinsing off too soon.  Rinse thoroughly with warm water  Do not dilute Chlorhexidine (Hibiclens)   Dry off as usual, do not use any deodorant, powder, body lotions, perfume, after shave or cologne.     Thanks ,   Khushbu

## 2024-12-04 ENCOUNTER — OFFICE VISIT (OUTPATIENT)
Dept: PLASTIC SURGERY | Facility: CLINIC | Age: 44
End: 2024-12-04
Attending: PLASTIC SURGERY
Payer: COMMERCIAL

## 2024-12-04 VITALS — DIASTOLIC BLOOD PRESSURE: 73 MMHG | HEART RATE: 69 BPM | SYSTOLIC BLOOD PRESSURE: 133 MMHG

## 2024-12-04 DIAGNOSIS — Z85.3 HISTORY OF BREAST CANCER: Primary | ICD-10-CM

## 2024-12-04 DIAGNOSIS — C50.919 MALIGNANT NEOPLASM OF FEMALE BREAST, UNSPECIFIED ESTROGEN RECEPTOR STATUS, UNSPECIFIED LATERALITY, UNSPECIFIED SITE OF BREAST: Primary | ICD-10-CM

## 2024-12-04 RX ORDER — MUPIROCIN 20 MG/G
OINTMENT TOPICAL
OUTPATIENT
Start: 2024-12-04

## 2024-12-04 RX ORDER — HEPARIN SODIUM 5000 [USP'U]/ML
5000 INJECTION, SOLUTION INTRAVENOUS; SUBCUTANEOUS ONCE
OUTPATIENT
Start: 2024-12-11

## 2024-12-04 RX ORDER — CEFAZOLIN SODIUM 2 G/50ML
2 SOLUTION INTRAVENOUS
OUTPATIENT
Start: 2024-12-04

## 2024-12-04 RX ORDER — SODIUM CHLORIDE 9 MG/ML
INJECTION, SOLUTION INTRAVENOUS CONTINUOUS
OUTPATIENT
Start: 2024-12-04

## 2024-12-04 NOTE — PROGRESS NOTES
\[facility]\"" Plastic and Reconstructive Surgery Clinic H&P    Tasha Cornejo is a 44 y.o. female with history of breast cancer s/p bilateral mastectomy with bilateral prepectoral tissue expander placement on 12/14/23. She had required seroma aspiration around tissue expander on left  post op but now well healed. Patient presents for autologous reconstruction. Bilateral prepectoral tissue expanders filled to 50 ccs intra-op and filled one time to a total of 100 ccs in each breast expander. She has completed chemotherapy.    Review of patient's allergies indicates:   Allergen Reactions    Aleve [naproxen sodium] Other (See Comments)     Throat swelling          Current Outpatient Medications:     fexofenadine (ALLEGRA) 180 MG tablet, Take 180 mg by mouth once daily., Disp: , Rfl:     LIDOcaine-prilocaine (EMLA) cream, Apply topically to port one hour prior to chemotherapy infusion, Disp: 30 g, Rfl: 1    multivitamin (THERAGRAN) per tablet, Take 1 tablet by mouth once daily., Disp: , Rfl:     tamoxifen (NOLVADEX) 20 MG Tab, Take 1 tablet (20 mg total) by mouth once daily., Disp: 30 tablet, Rfl: 11  No current facility-administered medications for this visit.    Facility-Administered Medications Ordered in Other Visits:     ceFAZolin 1 g, gentamicin 80 mg in sodium chloride 0.9% 500 mL irrigation, , Irrigation, On Call Procedure, Allen Damian MD    ceFAZolin 1 g, gentamicin 80 mg in sodium chloride 0.9% 500 mL irrigation, , Irrigation, On Call Procedure, Allen Damian MD    Past Medical History:   Diagnosis Date    Acid reflux     Chalazion     Food allergy     discontinued dairy to help eosinophilic esophagitis    Hx of psychiatric care     Malignant neoplasm of lower-inner quadrant of right breast of female, estrogen receptor positive 11/06/2023    Seasonal allergies ongoing/unsure    Therapy     Wears reading eyeglasses        Past Surgical History:   Procedure Laterality Date    ADENOIDECTOMY  1983     BILATERAL MASTECTOMY Bilateral 2023    Procedure: MASTECTOMY, BILATERAL;  Surgeon: Tasha Mcleod MD;  Location: [facility];  Service: General;  Laterality: Bilateral;  3.5 HOURS / EMAIL SENT  @ 1:03 LK    COLONOSCOPY N/A 2023    Procedure: COLONOSCOPY;  Surgeon: Fabio Rice MD;  Location: [facility] (4TH FLR);  Service: Endoscopy;  Laterality: N/A;  pt confirmed earlier time  EB    ESOPHAGOGASTRODUODENOSCOPY N/A 2023    Procedure: EGD (ESOPHAGOGASTRODUODENOSCOPY);  Surgeon: Fabio Rice MD;  Location: [facility] (4TH FLR);  Service: Endoscopy;  Laterality: N/A;  Procedure Timin-4 weeks    referred by Dr. Rice/Prep instructions handed to patient and to portal.  Patient called did not answer- DB    ESOPHAGOGASTRODUODENOSCOPY N/A 10/18/2023    Procedure: EGD (ESOPHAGOGASTRODUODENOSCOPY);  Surgeon: Fabio Rice MD;  Location: [facility] (4TH FLR);  Service: Endoscopy;  Laterality: N/A;  ref Ray  timeframe 5-12 weeks   Dr. Rice patient  portal instruction and given to patient jm  10/11-precall complete-MS    INJECTION FOR SENTINEL NODE IDENTIFICATION Right 2023    Procedure: INJECTION, FOR SENTINEL NODE IDENTIFICATION;  Surgeon: Tasha Mcleod MD;  Location: [facility];  Service: General;  Laterality: Right;    INSERTION OF BREAST TISSUE EXPANDER Bilateral 2023    Procedure: INSERTION, TISSUE EXPANDER, BREAST;  Surgeon: Allen Damian MD;  Location: [facility];  Service: Plastics;  Laterality: Bilateral;  2 HOURS    INSERTION, CATHETER, TUNNELED Left 2023    Procedure: INSERTION, CATHETER, TUNNELED;  Surgeon: Tasha Mcleod MD;  Location: [facility] [place];  Service: General;  Laterality: Left;    SENTINEL LYMPH NODE BIOPSY Right 2023    Procedure: BIOPSY, LYMPH NODE, SENTINEL;  Surgeon: Tasha Mcleod MD;  Location: [facility];  Service: General;  Laterality: Right;    TYMPANOSTOMY TUBE PLACEMENT  1983       Social History     Socioeconomic History    Marital status:       Spouse name: Oseas   Occupational History    Occupation:    Tobacco Use    Smoking status: Never     Passive exposure: Never    Smokeless tobacco: Never   Substance and Sexual Activity    Alcohol use: Not Currently     Comment: Less than a drink/monthly    Drug use: Never    Sexual activity: Not Currently     Social Drivers of Health     Financial Resource Strain: Low Risk  (6/5/2024)    Received from [facility]    Overall Financial Resource Strain (CARDIA)     Difficulty of Paying Living Expenses: Not hard at all   Food Insecurity: No Food Insecurity (6/5/2024)    Received from [facility]    Hunger Vital Sign     Worried About Running Out of Food in the Last Year: Never true     Ran Out of Food in the Last Year: Never true   Transportation Needs: No Transportation Needs (6/5/2024)    Received from [facility]    PRAPARE - Transportation     Lack of Transportation (Medical): No     Lack of Transportation (Non-Medical): No   Physical Activity: Sufficiently Active (6/5/2024)    Received from [facility]    Exercise Vital Sign     Days of Exercise per Week: 5 days     Minutes of Exercise per Session: 40 min   Stress: No Stress Concern Present (6/5/2024)    Received from [facility] - Occupational Stress Questionnaire     Feeling of Stress : Only a little   Recent Concern: Stress - Stress Concern Present (4/28/2024)    Received from [facility]  Occupational Health - Occupational Stress Questionnaire     Feeling of Stress : Rather much   Housing Stability: Low Risk  (3/4/2024)    Housing Stability Vital Sign     Unable to Pay for Housing in the Last Year: No     Number of Places Lived in the Last Year: 1     Unstable Housing in the Last Year: No     O  Physical exam  General: No acute distress. BMI 21.87  HEENT: Atraumatic. Normocephalic.   CV: Regular rate and rhythm by radial pulse  Pulm: Normal respiratory effort. Symmetric chest  rise and fall.   Abdomen: Soft. Non-tender. Non-distended. Inadequate infraumbilical tissue for bilateral breast reconstruction.   Extremities: No cyanosis. Peripheral pulses intact. Cap refill <2 sec.   Neuro: Alert and Oriented  Breasts:   Bilateral breast symmetric with prepectoral tissue expander in place with 100 cc fill.     Adequate gluteal and lumbar tissue for autologous reconstruction    CT 11/19/23 abdomen/pelvis/thighs: complete for surgical planning with adequate SGAP and LAP perforators    A/P  Tasha Cornejo is a 44 y.o. female with history of breast cancer s/p bilateral mastectomy with bilateral prepectoral tissue expander placement on 12/14/23. Ready for autologous reconstruction. 100 ccs in each prepectoral expander. Right breast mastectomy weight 196 grams; left mastectomy weight 192 grams    - Or on 12/11/24 for bilateral SGAP flap reconstruction.   - preoperative photos today in clinic  - informed consent obtained    Ant Wisdom MD  [facility]astic and Reconstructive Surgery HO-V  12/04/2024

## 2024-12-04 NOTE — H&P (VIEW-ONLY)
[facility] H&P    Tasha Cornejo is a 44 y.o. female with history of breast cancer s/p bilateral mastectomy with bilateral prepectoral tissue expander placement on 12/14/23. She had required seroma aspiration around tissue expander on left  post op but now well healed. Patient presents for autologous reconstruction. Bilateral prepectoral tissue expanders filled to 50 ccs intra-op and filled one time to a total of 100 ccs in each breast expander. She has completed chemotherapy.    Review of patient's allergies indicates:   Allergen Reactions    Aleve [naproxen sodium] Other (See Comments)     Throat swelling          Current Outpatient Medications:     fexofenadine (ALLEGRA) 180 MG tablet, Take 180 mg by mouth once daily., Disp: , Rfl:     LIDOcaine-prilocaine (EMLA) cream, Apply topically to port one hour prior to chemotherapy infusion, Disp: 30 g, Rfl: 1    multivitamin (THERAGRAN) per tablet, Take 1 tablet by mouth once daily., Disp: , Rfl:     tamoxifen (NOLVADEX) 20 MG Tab, Take 1 tablet (20 mg total) by mouth once daily., Disp: 30 tablet, Rfl: 11  No current facility-administered medications for this visit.    Facility-Administered Medications Ordered in Other Visits:     ceFAZolin 1 g, gentamicin 80 mg in sodium chloride 0.9% 500 mL irrigation, , Irrigation, On Call Procedure, Allen Damian MD    ceFAZolin 1 g, gentamicin 80 mg in sodium chloride 0.9% 500 mL irrigation, , Irrigation, On Call Procedure, Allen Damian MD    Past Medical History:   Diagnosis Date    Acid reflux     Chalazion     Food allergy     discontinued dairy to help eosinophilic esophagitis    Hx of psychiatric care     Malignant neoplasm of lower-inner quadrant of right breast of female, estrogen receptor positive 11/06/2023    Seasonal allergies ongoing/unsure    Therapy     Wears reading eyeglasses        Past Surgical History:   Procedure Laterality Date    ADENOIDECTOMY  1983     BILATERAL MASTECTOMY Bilateral 2023    Procedure: MASTECTOMY, BILATERAL;  Surgeon: Tasha Mcleod MD;  [facility];  Service: General;  Laterality: Bilateral;  3.5 HOURS / EMAIL SENT  @ 1:03 LK    COLONOSCOPY N/A 2023    Procedure: COLONOSCOPY;  Surgeon: Fabio Rice MD;  Location: [facility] (4TH FLR);  Service: Endoscopy;  Laterality: N/A;  pt confirmed earlier time  EB    ESOPHAGOGASTRODUODENOSCOPY N/A 2023    Procedure: EGD (ESOPHAGOGASTRODUODENOSCOPY);  Surgeon: Fabio Rice MD;  Location: [facility] (4TH FLR);  Service: Endoscopy;  Laterality: N/A;  Procedure Timin-4 weeks    referred by Dr. Rice/Prep instructions handed to patient and to portal.  Patient called did not answer- DB    ESOPHAGOGASTRODUODENOSCOPY N/A 10/18/2023    Procedure: EGD (ESOPHAGOGASTRODUODENOSCOPY);  Surgeon: Fabio Rice MD;  Location: [facility] (4TH FLR);  Service: Endoscopy;  Laterality: N/A;  ref Ray  timeframe 5-12 weeks   Dr. Rice patient  portal instruction and given to patient jm  10/11-precall complete-MS    INJECTION FOR SENTINEL NODE IDENTIFICATION Right 2023    Procedure: INJECTION, FOR SENTINEL NODE IDENTIFICATION;  Surgeon: Tasha Mcleod MD;  Location: [facility];  Service: General;  Laterality: Right;    INSERTION OF BREAST TISSUE EXPANDER Bilateral 2023    Procedure: INSERTION, TISSUE EXPANDER, BREAST;  Surgeon: Allen Damian MD;  Location: [facility];  Service: Plastics;  Laterality: Bilateral;  2 HOURS    INSERTION, CATHETER, TUNNELED Left 2023    Procedure: INSERTION, CATHETER, TUNNELED;  Surgeon: Tasha Mcleod MD;  Location: [facility] OR;  Service: General;  Laterality: Left;    SENTINEL LYMPH NODE BIOPSY Right 2023    Procedure: BIOPSY, LYMPH NODE, SENTINEL;  Surgeon: Tasha Mcleod MD;  Location: [facility];  Service: General;  Laterality: Right;    TYMPANOSTOMY TUBE PLACEMENT  1983       Social History     Socioeconomic History    Marital status:       Spouse name: Oseas   Occupational History    Occupation:    Tobacco Use    Smoking status: Never     Passive exposure: Never    Smokeless tobacco: Never   Substance and Sexual Activity    Alcohol use: Not Currently     Comment: Less than a drink/monthly    Drug use: Never    Sexual activity: Not Currently     Social Drivers of Health     Financial Resource Strain: Low Risk  (6/5/2024)    Received from [facility] [place]    Overall Financial Resource Strain (CARDIA)     Difficulty of Paying Living Expenses: Not hard at all   Food Insecurity: No Food Insecurity (6/5/2024)    Received from [facility] [place]    Hunger Vital Sign     Worried About Running Out of Food in the Last Year: Never true     Ran Out of Food in the Last Year: Never true   Transportation Needs: No Transportation Needs (6/5/2024)    Received from [facility] [place]    PRAPARE - Transportation     Lack of Transportation (Medical): No     Lack of Transportation (Non-Medical): No   Physical Activity: Sufficiently Active (6/5/2024)    Received from [email or web address]    Exercise Vital Sign     Days of Exercise per Week: 5 days     Minutes of Exercise per Session: 40 min   Stress: No Stress Concern Present (6/5/2024)    Received from [facility] [employer] [facility] - Occupational Stress Questionnaire     Feeling of Stress : Only a little   Recent Concern: Stress - Stress Concern Present (4/28/2024)    Received from [facility] [employer]  Occupational Health - Occupational Stress Questionnaire     Feeling of Stress : Rather much   Housing Stability: Low Risk  (3/4/2024)    Housing Stability Vital Sign     Unable to Pay for Housing in the Last Year: No     Number of Places Lived in the Last Year: 1     Unstable Housing in the Last Year: No     O  Physical exam  General: No acute distress. BMI 21.87  HEENT: Atraumatic. Normocephalic.   CV: Regular rate and rhythm by radial pulse  Pulm: Normal respiratory effort. Symmetric chest  rise and fall.   Abdomen: Soft. Non-tender. Non-distended. Inadequate infraumbilical tissue for bilateral breast reconstruction.   Extremities: No cyanosis. Peripheral pulses intact. Cap refill <2 sec.   Neuro: Alert and Oriented  Breasts:   Bilateral breast symmetric with prepectoral tissue expander in place with 100 cc fill.     Adequate gluteal and lumbar tissue for autologous reconstruction    CT 11/19/23 abdomen/pelvis/thighs: complete for surgical planning with adequate SGAP and LAP perforators    A/P  Tasha Cornejo is a 44 y.o. female with history of breast cancer s/p bilateral mastectomy with bilateral prepectoral tissue expander placement on 12/14/23. Ready for autologous reconstruction. 100 ccs in each prepectoral expander. Right breast mastectomy weight 196 grams; left mastectomy weight 192 grams    - Or on 12/11/24 for bilateral SGAP flap reconstruction.   - preoperative photos today in clinic  - informed consent obtained    Ant Wisdom MD  [facility] HO-V  12/04/2024

## 2024-12-06 ENCOUNTER — CLINICAL SUPPORT (OUTPATIENT)
Dept: REHABILITATION | Facility: HOSPITAL | Age: 44
End: 2024-12-06
Payer: COMMERCIAL

## 2024-12-06 DIAGNOSIS — R29.898 WEAKNESS OF RIGHT LOWER EXTREMITY: ICD-10-CM

## 2024-12-06 DIAGNOSIS — G89.29 CHRONIC RIGHT HIP PAIN: Primary | ICD-10-CM

## 2024-12-06 DIAGNOSIS — M25.551 CHRONIC RIGHT HIP PAIN: Primary | ICD-10-CM

## 2024-12-06 PROCEDURE — 97112 NEUROMUSCULAR REEDUCATION: CPT

## 2024-12-06 PROCEDURE — 97110 THERAPEUTIC EXERCISES: CPT

## 2024-12-06 PROCEDURE — 97140 MANUAL THERAPY 1/> REGIONS: CPT

## 2024-12-06 NOTE — PROGRESS NOTES
"[facility]   Physical Therapy Treatment Note        Name: Tasha Cornejo  Clinic Number: [id number]    Therapy Diagnosis:   Encounter Diagnoses   Name Primary?    Chronic right hip pain Yes    Weakness of right lower extremity      Physician: Myrna Sofia DO    Visit Date: 12/6/2024    Physician Orders: PT Eval and Treat  Medical Diagnosis from Referral:   M25.551 (ICD-10-CM) - Lateral pain of right hip   M79.10 (ICD-10-CM) - Myalgia      Evaluation Date: 11/14/2024  Authorization Period Expiration: 11/13/2025  Plan of Care Expiration: 2/6/2025  Progress Note Due: 12/10/2024  Visit # / Visits authorized: 3/20  FOTO: 1/3 (last performed on 11/14/2024)     Precautions: Standard and cancer    Time In: 8:00 am   Time Out: 9:00 am  Total Billable Time: 60 minutes     Subjective     Patient reports: ran every other day and felt lateral hip discomfort but no pain. Does report increase pain at night when laying on her side and straightening her leg.     She was compliant with home exercise program.  Response to previous treatment: no pain with running   Functional change: no pain with running     Pain: 2/10     Location: right hip     Objective      Objective Measures updated at progress report or POC update only unless otherwise noted.       Treatment       Tasha received the treatments listed below:      Therapeutic exercises to develop strength, endurance, ROM, flexibility, and posture for 10 minutes including:    SL press 1.5 bands 3x12   DL press 2.5 bands 2x15      Not today:   Modified Side plank 4x30" B   SL calf raises 4x15 B - between 2 minute rest breaks   Modified side plank with hip abduction 2# [id number]"   SL bridge 3x10 B  Plank with hip extension 3x10 B    Manual therapy techniques: Joint mobilizations and Soft tissue Mobilization were applied to the: right hip for 15 minutes, including:  Reassessment  R hip extension mobs   R hip flexion mobs       Therapeutic activities to " "improve functional performance for 0  minutes, including:      Neuromuscular re-education activities to improve: Balance, Coordination, Kinesthetic, Sense, Proprioception, and Posture for 35 minutes. The following activities were included:    Lateral band walks GTB 3x10   Monster band walks GTB 3x10   Prone hip ER w/ ancore 5# 3x15 B   Prone bilateral hip IR w/ GTB [id number]"   Shuttle hip extension 3x10 w/ 1 band   5" lateral step down 2x10 B       Not today:   Captain Salinas's 5x45" with 2 minutes rest between    Hip pretzels 3x15 w/ 2#   Marching bridge 3x10x3" hold B   Wall clams GTB 3x10     Patient Education and Home Exercises       Home Exercises Provided and Patient Education Provided     Education provided:   Activity modifications, HEP (1-2 sets before runs then 2-3 sets post run)      Written Home Exercises Provided: yes.  Exercises were reviewed and Tasha was able to demonstrate them prior to the end of the session.  Tasha demonstrated good  understanding of the education provided. See EMR under Patient Instructions for exercises provided during therapy sessions.    Assessment     Tasha  reports improvement in symptoms with running and compliance to HEP. Slight restrictions in joint mobility of right hip noted in extension and flexion but improved after manual therapy. Progressing well with loading of glute med and hip intrinsics. No symptoms reproduced during today's session. Updated HEP to continue making improvements outside of PT. Pt has unrelated surgery next week and will have exercise restrictions. Will resume PT once she is cleared by MD.     Tasha is progressing well towards her goals.   Patient prognosis is Good.     Patient will continue to benefit from skilled outpatient physical therapy to address the deficits listed in the problem list box on initial evaluation, provide pt/family education and to maximize patient's level of independence in the home and community environment.     Patient's spiritual, " cultural and educational needs considered and pt agreeable to plan of care and goals.     Anticipated barriers: chronicity of condition      Goals:   Short Term Goals: 4-6 weeks  1. Pt will be compliant with HEP 50% of prescribed amount. MET  2. Pt to demo at least 10 lateral step downs without pelvic drop to demo improvement in gluteal strength with functional activities. MET  3.  Pt to demo at least 1/3 grade of improvement on hip manual muscle testing to improve activity tolerance.   4. Pt will report worst pain of </= 3/10 in order to progress toward max functional ability and improve quality of life.  5. Pt to run 2-3 miles independently with minimal hip pain.      Long Term Goals: 6-12 weeks   Pt will be compliant with % of prescribed amount. MET  Patient will improve their FOTO limitation score to at least 77 as evidence of clinically significant improvements in their function.  Pt to demo at least 10 single leg squats without compensations to improve running tolerance.   Pt to demo at least 4+/5 of hip manual muscle testing to demo improvement in tolerance to activity.   Pt to demo LSI >/= 95% on hip abduction strength to improve running tolerance.   Pt goal: be able to run 4 days a week without limitations from her right hip.     Plan     Continue Plan of Care (POC) and progress per patient tolerance.     Celeste Aguiar, PT , DPT

## 2024-12-09 ENCOUNTER — HOSPITAL ENCOUNTER (OUTPATIENT)
Dept: CARDIOLOGY | Facility: HOSPITAL | Age: 44
Discharge: HOME | End: 2024-12-09
Attending: INTERNAL MEDICINE
Payer: COMMERCIAL

## 2024-12-09 VITALS
DIASTOLIC BLOOD PRESSURE: 70 MMHG | HEIGHT: 60 IN | HEART RATE: 70 BPM | WEIGHT: 112 LBS | SYSTOLIC BLOOD PRESSURE: 130 MMHG | BODY MASS INDEX: 21.99 KG/M2

## 2024-12-09 DIAGNOSIS — C50.311 MALIGNANT NEOPLASM OF LOWER-INNER QUADRANT OF RIGHT BREAST OF FEMALE, ESTROGEN RECEPTOR POSITIVE: ICD-10-CM

## 2024-12-09 DIAGNOSIS — Z15.01 BARD1 GENE MUTATION POSITIVE: ICD-10-CM

## 2024-12-09 DIAGNOSIS — Z17.0 MALIGNANT NEOPLASM OF LOWER-INNER QUADRANT OF RIGHT BREAST OF FEMALE, ESTROGEN RECEPTOR POSITIVE: ICD-10-CM

## 2024-12-09 LAB
AV AREA BY CONTINUOUS VTI: 2.1 CM2
AV INDEX (PROSTH): 0.74
AV LVOT MEAN GRADIENT: 1 MMHG
AV LVOT PEAK GRADIENT: 3 MMHG
AV MEAN GRADIENT: 2.8 MMHG
AV PEAK GRADIENT: 5.8 MMHG
AV VALVE AREA BY VELOCITY RATIO: 2.1 CM²
AV VALVE AREA: 2.1 CM2
AV VELOCITY RATIO: 0.75
BSA FOR ECHO PROCEDURE: 1.47 M2
CV ECHO LV RWT: 0.33 CM
DOP CALC AO PEAK VEL: 1.2 M/S
DOP CALC AO VTI: 26.7 CM
DOP CALC LVOT AREA: 2.8 CM2
DOP CALC LVOT DIAMETER: 1.9 CM
DOP CALC LVOT PEAK VEL: 0.9 M/S
DOP CALC LVOT STROKE VOLUME: 56.1 CM3
DOP CALCLVOT PEAK VEL VTI: 19.8 CM
E WAVE DECELERATION TIME: 154.58 MS
E/A RATIO: 2.06
E/E' RATIO: 5.08 M/S
ECHO EF ESTIMATED: 46 %
ECHO LV POSTERIOR WALL: 0.7 CM (ref 0.6–1.1)
FRACTIONAL SHORTENING: 20.9 % (ref 28–44)
GLOBAL LONGITUIDAL STRAIN: 20 %
INTERVENTRICULAR SEPTUM: 0.6 CM (ref 0.6–1.1)
IVC DIAMETER: 2 CM
IVRT: 91.34 MS
LA MAJOR: 4.49 CM
LA MINOR: 4.4 CM
LA WIDTH: 3.65 CM
LEFT ATRIUM SIZE: 2.77 CM
LEFT ATRIUM VOLUME INDEX MOD: 29.9 ML/M2
LEFT ATRIUM VOLUME INDEX: 26.2 ML/M2
LEFT ATRIUM VOLUME MOD: 43.66 ML
LEFT ATRIUM VOLUME: 38.2 CM3
LEFT INTERNAL DIMENSION IN SYSTOLE: 3.4 CM (ref 2.1–4)
LEFT VENTRICLE DIASTOLIC VOLUME INDEX: 58.27 ML/M2
LEFT VENTRICLE DIASTOLIC VOLUME: 85.08 ML
LEFT VENTRICLE MASS INDEX: 55.2 G/M2
LEFT VENTRICLE SYSTOLIC VOLUME INDEX: 31.3 ML/M2
LEFT VENTRICLE SYSTOLIC VOLUME: 45.66 ML
LEFT VENTRICULAR INTERNAL DIMENSION IN DIASTOLE: 4.3 CM (ref 3.5–6)
LEFT VENTRICULAR MASS: 80.6 G
LV LATERAL E/E' RATIO: 4.4
LV SEPTAL E/E' RATIO: 6
MV A" WAVE DURATION": 179.83 MS
MV PEAK A VEL: 0.32 M/S
MV PEAK E VEL: 0.66 M/S
OHS CV RV/LV RATIO: 0.74 CM
OHS LV EJECTION FRACTION SIMPSONS BIPLANE MOD: 59 %
PISA TR MAX VEL: 2.15 M/S
PULM VEIN A" WAVE DURATION": 179.83 MS
PULM VEIN S/D RATIO: 1.07
PULMONIC VEIN PEAK A VELOCITY: 0.2 M/S
PV PEAK D VEL: 0.42 M/S
PV PEAK S VEL: 0.45 M/S
RA MAJOR: 3.96 CM
RA PRESSURE ESTIMATED: 3 MMHG
RA WIDTH: 3.17 CM
RIGHT ATRIAL AREA: 12.4 CM2
RIGHT VENTRICLE DIASTOLIC BASEL DIMENSION: 3.2 CM
RV TB RVSP: 5 MMHG
RV TISSUE DOPPLER FREE WALL SYSTOLIC VELOCITY 1 (APICAL 4 CHAMBER VIEW): 12.65 CM/S
SINUS: 3.29 CM
STJ: 2.39 CM
TDI LATERAL: 0.15 M/S
TDI SEPTAL: 0.11 M/S
TDI: 0.13 M/S
TR MAX PG: 18 MMHG
TRICUSPID ANNULAR PLANE SYSTOLIC EXCURSION: 2.54 CM
TV PEAK GRADIENT: 18 MMHG
TV REST PULMONARY ARTERY PRESSURE: 21 MMHG
Z-SCORE OF LEFT VENTRICULAR DIMENSION IN END DIASTOLE: -0.22
Z-SCORE OF LEFT VENTRICULAR DIMENSION IN END SYSTOLE: 1.74

## 2024-12-09 PROCEDURE — 93306 TTE W/DOPPLER COMPLETE: CPT | Mod: 26,,, | Performed by: INTERNAL MEDICINE

## 2024-12-09 PROCEDURE — 93306 TTE W/DOPPLER COMPLETE: CPT

## 2024-12-09 PROCEDURE — 93356 MYOCRD STRAIN IMG SPCKL TRCK: CPT | Mod: ,,, | Performed by: INTERNAL MEDICINE

## 2024-12-11 ENCOUNTER — HOSPITAL ENCOUNTER (INPATIENT)
Facility: OTHER | Age: 44
LOS: 4 days | Discharge: HOME | DRG: 584 | End: 2024-12-15
Attending: PLASTIC SURGERY | Admitting: PLASTIC SURGERY
Payer: COMMERCIAL

## 2024-12-11 ENCOUNTER — ANESTHESIA (OUTPATIENT)
Dept: SURGERY | Facility: OTHER | Age: 44
End: 2024-12-11
Payer: COMMERCIAL

## 2024-12-11 DIAGNOSIS — T45.1X5A CHEMOTHERAPY-INDUCED NAUSEA: Primary | ICD-10-CM

## 2024-12-11 DIAGNOSIS — Z85.3 HISTORY OF BREAST CANCER: ICD-10-CM

## 2024-12-11 DIAGNOSIS — C50.311 MALIGNANT NEOPLASM OF LOWER-INNER QUADRANT OF RIGHT BREAST OF FEMALE, ESTROGEN RECEPTOR POSITIVE: ICD-10-CM

## 2024-12-11 DIAGNOSIS — R11.0 CHEMOTHERAPY-INDUCED NAUSEA: Primary | ICD-10-CM

## 2024-12-11 DIAGNOSIS — Z17.0 MALIGNANT NEOPLASM OF LOWER-INNER QUADRANT OF RIGHT BREAST OF FEMALE, ESTROGEN RECEPTOR POSITIVE: ICD-10-CM

## 2024-12-11 LAB
B-HCG UR QL: NEGATIVE
CTP QC/QA: YES

## 2024-12-11 PROCEDURE — 0HRV07B REPLACEMENT OF BILATERAL BREAST USING LUMBAR ARTERY PERFORATOR FLAP, OPEN APPROACH: ICD-10-PCS | Performed by: PLASTIC SURGERY

## 2024-12-11 PROCEDURE — 88300 SURGICAL PATH GROSS: CPT | Mod: 59 | Performed by: STUDENT IN AN ORGANIZED HEALTH CARE EDUCATION/TRAINING PROGRAM

## 2024-12-11 PROCEDURE — 04BH0ZZ EXCISION OF RIGHT EXTERNAL ILIAC ARTERY, OPEN APPROACH: ICD-10-PCS | Performed by: PLASTIC SURGERY

## 2024-12-11 PROCEDURE — 25000003 PHARM REV CODE 250: Performed by: STUDENT IN AN ORGANIZED HEALTH CARE EDUCATION/TRAINING PROGRAM

## 2024-12-11 PROCEDURE — 63600175 PHARM REV CODE 636 W HCPCS: Performed by: STUDENT IN AN ORGANIZED HEALTH CARE EDUCATION/TRAINING PROGRAM

## 2024-12-11 PROCEDURE — 81025 URINE PREGNANCY TEST: CPT | Performed by: ANESTHESIOLOGY

## 2024-12-11 PROCEDURE — 0HPT0NZ REMOVAL OF TISSUE EXPANDER FROM RIGHT BREAST, OPEN APPROACH: ICD-10-PCS | Performed by: PLASTIC SURGERY

## 2024-12-11 PROCEDURE — 36000709 HC OR TIME LEV III EA ADD 15 MIN: Performed by: PLASTIC SURGERY

## 2024-12-11 PROCEDURE — 71000039 HC RECOVERY, EACH ADD'L HOUR: Performed by: PLASTIC SURGERY

## 2024-12-11 PROCEDURE — 63600175 PHARM REV CODE 636 W HCPCS: Performed by: PLASTIC SURGERY

## 2024-12-11 PROCEDURE — P9045 ALBUMIN (HUMAN), 5%, 250 ML: HCPCS | Mod: JZ,JG | Performed by: STUDENT IN AN ORGANIZED HEALTH CARE EDUCATION/TRAINING PROGRAM

## 2024-12-11 PROCEDURE — C9290 INJ, BUPIVACAINE LIPOSOME: HCPCS | Performed by: PLASTIC SURGERY

## 2024-12-11 PROCEDURE — 71000033 HC RECOVERY, INTIAL HOUR: Performed by: PLASTIC SURGERY

## 2024-12-11 PROCEDURE — 06BF0ZZ EXCISION OF RIGHT EXTERNAL ILIAC VEIN, OPEN APPROACH: ICD-10-PCS | Performed by: PLASTIC SURGERY

## 2024-12-11 PROCEDURE — 25000003 PHARM REV CODE 250: Performed by: PLASTIC SURGERY

## 2024-12-11 PROCEDURE — 25000003 PHARM REV CODE 250: Performed by: ANESTHESIOLOGY

## 2024-12-11 PROCEDURE — 11000001 HC ACUTE MED/SURG PRIVATE ROOM

## 2024-12-11 PROCEDURE — 94799 UNLISTED PULMONARY SVC/PX: CPT

## 2024-12-11 PROCEDURE — 37000008 HC ANESTHESIA 1ST 15 MINUTES: Performed by: PLASTIC SURGERY

## 2024-12-11 PROCEDURE — 88300 SURGICAL PATH GROSS: CPT | Mod: 26,,, | Performed by: STUDENT IN AN ORGANIZED HEALTH CARE EDUCATION/TRAINING PROGRAM

## 2024-12-11 PROCEDURE — 63600175 PHARM REV CODE 636 W HCPCS: Mod: JZ,JG | Performed by: STUDENT IN AN ORGANIZED HEALTH CARE EDUCATION/TRAINING PROGRAM

## 2024-12-11 PROCEDURE — 06BG0ZZ EXCISION OF LEFT EXTERNAL ILIAC VEIN, OPEN APPROACH: ICD-10-PCS | Performed by: PLASTIC SURGERY

## 2024-12-11 PROCEDURE — 37000009 HC ANESTHESIA EA ADD 15 MINS: Performed by: PLASTIC SURGERY

## 2024-12-11 PROCEDURE — 0HPU0NZ REMOVAL OF TISSUE EXPANDER FROM LEFT BREAST, OPEN APPROACH: ICD-10-PCS | Performed by: PLASTIC SURGERY

## 2024-12-11 PROCEDURE — 25000003 PHARM REV CODE 250: Performed by: NURSE ANESTHETIST, CERTIFIED REGISTERED

## 2024-12-11 PROCEDURE — C1729 CATH, DRAINAGE: HCPCS | Performed by: PLASTIC SURGERY

## 2024-12-11 PROCEDURE — 04BJ0ZZ EXCISION OF LEFT EXTERNAL ILIAC ARTERY, OPEN APPROACH: ICD-10-PCS | Performed by: PLASTIC SURGERY

## 2024-12-11 PROCEDURE — A6010 COLLAGEN BASED WOUND FILLER: HCPCS | Performed by: PLASTIC SURGERY

## 2024-12-11 PROCEDURE — 27201423 OPTIME MED/SURG SUP & DEVICES STERILE SUPPLY: Performed by: PLASTIC SURGERY

## 2024-12-11 PROCEDURE — 63600175 PHARM REV CODE 636 W HCPCS: Performed by: ANESTHESIOLOGY

## 2024-12-11 PROCEDURE — 36000708 HC OR TIME LEV III 1ST 15 MIN: Performed by: PLASTIC SURGERY

## 2024-12-11 DEVICE — COUPLER MICROVAS ANSTMS 2.5MM: Type: IMPLANTABLE DEVICE | Site: BREAST | Status: FUNCTIONAL

## 2024-12-11 DEVICE — COUPLER 2.0MM: Type: IMPLANTABLE DEVICE | Site: BREAST | Status: FUNCTIONAL

## 2024-12-11 RX ORDER — ACETAMINOPHEN 500 MG
1000 TABLET ORAL EVERY 6 HOURS
Status: DISCONTINUED | OUTPATIENT
Start: 2024-12-11 | End: 2024-12-15 | Stop reason: HOSPADM

## 2024-12-11 RX ORDER — TRANEXAMIC ACID 100 MG/ML
INJECTION, SOLUTION INTRAVENOUS
Status: DISCONTINUED | OUTPATIENT
Start: 2024-12-11 | End: 2024-12-11

## 2024-12-11 RX ORDER — SODIUM CHLORIDE, SODIUM LACTATE, POTASSIUM CHLORIDE, CALCIUM CHLORIDE 600; 310; 30; 20 MG/100ML; MG/100ML; MG/100ML; MG/100ML
INJECTION, SOLUTION INTRAVENOUS CONTINUOUS
Status: DISCONTINUED | OUTPATIENT
Start: 2024-12-11 | End: 2024-12-12

## 2024-12-11 RX ORDER — KETOROLAC TROMETHAMINE 30 MG/ML
INJECTION, SOLUTION INTRAMUSCULAR; INTRAVENOUS
Status: DISCONTINUED | OUTPATIENT
Start: 2024-12-11 | End: 2024-12-11

## 2024-12-11 RX ORDER — MUPIROCIN 20 MG/G
OINTMENT TOPICAL
Status: DISCONTINUED | OUTPATIENT
Start: 2024-12-11 | End: 2024-12-11 | Stop reason: HOSPADM

## 2024-12-11 RX ORDER — DEXAMETHASONE SODIUM PHOSPHATE 4 MG/ML
INJECTION, SOLUTION INTRA-ARTICULAR; INTRALESIONAL; INTRAMUSCULAR; INTRAVENOUS; SOFT TISSUE
Status: DISCONTINUED | OUTPATIENT
Start: 2024-12-11 | End: 2024-12-11

## 2024-12-11 RX ORDER — FENTANYL CITRATE 50 UG/ML
INJECTION, SOLUTION INTRAMUSCULAR; INTRAVENOUS
Status: DISCONTINUED | OUTPATIENT
Start: 2024-12-11 | End: 2024-12-11

## 2024-12-11 RX ORDER — DIPHENHYDRAMINE HCL 25 MG
50 CAPSULE ORAL EVERY 4 HOURS PRN
Status: DISCONTINUED | OUTPATIENT
Start: 2024-12-11 | End: 2024-12-15 | Stop reason: HOSPADM

## 2024-12-11 RX ORDER — ONDANSETRON HYDROCHLORIDE 2 MG/ML
INJECTION, SOLUTION INTRAMUSCULAR; INTRAVENOUS
Status: DISCONTINUED | OUTPATIENT
Start: 2024-12-11 | End: 2024-12-11

## 2024-12-11 RX ORDER — VECURONIUM BROMIDE 1 MG/ML
INJECTION, POWDER, LYOPHILIZED, FOR SOLUTION INTRAVENOUS
Status: DISCONTINUED | OUTPATIENT
Start: 2024-12-11 | End: 2024-12-11

## 2024-12-11 RX ORDER — HEPARIN SODIUM 5000 [USP'U]/ML
5000 INJECTION, SOLUTION INTRAVENOUS; SUBCUTANEOUS ONCE
Status: COMPLETED | OUTPATIENT
Start: 2024-12-11 | End: 2024-12-11

## 2024-12-11 RX ORDER — OXYCODONE HYDROCHLORIDE 5 MG/1
5 TABLET ORAL
Status: DISCONTINUED | OUTPATIENT
Start: 2024-12-11 | End: 2024-12-11 | Stop reason: HOSPADM

## 2024-12-11 RX ORDER — SODIUM CHLORIDE 0.9 % (FLUSH) 0.9 %
3 SYRINGE (ML) INJECTION
Status: DISCONTINUED | OUTPATIENT
Start: 2024-12-11 | End: 2024-12-11 | Stop reason: HOSPADM

## 2024-12-11 RX ORDER — PROCHLORPERAZINE EDISYLATE 5 MG/ML
5 INJECTION INTRAMUSCULAR; INTRAVENOUS EVERY 6 HOURS PRN
Status: DISCONTINUED | OUTPATIENT
Start: 2024-12-11 | End: 2024-12-15 | Stop reason: HOSPADM

## 2024-12-11 RX ORDER — HEPARIN SODIUM 5000 [USP'U]/ML
INJECTION, SOLUTION INTRAVENOUS; SUBCUTANEOUS
Status: DISCONTINUED | OUTPATIENT
Start: 2024-12-11 | End: 2024-12-11 | Stop reason: HOSPADM

## 2024-12-11 RX ORDER — SODIUM CHLORIDE 9 MG/ML
INJECTION, SOLUTION INTRAVENOUS CONTINUOUS
Status: DISCONTINUED | OUTPATIENT
Start: 2024-12-11 | End: 2024-12-12

## 2024-12-11 RX ORDER — GLUCAGON 1 MG
1 KIT INJECTION
Status: DISCONTINUED | OUTPATIENT
Start: 2024-12-11 | End: 2024-12-11 | Stop reason: HOSPADM

## 2024-12-11 RX ORDER — LIDOCAINE HYDROCHLORIDE AND EPINEPHRINE 10; 10 UG/ML; MG/ML
INJECTION, SOLUTION INFILTRATION; PERINEURAL
Status: DISCONTINUED | OUTPATIENT
Start: 2024-12-11 | End: 2024-12-11 | Stop reason: HOSPADM

## 2024-12-11 RX ORDER — MEPERIDINE HYDROCHLORIDE 25 MG/ML
12.5 INJECTION INTRAMUSCULAR; INTRAVENOUS; SUBCUTANEOUS ONCE AS NEEDED
Status: DISCONTINUED | OUTPATIENT
Start: 2024-12-11 | End: 2024-12-11 | Stop reason: HOSPADM

## 2024-12-11 RX ORDER — HYDROMORPHONE HYDROCHLORIDE 2 MG/ML
0.4 INJECTION, SOLUTION INTRAMUSCULAR; INTRAVENOUS; SUBCUTANEOUS EVERY 5 MIN PRN
Status: DISCONTINUED | OUTPATIENT
Start: 2024-12-11 | End: 2024-12-11 | Stop reason: HOSPADM

## 2024-12-11 RX ORDER — PROPOFOL 10 MG/ML
VIAL (ML) INTRAVENOUS
Status: DISCONTINUED | OUTPATIENT
Start: 2024-12-11 | End: 2024-12-11

## 2024-12-11 RX ORDER — KETAMINE HCL IN 0.9 % NACL 50 MG/5 ML
SYRINGE (ML) INTRAVENOUS
Status: DISCONTINUED | OUTPATIENT
Start: 2024-12-11 | End: 2024-12-11

## 2024-12-11 RX ORDER — DOCUSATE SODIUM 100 MG/1
100 CAPSULE, LIQUID FILLED ORAL EVERY 12 HOURS
Status: DISCONTINUED | OUTPATIENT
Start: 2024-12-11 | End: 2024-12-15 | Stop reason: HOSPADM

## 2024-12-11 RX ORDER — BUPIVACAINE 13.3 MG/ML
INJECTION, SUSPENSION, LIPOSOMAL INFILTRATION
Status: DISCONTINUED | OUTPATIENT
Start: 2024-12-11 | End: 2024-12-11 | Stop reason: HOSPADM

## 2024-12-11 RX ORDER — OXYCODONE HYDROCHLORIDE 5 MG/1
5 TABLET ORAL EVERY 4 HOURS PRN
Status: DISCONTINUED | OUTPATIENT
Start: 2024-12-11 | End: 2024-12-15 | Stop reason: HOSPADM

## 2024-12-11 RX ORDER — CEFAZOLIN 2 G/1
2 INJECTION, POWDER, FOR SOLUTION INTRAMUSCULAR; INTRAVENOUS
Status: COMPLETED | OUTPATIENT
Start: 2024-12-11 | End: 2024-12-11

## 2024-12-11 RX ORDER — PROCHLORPERAZINE EDISYLATE 5 MG/ML
5 INJECTION INTRAMUSCULAR; INTRAVENOUS EVERY 30 MIN PRN
Status: DISCONTINUED | OUTPATIENT
Start: 2024-12-11 | End: 2024-12-11 | Stop reason: HOSPADM

## 2024-12-11 RX ORDER — LIDOCAINE HYDROCHLORIDE 10 MG/ML
0.5 INJECTION, SOLUTION EPIDURAL; INFILTRATION; INTRACAUDAL; PERINEURAL ONCE
Status: DISCONTINUED | OUTPATIENT
Start: 2024-12-11 | End: 2024-12-11 | Stop reason: HOSPADM

## 2024-12-11 RX ORDER — CEFAZOLIN SODIUM 1 G/3ML
1 INJECTION, POWDER, FOR SOLUTION INTRAMUSCULAR; INTRAVENOUS
Status: DISCONTINUED | OUTPATIENT
Start: 2024-12-12 | End: 2024-12-14

## 2024-12-11 RX ORDER — OXYCODONE HYDROCHLORIDE 10 MG/1
10 TABLET ORAL EVERY 4 HOURS PRN
Status: DISCONTINUED | OUTPATIENT
Start: 2024-12-11 | End: 2024-12-15 | Stop reason: HOSPADM

## 2024-12-11 RX ORDER — ROCURONIUM BROMIDE 10 MG/ML
INJECTION, SOLUTION INTRAVENOUS
Status: DISCONTINUED | OUTPATIENT
Start: 2024-12-11 | End: 2024-12-11

## 2024-12-11 RX ORDER — ACETAMINOPHEN 500 MG
1000 TABLET ORAL
Status: COMPLETED | OUTPATIENT
Start: 2024-12-11 | End: 2024-12-11

## 2024-12-11 RX ORDER — ALBUMIN HUMAN 50 G/1000ML
SOLUTION INTRAVENOUS
Status: DISCONTINUED | OUTPATIENT
Start: 2024-12-11 | End: 2024-12-11

## 2024-12-11 RX ORDER — MIDAZOLAM HYDROCHLORIDE 1 MG/ML
INJECTION INTRAMUSCULAR; INTRAVENOUS
Status: DISCONTINUED | OUTPATIENT
Start: 2024-12-11 | End: 2024-12-11

## 2024-12-11 RX ORDER — MUPIROCIN 20 MG/G
OINTMENT TOPICAL 2 TIMES DAILY
Status: DISCONTINUED | OUTPATIENT
Start: 2024-12-11 | End: 2024-12-15 | Stop reason: HOSPADM

## 2024-12-11 RX ORDER — LIDOCAINE HYDROCHLORIDE 20 MG/ML
INJECTION, SOLUTION EPIDURAL; INFILTRATION; INTRACAUDAL; PERINEURAL
Status: DISCONTINUED | OUTPATIENT
Start: 2024-12-11 | End: 2024-12-11

## 2024-12-11 RX ORDER — PAPAVERINE HYDROCHLORIDE 30 MG/ML
INJECTION INTRAMUSCULAR; INTRAVENOUS
Status: DISCONTINUED | OUTPATIENT
Start: 2024-12-11 | End: 2024-12-11 | Stop reason: HOSPADM

## 2024-12-11 RX ORDER — CYCLOBENZAPRINE HCL 5 MG
10 TABLET ORAL 3 TIMES DAILY
Status: DISCONTINUED | OUTPATIENT
Start: 2024-12-11 | End: 2024-12-15 | Stop reason: HOSPADM

## 2024-12-11 RX ORDER — PREGABALIN 75 MG/1
75 CAPSULE ORAL ONCE
Status: COMPLETED | OUTPATIENT
Start: 2024-12-11 | End: 2024-12-11

## 2024-12-11 RX ORDER — TALC
6 POWDER (GRAM) TOPICAL NIGHTLY PRN
Status: DISCONTINUED | OUTPATIENT
Start: 2024-12-11 | End: 2024-12-15 | Stop reason: HOSPADM

## 2024-12-11 RX ORDER — ONDANSETRON 4 MG/1
4 TABLET, ORALLY DISINTEGRATING ORAL EVERY 8 HOURS PRN
Status: DISCONTINUED | OUTPATIENT
Start: 2024-12-11 | End: 2024-12-15 | Stop reason: HOSPADM

## 2024-12-11 RX ADMIN — FENTANYL CITRATE 50 MCG: 50 INJECTION, SOLUTION INTRAMUSCULAR; INTRAVENOUS at 11:12

## 2024-12-11 RX ADMIN — CEFAZOLIN 2 G: 2 INJECTION, POWDER, FOR SOLUTION INTRAMUSCULAR; INTRAVENOUS at 04:12

## 2024-12-11 RX ADMIN — ROCURONIUM BROMIDE 20 MG: 10 SOLUTION INTRAVENOUS at 09:12

## 2024-12-11 RX ADMIN — VECURONIUM BROMIDE FOR INJECTION 2 MG: 1 INJECTION, POWDER, LYOPHILIZED, FOR SOLUTION INTRAVENOUS at 01:12

## 2024-12-11 RX ADMIN — FENTANYL CITRATE 25 MCG: 50 INJECTION, SOLUTION INTRAMUSCULAR; INTRAVENOUS at 03:12

## 2024-12-11 RX ADMIN — CEFAZOLIN 2 G: 2 INJECTION, POWDER, FOR SOLUTION INTRAMUSCULAR; INTRAVENOUS at 12:12

## 2024-12-11 RX ADMIN — OXYCODONE 5 MG: 5 TABLET ORAL at 05:12

## 2024-12-11 RX ADMIN — ROCURONIUM BROMIDE 50 MG: 10 SOLUTION INTRAVENOUS at 08:12

## 2024-12-11 RX ADMIN — ROCURONIUM BROMIDE 20 MG: 10 SOLUTION INTRAVENOUS at 10:12

## 2024-12-11 RX ADMIN — SODIUM CHLORIDE, POTASSIUM CHLORIDE, SODIUM LACTATE AND CALCIUM CHLORIDE: 600; 310; 30; 20 INJECTION, SOLUTION INTRAVENOUS at 08:12

## 2024-12-11 RX ADMIN — FENTANYL CITRATE 50 MCG: 50 INJECTION, SOLUTION INTRAMUSCULAR; INTRAVENOUS at 09:12

## 2024-12-11 RX ADMIN — CYCLOBENZAPRINE HYDROCHLORIDE 10 MG: 5 TABLET, FILM COATED ORAL at 08:12

## 2024-12-11 RX ADMIN — PREGABALIN 75 MG: 75 CAPSULE ORAL at 06:12

## 2024-12-11 RX ADMIN — PROPOFOL 40 MG: 10 INJECTION, EMULSION INTRAVENOUS at 03:12

## 2024-12-11 RX ADMIN — MELATONIN TAB 3 MG 6 MG: 3 TAB at 11:12

## 2024-12-11 RX ADMIN — SODIUM CHLORIDE, SODIUM LACTATE, POTASSIUM CHLORIDE, AND CALCIUM CHLORIDE: .6; .31; .03; .02 INJECTION, SOLUTION INTRAVENOUS at 07:12

## 2024-12-11 RX ADMIN — FENTANYL CITRATE 100 MCG: 50 INJECTION, SOLUTION INTRAMUSCULAR; INTRAVENOUS at 08:12

## 2024-12-11 RX ADMIN — DEXAMETHASONE SODIUM PHOSPHATE 8 MG: 4 INJECTION, SOLUTION INTRAMUSCULAR; INTRAVENOUS at 08:12

## 2024-12-11 RX ADMIN — VECURONIUM BROMIDE FOR INJECTION 2 MG: 1 INJECTION, POWDER, LYOPHILIZED, FOR SOLUTION INTRAVENOUS at 12:12

## 2024-12-11 RX ADMIN — SODIUM CHLORIDE, SODIUM LACTATE, POTASSIUM CHLORIDE, AND CALCIUM CHLORIDE: .6; .31; .03; .02 INJECTION, SOLUTION INTRAVENOUS at 01:12

## 2024-12-11 RX ADMIN — Medication 25 MG: at 10:12

## 2024-12-11 RX ADMIN — LIDOCAINE HYDROCHLORIDE 80 MG: 20 INJECTION, SOLUTION EPIDURAL; INFILTRATION; INTRACAUDAL; PERINEURAL at 08:12

## 2024-12-11 RX ADMIN — PROPOFOL 150 MG: 10 INJECTION, EMULSION INTRAVENOUS at 08:12

## 2024-12-11 RX ADMIN — ACETAMINOPHEN 1000 MG: 500 TABLET, FILM COATED ORAL at 06:12

## 2024-12-11 RX ADMIN — HYDROMORPHONE HYDROCHLORIDE 0.4 MG: 2 INJECTION INTRAMUSCULAR; INTRAVENOUS; SUBCUTANEOUS at 04:12

## 2024-12-11 RX ADMIN — ACETAMINOPHEN 1000 MG: 500 TABLET, FILM COATED ORAL at 05:12

## 2024-12-11 RX ADMIN — FENTANYL CITRATE 25 MCG: 50 INJECTION, SOLUTION INTRAMUSCULAR; INTRAVENOUS at 02:12

## 2024-12-11 RX ADMIN — VECURONIUM BROMIDE FOR INJECTION 2 MG: 1 INJECTION, POWDER, LYOPHILIZED, FOR SOLUTION INTRAVENOUS at 11:12

## 2024-12-11 RX ADMIN — ALBUMIN (HUMAN) 500 ML: 12.5 SOLUTION INTRAVENOUS at 11:12

## 2024-12-11 RX ADMIN — MUPIROCIN: 20 OINTMENT TOPICAL at 11:12

## 2024-12-11 RX ADMIN — FENTANYL CITRATE 50 MCG: 50 INJECTION, SOLUTION INTRAMUSCULAR; INTRAVENOUS at 10:12

## 2024-12-11 RX ADMIN — PHENYLEPHRINE HYDROCHLORIDE 0.5 MCG/KG/MIN: 10 INJECTION INTRAVENOUS at 08:12

## 2024-12-11 RX ADMIN — ROCURONIUM BROMIDE 10 MG: 10 SOLUTION INTRAVENOUS at 10:12

## 2024-12-11 RX ADMIN — VECURONIUM BROMIDE FOR INJECTION 2 MG: 1 INJECTION, POWDER, LYOPHILIZED, FOR SOLUTION INTRAVENOUS at 02:12

## 2024-12-11 RX ADMIN — Medication 25 MG: at 08:12

## 2024-12-11 RX ADMIN — ACETAMINOPHEN 1000 MG: 500 TABLET, FILM COATED ORAL at 11:12

## 2024-12-11 RX ADMIN — TRANEXAMIC ACID 1000 MG: 100 INJECTION, SOLUTION INTRAVENOUS at 03:12

## 2024-12-11 RX ADMIN — ONDANSETRON 4 MG: 2 INJECTION INTRAMUSCULAR; INTRAVENOUS at 02:12

## 2024-12-11 RX ADMIN — CEFAZOLIN 1 G: 330 INJECTION, POWDER, FOR SOLUTION INTRAMUSCULAR; INTRAVENOUS at 11:12

## 2024-12-11 RX ADMIN — SODIUM CHLORIDE, SODIUM LACTATE, POTASSIUM CHLORIDE, AND CALCIUM CHLORIDE: .6; .31; .03; .02 INJECTION, SOLUTION INTRAVENOUS at 09:12

## 2024-12-11 RX ADMIN — HEPARIN SODIUM 5000 UNITS: 5000 INJECTION INTRAVENOUS; SUBCUTANEOUS at 06:12

## 2024-12-11 RX ADMIN — SUGAMMADEX 400 MG: 100 INJECTION, SOLUTION INTRAVENOUS at 04:12

## 2024-12-11 RX ADMIN — DOCUSATE SODIUM 100 MG: 100 CAPSULE, LIQUID FILLED ORAL at 08:12

## 2024-12-11 RX ADMIN — ROCURONIUM BROMIDE 20 MG: 10 SOLUTION INTRAVENOUS at 08:12

## 2024-12-11 RX ADMIN — ONDANSETRON 4 MG: 4 TABLET, ORALLY DISINTEGRATING ORAL at 09:12

## 2024-12-11 RX ADMIN — MUPIROCIN: 20 OINTMENT TOPICAL at 06:12

## 2024-12-11 RX ADMIN — MIDAZOLAM HYDROCHLORIDE 2 MG: 1 INJECTION, SOLUTION INTRAMUSCULAR; INTRAVENOUS at 07:12

## 2024-12-11 RX ADMIN — CEFAZOLIN 2 G: 2 INJECTION, POWDER, FOR SOLUTION INTRAMUSCULAR; INTRAVENOUS at 08:12

## 2024-12-11 NOTE — BRIEF OP NOTE
[facility] ([facility]  Brief Operative Note    SUMMARY     Surgery Date: 2024     Surgeons and Role:     * Allen Damian MD - Primary     * Olga Solitario MD - Assisting    Pre-op Diagnosis:  Breast Cancer    Post-op Diagnosis:  same as above    Procedure:  Bilateral lumbar artery  flap breast reconstruction with IDEA/DIEV interposition grafts  Bilateral breast tissue expander explantation  Bilateral breast capsulotomy    Anesthesia: General    Implants:  Implant Name Type Inv. Item Serial No.  Lot No. LRB No. Used Action   Sientra OPUS, allox2 tissue xpander   [id number] SIENTRA  Left 1 Explanted   Sientra OPUS, Allox2 tissue expander   [id number] SIENTRA  Right 1 Explanted   CELLERATE RX ACTIVATED COLLAGEN   [id number] [employer]  Bilateral 1 Implanted    2.0MM - [id number]   2.0MM  [employer] [id number] Right 1 Implanted    2.0MM - [id number]   2.0MM  [employer] [id number] Left 1 Implanted    MICROVAS ANSTMS 2.5MM - [id number]   MICROVAS ANSTMS 2.5MM  [employer] [id number] Right 1 Implanted    2.0MM - [id number]   2.0MM  [employer] [id number] Left 1 Implanted       Operative Findings:   Implants:   1. Left DIEV interposition graft-to-right IMV venous anastomotic : 2.5 mm  2. Left LAP-to-left DIEV interposition graft venous anastomotic : 2.0 mm  3. Right DIEV interposition graft-to-left IMV venous anastomotic : 2.0 mm  4. Right LAP-to-right DIEV interposition graft venous anastomotic : 2.0 mm      Findings:   Micro Information  First flap  Donor site: Left LAP  Recipient site: Right breast  Weight: 308 grams  Number of perforators: 1 LAP  Name arterial pedicle: Left LAP  Recipient artery: Right SCOTTIE  Estimate size of arterial anastomosis: 2.0 mm  Venous anastomosis in chest  Donor: Left DIEV  Recipient:  left IMV  Size: 2.5 mm  Venous anastomosis to DIEV portion of interposition graft  Donor: Left LAP vena comitans  Recipient: left DIEV  Size: 2.0 mm    Second flap  Donor site: Right LAP  Recipient site: Left breast  Weight: 274 grams  Number of perforators: 1 LAP  Name arterial pedicle: Right LAP  Recipient artery: Left SCOTTIE  Estimate size of arterial anastomosis: 2.0 mm  Venous anastomosis in chest  Donor: Left DIEV  Recipient: left IMV  Size: 2.0 mm  Venous anastomosis to DIEV portion of interposition graft  Donor: Left LAP vena comitans  Recipient: left DIEV  Size: 2.0 mm      Estimated Blood Loss: 150 ccs         Specimens:   Specimen (24h ago, onward)       Start     Ordered    12/11/24 1505  Specimen to Pathology, Surgery Gross only  Once        Comments: Pre-op Diagnosis: At high risk for breast cancer [Z91.89]Procedure(s):RECONSTRUCTION, BREAST, USING NATHALIE FREE FLAP / SGAPREMOVAL, TISSUE EXPANDER Number of specimens: 2Name of specimens: 1. Right tissue expander (Gross ID)2. Left tissue expander (Gross ID)     References:    Click here for ordering Quick Tip   Question Answer Comment   Procedure Type: Gross only    Specimen Class: Routine/Screening    Release to patient Immediate        12/11/24 1505                    [id number]

## 2024-12-11 NOTE — TRANSFER OF CARE
Anesthesia Transfer of Care Note    Patient: Tasha Cornejo    Procedure(s) Performed: Procedure(s) (LRB):  RECONSTRUCTION, BREAST, USING LUMBAR ARTERY FLAP AND INTERPOSITION GRAFT DIEA/DIEV (Bilateral)  REMOVAL, TISSUE EXPANDER (Bilateral)    Patient location: PACU    Anesthesia Type: general    Transport from OR: Transported from OR on 2-3 L/min O2 by NC with adequate spontaneous ventilation    Post pain: adequate analgesia    Post assessment: no apparent anesthetic complications    Post vital signs: stable    Level of consciousness: responds to stimulation    Nausea/Vomiting: no nausea/vomiting    Complications: none    Transfer of care protocol was followed      Last vitals: Visit Vitals  /60   Pulse 94   Temp 36.4 °C (97.6 °F) (Skin)   Resp 16   Ht 5' (1.524 m)   Wt 50.8 kg (112 lb)   LMP 10/30/2024 (Exact Date)   SpO2 99%   Breastfeeding No   BMI 21.87 kg/m²

## 2024-12-11 NOTE — ANESTHESIA PROCEDURE NOTES
Intubation    Date/Time: 12/11/2024 8:06 AM    Performed by: Tasha Ambriz CRNA  Authorized by: Carlos Alberto Boone MD    Intubation:     Mask Ventilation:  Easy with oral airway    Attempts:  1    Attempted By:  Student (CHRISTO HUNTER)    Method of Intubation:  Video laryngoscopy    Blade:  Abdullahi 3    Laryngeal View Grade: Grade I - full view of cords      Difficult Airway Encountered?: No      Complications:  None    Airway Device:  Oral endotracheal tube    Airway Device Size:  7.0    Inflation Amount (mL):  6    Tube secured:  21    Secured at:  The lips    Placement Verified By:  Capnometry and Revisualization with laryngoscopy    Complicating Factors:  None    Findings Post-Intubation:  BS equal bilateral

## 2024-12-11 NOTE — TRANSFER OF CARE
Anesthesia Transfer of Care Note    Patient: Tasha Cornejo    Procedure(s) Performed: Procedure(s) (LRB):  RECONSTRUCTION, BREAST, USING LUMBAR ARTERY FLAP AND INTERPOSITION GRAFT DIEA/DIEV (Bilateral)  REMOVAL, TISSUE EXPANDER (Bilateral)    Patient location: PACU    Anesthesia Type: general    Transport from OR: Transported from OR on 2-3 L/min O2 by NC with adequate spontaneous ventilation    Post pain: adequate analgesia    Post assessment: no apparent anesthetic complications    Post vital signs: stable    Level of consciousness: awake    Nausea/Vomiting: no nausea/vomiting    Complications: none    Transfer of care protocol was followed      Last vitals: Visit Vitals  BP (!) 142/90   Pulse 81   Temp 36.6 °C (97.9 °F) (Oral)   Resp 18   Ht 5' (1.524 m)   Wt 50.8 kg (112 lb)   LMP 10/30/2024 (Exact Date)   SpO2 100%   Breastfeeding No   BMI 21.87 kg/m²

## 2024-12-12 LAB
FINAL PATHOLOGIC DIAGNOSIS: NORMAL
GROSS: NORMAL
Lab: NORMAL
MICROSCOPIC EXAM: NORMAL

## 2024-12-12 PROCEDURE — 11000001 HC ACUTE MED/SURG PRIVATE ROOM

## 2024-12-12 PROCEDURE — 97165 OT EVAL LOW COMPLEX 30 MIN: CPT

## 2024-12-12 PROCEDURE — 25000003 PHARM REV CODE 250: Performed by: STUDENT IN AN ORGANIZED HEALTH CARE EDUCATION/TRAINING PROGRAM

## 2024-12-12 PROCEDURE — 97162 PT EVAL MOD COMPLEX 30 MIN: CPT

## 2024-12-12 PROCEDURE — 97535 SELF CARE MNGMENT TRAINING: CPT

## 2024-12-12 PROCEDURE — 63600175 PHARM REV CODE 636 W HCPCS: Performed by: STUDENT IN AN ORGANIZED HEALTH CARE EDUCATION/TRAINING PROGRAM

## 2024-12-12 RX ORDER — KETOROLAC TROMETHAMINE 30 MG/ML
15 INJECTION, SOLUTION INTRAMUSCULAR; INTRAVENOUS EVERY 6 HOURS
Status: DISCONTINUED | OUTPATIENT
Start: 2024-12-12 | End: 2024-12-14

## 2024-12-12 RX ADMIN — ACETAMINOPHEN 1000 MG: 500 TABLET, FILM COATED ORAL at 06:12

## 2024-12-12 RX ADMIN — DOCUSATE SODIUM 100 MG: 100 CAPSULE, LIQUID FILLED ORAL at 09:12

## 2024-12-12 RX ADMIN — CYCLOBENZAPRINE HYDROCHLORIDE 10 MG: 5 TABLET, FILM COATED ORAL at 09:12

## 2024-12-12 RX ADMIN — OXYCODONE 5 MG: 5 TABLET ORAL at 06:12

## 2024-12-12 RX ADMIN — CEFAZOLIN 1 G: 330 INJECTION, POWDER, FOR SOLUTION INTRAMUSCULAR; INTRAVENOUS at 04:12

## 2024-12-12 RX ADMIN — OXYCODONE HYDROCHLORIDE 10 MG: 10 TABLET ORAL at 04:12

## 2024-12-12 RX ADMIN — CYCLOBENZAPRINE HYDROCHLORIDE 10 MG: 5 TABLET, FILM COATED ORAL at 02:12

## 2024-12-12 RX ADMIN — OXYCODONE 5 MG: 5 TABLET ORAL at 02:12

## 2024-12-12 RX ADMIN — OXYCODONE HYDROCHLORIDE 10 MG: 10 TABLET ORAL at 09:12

## 2024-12-12 RX ADMIN — KETOROLAC TROMETHAMINE 15 MG: 30 INJECTION, SOLUTION INTRAMUSCULAR; INTRAVENOUS at 09:12

## 2024-12-12 RX ADMIN — MUPIROCIN: 20 OINTMENT TOPICAL at 09:12

## 2024-12-12 RX ADMIN — CEFAZOLIN 1 G: 330 INJECTION, POWDER, FOR SOLUTION INTRAMUSCULAR; INTRAVENOUS at 09:12

## 2024-12-12 RX ADMIN — KETOROLAC TROMETHAMINE 15 MG: 30 INJECTION, SOLUTION INTRAMUSCULAR; INTRAVENOUS at 01:12

## 2024-12-12 RX ADMIN — KETOROLAC TROMETHAMINE 15 MG: 30 INJECTION, SOLUTION INTRAMUSCULAR; INTRAVENOUS at 06:12

## 2024-12-12 RX ADMIN — THERA TABS 1 TABLET: TAB at 09:12

## 2024-12-12 NOTE — PLAN OF CARE
Assessment completed with patient at bedside--patient alert and oriented. Patient denies HH, DME, dialysis or coumadin. Patient takes meds as prescribed and can afford with insurance. Patient spouse will transport at discharge.     [facility] ([address])  Initial Discharge Assessment       Primary Care Provider: Julia Sandoval MD    Admission Diagnosis: At high risk for breast cancer [Z91.89]  History of breast cancer [Z85.3]    Admission Date: 12/11/2024  Expected Discharge Date:     Transition of Care Barriers: None    Payor: BLUE CROSS BLUE Start[employer]s / Plan: BCBS ALL OUT OF STATE / Product Type: PPO /     Extended Emergency Contact Information  Primary Emergency Contact: Danyel Cornejo  Address: [address] [place] [address]           [place] [address] [facility]  Home Phone: [phone]  Mobile Phone: [phone]  Relation: Spouse    Discharge Plan A: Home with family  Discharge Plan B: Home with family      [id number] - [place] - [address] AT [facility]  [address] [place] [address]  Phone: [phone] Fax: [phone]      Initial Assessment (most recent)       Adult Discharge Assessment - 12/12/24 1303          Discharge Assessment    Assessment Type Discharge Planning Assessment     Confirmed/corrected address, phone number and insurance Yes     Confirmed Demographics Correct on Facesheet     Source of Information patient     Communicated [place] with patient/caregiver Date not available/Unable to determine     People in Home spouse     Do you expect to return to your current living situation? Yes     Prior to hospitilization cognitive status: Alert/Oriented     Current cognitive status: Alert/Oriented     Walking or Climbing Stairs Difficulty no     Dressing/Bathing Difficulty no     Equipment Currently Used at Home none     Readmission within 30 days? No     Patient currently being followed by outpatient case management? No     Do you  currently have service(s) that help you manage your care at home? No     Do you take prescription medications? Yes     Do you have prescription coverage? Yes     Coverage BCBS     Do you have any problems affording any of your prescribed medications? No     Is the patient taking medications as prescribed? yes     Who is going to help you get home at discharge? Danyel Cornejo (Spouse)  [phone]     How do you get to doctors appointments? car, drives self     Are you on dialysis? No     Do you take coumadin? No     Discharge Plan A Home with family     Discharge Plan B Home with family     DME Needed Upon Discharge  none     Discharge Plan discussed with: Patient     Transition of Care Barriers None        Physical Activity    On average, how many days per week do you engage in moderate to strenuous exercise (like a brisk walk)? 4 days     On average, how many minutes do you engage in exercise at this level? 60 min        Financial Resource Strain    How hard is it for you to pay for the very basics like food, housing, medical care, and heating? Not hard at all        Housing Stability    In the last 12 months, was there a time when you were not able to pay the mortgage or rent on time? No     At any time in the past 12 months, were you homeless or living in a shelter (including now)? No        Transportation Needs    Has the lack of transportation kept you from medical appointments, meetings, work or from getting things needed for daily living? No        Food Insecurity    Within the past 12 months, you worried that your food would run out before you got the money to buy more. Never true     Within the past 12 months, the food you bought just didn't last and you didn't have money to get more. Never true        Stress    Do you feel stress - tense, restless, nervous, or anxious, or unable to sleep at night because your mind is troubled all the time - these days? Rather much        Social Isolation    How often do  you feel lonely or isolated from those around you?  Never        Alcohol Use    Q1: How often do you have a drink containing alcohol? Monthly or less     Q2: How many drinks containing alcohol do you have on a typical day when you are drinking? 1 or 2     Q3: How often do you have six or more drinks on one occasion? Never        Utilities    In the past 12 months has the electric, gas, oil, or water company threatened to shut off services in your home? No        Health Literacy    How often do you need to have someone help you when you read instructions, pamphlets, or other written material from your doctor or pharmacy? Never        OTHER    Name(s) of People in Home Danyel Cornejo (Spouse)  [phone]

## 2024-12-12 NOTE — PLAN OF CARE
AAOX4. VSS. FLAP checks and drain care Q4H. Notified charge nurse about firmness around left flap incision, MD aware and at bedside to assess. Continued to monitor flap closely, updated MD about slight bruising and paleness of left FLAP. Pt up with assist by staff and therapy. Voided after catheter removed. Family at bedside and attentive to pt. Bed locked in lowest position, call light in reach, and instructed to call for mobility assistance. No falls or injuries on shift.     Problem: Adult Inpatient Plan of Care  Goal: Plan of Care Review  Outcome: Progressing  Goal: Patient-Specific Goal (Individualized)  Outcome: Progressing  Goal: Absence of Hospital-Acquired Illness or Injury  Outcome: Progressing  Goal: Optimal Comfort and Wellbeing  Outcome: Progressing  Goal: Readiness for Transition of Care  Outcome: Progressing     Problem: Wound  Goal: Optimal Coping  Outcome: Progressing  Goal: Optimal Functional Ability  Outcome: Progressing  Goal: Absence of Infection Signs and Symptoms  Outcome: Progressing  Goal: Improved Oral Intake  Outcome: Progressing  Goal: Optimal Pain Control and Function  Outcome: Progressing  Goal: Skin Health and Integrity  Outcome: Progressing  Goal: Optimal Wound Healing  Outcome: Progressing     Problem: Pain Acute  Goal: Optimal Pain Control and Function  Outcome: Progressing     Problem: Breast Surgery  Goal: Optimal Coping with Surgery  Outcome: Progressing  Goal: Absence of Bleeding  Outcome: Progressing  Goal: Effective Bowel Elimination  Outcome: Progressing  Goal: Fluid and Electrolyte Balance  Outcome: Progressing  Goal: Absence of Infection Signs and Symptoms  Outcome: Progressing  Goal: Anesthesia/Sedation Recovery  Outcome: Progressing  Goal: Optimal Pain Control and Function  Outcome: Progressing  Goal: Nausea and Vomiting Relief  Outcome: Progressing  Goal: Effective Urinary Elimination  Outcome: Progressing  Goal: Effective Oxygenation and Ventilation  Outcome:  Progressing

## 2024-12-12 NOTE — OR NURSING
Pt resting quietly, responds to voice.  Pt states pain is tolerable.  VSS on 2L/m via NC.  Flaps soft with good doppler sounds.  Report called to nurse Atkins.  Spouse updated by phone.      Flap check completed at bedside with nurse Reed.

## 2024-12-12 NOTE — PLAN OF CARE
Problem: Occupational Therapy  Goal: Occupational Therapy Goal  Description: Goals to be met by: 12/30/2024     Patient will increase functional independence with ADLs by performing:    UE Dressing with independence.  Grooming while standing at sink with [place].  Toileting from toilet with [place] for hygiene and clothing management.   Toilet transfer to toilet with [place].    Outcome: Progressing     OT evaluation complete and POC established.  Low intensity therapy (with cancer specialist once cleared by MD) is recommended upon d/c from acute care to further address deficits and help pt improve overall functional independence.     NICK Prado  12/12/2024

## 2024-12-12 NOTE — PLAN OF CARE
D/C Rec: Low Intensity Therapy when cleared by MD REECE Rec: None    Pt evaluated, tolerated OOB x 2 hours and ambulated SBA in room without significant pain complaints. Pt would benefit from continued P.T. services to address post-op mobility deficits and restore PLOF.     Problem: Physical Therapy  Goal: Physical Therapy Goal  Description: P.T goals to be met in 2 weeks as follows:  1. Mod I Bed Mobility  2. Mod I T/F  3. Gait 200' Mod I   4. Tolerate OOB x 3 hours    Outcome: Progressing

## 2024-12-12 NOTE — OP NOTE
OPERATIVE REPORT    Date of Service 2024  MRN [id number]  Patient Name Tasha Cornejo    SURGEON: Allen Damian MD; Olga Solitario MD    ASSISTANT: Ant Wisdom MD    PREOPERATIVE DIAGNOSIS  History of breast cancer   History of bilateral mastectomy  History of bilateral prepectoral tissue expander placement    POSTOPERATIVE DIAGNOSIS  History of breast cancer   2.    History of bilateral mastectomy  3.    History of bilateral prepectoral tissue expander placement    PROCEDURE  Bilateral lumbar artery  flap breast reconstruction with DIEA/DIEV interposition grafts  Bilateral breast tissue expander explantation  Bilateral breast capsulotomy    ANESTHESIA: General, endotracheal. 266 mg exparel    Implants:  Implant Name Type Inv. Item Serial No.  Lot No. LRB No. Used Action   Sientra OPUS, allox2 tissue xpander     [id number] SIENTRA   Left 1 Explanted   Sientra OPUS, Allox2 tissue expander     [id number] SIENTRA   Right 1 Explanted   CELLERATE RX ACTIVATED COLLAGEN     [id number] [employer]OV[employer]  Bilateral 1 Implanted    2.0MM - [id number]    2.0MM   [employer] [id number] Right 1 Implanted    2.0MM - [id number]    2.0MM   [employer] [id number] Left 1 Implanted    MICROVAS ANSTMS 2.5MM - [id number]    MICROVAS ANSTMS 2.5MM   [employer] [id number] Right 1 Implanted    2.0MM - [id number]    2.0MM   [employer] [id number] Left 1 Implanted         Operative Findings:   Implants:   1. Left DIEV interposition graft-to-right IMV venous anastomotic : 2.5 mm  2. Left LAP-to-left DIEV interposition graft venous anastomotic : 2.0 mm  3. Right DIEV interposition graft-to-left IMV venous anastomotic : 2.0 mm  4. Right LAP-to-right DIEV interposition graft venous anastomotic : 2.0 mm      Findings:   Micro Information  First  flap  Donor site: Left LAP  Recipient site: Right breast  Weight: 308 grams  Number of perforators: 1 LAP  Name arterial pedicle: Left LAP  Recipient artery: Right SCOTTIE  Estimate size of arterial anastomosis: 2.0 mm  Venous anastomosis in chest  Donor: Left DIEV  Recipient: left IMV  Size: 2.5 mm  Venous anastomosis to DIEV portion of interposition graft  Donor: Left LAP vena comitans  Recipient: left DIEV  Size: 2.0 mm     Second flap  Donor site: Right LAP  Recipient site: Left breast  Weight: 274 grams  Number of perforators: 1 LAP  Name arterial pedicle: Right LAP  Recipient artery: Left SCOTTIE  Estimate size of arterial anastomosis: 2.0 mm  Venous anastomosis in chest  Donor: Left DIEV  Recipient: left IMV  Size: 2.0 mm  Venous anastomosis to DIEV portion of interposition graft  Donor: Left LAP vena comitans  Recipient: left DIEV  Size: 2.0 mm    ESTIMATED BLOOD LOSS: 150 ccs    CULTURES: NONE    DRAINS:  - right lumbar: 15 Ethiopian YAN drain  - left lumbar: 15 Ethiopian YAN drain  - right breast: 15 Ethiopian YAN drain  - left breast: 15 Ethiopian YAN drain    SPECIMENS:   - Right breast tissue expander  - Left breast tissue expander    COMPLICATIONS: None.    COUNTS: Sponge and needle counts correct. Radiofrequency negative    DISPOSITION: Extubated to postanesthesia care unit     Indications for procedure:  Tasha Cornejo is a 44 year old female with history of breast cancer deemed a good candidate for bilateral breast autologous reconstruction. She has a history of bilateral mastectomy and tissue expander placement. She has limited infraumbilical adiposity with plan for bilateral lumbar artery free tissue transfer with IDEA/DIEV interposition grafts.  The procedure, risks, benefits, and alternatives were discussed. After all questions were answered, informed consent was obtained and we proceeded to the operating room on an elective basis.      Technique:   The patient was marked for appropriate anatomical landmarks and  excision lines in the preoperative holding area by the Plastic Surgery service. The patient was brought into the operating room and placed on a well-padded operating table in the supine position with her arms padded and abducted. Appropriate perioperative antibiotics were administered. Sequential compression devices were placed on her calves. General endotracheal anesthesia was administered via the anesthesia department. A Waldrop was placed under sterile conditions by nursing. The patient was prepped and draped in the usual sterile fashion. A timeout was performed per hospital protocol with all team members in agreement.      We began the surgery with preparation of bilateral chest vessels. In the right chest, 1% lidocaine with epinephrine was injected into IMF incision.  A scalpel incised the skin and dermis and Bovie electrocautery continued dissection into the prior breast capsule.  A tissue expander was explanted and sent off the field.  The capsule was in good location and the superior and medial aspect was opened.  Internal mammary perforating artery and veins were not dissected for in preparation for eventual anastomosis. The pectoralis muscle overlying the fourth rib was then dissected/split using Bovie electrocautery to reveal fourth rib. We then identified the fourth costochondral cartilage. The cartilage was then scored with the electrocautery. The perichodrium was then elevated off the cartilage using a freer elevator. The medial portion of the fourth costal cartilage was then removed piecemeal with a Ronjeur. The internal mammary vein (IMV) and internal mammary artery (SCOTTIE) were then identified running under a thin layer of the perichondrium posteriorly. To assure sufficient length for the anastomosis, the intercostal muscle in the intercostal space between the third and fifth rib was dissected free and removed. The SCOTTIE and IMV were then circumferentially dissected with Bipolar cautery. The vessels with  bathed with Papaverine. Exparel was injected locally to the tissue of the chest wall for PEC1 and PEC 2 blocks. A papavarine soaked ray-geraldo was placed over SCOTTIE and IMV and incision temporarily stapled closed.     In the left chest, 1% lidocaine with epinephrine was injected into medial IMF and vertical incision.  A% lidocaine with epinephrine was injected into IMF incision.  A scalpel incised the skin and dermis and Bovie electrocautery continued dissection into the prior breast capsule.  A tissue expander was explanted and sent off the field.  The capsule was in good location and the superior and medial aspect was opened.  Internal mammary perforating artery and veins were not dissected for in preparation for eventual anastomosis. The pectoralis muscle overlying the fourth rib was then dissected/split using Bovie electrocautery to reveal fourth rib. We then identified the fourth costochondral cartilage. The cartilage was then scored with the electrocautery. The perichodrium was then elevated off the cartilage using a freer elevator. The medial portion of the fourth costal cartilage was then removed piecemeal with a Ronjeur. The internal mammary vein (IMV) and internal mammary artery (SCOTTIE) were then identified running under a thin layer of the perichondrium posteriorly. To assure sufficient length for the anastomosis, the intercostal muscle in the intercostal space between the third and fifth rib was dissected free and removed. The SCOTTIE and IMV were then circumferentially dissected with Bipolar cautery. The vessels with bathed with Papaverine. Exparel was injected locally to the tissue of the chest wall for PEC1 and PEC 2 blocks. A papavarine soaked ray-geraldo was placed over SCOTTIE and IMV and incision temporarily stapled closed.     We then turned our attention to harvest of bilateral IDEA/DIEV interposition grafts. 1% lidodaine with epinephrine was injected into 6 cm transverse paramedian incisions planned in groin  region. We began with harvest of the right interposition graft followed by a similar procedure for the left interposition graft. A scalpel incised the skin and dermis and bovie electrocautery continued dissection to the anterior abdominal wall. Bovie electrocautery stimulation confirmed lateral border of the rectus abdominis muscle and the abdominal fascia was incised in oblique orientation lateral to this muscle. The IDEA/DIEV were then dissected cranially for a distance > 6 cm, hemoclipped, and harvested. The interposition graft artery and vein were flushed with heparinized saline and stored in saline soaked gauze for later anastomosis. 0 PDS was used to repair the abdominal fascia in 2 layers followed by 2-0 vicryl for interrupted trenton repair and a two layered 3-0 quill in running deep dermal and running subcuticular fashion. Dermabond applied to incision.     We placed tegaderm on anterior chest and then repositioned the patient in prone position for harvest of the bilateral LAP flaps. All pressure points were padded and the patient was prepped and draped in usual sterile fashion.      We proceeded to the harvest of the bilateral lumbar artery  flap based on markings in preoperative area according to patient specific anatomy with  location confirmed with handheld doppler. The left flap was initially elevated. 1% lidocaine with epinephrine was injected into planned incisions. A scalpel incised through skin and dermis and bovie electrocuatery proceeded with flap elevation including significant beveling in gluteal region to incorporate additional subscarpal fat for eventual additional breast upper pole fullness.  dissection proceeded from medial to lateral in subfascial plane. The  was identified, circumferentially dissected, and retrograde dissection performed until > 4 cm of pedicle length was obtained. The vessels were hemoclipped and single green clamp applied. The flap  was still perfused by lateral extent.     We then elevated the right LAP flap. 1% lidocaine with epinephrine was injected into planned incisions. A scalpel incised through skin and dermis and bovie electrocuatery proceeded with flap elevation including significant beveling in gluteal region to incorporate additional subscarpal fat for eventual additional breast upper pole fullness.  dissection proceeded from medial to lateral in subfascial plane. The  was identified, circumferentially dissected, and retrograde dissection performed until > 4 cm of pedicle length was obtained. The vessels were hemoclipped and single green clamp applied.     At this point both flaps were completely harvested and brought to the back table while simultaneous closure of the back donor sites were performed. Exparel was injected as a field block within muscle and surrounding tissue. Meticulous hemostasis achieved. 15 Ukrainian YAN drains were placed bilaterally and secured with a  3-0 nylon drain stitch. Cellurate was applied to the bilateral flap donor sites prior to closure. 2-0 PDS was used for progressive tension sutures. The incisions were closed with 2-0 vicryl interrupted scarpas repair, 3-0 vicryl for interrupted deep dermal suture, and 3-0 quill running subcuticular closure. 5-0 prolene simple interrupted suture reinforced closure. Prineo was applied to incision. On the back table the microscope was brought into the field. The right IDEA/DIEV interposition graft was anastomosed to the right LAP flap. The right LAP vena comitans and DIEV were anastomosed together using a 2.0 venous anastomotic . The artery was hand sewn using 9-0 nylon simple suture in simple interrupted fashion. The left IDEA/DIEV interposition graft was anastomosed to the left LAP. The left LAP vena comitans and DIEV were anastomosed together using a 2.0 venous anastomotic . The artery was hand sewn using 9-0 nylon simple suture in  simple interrupted fashion. 2-0 vicryl was used to shape both flaps.     We then performed our final patient repositioning to the supine position for completion of the procedure. All pressure points were padded and the patient was prepped and draped in usual sterile fashion.     The microscope was again brought into the field and we proceeded with the microsurgical transfer of the left LAP with interposition graft to the anterograde right SCOTTIE and IMV. The venous anastomosis was performed using a 2.5 venous  with  artery hand sewn with 9-0 nylon suture in interrupted fashion. A Prolify internal doppler was placed on the arterial pedicle distal to the anastomosis for post-operative monitoring. At the conclusion of the anastomosis, excellent blood flow was clinically observed in the artery and veins as well as anastomosis of interposition graft. Bright red bleeding was observed on the flap. The flap was noted to be warm and well-perfused.      We then performed microsurgical transfer of the right LAP with interposition graft to the anterograde left SCOTTIE and IMV. The venous anastomosis was performed using a 2.0 venous  with  artery hand sewn with 9-0 nylon suture in interrupted fashion. A Prolify internal doppler was placed on the arterial pedicle distal to the anastomosis for post-operative monitoring. At the conclusion of the anastomosis, excellent blood flow was clinically observed in the artery and veins as well as anastomosis of interposition graft. Bright red bleeding was observed on the flap. The flap was noted to be warm and well-perfused.     The flaps were partially de-epithelialized and trimmed to leave inferior pole skin paddles bilaterally. Flaps were then inset into the chest wall using multiple 2-0 Vicryl suture. Bilateral chest 15F YAN drains were placed in each breast pocket and secured at a separate exit site with 3-0 nylon suture. Hemostasis assured. The mastectomy skin was redraped and closed  in over drain using 2-0 Vicryl for interrupted deep dermal suture and a 3-0 Quill in running subcuticular fashion.     The incisions of breasts were cleaned, dried, and Prineo surgical tape were applied to the incisions. Sterile dressings were placed at all drain exit sites. Dry dressings including ABDs were placed atop both the breast and the lumbar donor site. A girdle and surgical bra were placed. Kerlix rolls were placed into bilateral lateral breast/axilla to provide lateral compression.    At the end of the procedure, all needle, sponge, and instrument counts were correct on two occasions. Biphasic doppler signals were appreciated on both the left and right flaps with the [facility] internal dopplers. The patient was then awakened from general anesthesia without complication and returned to the post-anesthesia recovery unit in stable condition.      Dr. Allen Damian and Dr. Olga Solitario were present and scrubbed throughout all critical portions of the procedure.     Ant Wisdom MD  [facility], [facility]  12/11/2024

## 2024-12-12 NOTE — ANESTHESIA POSTPROCEDURE EVALUATION
Anesthesia Post Evaluation    Patient: Tasha Cornejo    Procedure(s) Performed: Procedure(s) (LRB):  RECONSTRUCTION, BREAST, USING LUMBAR ARTERY FLAP AND INTERPOSITION GRAFT DIEA/DIEV (Bilateral)  REMOVAL, TISSUE EXPANDER (Bilateral)    Final Anesthesia Type: general      Patient location during evaluation: PACU  Patient participation: Yes- Able to Participate  Level of consciousness: awake and alert  Post-procedure vital signs: reviewed and stable  Pain management: adequate  Airway patency: patent    PONV status at discharge: No PONV  Anesthetic complications: no      Cardiovascular status: blood pressure returned to baseline  Respiratory status: unassisted and spontaneous ventilation  Hydration status: euvolemic  Follow-up not needed.              Vitals Value Taken Time   /58 12/11/24 1831   Temp 36.4 °C (97.6 °F) 12/11/24 1639   Pulse 79 12/11/24 1842   Resp 16 12/11/24 1830   SpO2 97 % 12/11/24 1842   Vitals shown include unfiled device data.      No case tracking events are documented in the log.      Pain/Andre Score: Pain Rating Prior to Med Admin: 3 (12/11/2024  5:32 PM)  Pain Rating Post Med Admin: 1 (12/11/2024  6:00 PM)  Andre Score: 7 (12/11/2024  6:30 PM)

## 2024-12-12 NOTE — PROGRESS NOTES
Plastic Surgery Progress Note    Subjective:  NAEON  AFVSS   Pain tolerable  Tolerating PO intake without N/V.   Denies f/c, CP, SOB    Objective:  Vitals:    12/12/24 0426   BP:    Pulse:    Resp: 17   Temp:      PE:   Gen: NAD, Aox3  CV: RR  Pulm: NWOB  Bilateral breasts: flaps soft warm with inferior pole skin paddles with good cap refill. No signs of infection, hematoma, seroma, dehiscence. Strong doppler pulses bilaterally YAN drains with s/s output    Abdomen: low transverse incisions x 2. Soft with appropriate tenderness.     Back: incisions c/d/I with no signs of infection, hematoma, seroma, dehiscence. Bilateral back YAN drains with s/s output.     Assessment:  44 y.o. female with history of breast cancer s/p bilateral mastectomy with tissue expander placement. She is now s/p bilateral breast LAP flap reconstruction with IDEA/DIEV interposition grafts 12/11/24    Plan:  - murrell out  - q4 flap checks  - PT/OT  - drain care  - Incentive spirometer  - pain control: Vida Tylenol, flexeril, and Toradol, PRN oxy   - Bowel regimen: Colace   - Diet: Advance to regular diet   - Abx: Ancef  - DVT ppx: Lovenox, SCDs, ambulation   - Activity/Restrictions: OK to ambulate  - Dispo: Likely discharge tomorrow vs POD3. Do not anticipate any post hospitalization needs      Ant Wisdom MD  [facility] Surgery, PGY-V

## 2024-12-12 NOTE — NURSING
1345 notified charge nurse about increased firmness and swelling of inner left breast near incision site by FLAP.   1400 charge nurse called MD rodríguez about concerns and to come to bedside to reassess.   Drain care provided and checks flaps Q2H.   Documented output and change.     1500 MD at bedside with charge nurse to reassess. 2-3 cm firmness

## 2024-12-12 NOTE — PT/OT/SLP EVAL
Physical Therapy Evaluation    Patient Name:  Tasha Cornejo   MRN:  [id number]    Recommendations:     Discharge Recommendations: Low Intensity Therapy (OP cancer PT when cleared by MD)  Discharge Equipment Recommendations: none   Barriers to discharge: None    Assessment:     Tasha Cornejo is a 44 y.o. female admitted with a medical diagnosis of <principal problem not specified>.  She presents with the following impairments/functional limitations: weakness, impaired endurance, impaired functional mobility, gait instability, decreased upper extremity function, pain, decreased ROM.    Pt evaluated, tolerated OOB x 2 hours and ambulated SBA in room without significant pain complaints. Pt would benefit from continued P.T. services to address post-op mobility deficits and restore PLOF.     Rehab Prognosis: Good; patient would benefit from acute skilled PT services to address these deficits and reach maximum level of function.    Recent Surgery: Procedure(s) (LRB):  RECONSTRUCTION, BREAST, USING LUMBAR ARTERY FLAP AND INTERPOSITION GRAFT DIEA/DIEV (Bilateral)  REMOVAL, TISSUE EXPANDER (Bilateral) 1 Day Post-Op    Plan:     During this hospitalization, patient to be seen 4 x/week to address the identified rehab impairments via therapeutic exercises, therapeutic activities, gait training and progress toward the following goals:    Plan of Care Expires:  12/26/24    Subjective     Chief Complaint: Pt reports she got dizzy when she got OOB this AM but is feeling better  Patient/Family Comments/goals: Pt agreeable to PT eval, requesting to walk to bathroom then get back in bed  Pain/Comfort:  Pain Rating 1: 4/10  Location 1: abdomen  Pain Addressed 1: Pre-medicate for activity, Cessation of Activity    Patients cultural, spiritual, Confucianist conflicts given the current situation: no    Living Environment:  [facility] with threshold to enter  Prior to admission, patients level of function was I.  Equipment used at home:  none.  DME owned (not currently used): none.  Upon discharge, patient will have assistance from .    Objective:     Communicated with RN Estela after session.  Patient found up in chair with peripheral IV, YAN drain  upon PT entry to room.    General Precautions: Standard, fall, other (see comments)- mastectomy, B DIEA/DIEV/lumbar artery flap, surgical bra/abdominal and thigh binder (corset)  Orthopedic Precautions:N/A   Braces: N/A  Respiratory Status: Room air    Exams:  B LE ROM and strength WFL,     Functional Mobility:  Bed Mobility:     Scooting: max A supine scooting to HOB, min A seated scooting  Sit to Supine: minimum assistance and cueing for technique  Transfers:     Sit to Stand:  contact guard assistance with no AD  Toilet Transfer: contact guard assistance with  no AD  using  Step Transfer  Gait: 25' CGA in room, slow gait speed      AM-PAC 6 CLICK MOBILITY  Total Score:18       Treatment & Education:  Pt educated on role of PT and POC. Issued handouts on mastectomy, flap and spine precautions    Patient left supine with all lines intact, call button in reach, RN notified, and spouse present.    GOALS:   Multidisciplinary Problems       Physical Therapy Goals          Problem: Physical Therapy    Goal Priority Disciplines Outcome Interventions   Physical Therapy Goal     PT, PT/OT Progressing    Description: P.T goals to be met in 2 weeks as follows:  1. Mod I Bed Mobility  2. Mod I T/F  3. Gait 200' Mod I   4. Tolerate OOB x 3 hours                         History:     Past Medical History:   Diagnosis Date    Acid reflux     Chalazion     Food allergy     discontinued dairy to help eosinophilic esophagitis    Hx of psychiatric care     Malignant neoplasm of lower-inner quadrant of right breast of female, estrogen receptor positive 11/06/2023    Seasonal allergies ongoing/unsure    Therapy     Wears reading eyeglasses        Past Surgical History:   Procedure Laterality Date    ADENOIDECTOMY   1983    BILATERAL MASTECTOMY Bilateral 2023    Procedure: MASTECTOMY, BILATERAL;  Surgeon: Tasha Mcleod MD;  Location: [facility];  Service: General;  Laterality: Bilateral;  3.5 HOURS / EMAIL SENT  @ 1:03 LK    COLONOSCOPY N/A 2023    Procedure: COLONOSCOPY;  Surgeon: Fabio Rice MD;  Location: [facility] ENDO (4TH FLR);  Service: Endoscopy;  Laterality: N/A;  pt confirmed earlier time  EB    ESOPHAGOGASTRODUODENOSCOPY N/A 2023    Procedure: EGD (ESOPHAGOGASTRODUODENOSCOPY);  Surgeon: Fabio Rice MD;  Location: [facility] ENDO (4TH FLR);  Service: Endoscopy;  Laterality: N/A;  Procedure Timin-4 weeks    referred by Dr. Rice/Prep instructions handed to patient and to portal.  Patient called did not answer- DB    ESOPHAGOGASTRODUODENOSCOPY N/A 10/18/2023    Procedure: EGD (ESOPHAGOGASTRODUODENOSCOPY);  Surgeon: Fabio Rice MD;  Location: [facility] (4TH FLR);  Service: Endoscopy;  Laterality: N/A;  ref Ray  timeframe 5-12 weeks   Dr. Rice patient  portal instruction and given to patient jm  10/11-precall complete-MS    INJECTION FOR SENTINEL NODE IDENTIFICATION Right 2023    Procedure: INJECTION, FOR SENTINEL NODE IDENTIFICATION;  Surgeon: Tasha Mcleod MD;  Location: [facility];  Service: General;  Laterality: Right;    INSERTION OF BREAST TISSUE EXPANDER Bilateral 2023    Procedure: INSERTION, TISSUE EXPANDER, BREAST;  Surgeon: Allen Damian MD;  Location: [facility] OR;  Service: Plastics;  Laterality: Bilateral;  2 HOURS    INSERTION, CATHETER, TUNNELED Left 2023    Procedure: INSERTION, CATHETER, TUNNELED;  Surgeon: Tasha Mcleod MD;  Location: [facility] OR;  Service: General;  Laterality: Left;    RECONSTRUCTION OF BREAST WITH DEEP INFERIOR EPIGASTRIC ARTERY  (NATHALIE) FREE FLAP Bilateral 2024    Procedure: RECONSTRUCTION, BREAST, USING LUMBAR ARTERY FLAP AND INTERPOSITION GRAFT DIEA/DIEV;  Surgeon: Allen Damian MD;  Location: [facility];  Service: Plastics;   Laterality: Bilateral;    SENTINEL LYMPH NODE BIOPSY Right 12/14/2023    Procedure: BIOPSY, LYMPH NODE, SENTINEL;  Surgeon: Tasha Mcleod MD;  Location: [facility];  Service: General;  Laterality: Right;    TISSUE EXPANDER REMOVAL Bilateral 12/11/2024    Procedure: REMOVAL, TISSUE EXPANDER;  Surgeon: Allen Damian MD;  Location: [facility];  Service: Plastics;  Laterality: Bilateral;    TYMPANOSTOMY TUBE PLACEMENT  1983       Time Tracking:     PT Received On: 12/12/24  PT Start Time: 1329     PT Stop Time: 1345  PT Total Time (min): 16 min     Billable Minutes: Evaluation 13      12/12/2024

## 2024-12-12 NOTE — PT/OT/SLP EVAL
Occupational Therapy   Evaluation & Treatment    Name: Tasha Cornejo  MRN: [id number]  Admitting Diagnosis: <principal problem not specified>  Recent Surgery: Procedure(s) (LRB):  RECONSTRUCTION, BREAST, USING LUMBAR ARTERY FLAP AND INTERPOSITION GRAFT DIEA/DIEV (Bilateral)  REMOVAL, TISSUE EXPANDER (Bilateral) 1 Day Post-Op    Recommendations:     Discharge Recommendations: Low Intensity Therapy (OP PT with cancer specialist once cleared by MD)  Discharge Equipment Recommendations:  none  Barriers to discharge:  None    Assessment:     Tasha Cornejo is a 44 y.o. female with a medical diagnosis of <principal problem not specified>.  She presents with mild pain in back. Performance deficits affecting function: impaired endurance, impaired self care skills, impaired functional mobility, gait instability, decreased upper extremity function, decreased ROM.  Pt agreeable to participating in therapy upon arrival to room.  Pt demonstrates strength and ROM in (B) UE needed for ADLs that is WFL following post surgical precautions.  Pt able to perform grooming task standing at sink with SBA.  While ambulating in room CGA/HHA provided.    Pt reported feeling slightly light headed upon first moving from supine <> seated position and after sit <> stand transfer, but feeling subsided fairly quickly.  Overall, pt tolerated therapy well.  PTA pt reports being independent with ADLs and functional mobility.  Pt would benefit from skilled OT services to address problems listed above and increase independence with ADLs.  Low intensity therapy (OP PT with cancer specialist once cleared by MD) is recommended upon d/c from acute care to further address deficits and help pt improve overall functional independence.       Rehab Prognosis: Good; patient would benefit from acute skilled OT services to address these deficits and reach maximum level of function.       Plan:     Patient to be seen 3 x/week to address the above listed  problems via self-care/home management, therapeutic activities, therapeutic exercises  Plan of Care Expires:    Plan of Care Reviewed with: patient, spouse    Subjective     Chief Complaint: mild pulling feeling in back with mobility (left lower back)  Patient/Family Comments/goals: short term: resume day to day routine; long term: eventually be able to run again    Occupational Profile:  Living Environment: Pt lives with  in [facility].  Bathroom has tub/shower.  Previous level of function: Independent with ADLs, iADLs, and functional mobility  Roles and Routines: Wife, previously enjoyed running  Equipment Used at Home: none  Assistance upon Discharge:  able to provide assist    Pain/Comfort:  Pain Rating 1: 0/10  Pain Rating Post-Intervention 1:  (mild discomfort in back with movement; comfortable at rest)    Patients cultural, spiritual, Orthodox conflicts given the current situation: no    Objective:     Communicated with: RN (Estela) prior to session.  Patient found HOB elevated with peripheral IV, YAN drain, SCDs, surgical bra; supportive binder covering abdomen and back upon OT entry to room.  * present during session  *Kangaroo pouch utilized to hold YAN drains    General Precautions: Standard,  (avoid: overhead movements, excessive pushing/pulling, heavy lifting, twisting, crossing midline)  Orthopedic Precautions: N/A  Braces:  (surgical bra and supportive binder)  Respiratory Status: Room air    Occupational Performance:    Bed Mobility:    Supine <> sit: Min A-CGA with increased time  Scooting: SBA seated towards EOB, SBA seated towards back of chair    Functional Mobility/Transfers:  Sit <> Stand: CGA/HHA x 1 trial from EOB  Functional Mobility: Pt ambulated ~30 ft in room with CGA/HHA.  No instances of LOB noted  Pt initially reported feeling slightly light headed, but feeling subsided quickly    Activities of Daily Living:  Grooming: stand by assistance for washing hands  while standing at sink.    Cognitive/Visual Perceptual:  Cognitive/Psychosocial Skills:    -       Oriented to: Person, Place, Time, and Situation   -       Follows Commands/attention: Follows 100% of one step commands  -       Communication: clear/fluent  -       Memory: No Deficits noted  -       Safety awareness/insight to disability: intact   -       Mood/Affect/Coping skills/emotional control: Cooperative and Pleasant    Physical Exam:  Postural examination/scapula alignment: No postural abnormalities identified  Skin integrity: Visible skin intact  Edema:  None noted  Sensation: -       Intact  Motor Planning: -       WNL  Dominant hand: -       Right  Upper Extremity Range of Motion: Adhering to post surgical precautions  -       Right Upper Extremity: WFL  -       Left Upper Extremity: WFL  Upper Extremity Strength: Adhering to post surgical precautions  -       Right Upper Extremity: WFL  -       Left Upper Extremity: WFL   Strength: WNL  Fine Motor Coordination: intact  Gross motor coordination: WFL  Balance: Sitting- Independent; Standing- CGA-SBA    AMPAC 6 Click ADL:  AMPAC Total Score: 17    Treatment & Education:  *Pt educated on:  -role of OT in acute care setting  -log rolling technique  -supine <> sit transfer technique  -post surgical precautions- avoid: excessive pushing/pulling, overhead movements, heavy lifting  *Pt performed supine <> sit transfer; increased time required.  Cues provided for technique  *Pt ambulated within room to address coordination, endurance, and balance needed for functional mobility  *Pt performed grooming task standing at sink  *POC reviewed with pt and     Patient left up in chair with all lines intact, call button in reach, RN (Estela) notified, and  present  *OT spoke with RN about obtaining a BSC  -OT discussed possibilty of using BSC in room with pt.  During session pt practiced ambulating to bathroom and did well, but stated she would like the BSC  just in case    GOALS:   Multidisciplinary Problems       Occupational Therapy Goals          Problem: Occupational Therapy    Goal Priority Disciplines Outcome Interventions   Occupational Therapy Goal     OT, PT/OT Progressing    Description: Goals to be met by: 2024     Patient will increase functional independence with ADLs by performing:    UE Dressing with independence.  Grooming while standing at sink with [place].  Toileting from toilet with [place] for hygiene and clothing management.   Toilet transfer to toilet with [place].                         History:     Past Medical History:   Diagnosis Date    Acid reflux     Chalazion     Food allergy     discontinued dairy to help eosinophilic esophagitis    Hx of psychiatric care     Malignant neoplasm of lower-inner quadrant of right breast of female, estrogen receptor positive 2023    Seasonal allergies ongoing/unsure    Therapy     Wears reading eyeglasses          Past Surgical History:   Procedure Laterality Date    ADENOIDECTOMY  1983    BILATERAL MASTECTOMY Bilateral 2023    Procedure: MASTECTOMY, BILATERAL;  Surgeon: Tasha Mcleod MD;  Location: [facility];  Service: General;  Laterality: Bilateral;  3.5 HOURS / EMAIL SENT  @ 1:03 LK    COLONOSCOPY N/A 2023    Procedure: COLONOSCOPY;  Surgeon: Fabio Rice MD;  Location: [address]);  Service: Endoscopy;  Laterality: N/A;  pt confirmed earlier time  EB    ESOPHAGOGASTRODUODENOSCOPY N/A 2023    Procedure: EGD (ESOPHAGOGASTRODUODENOSCOPY);  Surgeon: Fabio Rice MD;  Location: [address]);  Service: Endoscopy;  Laterality: N/A;  Procedure Timin-4 weeks    referred by Dr. Rice/Prep instructions handed to patient and to portal.  Patient called did not answer- DB    ESOPHAGOGASTRODUODENOSCOPY N/A 10/18/2023    Procedure: EGD (ESOPHAGOGASTRODUODENOSCOPY);  Surgeon: Fabio Rice MD;  [address]  Service: Endoscopy;   Laterality: N/A;  ref Ray  timeframe 5-12 weeks   Dr. Rice patient  portal instruction and given to patient jm  10/11-precall complete-MS    INJECTION FOR SENTINEL NODE IDENTIFICATION Right 12/14/2023    Procedure: INJECTION, FOR SENTINEL NODE IDENTIFICATION;  Surgeon: Tasha Mcleod MD;  Location: [facility];  Service: General;  Laterality: Right;    INSERTION OF BREAST TISSUE EXPANDER Bilateral 12/14/2023    Procedure: INSERTION, TISSUE EXPANDER, BREAST;  Surgeon: Allen Damian MD;  Location: [facility] OR;  Service: Plastics;  Laterality: Bilateral;  2 HOURS    INSERTION, CATHETER, TUNNELED Left 12/14/2023    Procedure: INSERTION, CATHETER, TUNNELED;  Surgeon: Tasha Mcleod MD;  Location: [facility] OR;  Service: General;  Laterality: Left;    RECONSTRUCTION OF BREAST WITH DEEP INFERIOR EPIGASTRIC ARTERY  (NATHALIE) FREE FLAP Bilateral 12/11/2024    Procedure: RECONSTRUCTION, BREAST, USING LUMBAR ARTERY FLAP AND INTERPOSITION GRAFT DIEA/DIEV;  Surgeon: Allen Damian MD;  Location: [facility];  Service: Plastics;  Laterality: Bilateral;    SENTINEL LYMPH NODE BIOPSY Right 12/14/2023    Procedure: BIOPSY, LYMPH NODE, SENTINEL;  Surgeon: Tasha Mcleod MD;  Location: [facility] OR;  Service: General;  Laterality: Right;    TISSUE EXPANDER REMOVAL Bilateral 12/11/2024    Procedure: REMOVAL, TISSUE EXPANDER;  Surgeon: Allen Damian MD;  Location: [facility];  Service: Plastics;  Laterality: Bilateral;    TYMPANOSTOMY TUBE PLACEMENT  1983       Time Tracking:     OT Date of Treatment: 12/12/24  OT Start Time: 1128  OT Stop Time: 1156  OT Total Time (min): 28 min    Billable Minutes:Evaluation 15  Self Care/Home Management 13    NICK Prado  12/12/2024

## 2024-12-13 PROCEDURE — 94761 N-INVAS EAR/PLS OXIMETRY MLT: CPT

## 2024-12-13 PROCEDURE — 99900035 HC TECH TIME PER 15 MIN (STAT)

## 2024-12-13 PROCEDURE — 97116 GAIT TRAINING THERAPY: CPT

## 2024-12-13 PROCEDURE — 63600175 PHARM REV CODE 636 W HCPCS: Performed by: STUDENT IN AN ORGANIZED HEALTH CARE EDUCATION/TRAINING PROGRAM

## 2024-12-13 PROCEDURE — 11000001 HC ACUTE MED/SURG PRIVATE ROOM

## 2024-12-13 PROCEDURE — 25000003 PHARM REV CODE 250: Performed by: STUDENT IN AN ORGANIZED HEALTH CARE EDUCATION/TRAINING PROGRAM

## 2024-12-13 PROCEDURE — 94799 UNLISTED PULMONARY SVC/PX: CPT

## 2024-12-13 RX ORDER — OXYCODONE AND ACETAMINOPHEN 10; 325 MG/1; MG/1
1 TABLET ORAL EVERY 4 HOURS PRN
Qty: 25 TABLET | Refills: 0 | Status: SHIPPED | OUTPATIENT
Start: 2024-12-13 | End: 2024-12-15

## 2024-12-13 RX ORDER — CEPHALEXIN 500 MG/1
500 CAPSULE ORAL EVERY 8 HOURS
Qty: 21 CAPSULE | Refills: 0 | Status: SHIPPED | OUTPATIENT
Start: 2024-12-13 | End: 2024-12-14 | Stop reason: HOSPADM

## 2024-12-13 RX ORDER — ONDANSETRON 4 MG/1
4 TABLET, ORALLY DISINTEGRATING ORAL 2 TIMES DAILY
Qty: 30 TABLET | Refills: 0 | Status: SHIPPED | OUTPATIENT
Start: 2024-12-13 | End: 2024-12-15

## 2024-12-13 RX ORDER — CYCLOBENZAPRINE HCL 10 MG
10 TABLET ORAL 3 TIMES DAILY PRN
Qty: 30 TABLET | Refills: 0 | Status: SHIPPED | OUTPATIENT
Start: 2024-12-13 | End: 2024-12-15

## 2024-12-13 RX ADMIN — ACETAMINOPHEN 1000 MG: 500 TABLET, FILM COATED ORAL at 06:12

## 2024-12-13 RX ADMIN — DOCUSATE SODIUM 100 MG: 100 CAPSULE, LIQUID FILLED ORAL at 09:12

## 2024-12-13 RX ADMIN — DOCUSATE SODIUM 100 MG: 100 CAPSULE, LIQUID FILLED ORAL at 10:12

## 2024-12-13 RX ADMIN — OXYCODONE HYDROCHLORIDE 10 MG: 10 TABLET ORAL at 05:12

## 2024-12-13 RX ADMIN — DIPHENHYDRAMINE HYDROCHLORIDE 50 MG: 25 CAPSULE ORAL at 02:12

## 2024-12-13 RX ADMIN — OXYCODONE HYDROCHLORIDE 10 MG: 10 TABLET ORAL at 04:12

## 2024-12-13 RX ADMIN — CYCLOBENZAPRINE HYDROCHLORIDE 10 MG: 5 TABLET, FILM COATED ORAL at 10:12

## 2024-12-13 RX ADMIN — KETOROLAC TROMETHAMINE 15 MG: 30 INJECTION, SOLUTION INTRAMUSCULAR; INTRAVENOUS at 12:12

## 2024-12-13 RX ADMIN — THERA TABS 1 TABLET: TAB at 10:12

## 2024-12-13 RX ADMIN — KETOROLAC TROMETHAMINE 15 MG: 30 INJECTION, SOLUTION INTRAMUSCULAR; INTRAVENOUS at 06:12

## 2024-12-13 RX ADMIN — ACETAMINOPHEN 1000 MG: 500 TABLET, FILM COATED ORAL at 12:12

## 2024-12-13 RX ADMIN — ACETAMINOPHEN 1000 MG: 500 TABLET, FILM COATED ORAL at 05:12

## 2024-12-13 RX ADMIN — CEFAZOLIN 1 G: 330 INJECTION, POWDER, FOR SOLUTION INTRAMUSCULAR; INTRAVENOUS at 10:12

## 2024-12-13 RX ADMIN — MUPIROCIN: 20 OINTMENT TOPICAL at 09:12

## 2024-12-13 RX ADMIN — MUPIROCIN: 20 OINTMENT TOPICAL at 10:12

## 2024-12-13 RX ADMIN — CYCLOBENZAPRINE HYDROCHLORIDE 10 MG: 5 TABLET, FILM COATED ORAL at 02:12

## 2024-12-13 RX ADMIN — CEFAZOLIN 1 G: 330 INJECTION, POWDER, FOR SOLUTION INTRAMUSCULAR; INTRAVENOUS at 04:12

## 2024-12-13 RX ADMIN — DIPHENHYDRAMINE HYDROCHLORIDE 50 MG: 25 CAPSULE ORAL at 09:12

## 2024-12-13 RX ADMIN — CEFAZOLIN 1 G: 330 INJECTION, POWDER, FOR SOLUTION INTRAMUSCULAR; INTRAVENOUS at 12:12

## 2024-12-13 RX ADMIN — CYCLOBENZAPRINE HYDROCHLORIDE 10 MG: 5 TABLET, FILM COATED ORAL at 09:12

## 2024-12-13 NOTE — PLAN OF CARE
Patient is Aaox4 , care plan reviewed with pt.      Problem: Adult Inpatient Plan of Care  Goal: Plan of Care Review  Outcome: Progressing  Goal: Patient-Specific Goal (Individualized)  Outcome: Progressing  Goal: Absence of Hospital-Acquired Illness or Injury  Outcome: Progressing  Goal: Optimal Comfort and Wellbeing  Outcome: Progressing  Goal: Readiness for Transition of Care  Outcome: Progressing     Problem: Wound  Goal: Optimal Coping  Outcome: Progressing  Goal: Optimal Functional Ability  Outcome: Progressing  Goal: Absence of Infection Signs and Symptoms  Outcome: Progressing  Goal: Improved Oral Intake  Outcome: Progressing  Goal: Optimal Pain Control and Function  Outcome: Progressing  Goal: Skin Health and Integrity  Outcome: Progressing  Goal: Optimal Wound Healing  Outcome: Progressing     Problem: Pain Acute  Goal: Optimal Pain Control and Function  Outcome: Progressing     Problem: Breast Surgery  Goal: Optimal Coping with Surgery  Outcome: Progressing  Goal: Absence of Bleeding  Outcome: Progressing  Goal: Effective Bowel Elimination  Outcome: Progressing  Goal: Fluid and Electrolyte Balance  Outcome: Progressing  Goal: Absence of Infection Signs and Symptoms  Outcome: Progressing  Goal: Anesthesia/Sedation Recovery  Outcome: Progressing  Goal: Optimal Pain Control and Function  Outcome: Progressing  Goal: Nausea and Vomiting Relief  Outcome: Progressing  Goal: Effective Urinary Elimination  Outcome: Progressing  Goal: Effective Oxygenation and Ventilation  Outcome: Progressing

## 2024-12-13 NOTE — PROGRESS NOTES
Plastic Surgery Progress Note    Subjective:  NAEON  Pain tolerable  Has not walked halls yet  Flaps stable    Objective:  Vitals:    12/13/24 0524   BP:    Pulse:    Resp: 20   Temp:      PE:   Gen: NAD, Aox3  CV: RR  Pulm: NWOB  Bilateral breasts: flaps soft warm with inferior pole skin paddles with good cap refill. Relative firmness of buried de-epithelized skin paddle 3 cm superior to skin paddle that is soft and compressible. No signs of infection, hematoma, seroma, dehiscence. Strong doppler pulses bilaterally YAN drains with s/s output    Abdomen: low transverse incisions x 2. Soft with appropriate tenderness.     Back: incisions c/d/I with no signs of infection, hematoma, seroma, dehiscence. Bilateral back YAN drains with s/s output.     Assessment:  44 y.o. female with history of breast cancer s/p bilateral mastectomy with tissue expander placement. She is now s/p bilateral breast LAP flap reconstruction with IDEA/DIEV interposition grafts 12/11/24    Plan:  - q4 flap checks  - PT/OT  - drain care  - Incentive spirometer  - pain control: Vida Tylenol, flexeril, and Toradol, PRN oxy   - Bowel regimen: Colace   - Diet: regular diet   - Abx: Ancef  - DVT ppx: Lovenox, SCDs, ambulation   - Activity/Restrictions: OK to ambulate  - Dispo: Likely discharge tomorrow on POD3. Do not anticipate any post hospitalization needs      Ant Wisdom MD  [employer] [facility], PGY-V

## 2024-12-13 NOTE — PT/OT/SLP PROGRESS
Physical Therapy Treatment    Patient Name:  Tasha Cornejo   MRN:  [id number]    Recommendations:     Discharge Recommendations: Low Intensity Therapy (OP cancer PT when cleared by MD)  Discharge Equipment Recommendations: none  Barriers to discharge: None    Assessment:     Tasha Cornejo is a 44 y.o. female admitted with a medical diagnosis of see surgery below.  She presents with the following impairments/functional limitations: weakness, impaired endurance, impaired functional mobility, gait instability, decreased upper extremity function, pain, decreased ROM.    Pt progressing well with therapy, increased gait distance and speed today    Rehab Prognosis: Good; patient would benefit from acute skilled PT services to address these deficits and reach maximum level of function.    Recent Surgery: Procedure(s) (LRB):  RECONSTRUCTION, BREAST, USING LUMBAR ARTERY FLAP AND INTERPOSITION GRAFT DIEA/DIEV (Bilateral)  REMOVAL, TISSUE EXPANDER (Bilateral) 2 Days Post-Op    Plan:     During this hospitalization, patient to be seen 4 x/week to address the identified rehab impairments via gait training, therapeutic activities, therapeutic exercises and progress toward the following goals:    Plan of Care Expires:  12/26/24    Subjective     Chief Complaint: Pt reports she is feeling better today and got more rest last night  Patient/Family Comments/goals: Pt reports she may be going home today but states she is anxious about her incisions healing.   Pain/Comfort:  Pain Rating 1: 3/10  Location 1: abdomen  Pain Addressed 1: Pre-medicate for activity, Cessation of Activity      Objective:      Patient found supine with peripheral IV, YAN drain upon PT entry to room.     General Precautions: Standard, fall, other (see comments)- mastectomy, lumbar DIEV/DIEA flaps  Orthopedic Precautions: N/A  Braces: N/A  Respiratory Status: Room air     Functional Mobility:  Bed Mobility:     Rolling Right: supervision  Supine to Sit:  supervision  Transfers:     Sit to Stand:  stand by assistance with no AD  Bed to Chair: contact guard assistance with  no AD  using  Step Transfer  Gait: 150' close SBA, pt demonstrated improved gait speed today      AM-PAC 6 CLICK MOBILITY  Turning over in bed (including adjusting bedclothes, sheets and blankets)?: 4  Sitting down on and standing up from a chair with arms (e.g., wheelchair, bedside commode, etc.): 3  Moving from lying on back to sitting on the side of the bed?: 3  Moving to and from a bed to a chair (including a wheelchair)?: 4  Need to walk in hospital room?: 4  Climbing 3-5 steps with a railing?: 4  Basic Mobility Total Score: 22       Treatment & Education:  Pt educated on role of PT and POC. Pt asking questions related to performing transfers at home, educated on using step stool for bed mobility and technique for transferring into recliner.     Patient left up in chair with all lines intact and call button in reach..    GOALS:   Multidisciplinary Problems       Physical Therapy Goals          Problem: Physical Therapy    Goal Priority Disciplines Outcome Interventions   Physical Therapy Goal     PT, PT/OT Progressing    Description: P.T goals to be met in 2 weeks as follows:  1. Mod I Bed Mobility  2. Mod I T/F  3. Gait 200' Mod I   4. Tolerate OOB x 3 hours                         Time Tracking:     PT Received On: 12/13/24  PT Start Time: 0924     PT Stop Time: 0939  PT Total Time (min): 15 min     Billable Minutes: Gait Training 12    Treatment Type: Treatment  PT/PTA: PT     Number of PTA visits since last PT visit: 0     12/13/2024

## 2024-12-14 ENCOUNTER — ANESTHESIA (OUTPATIENT)
Dept: SURGERY | Facility: OTHER | Age: 44
End: 2024-12-14
Payer: COMMERCIAL

## 2024-12-14 ENCOUNTER — ANESTHESIA EVENT (OUTPATIENT)
Dept: SURGERY | Facility: OTHER | Age: 44
End: 2024-12-14
Payer: COMMERCIAL

## 2024-12-14 PROCEDURE — 0HPU07Z REMOVAL OF AUTOLOGOUS TISSUE SUBSTITUTE FROM LEFT BREAST, OPEN APPROACH: ICD-10-PCS | Performed by: PLASTIC SURGERY

## 2024-12-14 PROCEDURE — 99900035 HC TECH TIME PER 15 MIN (STAT)

## 2024-12-14 PROCEDURE — 27201423 OPTIME MED/SURG SUP & DEVICES STERILE SUPPLY: Performed by: SURGERY

## 2024-12-14 PROCEDURE — 11000001 HC ACUTE MED/SURG PRIVATE ROOM

## 2024-12-14 PROCEDURE — 71000033 HC RECOVERY, INTIAL HOUR: Performed by: SURGERY

## 2024-12-14 PROCEDURE — 63600175 PHARM REV CODE 636 W HCPCS: Performed by: SURGERY

## 2024-12-14 PROCEDURE — 71000039 HC RECOVERY, EACH ADD'L HOUR: Performed by: SURGERY

## 2024-12-14 PROCEDURE — 0HRU0JZ REPLACEMENT OF LEFT BREAST WITH SYNTHETIC SUBSTITUTE, OPEN APPROACH: ICD-10-PCS | Performed by: PLASTIC SURGERY

## 2024-12-14 PROCEDURE — 94799 UNLISTED PULMONARY SVC/PX: CPT

## 2024-12-14 PROCEDURE — D9220A PRA ANESTHESIA: Mod: CRNA,,, | Performed by: NURSE ANESTHETIST, CERTIFIED REGISTERED

## 2024-12-14 PROCEDURE — 25000003 PHARM REV CODE 250: Performed by: SURGERY

## 2024-12-14 PROCEDURE — 37000009 HC ANESTHESIA EA ADD 15 MINS: Performed by: SURGERY

## 2024-12-14 PROCEDURE — 36000706: Performed by: SURGERY

## 2024-12-14 PROCEDURE — 63600175 PHARM REV CODE 636 W HCPCS: Performed by: STUDENT IN AN ORGANIZED HEALTH CARE EDUCATION/TRAINING PROGRAM

## 2024-12-14 PROCEDURE — 25000003 PHARM REV CODE 250: Performed by: NURSE ANESTHETIST, CERTIFIED REGISTERED

## 2024-12-14 PROCEDURE — 37000008 HC ANESTHESIA 1ST 15 MINUTES: Performed by: SURGERY

## 2024-12-14 PROCEDURE — D9220A PRA ANESTHESIA: Mod: ANES,,, | Performed by: ANESTHESIOLOGY

## 2024-12-14 PROCEDURE — 63600175 PHARM REV CODE 636 W HCPCS: Performed by: NURSE ANESTHETIST, CERTIFIED REGISTERED

## 2024-12-14 PROCEDURE — C1789 PROSTHESIS, BREAST, IMP: HCPCS | Performed by: SURGERY

## 2024-12-14 PROCEDURE — 25000003 PHARM REV CODE 250: Performed by: STUDENT IN AN ORGANIZED HEALTH CARE EDUCATION/TRAINING PROGRAM

## 2024-12-14 PROCEDURE — 36000707: Performed by: SURGERY

## 2024-12-14 DEVICE — IMPLANTABLE DEVICE: Type: IMPLANTABLE DEVICE | Site: BREAST | Status: FUNCTIONAL

## 2024-12-14 RX ORDER — GENTAMICIN 40 MG/ML
INJECTION, SOLUTION INTRAMUSCULAR; INTRAVENOUS
Status: DISCONTINUED | OUTPATIENT
Start: 2024-12-14 | End: 2024-12-14 | Stop reason: HOSPADM

## 2024-12-14 RX ORDER — HYDROMORPHONE HYDROCHLORIDE 2 MG/ML
INJECTION, SOLUTION INTRAMUSCULAR; INTRAVENOUS; SUBCUTANEOUS
Status: DISCONTINUED | OUTPATIENT
Start: 2024-12-14 | End: 2024-12-14

## 2024-12-14 RX ORDER — GLUCAGON 1 MG
1 KIT INJECTION
Status: DISCONTINUED | OUTPATIENT
Start: 2024-12-14 | End: 2024-12-15 | Stop reason: HOSPADM

## 2024-12-14 RX ORDER — CIPROFLOXACIN 2 MG/ML
INJECTION, SOLUTION INTRAVENOUS
Status: DISCONTINUED | OUTPATIENT
Start: 2024-12-14 | End: 2024-12-14

## 2024-12-14 RX ORDER — SULFAMETHOXAZOLE AND TRIMETHOPRIM 800; 160 MG/1; MG/1
2 TABLET ORAL 2 TIMES DAILY
Status: DISCONTINUED | OUTPATIENT
Start: 2024-12-14 | End: 2024-12-15 | Stop reason: HOSPADM

## 2024-12-14 RX ORDER — MEPERIDINE HYDROCHLORIDE 25 MG/ML
12.5 INJECTION INTRAMUSCULAR; INTRAVENOUS; SUBCUTANEOUS ONCE AS NEEDED
Status: ACTIVE | OUTPATIENT
Start: 2024-12-14 | End: 2024-12-15

## 2024-12-14 RX ORDER — CEFAZOLIN SODIUM 1 G/3ML
INJECTION, POWDER, FOR SOLUTION INTRAMUSCULAR; INTRAVENOUS
Status: DISCONTINUED | OUTPATIENT
Start: 2024-12-14 | End: 2024-12-14 | Stop reason: HOSPADM

## 2024-12-14 RX ORDER — HYDROMORPHONE HYDROCHLORIDE 2 MG/ML
0.4 INJECTION, SOLUTION INTRAMUSCULAR; INTRAVENOUS; SUBCUTANEOUS EVERY 5 MIN PRN
Status: DISCONTINUED | OUTPATIENT
Start: 2024-12-14 | End: 2024-12-15 | Stop reason: HOSPADM

## 2024-12-14 RX ORDER — CEFAZOLIN 2 G/1
2 INJECTION, POWDER, FOR SOLUTION INTRAMUSCULAR; INTRAVENOUS
Status: DISCONTINUED | OUTPATIENT
Start: 2024-12-14 | End: 2024-12-14

## 2024-12-14 RX ORDER — SODIUM CHLORIDE 0.9 % (FLUSH) 0.9 %
3 SYRINGE (ML) INJECTION
Status: DISCONTINUED | OUTPATIENT
Start: 2024-12-14 | End: 2024-12-15 | Stop reason: HOSPADM

## 2024-12-14 RX ORDER — LIDOCAINE HYDROCHLORIDE 20 MG/ML
INJECTION INTRAVENOUS
Status: DISCONTINUED | OUTPATIENT
Start: 2024-12-14 | End: 2024-12-14

## 2024-12-14 RX ORDER — PROCHLORPERAZINE EDISYLATE 5 MG/ML
5 INJECTION INTRAMUSCULAR; INTRAVENOUS EVERY 30 MIN PRN
Status: DISCONTINUED | OUTPATIENT
Start: 2024-12-14 | End: 2024-12-15 | Stop reason: HOSPADM

## 2024-12-14 RX ORDER — LEVOFLOXACIN 750 MG/1
750 TABLET ORAL DAILY
Qty: 10 TABLET | Refills: 0 | Status: SHIPPED | OUTPATIENT
Start: 2024-12-14 | End: 2024-12-15

## 2024-12-14 RX ORDER — VANCOMYCIN HYDROCHLORIDE 1 G/20ML
INJECTION, POWDER, LYOPHILIZED, FOR SOLUTION INTRAVENOUS
Status: DISCONTINUED | OUTPATIENT
Start: 2024-12-14 | End: 2024-12-14 | Stop reason: HOSPADM

## 2024-12-14 RX ORDER — SULFAMETHOXAZOLE AND TRIMETHOPRIM 800; 160 MG/1; MG/1
2 TABLET ORAL 2 TIMES DAILY
Qty: 40 TABLET | Refills: 0 | Status: SHIPPED | OUTPATIENT
Start: 2024-12-14 | End: 2024-12-15

## 2024-12-14 RX ORDER — PHENYLEPHRINE HYDROCHLORIDE 10 MG/ML
INJECTION INTRAVENOUS
Status: DISCONTINUED | OUTPATIENT
Start: 2024-12-14 | End: 2024-12-14

## 2024-12-14 RX ORDER — BACITRACIN ZINC 500 UNIT/G
OINTMENT (GRAM) TOPICAL
Status: DISCONTINUED | OUTPATIENT
Start: 2024-12-14 | End: 2024-12-14 | Stop reason: HOSPADM

## 2024-12-14 RX ORDER — FENTANYL CITRATE 50 UG/ML
INJECTION, SOLUTION INTRAMUSCULAR; INTRAVENOUS
Status: DISCONTINUED | OUTPATIENT
Start: 2024-12-14 | End: 2024-12-14

## 2024-12-14 RX ORDER — ONDANSETRON HYDROCHLORIDE 2 MG/ML
INJECTION, SOLUTION INTRAVENOUS
Status: DISCONTINUED | OUTPATIENT
Start: 2024-12-14 | End: 2024-12-14

## 2024-12-14 RX ORDER — BACITRACIN ZINC 500 UNIT/G
OINTMENT (GRAM) TOPICAL 3 TIMES DAILY
Qty: 28 G | Refills: 0 | Status: SHIPPED | OUTPATIENT
Start: 2024-12-14 | End: 2024-12-15

## 2024-12-14 RX ORDER — BACITRACIN ZINC 500 UNIT/G
OINTMENT (GRAM) TOPICAL 3 TIMES DAILY
Status: DISCONTINUED | OUTPATIENT
Start: 2024-12-14 | End: 2024-12-15 | Stop reason: HOSPADM

## 2024-12-14 RX ORDER — LIDOCAINE HYDROCHLORIDE AND EPINEPHRINE 10; 10 UG/ML; MG/ML
INJECTION, SOLUTION INFILTRATION; PERINEURAL
Status: DISCONTINUED | OUTPATIENT
Start: 2024-12-14 | End: 2024-12-14 | Stop reason: HOSPADM

## 2024-12-14 RX ORDER — DEXAMETHASONE SODIUM PHOSPHATE 4 MG/ML
INJECTION, SOLUTION INTRA-ARTICULAR; INTRALESIONAL; INTRAMUSCULAR; INTRAVENOUS; SOFT TISSUE
Status: DISCONTINUED | OUTPATIENT
Start: 2024-12-14 | End: 2024-12-14

## 2024-12-14 RX ORDER — KETAMINE HCL IN 0.9 % NACL 50 MG/5 ML
SYRINGE (ML) INTRAVENOUS
Status: DISCONTINUED | OUTPATIENT
Start: 2024-12-14 | End: 2024-12-14

## 2024-12-14 RX ORDER — ROCURONIUM BROMIDE 10 MG/ML
INJECTION, SOLUTION INTRAVENOUS
Status: DISCONTINUED | OUTPATIENT
Start: 2024-12-14 | End: 2024-12-14

## 2024-12-14 RX ORDER — OXYCODONE HYDROCHLORIDE 5 MG/1
5 TABLET ORAL
Status: DISCONTINUED | OUTPATIENT
Start: 2024-12-14 | End: 2024-12-14

## 2024-12-14 RX ORDER — PROPOFOL 10 MG/ML
VIAL (ML) INTRAVENOUS
Status: DISCONTINUED | OUTPATIENT
Start: 2024-12-14 | End: 2024-12-14

## 2024-12-14 RX ADMIN — DIPHENHYDRAMINE HYDROCHLORIDE 50 MG: 25 CAPSULE ORAL at 05:12

## 2024-12-14 RX ADMIN — ROCURONIUM BROMIDE 50 MG: 10 SOLUTION INTRAVENOUS at 08:12

## 2024-12-14 RX ADMIN — SULFAMETHOXAZOLE AND TRIMETHOPRIM 2 TABLET: 800; 160 TABLET ORAL at 08:12

## 2024-12-14 RX ADMIN — ACETAMINOPHEN 1000 MG: 500 TABLET, FILM COATED ORAL at 12:12

## 2024-12-14 RX ADMIN — CARBOXYMETHYLCELLULOSE SODIUM 2 DROP: 2.5 SOLUTION/ DROPS OPHTHALMIC at 08:12

## 2024-12-14 RX ADMIN — CEFAZOLIN 2 G: 330 INJECTION, POWDER, FOR SOLUTION INTRAMUSCULAR; INTRAVENOUS at 08:12

## 2024-12-14 RX ADMIN — SODIUM CHLORIDE, SODIUM LACTATE, POTASSIUM CHLORIDE, AND CALCIUM CHLORIDE: .6; .31; .03; .02 INJECTION, SOLUTION INTRAVENOUS at 08:12

## 2024-12-14 RX ADMIN — SUGAMMADEX 200 MG: 100 INJECTION, SOLUTION INTRAVENOUS at 09:12

## 2024-12-14 RX ADMIN — FENTANYL CITRATE 100 MCG: 50 INJECTION, SOLUTION INTRAMUSCULAR; INTRAVENOUS at 08:12

## 2024-12-14 RX ADMIN — DEXAMETHASONE SODIUM PHOSPHATE 4 MG: 4 INJECTION, SOLUTION INTRAMUSCULAR; INTRAVENOUS at 08:12

## 2024-12-14 RX ADMIN — PHENYLEPHRINE HYDROCHLORIDE 100 MCG: 10 INJECTION INTRAVENOUS at 09:12

## 2024-12-14 RX ADMIN — SULFAMETHOXAZOLE AND TRIMETHOPRIM 2 TABLET: 800; 160 TABLET ORAL at 12:12

## 2024-12-14 RX ADMIN — BACITRACIN ZINC: 500 OINTMENT TOPICAL at 02:12

## 2024-12-14 RX ADMIN — DOCUSATE SODIUM 100 MG: 100 CAPSULE, LIQUID FILLED ORAL at 08:12

## 2024-12-14 RX ADMIN — MUPIROCIN: 20 OINTMENT TOPICAL at 08:12

## 2024-12-14 RX ADMIN — BACITRACIN ZINC: 500 OINTMENT TOPICAL at 09:12

## 2024-12-14 RX ADMIN — CEFAZOLIN 1 G: 330 INJECTION, POWDER, FOR SOLUTION INTRAMUSCULAR; INTRAVENOUS at 12:12

## 2024-12-14 RX ADMIN — HYDROMORPHONE HYDROCHLORIDE 0.5 MG: 2 INJECTION INTRAMUSCULAR; INTRAVENOUS; SUBCUTANEOUS at 09:12

## 2024-12-14 RX ADMIN — CYCLOBENZAPRINE HYDROCHLORIDE 10 MG: 5 TABLET, FILM COATED ORAL at 02:12

## 2024-12-14 RX ADMIN — ACETAMINOPHEN 1000 MG: 500 TABLET, FILM COATED ORAL at 11:12

## 2024-12-14 RX ADMIN — LIDOCAINE HYDROCHLORIDE 100 MG: 20 INJECTION, SOLUTION INTRAVENOUS at 08:12

## 2024-12-14 RX ADMIN — Medication 30 MG: at 08:12

## 2024-12-14 RX ADMIN — CYCLOBENZAPRINE HYDROCHLORIDE 10 MG: 5 TABLET, FILM COATED ORAL at 08:12

## 2024-12-14 RX ADMIN — CIPROFLOXACIN 400 MG: 2 INJECTION, SOLUTION INTRAVENOUS at 09:12

## 2024-12-14 RX ADMIN — PROPOFOL 180 MG: 10 INJECTION, EMULSION INTRAVENOUS at 08:12

## 2024-12-14 RX ADMIN — OXYCODONE HYDROCHLORIDE 10 MG: 10 TABLET ORAL at 08:12

## 2024-12-14 RX ADMIN — ACETAMINOPHEN 1000 MG: 500 TABLET, FILM COATED ORAL at 05:12

## 2024-12-14 RX ADMIN — MELATONIN TAB 3 MG 6 MG: 3 TAB at 08:12

## 2024-12-14 RX ADMIN — OXYCODONE 5 MG: 5 TABLET ORAL at 04:12

## 2024-12-14 RX ADMIN — ONDANSETRON HYDROCHLORIDE 4 MG: 2 INJECTION INTRAMUSCULAR; INTRAVENOUS at 09:12

## 2024-12-14 NOTE — PLAN OF CARE
Pt is Aaox4, care plan reviewed with pt.      Problem: Adult Inpatient Plan of Care  Goal: Plan of Care Review  Outcome: Progressing  Goal: Patient-Specific Goal (Individualized)  Outcome: Progressing  Goal: Absence of Hospital-Acquired Illness or Injury  Outcome: Progressing  Goal: Optimal Comfort and Wellbeing  Outcome: Progressing  Goal: Readiness for Transition of Care  Outcome: Progressing     Problem: Wound  Goal: Optimal Coping  Outcome: Progressing  Goal: Optimal Functional Ability  Outcome: Progressing  Goal: Absence of Infection Signs and Symptoms  Outcome: Progressing  Goal: Improved Oral Intake  Outcome: Progressing  Goal: Optimal Pain Control and Function  Outcome: Progressing  Goal: Skin Health and Integrity  Outcome: Progressing  Goal: Optimal Wound Healing  Outcome: Progressing     Problem: Pain Acute  Goal: Optimal Pain Control and Function  Outcome: Progressing     Problem: Breast Surgery  Goal: Optimal Coping with Surgery  Outcome: Progressing  Goal: Absence of Bleeding  Outcome: Progressing  Goal: Effective Bowel Elimination  Outcome: Progressing  Goal: Fluid and Electrolyte Balance  Outcome: Progressing  Goal: Absence of Infection Signs and Symptoms  Outcome: Progressing  Goal: Anesthesia/Sedation Recovery  Outcome: Progressing  Goal: Optimal Pain Control and Function  Outcome: Progressing  Goal: Nausea and Vomiting Relief  Outcome: Progressing  Goal: Effective Urinary Elimination  Outcome: Progressing  Goal: Effective Oxygenation and Ventilation  Outcome: Progressing

## 2024-12-14 NOTE — NURSING
Pt L breast inaudible , R breast audible. Plastics @ beside . Plastic ordered pt to be NPO and prepare for OR .

## 2024-12-14 NOTE — ANESTHESIA PROCEDURE NOTES
Intubation    Date/Time: 12/14/2024 8:34 AM    Performed by: Celeste Salazar CRNA  Authorized by: Francis Luis MD    Intubation:     Induction:  Intravenous    Intubated:  Postinduction    Mask Ventilation:  Easy mask    Attempts:  1    Attempted By:  CRNA    Method of Intubation:  Video laryngoscopy    Blade:  Abdullahi 3    Laryngeal View Grade: Grade I - full view of cords      Difficult Airway Encountered?: No      Complications:  None    Airway Device:  Oral endotracheal tube    Airway Device Size:  7.0    Style/Cuff Inflation:  Cuffed    Tube secured:  21    Secured at:  The lips    Placement Verified By:  Capnometry    Complicating Factors:  None    Findings Post-Intubation:  BS equal bilateral and atraumatic/condition of teeth unchanged

## 2024-12-14 NOTE — TRANSFER OF CARE
Anesthesia Transfer of Care Note    Patient: Tasha Cornejo    Procedure(s) Performed: Procedure(s) (LRB):  EXPLORATION, FLAP OR GRAFT SITE (Left)    Patient location: PACU    Anesthesia Type: general    Transport from OR: Transported from OR on 6-10 L/min O2 by face mask with adequate spontaneous ventilation    Post pain: adequate analgesia    Post assessment: no apparent anesthetic complications    Post vital signs: stable    Level of consciousness: awake and alert    Nausea/Vomiting: no nausea/vomiting    Complications: none    Transfer of care protocol was followed      Last vitals: Visit Vitals  BP (!) 145/79 (BP Location: Right arm, Patient Position: Lying)   Pulse 79   Temp 36.4 °C (97.6 °F) (Oral)   Resp 17   Ht 5' (1.524 m)   Wt 51.4 kg (113 lb 5.1 oz)   LMP 10/30/2024 (Exact Date)   SpO2 98%   Breastfeeding No   BMI 22.13 kg/m²

## 2024-12-14 NOTE — ANESTHESIA POSTPROCEDURE EVALUATION
Anesthesia Post Evaluation    Patient: Tasha Cornejo    Procedure(s) Performed: Procedure(s) (LRB):  EXPLORATION, FLAP OR GRAFT SITE (Left)  INSERTION, BREAST IMPLANT (Left)    Final Anesthesia Type: general      Patient location during evaluation: PACU  Patient participation: Yes- Able to Participate  Level of consciousness: awake and alert  Post-procedure vital signs: reviewed and stable  Pain management: adequate  Airway patency: patent  ALISSA mitigation strategies: Extubation while patient is awake  PONV status at discharge: No PONV  Anesthetic complications: no      Cardiovascular status: hemodynamically stable  Respiratory status: unassisted  Hydration status: euvolemic  Follow-up not needed.              Vitals Value Taken Time   /81 12/14/24 1055   Temp 36.4 °C (97.6 °F) 12/14/24 1055   Pulse 83 12/14/24 1055   Resp 18 12/14/24 1055   SpO2 99 % 12/14/24 1055         No case tracking events are documented in the log.      Pain/Andre Score: Pain Rating Prior to Med Admin: 6 (12/14/2024  5:37 AM)  Pain Rating Post Med Admin: 2 (12/14/2024  6:37 AM)  Andre Score: 9 (12/14/2024 10:20 AM)

## 2024-12-14 NOTE — OR NURSING
Pt continues without c/o pain at thsi time. No change from previous assessment. Prepared for transfer to inpt room.

## 2024-12-14 NOTE — H&P
Plastic and Reconstructive Surgery H&P:     Pt with loss of dopplerable signal of the left breast with associated congestion around 4 AM. Flap is warm, soft, and minuscule amount of bright red bleeding with needle stick, although slow. Will take back to the OR for emergent flap exploration, revision of anastomosis, washout, and possible TE or implant placement. Consents to be signed.     Eyal Mosquera MD  [facility]astic and Reconstructive Surgery, -  12/14/2024  7:30 AM

## 2024-12-14 NOTE — NURSING
At 4am, internal doppler of L breast inaudible, R breast audible and loud. L breast pulses checked with external doppler, noted to be audible and loud. Dr. Kirsten Singh called and updated with patient condition. No other needs expressed by patient at this time. Will continue to monitor closely.

## 2024-12-14 NOTE — ANESTHESIA PREPROCEDURE EVALUATION
12/14/2024  Tasha Cornejo is a 44 y.o., female.      Pre-op Assessment    I have reviewed the Patient Summary Reports.     I have reviewed the Nursing Notes. I have reviewed the NPO Status.   I have reviewed the Medications.     Review of Systems  Anesthesia Hx:  No problems with previous Anesthesia   History of prior surgery of interest to airway management or planning:  Previous anesthesia: [facility] 1 yr ago with general anesthesia.  Procedure performed at an [facility].       Denies Family Hx of Anesthesia complications.    Denies Personal Hx of Anesthesia complications.                    Social:  Non-Smoker, Social Alcohol Use       Hematology/Oncology:  Hematology Normal                     Current/Recent Cancer.  --  Cancer in past history:       Breast       surgery and chemotherapy       EENT/Dental:  chronic allergic rhinitis           Cardiovascular:  Cardiovascular Normal Exercise tolerance: good                  Normal echo 11/2023                           Pulmonary:  Pulmonary Normal                       Renal/:  Renal/ Normal                 Hepatic/GI:     GERD, well controlled                Musculoskeletal:  Musculoskeletal Normal                Neurological:  Neurology Normal                                      Endocrine:  Endocrine Normal            Psych:  Psychiatric History                  Physical Exam  General: Well nourished, Cooperative, Alert and Oriented    Airway:  Mallampati: II   Mouth Opening: Normal  TM Distance: Normal  Neck ROM: Normal ROM    Dental:  Intact        Anesthesia Plan  Type of Anesthesia, risks & benefits discussed:    Anesthesia Type: Gen ETT  Intra-op Monitoring Plan: Standard ASA Monitors  Post Op Pain Control Plan: multimodal analgesia  Induction:  IV  Airway Plan: Video  Informed Consent: Informed consent signed with the Patient  and all parties understand the risks and agree with anesthesia plan.  All questions answered.   ASA Score: 2 Emergent    Ready For Surgery From Anesthesia Perspective.     .

## 2024-12-14 NOTE — OP NOTE
OPERATIVE REPORT     Patient Name      SURGEON: Olga Solitario     PREOPERATIVE DIAGNOSIS    History of arcelia mastectomy  Acquired absence of breast        POSTOPERATIVE DIAGNOSIS  same        PROCEDURE  Procedure(s):   Exploration of left breast flap, removal  Implantation of biologic mesh  Silicone implant placement - delayed       ANESTHESIA: GENERAL, ENDOTRACHEAL.     FINDINGS:     Micro:  Left flap weight:  260     INTAKE / OUTPUT  Estimated blood loss: 15 mL     SPECIMENS:   None     CULTURES: NONE     DRAINS:  Left drain removed        COMPLICATIONS: None.     COUNTS: Sponge and needle counts correct.       DISPOSITION: Extubated to postanesthesia care unit.     INDICATIONS  Options for treatment were discussed  The risks, benefits, alternatives, expected outcomes, recovery time, and potential morbidity were discussed and the patient wished to proceed. Informed consent was obtained.     OPERATIVE PROCEDURE  The patient was met in the preoperative holding area.  Surgical site was verified.The patient was then taken to the operating room and a surgical time-out verified her identity, diagnosis, nature and sites of procedures. Necessary equipment was verified. The patient was given 2 grams cefazolin and 400g iv cipro intravenously  for antimicrobial prophylaxis. SCD boots were placed. After induction of anesthesia and orotracheal intubation an indwelling bladder catheter was placed. The left chest was prepped and draped. Her prior incision was opened. The flap did not have arterial perfusion. The pedicle was then evaluated and a transition point was noted at the vein interposition graft and the lumbar artery  anastomosis. The artery was clotted to the SCOTTIE. The pedicle was divided at the interposition graft anastmosis and the vein and artery were irrigated but no flow achieved. It was clipped here and the flap as removed. The pocket was then irrigated with triple antibiotic solution. The skin was  reprepped. An alloderm mesh was then placed over the interposition graft and secured with 3.0 monocryl suture . A silicone implant was placed into the breast pocket. The wound was then closed in 2 layers using 3.0 monocryl interrupted and2.0 quill running. Prior to complete closure vashe was placed into the pocket and allowed to dwell. Hemostasis was achieved and confirmed prior to closure. The skin was painted with betadine prior to final closure. Bacitracin and adaptic were then applied.  The patient was then awakened, extubated, and transferred to recovery in good condition.     Electronically signed by:  Olga Solitario

## 2024-12-15 ENCOUNTER — PATIENT MESSAGE (OUTPATIENT)
Dept: PLASTIC SURGERY | Facility: CLINIC | Age: 44
End: 2024-12-15
Payer: COMMERCIAL

## 2024-12-15 VITALS
TEMPERATURE: 98 F | BODY MASS INDEX: 22.25 KG/M2 | OXYGEN SATURATION: 100 % | SYSTOLIC BLOOD PRESSURE: 114 MMHG | RESPIRATION RATE: 18 BRPM | HEART RATE: 83 BPM | DIASTOLIC BLOOD PRESSURE: 60 MMHG | WEIGHT: 113.31 LBS | HEIGHT: 60 IN

## 2024-12-15 DIAGNOSIS — C50.919 MALIGNANT NEOPLASM OF FEMALE BREAST, UNSPECIFIED ESTROGEN RECEPTOR STATUS, UNSPECIFIED LATERALITY, UNSPECIFIED SITE OF BREAST: Primary | ICD-10-CM

## 2024-12-15 PROCEDURE — 25000003 PHARM REV CODE 250: Performed by: STUDENT IN AN ORGANIZED HEALTH CARE EDUCATION/TRAINING PROGRAM

## 2024-12-15 PROCEDURE — 97116 GAIT TRAINING THERAPY: CPT | Mod: CQ

## 2024-12-15 PROCEDURE — 25000003 PHARM REV CODE 250: Performed by: SURGERY

## 2024-12-15 RX ORDER — METHOCARBAMOL 500 MG/1
500 TABLET, FILM COATED ORAL 4 TIMES DAILY
Qty: 40 TABLET | Refills: 0 | Status: SHIPPED | OUTPATIENT
Start: 2024-12-15 | End: 2024-12-25

## 2024-12-15 RX ORDER — OXYCODONE AND ACETAMINOPHEN 10; 325 MG/1; MG/1
1 TABLET ORAL EVERY 4 HOURS PRN
Qty: 25 TABLET | Refills: 0 | Status: SHIPPED | OUTPATIENT
Start: 2024-12-15

## 2024-12-15 RX ORDER — CYCLOBENZAPRINE HCL 10 MG
10 TABLET ORAL 3 TIMES DAILY PRN
Qty: 30 TABLET | Refills: 0 | Status: SHIPPED | OUTPATIENT
Start: 2024-12-15 | End: 2024-12-25

## 2024-12-15 RX ORDER — SULFAMETHOXAZOLE AND TRIMETHOPRIM 800; 160 MG/1; MG/1
1 TABLET ORAL 2 TIMES DAILY
Qty: 20 TABLET | Refills: 0 | Status: ON HOLD | OUTPATIENT
Start: 2024-12-15 | End: 2024-12-17

## 2024-12-15 RX ORDER — LEVOFLOXACIN 750 MG/1
750 TABLET ORAL DAILY
Qty: 10 TABLET | Refills: 0 | Status: ON HOLD | OUTPATIENT
Start: 2024-12-15 | End: 2024-12-17 | Stop reason: HOSPADM

## 2024-12-15 RX ORDER — ACETAMINOPHEN 500 MG
500 TABLET ORAL EVERY 6 HOURS
Qty: 56 TABLET | Refills: 0 | Status: SHIPPED | OUTPATIENT
Start: 2024-12-15 | End: 2024-12-29

## 2024-12-15 RX ORDER — BACITRACIN ZINC 500 UNIT/G
OINTMENT (GRAM) TOPICAL 3 TIMES DAILY
Qty: 28 G | Refills: 0 | Status: SHIPPED | OUTPATIENT
Start: 2024-12-15

## 2024-12-15 RX ORDER — OXYCODONE HYDROCHLORIDE 10 MG/1
10 TABLET ORAL EVERY 4 HOURS PRN
Qty: 25 TABLET | Refills: 0 | Status: SHIPPED | OUTPATIENT
Start: 2024-12-15

## 2024-12-15 RX ORDER — ONDANSETRON 4 MG/1
4 TABLET, ORALLY DISINTEGRATING ORAL 2 TIMES DAILY
Qty: 30 TABLET | Refills: 0 | Status: SHIPPED | OUTPATIENT
Start: 2024-12-15

## 2024-12-15 RX ORDER — LEVOFLOXACIN 500 MG/1
500 TABLET, FILM COATED ORAL DAILY
Qty: 10 TABLET | Refills: 0 | Status: ON HOLD | OUTPATIENT
Start: 2024-12-15 | End: 2024-12-17 | Stop reason: HOSPADM

## 2024-12-15 RX ORDER — SULFAMETHOXAZOLE AND TRIMETHOPRIM 800; 160 MG/1; MG/1
2 TABLET ORAL 2 TIMES DAILY
Qty: 40 TABLET | Refills: 0 | Status: ON HOLD | OUTPATIENT
Start: 2024-12-15 | End: 2024-12-17

## 2024-12-15 RX ADMIN — CYCLOBENZAPRINE HYDROCHLORIDE 10 MG: 5 TABLET, FILM COATED ORAL at 08:12

## 2024-12-15 RX ADMIN — DOCUSATE SODIUM 100 MG: 100 CAPSULE, LIQUID FILLED ORAL at 08:12

## 2024-12-15 RX ADMIN — MUPIROCIN: 20 OINTMENT TOPICAL at 08:12

## 2024-12-15 RX ADMIN — OXYCODONE HYDROCHLORIDE 10 MG: 10 TABLET ORAL at 08:12

## 2024-12-15 RX ADMIN — ACETAMINOPHEN 1000 MG: 500 TABLET, FILM COATED ORAL at 11:12

## 2024-12-15 RX ADMIN — OXYCODONE HYDROCHLORIDE 10 MG: 10 TABLET ORAL at 12:12

## 2024-12-15 RX ADMIN — THERA TABS 1 TABLET: TAB at 08:12

## 2024-12-15 RX ADMIN — ACETAMINOPHEN 1000 MG: 500 TABLET, FILM COATED ORAL at 05:12

## 2024-12-15 RX ADMIN — SULFAMETHOXAZOLE AND TRIMETHOPRIM 2 TABLET: 800; 160 TABLET ORAL at 08:12

## 2024-12-15 NOTE — PLAN OF CARE
Attempted to reach out to Dr Mosquera and Dr Piedra in regards to patient's medications being to sent to a pharmacy that was closed and asked if they could be sent to a different pharmacy. No response from providers. Also attempted to call pt and pt's  on several occasions to advise them to call the clinics on call service, but no answer on either phone number.     Problem: Adult Inpatient Plan of Care  Goal: Plan of Care Review  Outcome: Adequate for Care Transition  Goal: Patient-Specific Goal (Individualized)  Outcome: Adequate for Care Transition  Goal: Absence of Hospital-Acquired Illness or Injury  Outcome: Adequate for Care Transition  Goal: Optimal Comfort and Wellbeing  Outcome: Adequate for Care Transition  Goal: Readiness for Transition of Care  Outcome: Adequate for Care Transition     Problem: Wound  Goal: Optimal Coping  Outcome: Adequate for Care Transition  Goal: Optimal Functional Ability  Outcome: Adequate for Care Transition  Goal: Absence of Infection Signs and Symptoms  Outcome: Adequate for Care Transition  Goal: Improved Oral Intake  Outcome: Adequate for Care Transition  Goal: Optimal Pain Control and Function  Outcome: Adequate for Care Transition  Goal: Skin Health and Integrity  Outcome: Adequate for Care Transition  Goal: Optimal Wound Healing  Outcome: Adequate for Care Transition     Problem: Pain Acute  Goal: Optimal Pain Control and Function  Outcome: Adequate for Care Transition     Problem: Breast Surgery  Goal: Optimal Coping with Surgery  Outcome: Adequate for Care Transition  Goal: Absence of Bleeding  Outcome: Adequate for Care Transition  Goal: Effective Bowel Elimination  Outcome: Adequate for Care Transition  Goal: Fluid and Electrolyte Balance  Outcome: Adequate for Care Transition  Goal: Absence of Infection Signs and Symptoms  Outcome: Adequate for Care Transition  Goal: Anesthesia/Sedation Recovery  Outcome: Adequate for Care Transition  Goal: Optimal Pain  Control and Function  Outcome: Adequate for Care Transition  Goal: Nausea and Vomiting Relief  Outcome: Adequate for Care Transition  Goal: Effective Urinary Elimination  Outcome: Adequate for Care Transition  Goal: Effective Oxygenation and Ventilation  Outcome: Adequate for Care Transition

## 2024-12-15 NOTE — PLAN OF CARE
Case Management Final Discharge Note      Discharge Disposition: home with family    New DME ordered / company name: none    Relevant SDOH / Transition of Care Barriers:  none    Primary Caretaker and contact information: self    Scheduled followup appointment: Post Op    Referrals placed: none    Transportation: Patient  will transport patient home.         Patient and family educated on discharge services and updated on DC plan. Bedside RN notified, patient clear to discharge from Case Management Perspective.       [facility] ([address])  Discharge Final Note    Primary Care Provider: Julia Sandoval MD    Expected Discharge Date: 12/15/2024    Final Discharge Note (most recent)       Final Note - 12/15/24 0949          Final Note    Assessment Type Final Discharge Note     Anticipated Discharge Disposition Home or Self Care     Hospital Resources/Appts/Education Provided Provided patient/caregiver with written discharge plan information;Appointments scheduled and added to AVS        Post-Acute Status    Discharge Delays None known at this time                     Important Message from Medicare

## 2024-12-15 NOTE — PLAN OF CARE
Problem: Adult Inpatient Plan of Care  Goal: Plan of Care Review  Outcome: Progressing  Flowsheets (Taken 12/14/2024 2235)  Plan of Care Reviewed With: patient  Goal: Optimal Comfort and Wellbeing  Outcome: Progressing  Intervention: Provide Person-Centered Care  Flowsheets (Taken 12/14/2024 2235)  Trust Relationship/Rapport:   care explained   choices provided   emotional support provided   empathic listening provided   questions answered   questions encouraged   reassurance provided   thoughts/feelings acknowledged     Problem: Breast Surgery  Goal: Absence of Infection Signs and Symptoms  Outcome: Progressing  Intervention: Prevent or Manage Infection  Flowsheets (Taken 12/14/2024 2235)  Fever Reduction/Comfort Measures:   lightweight bedding   lightweight clothing

## 2024-12-15 NOTE — PT/OT/SLP PROGRESS
Physical Therapy Treatment    Patient Name:  Tasha Cornejo   MRN:  [id number]    Recommendations:     Discharge Recommendations: Low Intensity Therapy (outpatient PT)  Discharge Equipment Recommendations: none  Barriers to discharge: None    Assessment:     Tasha Cornejo is a 44 y.o. female admitted with a medical diagnosis of Malignant neoplasm of lower-inner quadrant of right breast of female, estrogen receptor positive.  She presents with the following impairments/functional limitations: weakness, gait instability, pain, impaired endurance, impaired functional mobility, decreased ROM, decreased upper extremity function.    Sit>stand with no AD and SBA, from chair, EOB  Amb 200' with no AD and SBA, high guard position, decreased B step length  Step-up onto step stool with no AD and CGA, with pivot to sit on EOB  Pt preparing for discharge, all needs met  Rec Low Intensity Therapy (outpatient PT)    Rehab Prognosis: Good; patient would benefit from acute skilled PT services to address these deficits and reach maximum level of function.    Recent Surgery: Procedure(s) (LRB):  EXPLORATION, FLAP OR GRAFT SITE (Left)  INSERTION, BREAST IMPLANT (Left) 1 Day Post-Op    Plan:     During this hospitalization, patient to be seen 4 x/week to address the identified rehab impairments via gait training, therapeutic activities, therapeutic exercises and progress toward the following goals:    Plan of Care Expires:  12/26/24    Subjective     Chief Complaint: pain  Patient/Family Comments/goals: my bed is kind of high, usually I climb into it  Pain/Comfort:  Pain Rating 1: 5/10  Location 1: abdomen  Pain Addressed 1: Pre-medicate for activity, Reposition, Distraction, Cessation of Activity  Pain Rating Post-Intervention 1: 5/10      Objective:     Communicated with nurse Nicole prior to session.  Patient found up in chair with peripheral IV, YAN drain upon PT entry to room.     General Precautions: Standard, fall, other  (see comments)  Orthopedic Precautions: N/A  Braces: N/A  Respiratory Status: Room air     Functional Mobility:  Transfers:     Sit to Stand:  stand by assistance with no AD  Gait: 200' with no AD and SBA, high guard position, decreased B step length      AM-PAC 6 CLICK MOBILITY  Turning over in bed (including adjusting bedclothes, sheets and blankets)?: 4  Sitting down on and standing up from a chair with arms (e.g., wheelchair, bedside commode, etc.): 3  Moving from lying on back to sitting on the side of the bed?: 3  Moving to and from a bed to a chair (including a wheelchair)?: 4  Need to walk in hospital room?: 4  Climbing 3-5 steps with a railing?: 3  Basic Mobility Total Score: 21       Treatment & Education:  Gait training as noted  Step-up on step stool to simulate getting into pt's high bed, performed with CGA    Patient left ambulatory in room/santoyo with all lines intact, call button in reach, and spouse present..    GOALS:   Multidisciplinary Problems       Physical Therapy Goals          Problem: Physical Therapy    Goal Priority Disciplines Outcome Interventions   Physical Therapy Goal     PT, PT/OT Progressing    Description: P.T goals to be met in 2 weeks as follows:  1. Mod I Bed Mobility  2. Mod I T/F  3. Gait 200' Mod I   4. Tolerate OOB x 3 hours                         Time Tracking:     PT Received On: 12/15/24  PT Start Time: 1225     PT Stop Time: 1235  PT Total Time (min): 10 min     Billable Minutes: Gait Training 10    Treatment Type: Treatment  PT/PTA: PTA     Number of PTA visits since last PT visit: 1     12/15/2024

## 2024-12-15 NOTE — DISCHARGE SUMMARY
[facility] ([address])  Discharge Summary      Patient Name: Tasha Cornejo  MRN: [id number]  Admission Date: 12/11/2024  Hospital Length of Stay: 4 days  Discharge Date and Time:  12/15/2024 9:46 AM  Attending Physician: Allen Damian MD   Discharging Provider: Eyal Mosquera MD  Primary Care Provider: Julia Sandoval MD    HPI: with history of breast cancer s/p bilateral mastectomy with bilateral prepectoral tissue expander placement on 12/14/23. Ready for autologous reconstruction.     Procedure(s) (LRB):  EXPLORATION, FLAP OR GRAFT SITE (Left)  INSERTION, BREAST IMPLANT (Left)   Bilateral breast reconstruction with lumbar artery  flap with DIEA/V interposition grafts     Indwelling Lines/Drains at time of discharge:   Lines/Drains/Airways       Central Venous Catheter Line  Duration                  PowerPort A Cath Single Lumen 02/01/24 Atrial Left 318 days              Drain  Duration                  Closed/Suction Drain 12/11/24 1244 Right Buttock Bulb 15 Fr. 3 days         Closed/Suction Drain 12/11/24 1245 Left Buttock Bulb 15 Fr. 3 days         Closed/Suction Drain 12/11/24 1245 Right Breast Bulb 15 Fr. 3 days                  Hospital Course:    Day of surgery patient to the OR for breast reconstruction with  with flap checks. POD 1 patient doing well and pain controlled. Flap checks q4hr. She was started on diet and Wal[place] and IVF discontinued after good oral intake. She was able to get OOBTC. POD 3 Unfortunately patient had venous congestion of the left flap that required return to OR, was unable to salvage, and TE placed. POD 4 right breast flap continued to do well and pain controlled. The patient was ready to go home. Deemed appropriate for discharge.       Consults: None     Significant Diagnostic Studies: N/A    Pending Diagnostic Studies:       None          Final Active Diagnoses:    Diagnosis Date Noted POA    PRINCIPAL PROBLEM:  Malignant neoplasm of  lower-inner quadrant of right breast of female, estrogen receptor positive [C50.311, Z17.0] 11/06/2023 Not Applicable      Problems Resolved During this Admission:      Discharged Condition: good    Disposition: Home or Self Care    Follow Up: 12/18    Patient Instructions:      Activity as tolerated   Order Comments: The following are post-operative instructions that will help you to recover from your surgery.  Please read over these instructions carefully and contact us if we can answer any of your questions or concerns.    Activity   You will be able to do much of your own personal care, such as bathing, dressing, preparing simple meals, etc.  A short walk each day will help with your recovery  You may find that you need to take rest breaks between activities, but you should not need to stay in bed for prolonged periods of time during the day. A good rule during this time is to listen to your body, do what is comfortable, and stop and rest when your feel tired.  If it hurts, don't do it.  Return to taking your daily medications as prescribed  Please avoid activities that require moderate to heavy lifting (grocery shopping) or pushing/pulling (vacuuming) and repetitive motions (such as washing windows). Do not lift anything heavier than a gallon of milk.  You may restart driving when you are no longer on narcotics and you feel safe turning the wheel and stopping quickly.  You will need to be out of work approximately 2-6 weeks depending on your particular surgery and how well you are recovering.  We will evaluate how you are doing at the first post-op appointment.  This is a good time to ask when you may return to work and what activities you may do.    Medication for pain  You will be given a prescription for pain medication. You should not drive or operate machinery while taking these.  Please take prescription pain medication (narcotics) with food.  Narcotics can cause, or worsen, constipation.  You will need  "to increase your fluid intake, eat high fiber foods (such as fruits and bran) and make sure that you are up and walking. You may need to take an over the counter stool softener for constipation.  If you are given a prescription for antibiotics, take them as prescribed.      How to care for your Drain(s)  Wash hands-STRIP or "milk" the drainage tube as it comes out of your body toward the bulb.   Beginning where the drain comes out of your body, hold drainage tubing with one hand and with the other, stretch and release tubing an inch at time while moving downward with both hands toward the bulb.  Do this 2-3 times before emptying the bulb.  Remove the stopper from the bulb's port  (drainage port)  Pour the drainage in the measuring cup provided by the nurse  Flatten/squeeze the bulb to create a vacuum and replace the stopper before letting go of the bulb.  Record the date, time and amount of drainage in cc's (not ounces) each time bulb is emptied. If you have more than one drain, record each separately.  Discard the drainage into the toilet after measuring and then wash hands.  Empty bulbs 2-3 times/day or as needed if it fills up before 8 hours.  Remember to bring the output record with you to your doctor's appointment.    Please report the following:  Temperature greater than 101 degrees  Discharge or bad odor from the wound  Excessive bleeding, such as saturated bloody dressing or extreme bruising  Redness at incision and/or drain sites  Swelling or buildup of fluid around incision  Persistent fevers, chills, nausea, vomiting, or diarrhea     Medications:  Reconciled Home Medications:      Medication List        START taking these medications      bacitracin 500 unit/gram Oint  Apply topically 3 (three) times daily.     cyclobenzaprine 10 MG tablet  Commonly known as: FLEXERIL  Take 1 tablet (10 mg total) by mouth 3 (three) times daily as needed for Muscle spasms.     levoFLOXacin 750 MG tablet  Commonly known as: " LEVAQUIN  Take 1 tablet (750 mg total) by mouth once daily. for 10 days     ondansetron 4 MG Tbdl  Commonly known as: ZOFRAN-ODT  Take 1 tablet (4 mg total) by mouth 2 (two) times daily.     oxyCODONE-acetaminophen  mg per tablet  Commonly known as: PERCOCET  Take 1 tablet by mouth every 4 (four) hours as needed.     sulfamethoxazole-trimethoprim 800-160mg 800-160 mg Tab  Commonly known as: BACTRIM DS  Take 2 tablets by mouth 2 (two) times daily. for 10 days            CONTINUE taking these medications      fexofenadine 180 MG tablet  Commonly known as: ALLEGRA  Take 180 mg by mouth once daily.     LIDOcaine-prilocaine cream  Commonly known as: EMLA  Apply topically to port one hour prior to chemotherapy infusion     multivitamin per tablet  Commonly known as: THERAGRAN  Take 1 tablet by mouth once daily.     tamoxifen 20 MG Tab  Commonly known as: NOLVADEX  Take 1 tablet (20 mg total) by mouth once daily.            Time spent on the discharge of patient: 30 minutes         Eyal Mosquera MD  [facility] ([address])

## 2024-12-16 ENCOUNTER — OFFICE VISIT (OUTPATIENT)
Dept: PLASTIC SURGERY | Facility: CLINIC | Age: 44
End: 2024-12-16
Attending: PLASTIC SURGERY
Payer: COMMERCIAL

## 2024-12-16 ENCOUNTER — ANESTHESIA EVENT (OUTPATIENT)
Dept: SURGERY | Facility: OTHER | Age: 44
End: 2024-12-16
Payer: COMMERCIAL

## 2024-12-16 ENCOUNTER — PATIENT MESSAGE (OUTPATIENT)
Dept: PREADMISSION TESTING | Facility: OTHER | Age: 44
End: 2024-12-16
Payer: COMMERCIAL

## 2024-12-16 VITALS — HEART RATE: 101 BPM | SYSTOLIC BLOOD PRESSURE: 132 MMHG | DIASTOLIC BLOOD PRESSURE: 86 MMHG

## 2024-12-16 DIAGNOSIS — Z85.3 HISTORY OF BREAST CANCER: Primary | ICD-10-CM

## 2024-12-16 DIAGNOSIS — C50.919 MALIGNANT NEOPLASM OF FEMALE BREAST, UNSPECIFIED ESTROGEN RECEPTOR STATUS, UNSPECIFIED LATERALITY, UNSPECIFIED SITE OF BREAST: Primary | ICD-10-CM

## 2024-12-16 RX ORDER — DOXYCYCLINE 100 MG/1
100 CAPSULE ORAL 2 TIMES DAILY
Qty: 10 CAPSULE | Refills: 0 | Status: ON HOLD | OUTPATIENT
Start: 2024-12-16 | End: 2024-12-17 | Stop reason: HOSPADM

## 2024-12-16 RX ORDER — MUPIROCIN 20 MG/G
OINTMENT TOPICAL
Status: CANCELLED | OUTPATIENT
Start: 2024-12-16

## 2024-12-16 RX ORDER — CEFAZOLIN SODIUM 2 G/50ML
2 SOLUTION INTRAVENOUS
Status: CANCELLED | OUTPATIENT
Start: 2024-12-16

## 2024-12-16 RX ORDER — SODIUM CHLORIDE 9 MG/ML
INJECTION, SOLUTION INTRAVENOUS CONTINUOUS
Status: CANCELLED | OUTPATIENT
Start: 2024-12-16

## 2024-12-16 RX ORDER — DOCUSATE SODIUM 250 MG
250 CAPSULE ORAL DAILY
COMMUNITY

## 2024-12-16 NOTE — PROGRESS NOTES
[facility] H&P    Tasha Cornejo is a 44 y.o. female with history of breast cancer status post bilateral mastectomy with tissue expander placement and status post bilateral breast reconstruction with lumbar artery free flaps with IDEA/DIEV interposition grafts on 12/11/24. On POD1 and POD2 her bilateral breast flaps were soft with good color, cap refill, and dopplerable pulse; however on POD3 morning her left breast flap became congested and lost arterial signal. She was taken back to OR on 12/14/24 for left breast flap debridement and left breast prepectoral implant placement. She was discharged on 12/15/24. At time of discharge on 12/15/24 right breast flap was soft and continued to do well. She was discharged on bactrim and Levaquin. On 12/15/24 at night, right breast flap color changed and patient came in on 12/16/24 to clinic for evaluation.     Additionally, patient states she has developed a diffuse rash to her back and stomach that has worsened since leaving the hospital. She states she had a previous rash the last time she took bactrim/cipro antibiotics when she had tissue expanders.      Review of patient's allergies indicates:   Allergen Reactions    Aleve [naproxen sodium] Other (See Comments)     Throat swelling        Current Outpatient Medications:     acetaminophen (TYLENOL) 500 MG tablet, Take 1 tablet (500 mg total) by mouth every 6 (six) hours. for 14 days, Disp: 56 tablet, Rfl: 0    bacitracin 500 unit/gram Oint, Apply topically 3 (three) times daily., Disp: 28 g, Rfl: 0    cyclobenzaprine (FLEXERIL) 10 MG tablet, Take 1 tablet (10 mg total) by mouth 3 (three) times daily as needed for Muscle spasms., Disp: 30 tablet, Rfl: 0    docusate sodium (COLACE) 250 MG capsule, Take 250 mg by mouth once daily., Disp: , Rfl:     doxycycline (VIBRAMYCIN) 100 MG Cap, Take 1 capsule (100 mg total) by mouth 2 (two) times daily., Disp: 10 capsule, Rfl: 0    fexofenadine  (ALLEGRA) 180 MG tablet, Take 180 mg by mouth once daily., Disp: , Rfl:     levoFLOXacin (LEVAQUIN) 500 MG tablet, Take 1 tablet (500 mg total) by mouth once daily. for 10 days, Disp: 10 tablet, Rfl: 0    levoFLOXacin (LEVAQUIN) 750 MG tablet, Take 1 tablet (750 mg total) by mouth once daily. for 10 days, Disp: 10 tablet, Rfl: 0    LIDOcaine-prilocaine (EMLA) cream, Apply topically to port one hour prior to chemotherapy infusion, Disp: 30 g, Rfl: 1    methocarbamoL (ROBAXIN) 500 MG Tab, Take 1 tablet (500 mg total) by mouth 4 (four) times daily. for 10 days, Disp: 40 tablet, Rfl: 0    multivitamin (THERAGRAN) per tablet, Take 1 tablet by mouth once daily., Disp: , Rfl:     ondansetron (ZOFRAN-ODT) 4 MG TbDL, Take 1 tablet (4 mg total) by mouth 2 (two) times daily., Disp: 30 tablet, Rfl: 0    oxyCODONE (ROXICODONE) 10 mg Tab immediate release tablet, Take 1 tablet (10 mg total) by mouth every 4 (four) hours as needed for Pain., Disp: 25 tablet, Rfl: 0    oxyCODONE-acetaminophen (PERCOCET)  mg per tablet, Take 1 tablet by mouth every 4 (four) hours as needed for Pain., Disp: 25 tablet, Rfl: 0    oxyCODONE-acetaminophen (PERCOCET)  mg per tablet, Take 1 tablet by mouth every 4 (four) hours as needed for Pain., Disp: 25 tablet, Rfl: 0    sulfamethoxazole-trimethoprim 800-160mg (BACTRIM DS) 800-160 mg Tab, Take 2 tablets by mouth 2 (two) times daily. for 10 days, Disp: 40 tablet, Rfl: 0    sulfamethoxazole-trimethoprim 800-160mg (BACTRIM DS) 800-160 mg Tab, Take 1 tablet by mouth 2 (two) times daily. for 10 days, Disp: 20 tablet, Rfl: 0    tamoxifen (NOLVADEX) 20 MG Tab, Take 1 tablet (20 mg total) by mouth once daily., Disp: 30 tablet, Rfl: 11  No current facility-administered medications for this visit.    Facility-Administered Medications Ordered in Other Visits:     ceFAZolin 1 g, gentamicin 80 mg in sodium chloride 0.9% 500 mL irrigation, , Irrigation, On Call Procedure, Allen Damian MD     ceFAZolin 1 g, gentamicin 80 mg in sodium chloride 0.9% 500 mL irrigation, , Irrigation, On Call Procedure, Allen Damian MD    Past Medical History:   Diagnosis Date    Acid reflux     Chalazion     Food allergy     discontinued dairy to help eosinophilic esophagitis    Hx of psychiatric care     Malignant neoplasm of lower-inner quadrant of right breast of female, estrogen receptor positive 2023    Seasonal allergies ongoing/unsure    Therapy     Wears reading eyeglasses        Past Surgical History:   Procedure Laterality Date    ADENOIDECTOMY  1983    BILATERAL MASTECTOMY Bilateral 2023    Procedure: MASTECTOMY, BILATERAL;  Surgeon: Tasha Mcleod MD;  Location: [facility];  Service: General;  Laterality: Bilateral;  3.5 HOURS / EMAIL SENT  @ 1:03     COLONOSCOPY N/A 2023    Procedure: COLONOSCOPY;  Surgeon: Fabio Rice MD;  Location: [facility] ([address] [place] [address]);  Service: Endoscopy;  Laterality: N/A;  pt confirmed earlier time  EB    ESOPHAGOGASTRODUODENOSCOPY N/A 2023    Procedure: EGD (ESOPHAGOGASTRODUODENOSCOPY);  Surgeon: Fabio Rice MD;  [facility]  Service: Endoscopy;  Laterality: N/A;  Procedure Timin-4 weeks    referred by Dr. Rice/Prep instructions handed to patient and to portal.  Patient called did not answer- DB    ESOPHAGOGASTRODUODENOSCOPY N/A 10/18/2023    Procedure: EGD (ESOPHAGOGASTRODUODENOSCOPY);  Surgeon: Fabio Rice MD;  [facility]  Service: Endoscopy;  Laterality: N/A;  ref Dwayne  timeframe 5-12 weeks   Dr. Rice patient  portal instruction and given to patient jm  10/11-precall complete-MS    INJECTION FOR SENTINEL NODE IDENTIFICATION Right 2023    Procedure: INJECTION, FOR SENTINEL NODE IDENTIFICATION;  Surgeon: aTsha Mcleod MD;  Location: [facility];  Service: General;  Laterality: Right;    INSERTION OF BREAST TISSUE EXPANDER Bilateral 2023    Procedure: INSERTION, TISSUE EXPANDER, BREAST;  Surgeon:   Allen Lopez MD;  Location: [facility];  Service: Plastics;  Laterality: Bilateral;  2 HOURS    INSERTION, CATHETER, TUNNELED Left 12/14/2023    Procedure: INSERTION, CATHETER, TUNNELED;  Surgeon: Tasha Mcleod MD;  Location: [facility];  Service: General;  Laterality: Left;    PLACEMENT, MEDIPORT      RECONSTRUCTION OF BREAST WITH DEEP INFERIOR EPIGASTRIC ARTERY  (NATHALIE) FREE FLAP Bilateral 12/11/2024    Procedure: RECONSTRUCTION, BREAST, USING LUMBAR ARTERY FLAP AND INTERPOSITION GRAFT DIEA/DIEV;  Surgeon: Allen Damian MD;  Location: [facility];  Service: Plastics;  Laterality: Bilateral;    SENTINEL LYMPH NODE BIOPSY Right 12/14/2023    Procedure: BIOPSY, LYMPH NODE, SENTINEL;  Surgeon: Tasha Mcleod MD;  Location: [facility];  Service: General;  Laterality: Right;    TISSUE EXPANDER REMOVAL Bilateral 12/11/2024    Procedure: REMOVAL, TISSUE EXPANDER;  Surgeon: Allen Damian MD;  Location: [facility];  Service: Plastics;  Laterality: Bilateral;    TYMPANOSTOMY TUBE PLACEMENT  1983       Social History     Socioeconomic History    Marital status:      Spouse name: Oseas   Occupational History    Occupation:    Tobacco Use    Smoking status: Never     Passive exposure: Never    Smokeless tobacco: Never   Substance and Sexual Activity    Alcohol use: Not Currently     Comment: Less than a drink/monthly    Drug use: Yes     Types: Oxycodone    Sexual activity: Not Currently     Social Drivers of Health     Financial Resource Strain: Low Risk  (12/12/2024)    Overall Financial Resource Strain (CARDIA)     Difficulty of Paying Living Expenses: Not hard at all   Food Insecurity: No Food Insecurity (12/12/2024)    Hunger Vital Sign     Worried About Running Out of Food in the Last Year: Never true     Ran Out of Food in the Last Year: Never true   Transportation Needs: No Transportation Needs (12/12/2024)    TRANSPORTATION NEEDS     Transportation : No   Physical Activity: Sufficiently  Active (12/12/2024)    Exercise Vital Sign     Days of Exercise per Week: 4 days     Minutes of Exercise per Session: 60 min   Stress: Stress Concern Present (12/12/2024)    [employer] - Occupational Stress Questionnaire     Feeling of Stress : Rather much   Housing Stability: Low Risk  (12/12/2024)    Housing Stability Vital Sign     Unable to Pay for Housing in the Last Year: No     Homeless in the Last Year: No         O  Physical exam  General: No acute distress. BM 21.87  HEENT: Atraumatic. Normocephalic.   CV: Regular rate and rhythm by radial pulse  Pulm: Normal respiratory effort. Symmetric chest rise and fall.   Extremities: No cyanosis. Peripheral pulses intact. Cap refill <2 sec.   Neuro: Alert and Oriented  Breasts:   Right breast flap compressible with moderate firmness. Flap skin paddle compressible, warm, with congested dark purple appearance. On pin prick there is very small amount of slow dark red bleeding     Left breast with implant in place with incisions c/d/I without signs of infection. Breast soft.      Scattered diffuse rash over bilateral breasts    Abdomen: Soft. Bilateral groin incisions with appropriate edema, soft, with incisions c/d/I without signs of infection. Upper abdomen with diffuse maculopapular rash.    Back:   Low back incision c/d/I with prineo in place without signs of infection. Maculopapular rash present throughout entire back    A/P  Tasha Cornejo is a 44 y.o. female with history of breast cancer s/p bilateral free LAP flap with IDEA/DIEV interposition grafts on 12/11/24 complicated by loss of left breast flap 12/14/24. She now presents with right breast flap congestion.     - Discussed reconstructive options with the patient. Discussed right breast flap debridement with prepectoral implant placement versus right breast flap debridement with left breast implant removal to become flat chested.   - At this time plan for right breast flap  debridement with implant placement  - informed consent obtained  - Plan for surgery 12/17/24   - hypercoagulable labs ordered    Ant Wisdom MD  [facility]  12/16/2024

## 2024-12-16 NOTE — PRE-PROCEDURE INSTRUCTIONS
Pre admit phone call completed.    Instructions given to patient about NPO status as follows:     The evening before surgery do not eat anything after 9 p.m. ( this includes hard candy, chewing gum and mints).  You may only have GATORADE, POWERADE AND WATER from 9 p.m. until you leave your home. DO NOT  DRINK ANY LIQUIDS ON THE WAY TO THE HOSPITAL.      Patient was also instructed on the below information:    Park in the Parking lot behind the hospital or in the [employer] [facility] across the street from the parking lot.  Parking is complimentary.  If you will be discharged the same day as your procedure, please arrange for a responsible adult to drive you home or  to accompany you if traveling by taxi.  YOU WILL NOT BE PERMITTED TO DRIVE OR TO LEAVE THE HOSPITAL ALONE AFTER SURGERY.  It is strongly recommended that you arrange for someone to remain with you for the first 24 hrs following your surgery.    Patient verbalized understanding of above instructions.

## 2024-12-17 ENCOUNTER — ANESTHESIA (OUTPATIENT)
Dept: SURGERY | Facility: OTHER | Age: 44
End: 2024-12-17
Payer: COMMERCIAL

## 2024-12-17 ENCOUNTER — PATIENT MESSAGE (OUTPATIENT)
Dept: HEMATOLOGY/ONCOLOGY | Facility: CLINIC | Age: 44
End: 2024-12-17
Payer: COMMERCIAL

## 2024-12-17 ENCOUNTER — HOSPITAL ENCOUNTER (OUTPATIENT)
Facility: OTHER | Age: 44
Discharge: HOME | End: 2024-12-17
Attending: PLASTIC SURGERY | Admitting: PLASTIC SURGERY
Payer: COMMERCIAL

## 2024-12-17 VITALS
RESPIRATION RATE: 17 BRPM | BODY MASS INDEX: 21.99 KG/M2 | WEIGHT: 112 LBS | HEIGHT: 60 IN | SYSTOLIC BLOOD PRESSURE: 118 MMHG | HEART RATE: 86 BPM | OXYGEN SATURATION: 97 % | DIASTOLIC BLOOD PRESSURE: 60 MMHG | TEMPERATURE: 98 F

## 2024-12-17 DIAGNOSIS — Z85.3 HISTORY OF BREAST CANCER: ICD-10-CM

## 2024-12-17 LAB
APTT PPP: 24 SEC (ref 21–32)
B-HCG UR QL: NEGATIVE
CTP QC/QA: YES
D DIMER PPP IA.FEU-MCNC: 1.88 MG/L FEU
FIBRINOGEN PPP-MCNC: 495 MG/DL (ref 182–400)
HCYS SERPL-SCNC: 6.4 UMOL/L (ref 4–15.5)
HCYS SERPL-SCNC: 6.4 UMOL/L (ref 4–15.5)
INR PPP: 0.9 (ref 0.8–1.2)
PROTHROMBIN TIME: 10.6 SEC (ref 9–12.5)

## 2024-12-17 PROCEDURE — 85379 FIBRIN DEGRADATION QUANT: CPT | Performed by: STUDENT IN AN ORGANIZED HEALTH CARE EDUCATION/TRAINING PROGRAM

## 2024-12-17 PROCEDURE — 63600175 PHARM REV CODE 636 W HCPCS: Performed by: PLASTIC SURGERY

## 2024-12-17 PROCEDURE — C9290 INJ, BUPIVACAINE LIPOSOME: HCPCS | Performed by: PLASTIC SURGERY

## 2024-12-17 PROCEDURE — 81240 F2 GENE: CPT | Performed by: STUDENT IN AN ORGANIZED HEALTH CARE EDUCATION/TRAINING PROGRAM

## 2024-12-17 PROCEDURE — 36000706: Performed by: PLASTIC SURGERY

## 2024-12-17 PROCEDURE — 85730 THROMBOPLASTIN TIME PARTIAL: CPT | Mod: 91 | Performed by: STUDENT IN AN ORGANIZED HEALTH CARE EDUCATION/TRAINING PROGRAM

## 2024-12-17 PROCEDURE — C1729 CATH, DRAINAGE: HCPCS | Performed by: PLASTIC SURGERY

## 2024-12-17 PROCEDURE — 71000033 HC RECOVERY, INTIAL HOUR: Performed by: PLASTIC SURGERY

## 2024-12-17 PROCEDURE — 81025 URINE PREGNANCY TEST: CPT | Performed by: ANESTHESIOLOGY

## 2024-12-17 PROCEDURE — 25000003 PHARM REV CODE 250: Performed by: ANESTHESIOLOGY

## 2024-12-17 PROCEDURE — 81241 F5 GENE: CPT | Performed by: STUDENT IN AN ORGANIZED HEALTH CARE EDUCATION/TRAINING PROGRAM

## 2024-12-17 PROCEDURE — 85520 HEPARIN ASSAY: CPT | Performed by: STUDENT IN AN ORGANIZED HEALTH CARE EDUCATION/TRAINING PROGRAM

## 2024-12-17 PROCEDURE — 63600175 PHARM REV CODE 636 W HCPCS: Performed by: NURSE ANESTHETIST, CERTIFIED REGISTERED

## 2024-12-17 PROCEDURE — 85613 RUSSELL VIPER VENOM DILUTED: CPT | Mod: 59 | Performed by: STUDENT IN AN ORGANIZED HEALTH CARE EDUCATION/TRAINING PROGRAM

## 2024-12-17 PROCEDURE — 88300 SURGICAL PATH GROSS: CPT | Performed by: STUDENT IN AN ORGANIZED HEALTH CARE EDUCATION/TRAINING PROGRAM

## 2024-12-17 PROCEDURE — 71000016 HC POSTOP RECOV ADDL HR: Performed by: PLASTIC SURGERY

## 2024-12-17 PROCEDURE — 83090 ASSAY OF HOMOCYSTEINE: CPT | Performed by: STUDENT IN AN ORGANIZED HEALTH CARE EDUCATION/TRAINING PROGRAM

## 2024-12-17 PROCEDURE — 71000039 HC RECOVERY, EACH ADD'L HOUR: Performed by: PLASTIC SURGERY

## 2024-12-17 PROCEDURE — 85730 THROMBOPLASTIN TIME PARTIAL: CPT | Performed by: STUDENT IN AN ORGANIZED HEALTH CARE EDUCATION/TRAINING PROGRAM

## 2024-12-17 PROCEDURE — 86147 CARDIOLIPIN ANTIBODY EA IG: CPT | Performed by: STUDENT IN AN ORGANIZED HEALTH CARE EDUCATION/TRAINING PROGRAM

## 2024-12-17 PROCEDURE — 85303 CLOT INHIBIT PROT C ACTIVITY: CPT | Performed by: STUDENT IN AN ORGANIZED HEALTH CARE EDUCATION/TRAINING PROGRAM

## 2024-12-17 PROCEDURE — 85300 ANTITHROMBIN III ACTIVITY: CPT | Performed by: STUDENT IN AN ORGANIZED HEALTH CARE EDUCATION/TRAINING PROGRAM

## 2024-12-17 PROCEDURE — 71000015 HC POSTOP RECOV 1ST HR: Performed by: PLASTIC SURGERY

## 2024-12-17 PROCEDURE — 25000003 PHARM REV CODE 250: Performed by: PLASTIC SURGERY

## 2024-12-17 PROCEDURE — 25000003 PHARM REV CODE 250: Performed by: NURSE ANESTHETIST, CERTIFIED REGISTERED

## 2024-12-17 PROCEDURE — 85384 FIBRINOGEN ACTIVITY: CPT | Performed by: STUDENT IN AN ORGANIZED HEALTH CARE EDUCATION/TRAINING PROGRAM

## 2024-12-17 PROCEDURE — 37000008 HC ANESTHESIA 1ST 15 MINUTES: Performed by: PLASTIC SURGERY

## 2024-12-17 PROCEDURE — 85306 CLOT INHIBIT PROT S FREE: CPT | Performed by: STUDENT IN AN ORGANIZED HEALTH CARE EDUCATION/TRAINING PROGRAM

## 2024-12-17 PROCEDURE — 85610 PROTHROMBIN TIME: CPT | Mod: 91 | Performed by: STUDENT IN AN ORGANIZED HEALTH CARE EDUCATION/TRAINING PROGRAM

## 2024-12-17 PROCEDURE — 88300 SURGICAL PATH GROSS: CPT | Mod: 26,,, | Performed by: STUDENT IN AN ORGANIZED HEALTH CARE EDUCATION/TRAINING PROGRAM

## 2024-12-17 PROCEDURE — 83695 ASSAY OF LIPOPROTEIN(A): CPT | Performed by: STUDENT IN AN ORGANIZED HEALTH CARE EDUCATION/TRAINING PROGRAM

## 2024-12-17 PROCEDURE — 37000009 HC ANESTHESIA EA ADD 15 MINS: Performed by: PLASTIC SURGERY

## 2024-12-17 PROCEDURE — 86147 CARDIOLIPIN ANTIBODY EA IG: CPT | Mod: 59 | Performed by: STUDENT IN AN ORGANIZED HEALTH CARE EDUCATION/TRAINING PROGRAM

## 2024-12-17 PROCEDURE — 36000707: Performed by: PLASTIC SURGERY

## 2024-12-17 RX ORDER — HYDROMORPHONE HYDROCHLORIDE 2 MG/ML
INJECTION, SOLUTION INTRAMUSCULAR; INTRAVENOUS; SUBCUTANEOUS
Status: DISCONTINUED | OUTPATIENT
Start: 2024-12-17 | End: 2024-12-17

## 2024-12-17 RX ORDER — BUPIVACAINE 13.3 MG/ML
INJECTION, SUSPENSION, LIPOSOMAL INFILTRATION
Status: DISCONTINUED | OUTPATIENT
Start: 2024-12-17 | End: 2024-12-17 | Stop reason: HOSPADM

## 2024-12-17 RX ORDER — SODIUM CHLORIDE 9 MG/ML
INJECTION, SOLUTION INTRAVENOUS CONTINUOUS
Status: DISCONTINUED | OUTPATIENT
Start: 2024-12-17 | End: 2024-12-17 | Stop reason: HOSPADM

## 2024-12-17 RX ORDER — OXYCODONE HYDROCHLORIDE 5 MG/1
5 TABLET ORAL
Status: DISCONTINUED | OUTPATIENT
Start: 2024-12-17 | End: 2024-12-17 | Stop reason: HOSPADM

## 2024-12-17 RX ORDER — MUPIROCIN 20 MG/G
OINTMENT TOPICAL
Status: DISCONTINUED | OUTPATIENT
Start: 2024-12-17 | End: 2024-12-17 | Stop reason: HOSPADM

## 2024-12-17 RX ORDER — DEXAMETHASONE SODIUM PHOSPHATE 4 MG/ML
INJECTION, SOLUTION INTRA-ARTICULAR; INTRALESIONAL; INTRAMUSCULAR; INTRAVENOUS; SOFT TISSUE
Status: DISCONTINUED | OUTPATIENT
Start: 2024-12-17 | End: 2024-12-17

## 2024-12-17 RX ORDER — SODIUM CHLORIDE, SODIUM LACTATE, POTASSIUM CHLORIDE, CALCIUM CHLORIDE 600; 310; 30; 20 MG/100ML; MG/100ML; MG/100ML; MG/100ML
INJECTION, SOLUTION INTRAVENOUS CONTINUOUS
Status: DISCONTINUED | OUTPATIENT
Start: 2024-12-17 | End: 2024-12-17 | Stop reason: HOSPADM

## 2024-12-17 RX ORDER — ONDANSETRON HYDROCHLORIDE 2 MG/ML
INJECTION, SOLUTION INTRAMUSCULAR; INTRAVENOUS
Status: DISCONTINUED | OUTPATIENT
Start: 2024-12-17 | End: 2024-12-17

## 2024-12-17 RX ORDER — DEXMEDETOMIDINE HYDROCHLORIDE 100 UG/ML
INJECTION, SOLUTION INTRAVENOUS
Status: DISCONTINUED | OUTPATIENT
Start: 2024-12-17 | End: 2024-12-17

## 2024-12-17 RX ORDER — CEFAZOLIN 2 G/1
2 INJECTION, POWDER, FOR SOLUTION INTRAMUSCULAR; INTRAVENOUS
Status: COMPLETED | OUTPATIENT
Start: 2024-12-17 | End: 2024-12-17

## 2024-12-17 RX ORDER — PHENYLEPHRINE HYDROCHLORIDE 10 MG/ML
INJECTION INTRAVENOUS
Status: DISCONTINUED | OUTPATIENT
Start: 2024-12-17 | End: 2024-12-17

## 2024-12-17 RX ORDER — GLUCAGON 1 MG
1 KIT INJECTION
Status: DISCONTINUED | OUTPATIENT
Start: 2024-12-17 | End: 2024-12-17 | Stop reason: HOSPADM

## 2024-12-17 RX ORDER — MEPERIDINE HYDROCHLORIDE 25 MG/ML
12.5 INJECTION INTRAMUSCULAR; INTRAVENOUS; SUBCUTANEOUS ONCE AS NEEDED
Status: DISCONTINUED | OUTPATIENT
Start: 2024-12-17 | End: 2024-12-17 | Stop reason: HOSPADM

## 2024-12-17 RX ORDER — PROPOFOL 10 MG/ML
VIAL (ML) INTRAVENOUS
Status: DISCONTINUED | OUTPATIENT
Start: 2024-12-17 | End: 2024-12-17

## 2024-12-17 RX ORDER — SODIUM CHLORIDE 0.9 % (FLUSH) 0.9 %
3 SYRINGE (ML) INJECTION
Status: DISCONTINUED | OUTPATIENT
Start: 2024-12-17 | End: 2024-12-17 | Stop reason: HOSPADM

## 2024-12-17 RX ORDER — PROPOFOL 10 MG/ML
VIAL (ML) INTRAVENOUS CONTINUOUS PRN
Status: DISCONTINUED | OUTPATIENT
Start: 2024-12-17 | End: 2024-12-17

## 2024-12-17 RX ORDER — LIDOCAINE HYDROCHLORIDE 10 MG/ML
0.5 INJECTION, SOLUTION EPIDURAL; INFILTRATION; INTRACAUDAL; PERINEURAL ONCE
Status: DISCONTINUED | OUTPATIENT
Start: 2024-12-17 | End: 2024-12-17 | Stop reason: HOSPADM

## 2024-12-17 RX ORDER — LIDOCAINE HYDROCHLORIDE 20 MG/ML
INJECTION INTRAVENOUS
Status: DISCONTINUED | OUTPATIENT
Start: 2024-12-17 | End: 2024-12-17

## 2024-12-17 RX ORDER — PROCHLORPERAZINE EDISYLATE 5 MG/ML
5 INJECTION INTRAMUSCULAR; INTRAVENOUS EVERY 30 MIN PRN
Status: DISCONTINUED | OUTPATIENT
Start: 2024-12-17 | End: 2024-12-17 | Stop reason: HOSPADM

## 2024-12-17 RX ORDER — HYDROMORPHONE HYDROCHLORIDE 2 MG/ML
0.4 INJECTION, SOLUTION INTRAMUSCULAR; INTRAVENOUS; SUBCUTANEOUS EVERY 5 MIN PRN
Status: DISCONTINUED | OUTPATIENT
Start: 2024-12-17 | End: 2024-12-17 | Stop reason: HOSPADM

## 2024-12-17 RX ORDER — OXYCODONE AND ACETAMINOPHEN 10; 325 MG/1; MG/1
1 TABLET ORAL EVERY 4 HOURS PRN
Qty: 15 TABLET | Refills: 0 | Status: SHIPPED | OUTPATIENT
Start: 2024-12-17

## 2024-12-17 RX ORDER — FENTANYL CITRATE 50 UG/ML
INJECTION, SOLUTION INTRAMUSCULAR; INTRAVENOUS
Status: DISCONTINUED | OUTPATIENT
Start: 2024-12-17 | End: 2024-12-17

## 2024-12-17 RX ADMIN — PROPOFOL 75 MCG/KG/MIN: 10 INJECTION, EMULSION INTRAVENOUS at 01:12

## 2024-12-17 RX ADMIN — PHENYLEPHRINE HYDROCHLORIDE 200 MCG: 10 INJECTION INTRAVENOUS at 02:12

## 2024-12-17 RX ADMIN — OXYCODONE 5 MG: 5 TABLET ORAL at 03:12

## 2024-12-17 RX ADMIN — PROPOFOL 180 MG: 10 INJECTION, EMULSION INTRAVENOUS at 01:12

## 2024-12-17 RX ADMIN — FENTANYL CITRATE 50 MCG: 50 INJECTION, SOLUTION INTRAMUSCULAR; INTRAVENOUS at 01:12

## 2024-12-17 RX ADMIN — PROPOFOL 20 MG: 10 INJECTION, EMULSION INTRAVENOUS at 01:12

## 2024-12-17 RX ADMIN — DEXAMETHASONE SODIUM PHOSPHATE 4 MG: 4 INJECTION, SOLUTION INTRAMUSCULAR; INTRAVENOUS at 02:12

## 2024-12-17 RX ADMIN — HYDROMORPHONE HYDROCHLORIDE 0.4 MG: 2 INJECTION INTRAMUSCULAR; INTRAVENOUS; SUBCUTANEOUS at 02:12

## 2024-12-17 RX ADMIN — DEXMEDETOMIDINE HYDROCHLORIDE 8 MCG: 100 INJECTION, SOLUTION, CONCENTRATE INTRAVENOUS at 01:12

## 2024-12-17 RX ADMIN — MUPIROCIN: 20 OINTMENT TOPICAL at 12:12

## 2024-12-17 RX ADMIN — CEFAZOLIN 2 G: 2 INJECTION, POWDER, FOR SOLUTION INTRAMUSCULAR; INTRAVENOUS at 01:12

## 2024-12-17 RX ADMIN — DEXMEDETOMIDINE HYDROCHLORIDE 8 MCG: 100 INJECTION, SOLUTION, CONCENTRATE INTRAVENOUS at 02:12

## 2024-12-17 RX ADMIN — ONDANSETRON 4 MG: 2 INJECTION INTRAMUSCULAR; INTRAVENOUS at 02:12

## 2024-12-17 RX ADMIN — SODIUM CHLORIDE: 0.9 INJECTION, SOLUTION INTRAVENOUS at 01:12

## 2024-12-17 RX ADMIN — LIDOCAINE HYDROCHLORIDE 80 MG: 20 INJECTION, SOLUTION INTRAVENOUS at 01:12

## 2024-12-17 RX ADMIN — PHENYLEPHRINE HYDROCHLORIDE 300 MCG: 10 INJECTION INTRAVENOUS at 02:12

## 2024-12-17 NOTE — ANESTHESIA PROCEDURE NOTES
Intubation    Date/Time: 12/17/2024 1:55 PM    Performed by: Sepideh Farooq CRNA  Authorized by: Paolo Choi MD    Intubation:     Induction:  Intravenous    Intubated:  Postinduction    Mask Ventilation:  Not attempted    Attempts:  1    Attempted By:  CRNA    Difficult Airway Encountered?: No      Complications:  None    Airway Device:  Supraglottic airway/LMA    Airway Device Size:  3.0    Style/Cuff Inflation:  Uncuffed    Secured at:  The lips    Placement Verified By:  Capnometry    Complicating Factors:  None    Findings Post-Intubation:  BS equal bilateral

## 2024-12-17 NOTE — H&P
Plastic and Reconstructive Surgery Clinic H&P    Tasha Cornejo is a 44 y.o. female with history of breast cancer status post bilateral mastectomy with tissue expander placement and status post bilateral breast reconstruction with lumbar artery free flaps with IDEA/DIEV interposition grafts on 12/11/24. On POD1 and POD2 her bilateral breast flaps were soft with good color, cap refill, and dopplerable pulse; however on POD3 morning her left breast flap became congested and lost arterial signal. She was taken back to OR on 12/14/24 for left breast flap debridement and left breast prepectoral implant placement. She was discharged on 12/15/24. At time of discharge on 12/15/24 right breast flap was soft and continued to do well. She was discharged on bactrim and Levaquin. On 12/15/24 at night, right breast flap color changed and patient came in on 12/16/24 to clinic for evaluation with concern for flap compromise. Flap evaluation with Dr. Piedra this AM.      Additionally, patient states she has developed a diffuse rash to her back and stomach that has worsened since leaving the hospital. She states she had a previous rash the last time she took bactrim/cipro antibiotics when she had tissue expanders.  Rash is now slowly improving off bactrim which was stopped yesterday.     Review of patient's allergies indicates:   Allergen Reactions    Aleve [naproxen sodium] Other (See Comments)     Throat swelling          Current Facility-Administered Medications:     0.9% NaCl infusion, , Intravenous, Continuous, Allen Damian MD    ceFAZolin 2 g, 2 g, Intravenous, On Call Procedure, Allen Damian MD    lactated ringers infusion, , Intravenous, Continuous, Paolo Choi MD    LIDOcaine (PF) 10 mg/ml (1%) injection 5 mg, 0.5 mL, Intradermal, Once, Paolo Choi MD    mupirocin 2 % ointment, , Nasal, On Call Procedure, Allen Damian MD    Facility-Administered Medications Ordered in Other Encounters:      ceFAZolin 1 g, gentamicin 80 mg in sodium chloride 0.9% 500 mL irrigation, , Irrigation, On Call Procedure, Allen Damian MD    ceFAZolin 1 g, gentamicin 80 mg in sodium chloride 0.9% 500 mL irrigation, , Irrigation, On Call Procedure, Allen Damian MD    Past Medical History:   Diagnosis Date    Acid reflux     Chalazion     Food allergy     discontinued dairy to help eosinophilic esophagitis    Hx of psychiatric care     Malignant neoplasm of lower-inner quadrant of right breast of female, estrogen receptor positive 2023    Seasonal allergies ongoing/unsure    Therapy     Wears reading eyeglasses        Past Surgical History:   Procedure Laterality Date    ADENOIDECTOMY  1983    BILATERAL MASTECTOMY Bilateral 2023    Procedure: MASTECTOMY, BILATERAL;  Surgeon: Tahsa Mcleod MD;  Location: [facility];  Service: General;  Laterality: Bilateral;  3.5 HOURS / EMAIL SENT -4 @ 1:03 LK    COLONOSCOPY N/A 2023    Procedure: COLONOSCOPY;  Surgeon: Fabio Rice MD;  Location: [facility] (4TH FLR);  Service: Endoscopy;  Laterality: N/A;  pt confirmed earlier time  EB    ESOPHAGOGASTRODUODENOSCOPY N/A 2023    Procedure: EGD (ESOPHAGOGASTRODUODENOSCOPY);  Surgeon: Fabio Rice MD;  Location: [facility] (4TH FLR);  Service: Endoscopy;  Laterality: N/A;  Procedure Timin-4 weeks    referred by Dr. Rice/Prep instructions handed to patient and to portal.  Patient called did not answer- DB    ESOPHAGOGASTRODUODENOSCOPY N/A 10/18/2023    Procedure: EGD (ESOPHAGOGASTRODUODENOSCOPY);  Surgeon: Fabio Rice MD;  Location: [facility] (4TH FLR);  Service: Endoscopy;  Laterality: N/A;  ref Ray  timeframe 5-12 weeks   Dr. Rice patient  portal instruction and given to patient jm  10/11-precall complete-MS    EXPLORATION, FLAP OR GRAFT SITE Left 2024    Procedure: EXPLORATION, FLAP OR GRAFT SITE;  Surgeon: Olga Solitario MD;  Location: [facility];  Service: Plastics;  Laterality: Left;  Flap  exploration, washout, revision, possible tissue expander or permanent implant placement (Base width of 12 cm, 250 ml or less implant)  Need the microscope and synovis microset    INJECTION FOR SENTINEL NODE IDENTIFICATION Right 12/14/2023    Procedure: INJECTION, FOR SENTINEL NODE IDENTIFICATION;  Surgeon: Tasha Mcleod MD;  Location: [facility];  Service: General;  Laterality: Right;    INSERTION OF BREAST IMPLANT Left 12/14/2024    Procedure: INSERTION, BREAST IMPLANT;  Surgeon: Olga Solitario MD;  Location: [facility] OR;  Service: Plastics;  Laterality: Left;    INSERTION OF BREAST TISSUE EXPANDER Bilateral 12/14/2023    Procedure: INSERTION, TISSUE EXPANDER, BREAST;  Surgeon: Allen Damian MD;  Location: [facility] OR;  Service: Plastics;  Laterality: Bilateral;  2 HOURS    INSERTION, CATHETER, TUNNELED Left 12/14/2023    Procedure: INSERTION, CATHETER, TUNNELED;  Surgeon: Tasha Mcleod MD;  Location: [facility];  Service: General;  Laterality: Left;    PLACEMENT, MEDIPORT      RECONSTRUCTION OF BREAST WITH DEEP INFERIOR EPIGASTRIC ARTERY  (NATHALIE) FREE FLAP Bilateral 12/11/2024    Procedure: RECONSTRUCTION, BREAST, USING LUMBAR ARTERY FLAP AND INTERPOSITION GRAFT DIEA/DIEV;  Surgeon: Allen Damian MD;  Location: [facility];  Service: Plastics;  Laterality: Bilateral;    SENTINEL LYMPH NODE BIOPSY Right 12/14/2023    Procedure: BIOPSY, LYMPH NODE, SENTINEL;  Surgeon: Tasha Mcleod MD;  Location: [facility];  Service: General;  Laterality: Right;    TISSUE EXPANDER REMOVAL Bilateral 12/11/2024    Procedure: REMOVAL, TISSUE EXPANDER;  Surgeon: Allen Damian MD;  Location: [facility];  Service: Plastics;  Laterality: Bilateral;    TYMPANOSTOMY TUBE PLACEMENT  1983       Social History     Socioeconomic History    Marital status:      Spouse name: Oseas   Occupational History    Occupation:    Tobacco Use    Smoking status: Never     Passive exposure: Never    Smokeless tobacco: Never    Substance and Sexual Activity    Alcohol use: Not Currently     Comment: Less than a drink/monthly    Drug use: Yes     Types: Oxycodone    Sexual activity: Not Currently     Social Drivers of Health     Financial Resource Strain: Low Risk  (12/12/2024)    Overall Financial Resource Strain (CARDIA)     Difficulty of Paying Living Expenses: Not hard at all   Food Insecurity: No Food Insecurity (12/12/2024)    Hunger Vital Sign     Worried About Running Out of Food in the Last Year: Never true     Ran Out of Food in the Last Year: Never true   Transportation Needs: No Transportation Needs (12/12/2024)    TRANSPORTATION NEEDS     Transportation : No   Physical Activity: Sufficiently Active (12/12/2024)    Exercise Vital Sign     Days of Exercise per Week: 4 days     Minutes of Exercise per Session: 60 min   Stress: Stress Concern Present (12/12/2024)    [employer] - Occupational Stress Questionnaire     Feeling of Stress : Rather much   Housing Stability: Low Risk  (12/12/2024)    Housing Stability Vital Sign     Unable to Pay for Housing in the Last Year: No     Homeless in the Last Year: No         O  Physical exam  General: No acute distress. BM 21.87  HEENT: Atraumatic. Normocephalic.   CV: Regular rate and rhythm by radial pulse  Pulm: Normal respiratory effort. Symmetric chest rise and fall.   Extremities: No cyanosis. Peripheral pulses intact. Cap refill <2 sec.   Neuro: Alert and Oriented  Breasts:   Right breast flap compressible with only moderate firmness. Flap skin paddle compressible, warm, with congested dark purple appearance now with bulla. On pin prick there is slow red bleeding. Doppler exam with dopplerable pulse in line with pedicle to buried skin paddle     Left breast with implant in place with incisions c/d/I without signs of infection. Breast soft.      Scattered diffuse rash over bilateral breasts     Abdomen: Soft. Bilateral groin incisions with appropriate  edema, soft, with incisions c/d/I without signs of infection. Upper abdomen with diffuse maculopapular rash.     Back:   Low back incision c/d/I with prineo in place without signs of infection. Maculopapular rash present throughout entire back     A/P  Tasha Cornejo is a 44 y.o. female with history of breast cancer s/p bilateral free LAP flap with IDEA/DIEV interposition grafts on 12/11/24 complicated by loss of left breast flap 12/14/24. She now presents with right breast flap congestion.      - Discussed reconstructive options with the patient. Discussed right breast flap debridement with prepectoral implant placement versus right breast flap debridement with left breast implant removal to become flat chested. Additionally discussed continued monitoring of right breast flap as flap soft, with blood flow into flap; however patient wants removal of left breast implant and right breast flap to become flat  - new informed consent obtained  - Plan for surgery today for right breast flap removal and left breast implant removal 12/17/24   - hypercoagulable labs ordered    Ant Wisdom MD  [facility] Plastic and Reconstructive Surgery HO-V  12/17/2024

## 2024-12-17 NOTE — ANESTHESIA PREPROCEDURE EVALUATION
12/17/2024  Tasha Cornejo is a 44 y.o., female.      Pre-op Assessment    I have reviewed the Patient Summary Reports.     I have reviewed the Nursing Notes. I have reviewed the NPO Status.   I have reviewed the Medications.     Review of Systems  Anesthesia Hx:  No problems with previous Anesthesia   History of prior surgery of interest to airway management or planning:  Previous anesthesia: General Yarsani 1 yr ago with general anesthesia.  Procedure performed at an [facility].       Denies Family Hx of Anesthesia complications.    Denies Personal Hx of Anesthesia complications.                    Social:  Non-Smoker, Social Alcohol Use       Hematology/Oncology:  Hematology Normal                     Current/Recent Cancer.  --  Cancer in past history:       Breast       surgery and chemotherapy       EENT/Dental:  chronic allergic rhinitis           Cardiovascular:  Cardiovascular Normal Exercise tolerance: good                  Normal echo 11/2023                           Pulmonary:  Pulmonary Normal                       Renal/:  Renal/ Normal                 Hepatic/GI:     GERD, well controlled                Musculoskeletal:  Musculoskeletal Normal                Neurological:  Neurology Normal                                      Endocrine:  Endocrine Normal            Psych:  Psychiatric History                  Physical Exam  General: Well nourished, Cooperative, Alert and Oriented    Airway:  Mallampati: II   Mouth Opening: Normal  TM Distance: Normal  Neck ROM: Normal ROM    Dental:  Intact        Anesthesia Plan  Type of Anesthesia, risks & benefits discussed:    Anesthesia Type: Gen ETT, Gen Supraglottic Airway  Intra-op Monitoring Plan: Standard ASA Monitors  Post Op Pain Control Plan: multimodal analgesia  Induction:  IV  Airway Plan: Video  Informed Consent: Informed consent  signed with the Patient and all parties understand the risks and agree with anesthesia plan.  All questions answered.   ASA Score: 2    Ready For Surgery From Anesthesia Perspective.     .

## 2024-12-17 NOTE — ANESTHESIA POSTPROCEDURE EVALUATION
Anesthesia Post Evaluation    Patient: Tasha Cornejo    Procedure(s) Performed: Procedure(s) (LRB):  DEBRIDEMENT, BREAST (Right)  REMOVAL, IMPLANT, BREAST (Left)  EXCISION, FREE FLAP, PREVIOUSLY APPLIED (Right)    Final Anesthesia Type: general      Patient location during evaluation: PACU  Patient participation: Yes- Able to Participate  Level of consciousness: awake and alert  Post-procedure vital signs: reviewed and stable  Pain management: adequate  Airway patency: patent  ALISSA mitigation strategies: Extubation while patient is awake  PONV status at discharge: No PONV  Anesthetic complications: no      Cardiovascular status: hemodynamically stable  Respiratory status: unassisted  Hydration status: euvolemic  Follow-up not needed.              Vitals Value Taken Time   /60 12/17/24 1550   Temp 36.6 °C (97.8 °F) 12/17/24 1550   Pulse 88 12/17/24 1550   Resp 16 12/17/24 1550   SpO2 94 % 12/17/24 1550         Event Time   Out of Recovery 15:46:02         Pain/Andre Score: Pain Rating Prior to Med Admin: 4 (12/17/2024  3:07 PM)  Pain Rating Post Med Admin: 4 (12/17/2024  3:43 PM)  Andre Score: 10 (12/17/2024  3:50 PM)

## 2024-12-17 NOTE — BRIEF OP NOTE
[facility] - Surgery ([place])  Brief Operative Note    Surgery Date: 12/17/2024     Surgeons and Role:     * Allen Damian MD - Primary    Assisting Surgeon: None    Pre-op Diagnosis:  HX: breast cancer [Z85.3]    Post-op Diagnosis:  Post-Op Diagnosis Codes:     * HX: breast cancer [Z85.3]    Procedure(s) (LRB):  DEBRIDEMENT, BREAST (Right)  REMOVAL, IMPLANT, BREAST (Left)  EXCISION, FREE FLAP, PREVIOUSLY APPLIED (Right)    Anesthesia: General    Operative Findings: right breast flap explored with right breast exposed skin paddle with blue discoloration with undermined depithilized skin and flap pink and viable. Pedicle with pulsatile inflow and good outflow. Flap removed and left breast implant explanted.      Estimated Blood Loss: Minimal         Specimens:   Specimen (24h ago, onward)       Start     Ordered    12/17/24 1419  Specimen to Pathology, Surgery Gross only  Once        Comments: Pre-op Diagnosis: HX: breast cancer [Z85.3]Procedure(s):DEBRIDEMENT, BREASTREPLACEMENT, IMPLANT, BREAST Number of specimens: 1Name of specimens: 1. Left breast implant (Gross ID)     References:    Click here for ordering Quick Tip   Question Answer Comment   Procedure Type: Gross only    Specimen Class: Routine/Screening    Release to patient Immediate        12/17/24 1442                      Discharge Note    OUTCOME: Patient tolerated treatment/procedure well without complication and is now ready for discharge.    DISPOSITION: Home or Self Care    FINAL DIAGNOSIS:  breast cancer    FOLLOWUP: In clinic in 1 week    DISCHARGE INSTRUCTIONS:    Discharge Procedure Orders   Diet Adult Regular     Notify your health care provider if you experience any of the following:  temperature >100.4     Notify your health care provider if you experience any of the following:  persistent nausea and vomiting or diarrhea     Notify your health care provider if you experience any of the following:  severe uncontrolled pain     Notify your  health care provider if you experience any of the following:  redness, tenderness, or signs of infection (pain, swelling, redness, odor or green/yellow discharge around incision site)     Leave dressing on - Keep it clean, dry, and intact until clinic visit   Order Comments: Surgical bra and girdle padded with ABDs. Drain care     Activity as tolerated

## 2024-12-17 NOTE — TRANSFER OF CARE
Anesthesia Transfer of Care Note    Patient: Tasha Cornejo    Procedure(s) Performed: Procedure(s) (LRB):  DEBRIDEMENT, BREAST (Right)  REMOVAL, IMPLANT, BREAST (Left)  EXCISION, FREE FLAP, PREVIOUSLY APPLIED (Right)    Patient location: PACU    Anesthesia Type: general    Transport from OR: Transported from OR on 2-3 L/min O2 by NC with adequate spontaneous ventilation    Post pain: adequate analgesia    Post assessment: no apparent anesthetic complications    Post vital signs: stable    Level of consciousness: responds to stimulation    Nausea/Vomiting: no nausea/vomiting    Complications: none    Transfer of care protocol was followed      Last vitals: Visit Vitals  /67 (BP Location: Left arm, Patient Position: Lying)   Pulse 81   Temp 36.9 °C (98.4 °F) (Oral)   Resp 18   Ht 5' (1.524 m)   Wt 50.8 kg (112 lb)   LMP 10/30/2024 (Exact Date)   SpO2 100%   Breastfeeding No   BMI 21.87 kg/m²

## 2024-12-17 NOTE — DISCHARGE INSTRUCTIONS
Stop your antibiotics per Dr. Wisdom.           Your Surgeon Gave You EXPAREL® (bupivacaine liposome injectable suspension)    To help control your pain after surgery, your surgeon injected EXPAREL into your surgical incision just before the end of the procedure.   EXPAREL is a local analgesic that contains the local anesthetic bupivacaine. Local anesthetics provide pain relief by numbing the tissue  around the surgical site.   EXPAREL is specifically designed to release pain medication over time and can control pain for up to 72 hours.   In addition to EXPAREL, your surgeon may provide other pain medications to control your pain.   Each patient is different and responds differently to painmedication. Depending on how you respond to EXPAREL, you may require less  additional pain medication during your recovery.    Possible Side Effects    Side effects can occur with any medication and it is important not to ignore anything you may be experiencing. Some patients who  received EXPAREL experienced nausea, vomiting, or constipation. Rarely, patients who receive bupivacaine (the active ingredient in  EXPAREL) have experienced numbness and tingling in their mouth or lips, lightheadedness, or anxiety. Speak with your doctor right  away if you think you may be experiencing any of these sensations, or if you have other questions regarding possible side effects.    Your Recovery    When your pain is under control, your body can better focus on healing. This is not the time to test your pain tolerance, or grin and  bear it.Work with your surgeon and nurse to make your recovery as speedy and pain-free as possible.   Follow the post-op orders your nurse gave you.   Eat a healthy diet and drink plenty of water. Surgery stresses your body; your body responds by needing more energy to heal.      Important Information About EXPAREL  Products that contain bupivacaine, like EXPAREL, may cause a temporary loss of  sensation or the  ability to move in the area where bupivacaine was injected.    Date Administered: 12/17/24  Time Administered: 9972    Other Forms of Bupivicaine should not be administered within 96hrs following administration of EXPAREL.

## 2024-12-17 NOTE — OP NOTE
Operative Report     Date of Service 12/17/24  MRN [id number]  Patient Name Tasha Cornejo    Pre-op Diagnosis:   History of breast cancer  History of bilateral LAP flap reconstruction with IDEA/DIEV interposition grafts  History of left breast flap loss with implant placement  Concern for right breast flap compromise       Post-op Diagnosis:   History of breast cancer  History of bilateral LAP flap reconstruction with IDEA/DIEV interposition grafts  History of left breast flap loss with implant placement  Concern for right breast flap compromise     Procedure(s):  Right breast flap excision  Left breast implant explantation     Anesthesia:   General     Surgeon:  Allen Damian MD     Assistants:  None     Estimated Blood Loss:   Minimal            Drains:   15 Fr YAN drain x1 left breast  15 Fr YAN drain x 1 right breast    IV Fluids:   Crystalloid, see anesthesia record for details     UOP:   See anesthesia record for details           Specimens:   Left breast 260 cc mentor implant explanted     Complications:   None     Findings:   right breast flap explored with right breast exposed skin paddle having blue discoloration with undermined depithilized skin and flap pink and viable. Pedicle with pulsatile inflow and good outflow. Flap removed and left breast implant explanted.      Disposition:   Awakened from anesthesia, extubated, and taken to the recovery room in a stable condition            Condition:   Doing well without problems     Indications for procedure:  Tasha Prieto is a 44 with history of breast cancer status post bilateral mastectomy with tissue expander placement and status post bilateral breast reconstruction with lumbar artery free flaps with IDEA/DIEV interposition grafts on 12/11/24. On POD1 and POD2 her bilateral breast flaps were soft with good color, cap refill, and dopplerable pulse; however on POD3 morning her left breast flap became congested and lost arterial signal. She was taken  back to OR on 12/14/24 for left breast flap debridement and left breast prepectoral implant placement. She was discharged on 12/15/24. At time of discharge on 12/15/24 right breast flap was soft and continued to do well. She was discharged on bactrim and Levaquin. On 12/15/24 at night, right breast flap color changed and patient came in on 12/16/24 to clinic for evaluation with concern for flap compromise. Flap evaluation with Dr. Piedra this AM revealed hand held dopplerable signal, flap soft, warm, with bluish discoloration to skin paddle with a small area having good color and capillary refill. Discussed risks and benefits of continued right breast flap monitoring versus right breast flap excision with implant placement, versus right breast flap excision and left breast implant removal. The procedure, risks, benefits, and alternatives were discussed. After all questions were answered, informed consent was obtained and we proceeded to the operating room for right breast flap removal and left breast implant explantation.      Technique:   The patient was marked for appropriate anatomical landmarks and excision lines in the preoperative holding area by the Plastic Surgery service. The patient was brought into the operating room and placed on a well-padded operating table in the supine position with her arms padded and abducted. Appropriate perioperative antibiotics were administered. Sequential compression devices were placed on her calves. General endotracheal anesthesia was administered via the anesthesia department. A Waldrop was placed under sterile conditions by nursing. The patient was prepped and draped in the usual sterile fashion. A timeout was performed per hospital protocol with all team members in agreement.    We began the procedure with exploration of the right breast flap. Previous incisions were opened with a scalpel and dissection performed to removed flap from breast pocket. On evaluation of the  right breast flap, the right breast exposed skin paddle had bluish discoloration with small area having capillary refill. Undermined depithilized skin and flap were pink and viable. Pedicle with pulsatile inflow and good outflow. Pedicle for flap was hemoclipped and flap was taken off the field. The pocket was irrigated and meticulous hemostasis obtained. A 15 [place] YAN drain was placed laterally and secured with a  3-0 nylon drain stitch. The incision was closed with 2-0 vicryl interrupted deep dermal suture followed by a two layered 2-0 quill in running deep dermal and running subcuticular fashion.     We then turned our attention to left breast. The previous IMF incision was opened with a 10 blade scalpel and bovie electrocautery continued dissection down to the breast implant. The breast implant was explanted and sent off the field for gross pathology. The pocket was copiously irrigated with normal saline and meticulous hemostasis performed with bovie electrocautery. A 15 [place]vian YAN drain was placed laterally and secure with a 3-0 nylon drain stithc. The incision as closed with 2-0 vicryl interrupted deep dermal suture followed by a two layered 2-0 quill in running deep dermal and running subcuticular fashion.     The incisions of breasts were cleaned, dried, and Prineo surgical tape were applied to the incisions. Sterile dressings were placed at all drain exit sites. Dry dressings including ABDs were placed atop both the breast. A surgical bra was placed.     At the end of the procedure, all needle, sponge, and instrument counts were correct on two occasions. The patient was then awakened from general anesthesia without complication and returned to the post-anesthesia recovery unit in stable condition.      Ant Wisdom MD  [facility] Plastic and Reconstructive Surgery, HO-V  12/17/2024

## 2024-12-18 ENCOUNTER — PATIENT MESSAGE (OUTPATIENT)
Dept: PLASTIC SURGERY | Facility: CLINIC | Age: 44
End: 2024-12-18

## 2024-12-18 LAB
AT III ACT/NOR PPP CHRO: 98 % (ref 83–118)
FINAL PATHOLOGIC DIAGNOSIS: NORMAL
GROSS: NORMAL
Lab: NORMAL

## 2024-12-19 ENCOUNTER — PATIENT OUTREACH (OUTPATIENT)
Dept: ADMINISTRATIVE | Facility: CLINIC | Age: 44
End: 2024-12-19
Payer: COMMERCIAL

## 2024-12-19 ENCOUNTER — HOSPITAL ENCOUNTER (EMERGENCY)
Facility: HOSPITAL | Age: 44
Discharge: HOME | End: 2024-12-19
Attending: STUDENT IN AN ORGANIZED HEALTH CARE EDUCATION/TRAINING PROGRAM
Payer: COMMERCIAL

## 2024-12-19 VITALS
DIASTOLIC BLOOD PRESSURE: 65 MMHG | OXYGEN SATURATION: 100 % | HEART RATE: 78 BPM | TEMPERATURE: 98 F | SYSTOLIC BLOOD PRESSURE: 122 MMHG | WEIGHT: 112 LBS | BODY MASS INDEX: 21.87 KG/M2 | RESPIRATION RATE: 16 BRPM

## 2024-12-19 DIAGNOSIS — K56.41 FECAL IMPACTION: ICD-10-CM

## 2024-12-19 DIAGNOSIS — K59.03 DRUG-INDUCED CONSTIPATION: Primary | ICD-10-CM

## 2024-12-19 DIAGNOSIS — L76.82 PAIN AT SURGICAL INCISION: ICD-10-CM

## 2024-12-19 DIAGNOSIS — R10.32 LEFT LOWER QUADRANT ABDOMINAL PAIN: ICD-10-CM

## 2024-12-19 LAB — PROT C ACT/NOR PPP CHRO: 128 % (ref 70–150)

## 2024-12-19 PROCEDURE — 25000003 PHARM REV CODE 250

## 2024-12-19 PROCEDURE — 63600175 PHARM REV CODE 636 W HCPCS

## 2024-12-19 PROCEDURE — 96374 THER/PROPH/DIAG INJ IV PUSH: CPT

## 2024-12-19 PROCEDURE — 99284 EMERGENCY DEPT VISIT MOD MDM: CPT | Mod: 25

## 2024-12-19 RX ORDER — POLYETHYLENE GLYCOL 3350 17 G/17G
17 POWDER, FOR SOLUTION ORAL 2 TIMES DAILY
Qty: 30 PACKET | Refills: 0 | Status: SHIPPED | OUTPATIENT
Start: 2024-12-19

## 2024-12-19 RX ORDER — PSEUDOEPHEDRINE/ACETAMINOPHEN 30MG-500MG
100 TABLET ORAL
Status: DISCONTINUED | OUTPATIENT
Start: 2024-12-19 | End: 2024-12-19 | Stop reason: HOSPADM

## 2024-12-19 RX ORDER — SYRING-NEEDL,DISP,INSUL,0.3 ML 29 G X1/2"
296 SYRINGE, EMPTY DISPOSABLE MISCELLANEOUS
Status: COMPLETED | OUTPATIENT
Start: 2024-12-19 | End: 2024-12-19

## 2024-12-19 RX ORDER — MORPHINE SULFATE 2 MG/ML
2 INJECTION, SOLUTION INTRAMUSCULAR; INTRAVENOUS
Status: COMPLETED | OUTPATIENT
Start: 2024-12-19 | End: 2024-12-19

## 2024-12-19 RX ORDER — SYRING-NEEDL,DISP,INSUL,0.3 ML 29 G X1/2"
296 SYRINGE, EMPTY DISPOSABLE MISCELLANEOUS
Status: DISCONTINUED | OUTPATIENT
Start: 2024-12-19 | End: 2024-12-19 | Stop reason: HOSPADM

## 2024-12-19 RX ADMIN — MORPHINE SULFATE 2 MG: 2 INJECTION, SOLUTION INTRAMUSCULAR; INTRAVENOUS at 08:12

## 2024-12-19 RX ADMIN — MAGNESIUM CITRATE 296 ML: 1.75 LIQUID ORAL at 06:12

## 2024-12-19 RX ADMIN — SODIUM CHLORIDE 500 ML: 0.9 INJECTION, SOLUTION INTRAVENOUS at 05:12

## 2024-12-19 NOTE — ED TRIAGE NOTES
Tasha Cornejo, a 44 y.o. female presents to the ED w/ complaint of constipation since 12/11. Pt was given anesthesia and pain medication on 12/11 for breast reconstruction surgery. Pt states since then she has had two more procedures due to complications. Pt states she has had slight bowel movements but has some abdominal discomfort. Pt has breast cancer, next immunotherapy is next week. Pt denies chest pain or SOB.     Triage note:  Chief Complaint   Patient presents with    Constipation     Last BM 12/11. Multiple recent Sx d/t breast cancer. Next immunotherapy is next week     Review of patient's allergies indicates:   Allergen Reactions    Aleve [naproxen sodium] Other (See Comments)     Throat swelling    Bactrim [sulfamethoxazole-trimethoprim] Rash     Past Medical History:   Diagnosis Date    Acid reflux     Chalazion     Food allergy     discontinued dairy to help eosinophilic esophagitis    Hx of psychiatric care     Malignant neoplasm of lower-inner quadrant of right breast of female, estrogen receptor positive 11/06/2023    Seasonal allergies ongoing/unsure    Therapy     Wears reading eyeglasses

## 2024-12-19 NOTE — Clinical Note
accompanied their spouse to the emergency department on 12/19/2024. They may return to work on 12/23/2024.      If you have any questions or concerns, please don't hesitate to call.      Emanuel Fuentes MD

## 2024-12-19 NOTE — ED NOTES
Patient was able to have a sizeable bowel movement. MD Fuentes notified and instructed to hold second enema.

## 2024-12-19 NOTE — ED PROVIDER NOTES
Encounter Date: 12/19/2024       History     Chief Complaint   Patient presents with    Constipation     Last BM 12/11. Multiple recent Sx d/t breast cancer. Next immunotherapy is next week     Patient is a 44-year-old female who presents today for constipation.  Past medical history of breast cancer on active chemo.  Presents with an 8 day history of constipation and lower abdominal pain.  The constipation has worsened in the past 2 days.  When she has the urge to have a bowel movement, she urinates, has not produced any stools in the past 8 days.  Denies any symptoms including fevers, chills, sweats, nausea, or vomiting.  Reports that her incisions are well healing, and that she has been taking opioid pain medications throughout the days for pain.  Her regimen for constipation has included Colace, and milk of magnesia last night.  Denies any allergies to medications.    The history is provided by the patient, medical records and the spouse. No  was used.     Review of patient's allergies indicates:   Allergen Reactions    Aleve [naproxen sodium] Other (See Comments)     Throat swelling    Bactrim [sulfamethoxazole-trimethoprim] Rash     Past Medical History:   Diagnosis Date    Acid reflux     Chalazion     Food allergy     discontinued dairy to help eosinophilic esophagitis    Hx of psychiatric care     Malignant neoplasm of lower-inner quadrant of right breast of female, estrogen receptor positive 11/06/2023    Seasonal allergies ongoing/unsure    Therapy     Wears reading eyeglasses      Past Surgical History:   Procedure Laterality Date    ADENOIDECTOMY  1983    BILATERAL MASTECTOMY Bilateral 12/14/2023    Procedure: MASTECTOMY, BILATERAL;  Surgeon: Tasha Mcleod MD;  Location: [facility];  Service: General;  Laterality: Bilateral;  3.5 HOURS / EMAIL SENT 12-4 @ 1:03 LK    COLONOSCOPY N/A 06/02/2023    Procedure: COLONOSCOPY;  Surgeon: Fabio Rice MD;  Location: [facility] ([address] [place] [address]);  Service:  Endoscopy;  Laterality: N/A;  pt confirmed earlier time  EB    DEBRIDEMENT, BREAST Right 2024    Procedure: DEBRIDEMENT, BREAST;  Surgeon: Allen Damian MD;  Location: [facility] OR;  Service: Plastics;  Laterality: Right;  1 HOUR    ESOPHAGOGASTRODUODENOSCOPY N/A 2023    Procedure: EGD (ESOPHAGOGASTRODUODENOSCOPY);  Surgeon: Fabio Rice MD;  Location: [facility] (4TH FLR);  Service: Endoscopy;  Laterality: N/A;  Procedure Timin-4 weeks    referred by Dr. Rice/Prep instructions handed to patient and to portal.  Patient called did not answer- DB    ESOPHAGOGASTRODUODENOSCOPY N/A 10/18/2023    Procedure: EGD (ESOPHAGOGASTRODUODENOSCOPY);  Surgeon: Fabio Rice MD;  Location: [facility] ENDO (4TH FLR);  Service: Endoscopy;  Laterality: N/A;  ref Ray  timeframe 5-12 weeks   Dr. Rice patient  portal instruction and given to patient jm  10/11-precall complete-MS    EXCISION, FREE FLAP, PREVIOUSLY APPLIED Right 2024    Procedure: EXCISION, FREE FLAP, PREVIOUSLY APPLIED;  Surgeon: Allen Damian MD;  Location: [facility] OR;  Service: Plastics;  Laterality: Right;    EXPLORATION, FLAP OR GRAFT SITE Left 2024    Procedure: EXPLORATION, FLAP OR GRAFT SITE;  Surgeon: Olga Solitario MD;  Location: [facility] OR;  Service: Plastics;  Laterality: Left;  Flap exploration, washout, revision, possible tissue expander or permanent implant placement (Base width of 12 cm, 250 ml or less implant)  Need the microscope and synovis microset    INJECTION FOR SENTINEL NODE IDENTIFICATION Right 2023    Procedure: INJECTION, FOR SENTINEL NODE IDENTIFICATION;  Surgeon: Tasha Mcleod MD;  Location: [facility] OR;  Service: General;  Laterality: Right;    INSERTION OF BREAST IMPLANT Left 2024    Procedure: INSERTION, BREAST IMPLANT;  Surgeon: Olga Solitario MD;  Location: [facility] OR;  Service: Plastics;  Laterality: Left;    INSERTION OF BREAST TISSUE EXPANDER Bilateral 2023    Procedure: INSERTION, TISSUE EXPANDER,  BREAST;  Surgeon: Allen Damian MD;  Location: [facility];  Service: Plastics;  Laterality: Bilateral;  2 HOURS    INSERTION, CATHETER, TUNNELED Left 12/14/2023    Procedure: INSERTION, CATHETER, TUNNELED;  Surgeon: Tasha Mcleod MD;  Location: [facility];  Service: General;  Laterality: Left;    PLACEMENT, MEDIPORT      RECONSTRUCTION OF BREAST WITH DEEP INFERIOR EPIGASTRIC ARTERY  (NATHALIE) FREE FLAP Bilateral 12/11/2024    Procedure: RECONSTRUCTION, BREAST, USING LUMBAR ARTERY FLAP AND INTERPOSITION GRAFT DIEA/DIEV;  Surgeon: Allen Damian MD;  Location: [facility];  Service: Plastics;  Laterality: Bilateral;    REMOVAL OF BREAST IMPLANT Left 12/17/2024    Procedure: REMOVAL, IMPLANT, BREAST;  Surgeon: Allen Damian MD;  Location: [facility];  Service: Plastics;  Laterality: Left;    SENTINEL LYMPH NODE BIOPSY Right 12/14/2023    Procedure: BIOPSY, LYMPH NODE, SENTINEL;  Surgeon: Tasha Mcleod MD;  Location: [facility];  Service: General;  Laterality: Right;    TISSUE EXPANDER REMOVAL Bilateral 12/11/2024    Procedure: REMOVAL, TISSUE EXPANDER;  Surgeon: Allen Damian MD;  Location: [facility];  Service: Plastics;  Laterality: Bilateral;    TYMPANOSTOMY TUBE PLACEMENT  1983     Family History   Problem Relation Name Age of Onset    Solid tumor Mother Father         uterine polyps    Cancer Mother Father     Hypertension Mother Father     Hypertension Father Fahad     Prostate cancer Father Fahad 67        tx: XRT seeds    Pancreatic cancer Paternal Uncle Vern 68        subtype unk; tx: unk    Cancer Maternal Grandmother Kasandra 68        mother thinks that a cancer dx was on death cert.    Cancer Maternal Grandfather Maternal Grandfather         esophageal (abn. cells at 54, tumor at 62)    Heart failure Paternal Grandmother Paternal Grandmother     Albinism Paternal Grandmother Paternal Grandmother     Alzheimer's disease Paternal Grandmother Paternal Grandmother     Diabetes Paternal Grandfather  Paternal Grandfather     Heart failure Paternal Grandfather Paternal Grandfather     Stroke Paternal Grandfather Paternal Grandfather     Breast cancer Other Johanna         dx.late 50s or 60s (tx: unk)    Cancer Other Nichole         cervical or ovarian, dx.mid-60s (tx: unk)    Cancer Other E.J.         lung, dx.mid-60s    Cancer Other Luke III         lung, dx. early 60s    Solid tumor Other          stomach tumor (noncancerous)    Cataracts Neg Hx      Glaucoma Neg Hx      Macular degeneration Neg Hx      Colon cancer Neg Hx       Social History     Tobacco Use    Smoking status: Never     Passive exposure: Never    Smokeless tobacco: Never   Substance Use Topics    Alcohol use: Not Currently     Comment: Less than a drink/monthly    Drug use: Yes     Types: Oxycodone     Review of Systems    Physical Exam     Initial Vitals [12/19/24 0450]   BP Pulse Resp Temp SpO2   (!) 143/81 104 18 97.4 °F (36.3 °C) 98 %      MAP       --         Physical Exam    Cardiovascular:  Normal rate and regular rhythm.           Pulmonary/Chest: No respiratory distress. She has no wheezes.   Abdominal: There is abdominal tenderness.   +left lower quadrant tenderness to palpation     Neurological: She is alert.   Skin: Skin is warm. Capillary refill takes less than 2 seconds. No rash noted. No erythema.   Psychiatric: She has a normal mood and affect.         ED Course   Procedures  Labs Reviewed - No data to display         Imaging Results    None          Medications   magnesium citrate solution 296 mL (296 mLs Rectal Given 12/19/24 0606)     And   sodium chloride 0.9% bolus 500 mL 500 mL (0 mLs Rectal Stopped 12/19/24 0625)   morphine injection 2 mg (2 mg Intravenous Given 12/19/24 0815)     Medical Decision Making  Differential diagnosis includes: diverticulitis vs SBO vs constipation versus internal hemorrhoids    Patient is a 44-year-old female who presented today with constipation.  Eight day history of with 3 surgeries in that  time period.  She has been on opioid medications the entire time.  Has taking Colace and milk of magnesia for the constipation, has not produced a stool throughout that time.  Additionally endorses abdominal pain today.  On physical exam she is tender in the left lower quadrant, on rectal exam remarkable for stool balls in her rectum, however not impacted.  In absence of nausea and vomiting, fever, chills, sweats or any blood per stool less suspicious for internal head rise, diverticulitis, or SBO.  At this time we will give her a brown bomber enema and reassess after she makes a bowel movement.  We will also consider using for opioid induced constipation as well as MiraLax at this time. Signed out to the day team.    Amount and/or Complexity of Data Reviewed  External Data Reviewed: labs and notes.    Risk  OTC drugs.  Prescription drug management.               ED Course as of 12/20/24 0521   Thu Dec 19, 2024   0834 Disimpaction with assigned nurse and nursing student chaperoned exam along with . [KW]      ED Course User Index  [KW] Emanuel Fuentes MD                           Clinical Impression:  Final diagnoses:  [K59.03] Drug-induced constipation (Primary)  [R10.32] Left lower quadrant abdominal pain  [L76.82] Pain at surgical incision  [K56.41] Fecal impaction          ED Disposition Condition    Discharge Stable          ED Prescriptions       Medication Sig Dispense Start Date End Date Auth. Provider    polyethylene glycol (GLYCOLAX) 17 gram PwPk Take 17 g by mouth 2 (two) times daily. 30 packet 12/19/2024 -- Heber Torres MD          Follow-up Information       Follow up With Specialties Details Why Contact Info    Julia Sandoval MD Internal Medicine Schedule an appointment as soon as possible for a visit in 3 days  [address] [facility] [address]  [phone]               Heber Torres MD  Resident  12/19/24 0611       Heber Torres MD  Resident  12/20/24 8015

## 2024-12-19 NOTE — DISCHARGE INSTRUCTIONS
You are being discharged from the emergency department this time.  Likely etiology of her constipation is the opioids you have been taking for the past 8 days.  The enema we have given you today she would have relieved some of the symptoms.        Please take 1 capful of MiraLax in the morning and 1 cap in the evening, you can adjust this dose up to 3 caps a day if needed.  Please try to have 2 bowel movements a day as a goal.

## 2024-12-19 NOTE — PROVIDER PROGRESS NOTES - EMERGENCY DEPT.
Encounter Date: 12/19/2024    ED Physician Progress Notes            ED Physician Hand-off Note:    ED Course: I assumed care of patient from off-going ED physician, Dr. Deep Torres.  Briefly, Patient is a   [age], female, constipation, lower abdominal pain presents with constipation.  Three surgeries in the past 8 days, not produce stool since that time, rectal exam already done, not impacted.      At the time of signout plan was pending enema.    Nursing notified me that the patient was experiencing a rash on her bottom.  Rash extends only to the area of the bottom that has a contacting the Chux pad.  No subcutaneous emphysema noted, no systemic symptoms including fevers chills or pain.  States that the rash started yesterday.  No new soaps detergents at home.  She tried a half dose of Benadryl which gave her some relief.  States that the rash is itchy.  On exam the rash is blanching, non raised, appears to be contact dermatitis.  Plan to treat with Benadryl and topical cortisol cream as outpatient.    Patient was disimpacted with nursing chaperone at bedside.  Patient proceeded to have a normal bowel movement, after visit summary printed and given to patient, patient verbalizes understanding of return precautions, we discussed that the goal for outpatient therapy was to have 2 bowel movements today, she is going to take MiraLax to have bowel movements at home.  Vital signs stable at time of discharge.    Disposition:  Discharge    Patient comfortable with current treatment regimen. Patient counseled regarding exam, results, diagnosis, treatment, and plan.    Impression:  Stable    Final diagnoses:  [K59.03] Drug-induced constipation (Primary)  [R10.32] Left lower quadrant abdominal pain  [L76.82] Pain at surgical incision

## 2024-12-20 ENCOUNTER — PATIENT MESSAGE (OUTPATIENT)
Dept: OPTOMETRY | Facility: CLINIC | Age: 44
End: 2024-12-20
Payer: COMMERCIAL

## 2024-12-20 LAB
CARDIOLIPIN IGA SER IA-ACNC: 1.6 APL
CARDIOLIPIN IGG SER IA-ACNC: <9.4 GPL (ref 0–14.99)
CARDIOLIPIN IGM SER IA-ACNC: <9.4 MPL (ref 0–12.49)
F2 C.20210G>A GENO BLD/T: NEGATIVE
F5 P.R506Q BLD/T QL: NEGATIVE
LPA SERPL-MCNC: <5 MG/DL (ref 0–30)
PROT S ACT/NOR PPP: 114 % (ref 50–150)

## 2024-12-23 ENCOUNTER — PATIENT MESSAGE (OUTPATIENT)
Dept: PLASTIC SURGERY | Facility: CLINIC | Age: 44
End: 2024-12-23

## 2024-12-23 ENCOUNTER — OFFICE VISIT (OUTPATIENT)
Dept: PLASTIC SURGERY | Facility: CLINIC | Age: 44
End: 2024-12-23
Attending: PLASTIC SURGERY
Payer: COMMERCIAL

## 2024-12-23 VITALS — HEART RATE: 75 BPM | DIASTOLIC BLOOD PRESSURE: 78 MMHG | SYSTOLIC BLOOD PRESSURE: 135 MMHG

## 2024-12-23 DIAGNOSIS — Z85.3 HISTORY OF BREAST CANCER: Primary | ICD-10-CM

## 2024-12-23 PROCEDURE — 99024 POSTOP FOLLOW-UP VISIT: CPT | Mod: S$GLB,,, | Performed by: PLASTIC SURGERY

## 2024-12-23 PROCEDURE — 3078F DIAST BP <80 MM HG: CPT | Mod: CPTII,S$GLB,, | Performed by: PLASTIC SURGERY

## 2024-12-23 PROCEDURE — 3044F HG A1C LEVEL LT 7.0%: CPT | Mod: CPTII,S$GLB,, | Performed by: PLASTIC SURGERY

## 2024-12-23 PROCEDURE — 3075F SYST BP GE 130 - 139MM HG: CPT | Mod: CPTII,S$GLB,, | Performed by: PLASTIC SURGERY

## 2024-12-23 NOTE — PROGRESS NOTES
Post bilateral attempted breast reconstruction with lumbar artery  flap.  Unfortunately she lost the left side and decided to have the right side removed.      No complaint today     On exam:  Incisions are intact of the IMF     Donor sites intact as well     The drains of the back still produce approximately 40 cc per 24 hours     The drains of the breasts produce minimal amount of serosanguineous material were removed without problem     Assessment:  Stable     Plan follow-up 1 week for drain removal at back and Dermabond tape at breasts and donor site

## 2024-12-26 ENCOUNTER — INFUSION (OUTPATIENT)
Dept: INFUSION THERAPY | Facility: HOSPITAL | Age: 44
End: 2024-12-26
Payer: COMMERCIAL

## 2024-12-26 ENCOUNTER — LAB VISIT (OUTPATIENT)
Dept: LAB | Facility: HOSPITAL | Age: 44
End: 2024-12-26
Payer: COMMERCIAL

## 2024-12-26 ENCOUNTER — OFFICE VISIT (OUTPATIENT)
Dept: HEMATOLOGY/ONCOLOGY | Facility: CLINIC | Age: 44
End: 2024-12-26
Payer: COMMERCIAL

## 2024-12-26 VITALS
RESPIRATION RATE: 18 BRPM | OXYGEN SATURATION: 100 % | TEMPERATURE: 98 F | HEART RATE: 75 BPM | SYSTOLIC BLOOD PRESSURE: 132 MMHG | DIASTOLIC BLOOD PRESSURE: 69 MMHG

## 2024-12-26 VITALS
OXYGEN SATURATION: 100 % | WEIGHT: 109 LBS | TEMPERATURE: 97 F | RESPIRATION RATE: 18 BRPM | BODY MASS INDEX: 21.4 KG/M2 | DIASTOLIC BLOOD PRESSURE: 77 MMHG | SYSTOLIC BLOOD PRESSURE: 130 MMHG | HEART RATE: 70 BPM | HEIGHT: 60 IN

## 2024-12-26 DIAGNOSIS — Z15.01 BARD1 GENE MUTATION POSITIVE: ICD-10-CM

## 2024-12-26 DIAGNOSIS — C50.311 MALIGNANT NEOPLASM OF LOWER-INNER QUADRANT OF RIGHT BREAST OF FEMALE, ESTROGEN RECEPTOR POSITIVE: ICD-10-CM

## 2024-12-26 DIAGNOSIS — Z17.0 MALIGNANT NEOPLASM OF LOWER-INNER QUADRANT OF RIGHT BREAST OF FEMALE, ESTROGEN RECEPTOR POSITIVE: ICD-10-CM

## 2024-12-26 DIAGNOSIS — C50.311 MALIGNANT NEOPLASM OF LOWER-INNER QUADRANT OF RIGHT BREAST OF FEMALE, ESTROGEN RECEPTOR POSITIVE: Primary | ICD-10-CM

## 2024-12-26 DIAGNOSIS — Z17.0 MALIGNANT NEOPLASM OF LOWER-INNER QUADRANT OF RIGHT BREAST OF FEMALE, ESTROGEN RECEPTOR POSITIVE: Primary | ICD-10-CM

## 2024-12-26 DIAGNOSIS — Z79.810 CARE RELATED TO CURRENT TAMOXIFEN USE: ICD-10-CM

## 2024-12-26 DIAGNOSIS — R11.0 CHEMOTHERAPY-INDUCED NAUSEA: Primary | ICD-10-CM

## 2024-12-26 DIAGNOSIS — T45.1X5A CHEMOTHERAPY-INDUCED NAUSEA: Primary | ICD-10-CM

## 2024-12-26 LAB
B-HCG UR QL: NEGATIVE
CONFIRM DRVVT STA-STACLOT: ABNORMAL S
DRVVT SCREEN TO CONFIRM RATIO: 1.1 {RATIO}
HEPARIN NT PPP QL: ABNORMAL
LA 3 SCREEN W REFLEX-IMP: ABNORMAL
LMW HEPARIN IND PLT AB SER QL: ABNORMAL
MIXING DRVVT/NORMAL: 1.13 %
NEUTRALIZED DRVVT SCREEN RATIO: ABNORMAL
PROTHROMBIN TIME: 13 S (ref 12–15.5)
SCREEN APTT/NORMAL: 0.86
SCREEN APTT/NORMAL: ABNORMAL
SCREEN DRVVT/NORMAL: 1.27 %
THROMBIN TIME: ABNORMAL S

## 2024-12-26 PROCEDURE — 63600175 PHARM REV CODE 636 W HCPCS: Mod: JG | Performed by: INTERNAL MEDICINE

## 2024-12-26 PROCEDURE — 81025 URINE PREGNANCY TEST: CPT | Performed by: NURSE PRACTITIONER

## 2024-12-26 PROCEDURE — 3008F BODY MASS INDEX DOCD: CPT | Mod: CPTII,S$GLB,, | Performed by: INTERNAL MEDICINE

## 2024-12-26 PROCEDURE — 3044F HG A1C LEVEL LT 7.0%: CPT | Mod: CPTII,S$GLB,, | Performed by: INTERNAL MEDICINE

## 2024-12-26 PROCEDURE — 99999 PR PBB SHADOW E&M-EST. PATIENT-LVL IV: CPT | Mod: PBBFAC,,, | Performed by: INTERNAL MEDICINE

## 2024-12-26 PROCEDURE — 3078F DIAST BP <80 MM HG: CPT | Mod: CPTII,S$GLB,, | Performed by: INTERNAL MEDICINE

## 2024-12-26 PROCEDURE — 99215 OFFICE O/P EST HI 40 MIN: CPT | Mod: S$GLB,,, | Performed by: INTERNAL MEDICINE

## 2024-12-26 PROCEDURE — 1159F MED LIST DOCD IN RCRD: CPT | Mod: CPTII,S$GLB,, | Performed by: INTERNAL MEDICINE

## 2024-12-26 PROCEDURE — 96413 CHEMO IV INFUSION 1 HR: CPT

## 2024-12-26 PROCEDURE — 1111F DSCHRG MED/CURRENT MED MERGE: CPT | Mod: CPTII,S$GLB,, | Performed by: INTERNAL MEDICINE

## 2024-12-26 PROCEDURE — 3075F SYST BP GE 130 - 139MM HG: CPT | Mod: CPTII,S$GLB,, | Performed by: INTERNAL MEDICINE

## 2024-12-26 PROCEDURE — 25000003 PHARM REV CODE 250: Performed by: INTERNAL MEDICINE

## 2024-12-26 PROCEDURE — G2211 COMPLEX E/M VISIT ADD ON: HCPCS | Mod: S$GLB,,, | Performed by: INTERNAL MEDICINE

## 2024-12-26 RX ORDER — HEPARIN 100 UNIT/ML
500 SYRINGE INTRAVENOUS
Status: CANCELLED | OUTPATIENT
Start: 2024-12-26

## 2024-12-26 RX ORDER — PROCHLORPERAZINE EDISYLATE 5 MG/ML
10 INJECTION INTRAMUSCULAR; INTRAVENOUS ONCE AS NEEDED
Status: CANCELLED
Start: 2024-12-26

## 2024-12-26 RX ORDER — DIPHENHYDRAMINE HYDROCHLORIDE 50 MG/ML
50 INJECTION INTRAMUSCULAR; INTRAVENOUS ONCE AS NEEDED
Status: DISCONTINUED | OUTPATIENT
Start: 2024-12-26 | End: 2024-12-26 | Stop reason: HOSPADM

## 2024-12-26 RX ORDER — DIPHENHYDRAMINE HYDROCHLORIDE 50 MG/ML
50 INJECTION INTRAMUSCULAR; INTRAVENOUS ONCE AS NEEDED
Status: CANCELLED | OUTPATIENT
Start: 2024-12-26

## 2024-12-26 RX ORDER — HEPARIN 100 UNIT/ML
500 SYRINGE INTRAVENOUS
Status: DISCONTINUED | OUTPATIENT
Start: 2024-12-26 | End: 2024-12-26 | Stop reason: HOSPADM

## 2024-12-26 RX ORDER — SODIUM CHLORIDE 0.9 % (FLUSH) 0.9 %
10 SYRINGE (ML) INJECTION
Status: CANCELLED | OUTPATIENT
Start: 2024-12-26

## 2024-12-26 RX ORDER — EPINEPHRINE 0.3 MG/.3ML
0.3 INJECTION SUBCUTANEOUS ONCE AS NEEDED
Status: DISCONTINUED | OUTPATIENT
Start: 2024-12-26 | End: 2024-12-26 | Stop reason: HOSPADM

## 2024-12-26 RX ORDER — PROCHLORPERAZINE EDISYLATE 5 MG/ML
10 INJECTION INTRAMUSCULAR; INTRAVENOUS ONCE AS NEEDED
Status: DISCONTINUED | OUTPATIENT
Start: 2024-12-26 | End: 2024-12-26 | Stop reason: HOSPADM

## 2024-12-26 RX ORDER — SODIUM CHLORIDE 0.9 % (FLUSH) 0.9 %
10 SYRINGE (ML) INJECTION
Status: DISCONTINUED | OUTPATIENT
Start: 2024-12-26 | End: 2024-12-26 | Stop reason: HOSPADM

## 2024-12-26 RX ORDER — EPINEPHRINE 0.3 MG/.3ML
0.3 INJECTION SUBCUTANEOUS ONCE AS NEEDED
Status: CANCELLED | OUTPATIENT
Start: 2024-12-26

## 2024-12-26 RX ADMIN — HEPARIN SODIUM (PORCINE) LOCK FLUSH IV SOLN 100 UNIT/ML 500 UNITS: 100 SOLUTION at 12:12

## 2024-12-26 RX ADMIN — SODIUM CHLORIDE: 9 INJECTION, SOLUTION INTRAVENOUS at 11:12

## 2024-12-26 RX ADMIN — TRASTUZUMAB-ANNS 300 MG: 420 INJECTION, POWDER, LYOPHILIZED, FOR SOLUTION INTRAVENOUS at 11:12

## 2024-12-26 NOTE — PLAN OF CARE
Problem: Chemotherapy Effects  Goal: Anemia Symptom Improvement  Outcome: Progressing  Goal: Safety Maintained  Outcome: Progressing  Goal: Absence of Hematuria  Outcome: Progressing  Goal: Nausea and Vomiting Relief  Outcome: Progressing  Goal: Neurotoxicity Symptom Control  Outcome: Progressing  Goal: Absence of Infection  Outcome: Progressing  Goal: Absence of Bleeding  Outcome: Progressing     Ambulatory to clinic with no c/o adverse effects or s/s of infection.  PAC accessed, flushed with out difficulty, blood return noted and infused with no problems.  Kanjinti tolerated with no problems.  Ambulatory home.  NAD.

## 2024-12-26 NOTE — PROGRESS NOTES
[facility]/ [facility]   at [facility] Note:      Chief Complaint:   Encounter Diagnoses   Name Primary?    Malignant neoplasm of lower-inner quadrant of right breast of female, estrogen receptor positive Yes    BARD1 gene mutation positive     Care related to current tamoxifen use       Cancer Staging   Malignant neoplasm of lower-inner quadrant of right breast of female, estrogen receptor positive  Staging form: Breast, AJCC 8th Edition  - Clinical stage from 10/26/2023: Stage IIA (cT2, cN0, cM0, G3, ER+, HI-, HER2+) - Signed by Linda Mcbride MD on 2023  - Pathologic stage from 2023: Stage IA (pT1c, pN0, cM0, G2, ER+, HI-, HER2+) - Signed by Linda Mcbride MD on 2024    HPI:  Tasha Cornejo is a 44 y.o. female who presents today for adjuvant kanjinti. She had reconstruction but required take-down due to clotting at the anastomosis. She is still recovering.  Restarted tamoxifen 2 days ago and symptoms like joint aches have not returned as of yet       Oncology History  Screening mammogram on 10/5/2023 identified coarse heterogeneous calcifications in a linear distribution seen in the lower outer quadrant of the right breast, spanning 1.5 cm.  Diagnostic mammogram on 10/12/2023 showed coarse heterogeneous calcifications in a regional distribution spanning 3.2 cm long axis   Biopsy 10/26/2023 with pathology revealing infiltrating ductal carcinoma of the breast, ER 70%/ HI-/HER2 3+; Ki67 60%    Bilateral mastectomy 23: IDC, grade 2, 1.5cm in maximum; 0/2 LN+    Adjuvant TH 24  Adjuvant tamoxifen:  2024 (held Dec 2024 due to surgery)    GYN History:  Age of menarche was 9.   Age of menopause n/a.  Patient denies hormonal therapy.   Patient is .     Patient Active Problem List   Diagnosis    Rhinitis, allergic    Allergic conjunctivitis    Vitamin D deficiency    Malignant neoplasm of lower-inner quadrant of right  breast of female, estrogen receptor positive    Chemotherapy-induced nausea    At risk for lymphedema    Stress and adjustment reaction    Physical deconditioning    Immunodeficiency due to drugs    Decreased ROM of right shoulder    Shoulder weakness    Anxiety about health    Palpitations    Chronic right hip pain    Weakness of right lower extremity       Current Outpatient Medications   Medication Instructions    acetaminophen (TYLENOL) 500 mg, Oral, Every 6 hours    bacitracin 500 unit/gram Oint Topical (Top), 3 times daily    docusate sodium (COLACE) 250 mg, Daily    fexofenadine (ALLEGRA) 180 mg, Daily    LIDOcaine-prilocaine (EMLA) cream Apply topically to port one hour prior to chemotherapy infusion    multivitamin (THERAGRAN) per tablet 1 tablet, Daily    ondansetron (ZOFRAN-ODT) 4 mg, Oral, 2 times daily    oxyCODONE (ROXICODONE) 10 mg, Oral, Every 4 hours PRN    oxyCODONE-acetaminophen (PERCOCET)  mg per tablet 1 tablet, Oral, Every 4 hours PRN    oxyCODONE-acetaminophen (PERCOCET)  mg per tablet 1 tablet, Oral, Every 4 hours PRN    oxyCODONE-acetaminophen (PERCOCET)  mg per tablet 1 tablet, Oral, Every 4 hours PRN    polyethylene glycol (GLYCOLAX) 17 g, Oral, 2 times daily       Review of Systems:   Review of Systems   Respiratory:  Negative for cough and shortness of breath.    Cardiovascular:  Negative for chest pain.   Gastrointestinal:  Negative for abdominal pain and diarrhea.   Genitourinary:  Negative for dysuria and frequency.   Musculoskeletal:  Positive for joint pain. Negative for back pain.   Skin:  Negative for rash.   Neurological:  Positive for headaches.   Endo/Heme/Allergies:         Night sweats   Psychiatric/Behavioral:  The patient is nervous/anxious.    All other systems reviewed and are negative.      PHYSICAL EXAM:  /77   Pulse 70   Temp 97.3 °F (36.3 °C) (Oral)   Resp 18   Ht 5' (1.524 m)   Wt 49.4 kg (109 lb)   LMP 10/30/2024 (Exact Date)   SpO2  100%   BMI 21.29 kg/m²     General Appearance:    Alert, cooperative, no distress, appears stated age   Head:    Normocephalic, without obvious abnormality, atraumatic   Lungs:     Clear to auscultation bilaterally, respirations unlabored    Heart:    Regular rate and rhythm, S1 and S2 normal, no murmur, rub    or gallop   Extremities:   Extremities normal, atraumatic, no cyanosis or edema   Pulses:   2+ and symmetric all extremities   Skin:   Skin color, texture, turgor normal, no rashes or lesions   Neurologic:   CNII-XII intact, normal gait         Pertinent Labs & Imaging:  Pathology Results  (Last 30 days)                 12/17/24 1450  Specimen to Pathology, Surgery Gross only Final result    Narrative:  Pre-op Diagnosis: HX: breast cancer [Z85.3]   Procedure(s):   DEBRIDEMENT, BREAST   REPLACEMENT, IMPLANT, BREAST   Number of specimens: 1   Name of specimens: 1. Left breast implant (Gross ID)   Release to patient->Immediate   Specimen total (fresh, frozen, permanent):->1       12/11/24 1627  Specimen to Pathology, Surgery Gross only Final result    Narrative:  Pre-op Diagnosis: At high risk for breast cancer [Z91.89]   Procedure(s):   RECONSTRUCTION, BREAST, USING NATHALIE FREE FLAP / SGAP   REMOVAL, TISSUE EXPANDER   Number of specimens: 2   Name of specimens: 1. Right tissue expander (Gross ID)   2. Left tissue expander (Gross ID)   Release to patient->Immediate   Specimen total (fresh, frozen, permanent):->2               Recent Results (from the past 24 hours)   Pregnancy, urine rapid    Collection Time: 12/26/24  9:10 AM   Result Value Ref Range    Preg Test, Ur Negative          Assessment & Plan:  1. Malignant neoplasm of lower-inner quadrant of right breast of female, estrogen receptor positive    2. BARD1 gene mutation positive    3. Care related to current tamoxifen use      Final pathology T1cN0  Reviewed Dec echo. ok for treatment today with Kanjinti  Repeat labs Feb 2025  Continue tamoxifen.   Tolerating fairly well today. Will follow up in 3 weeks for further symptom review     Will plan to continue on routine follow up.     Route Chart for Scheduling    Med Onc Chart Routing  Urgent    Follow up with physician 3 months.   Follow up with BHARGAVI 3 weeks. 1/16/25   Infusion scheduling note   1/16 Kanjinti need to be scheduled   Injection scheduling note    Labs None   Scheduling:  Preferred lab:  Lab interval:     Imaging    Pharmacy appointment    Other referrals                  Treatment Plan Information   OP BREAST TRASTUZUMAB PACLITAXEL WEEKLY Linda Mcbride MD   Associated diagnosis: Chemotherapy-induced nausea   noted on 11/13/2023  Associated diagnosis: Malignant neoplasm of lower-inner quadrant of right breast of female, estrogen receptor positive Stage IIA, Stage IA cT2, cN0, cM0, G3, ER+, OR-, HER2+, pT1c, pN0, cM0, G2, ER+, OR-, HER2+ noted on 11/6/2023   Line of treatment: Adjuvant  Treatment Goal: Curative     Upcoming Treatment Dates - OP BREAST TRASTUZUMAB PACLITAXEL WEEKLY    12/26/2024       Chemotherapy       trastuzumab-anns (KANJINTI) 318 mg in 0.9% NaCl 250 mL chemo infusion  1/16/2025       Chemotherapy       trastuzumab-anns (KANJINTI) 318 mg in 0.9% NaCl 250 mL chemo infusion    MDM includes  :    - Acute or chronic illness or injury that poses a threat to life or bodily function  - Independent review and explanation of 2 results from unique tests  - Discussion of management and ordering 2 unique tests  - Extensive discussion of treatment and management  - Prescription drug management  - Drug therapy requiring intensive monitoring for toxicity    Linda Mcbride MD   12/26/2024

## 2024-12-27 ENCOUNTER — OFFICE VISIT (OUTPATIENT)
Dept: OPTOMETRY | Facility: CLINIC | Age: 44
End: 2024-12-27
Payer: COMMERCIAL

## 2024-12-27 ENCOUNTER — PATIENT MESSAGE (OUTPATIENT)
Dept: OPTOMETRY | Facility: CLINIC | Age: 44
End: 2024-12-27
Payer: COMMERCIAL

## 2024-12-27 DIAGNOSIS — H52.4 PRESBYOPIA: Primary | ICD-10-CM

## 2024-12-27 PROCEDURE — 99999 PR PBB SHADOW E&M-EST. PATIENT-LVL III: CPT | Mod: PBBFAC,,, | Performed by: OPTOMETRIST

## 2024-12-31 ENCOUNTER — OFFICE VISIT (OUTPATIENT)
Dept: PLASTIC SURGERY | Facility: CLINIC | Age: 44
End: 2024-12-31
Attending: PLASTIC SURGERY
Payer: COMMERCIAL

## 2024-12-31 VITALS — SYSTOLIC BLOOD PRESSURE: 125 MMHG | DIASTOLIC BLOOD PRESSURE: 68 MMHG | HEART RATE: 67 BPM

## 2024-12-31 DIAGNOSIS — Z85.3 HISTORY OF BREAST CANCER: Primary | ICD-10-CM

## 2024-12-31 NOTE — PROGRESS NOTES
Plastic Surgery Clinic Visit    Patient status post bilateral attempted breast reconstruction with lumbar artery  flap.  Unfortunately she lost the left side and decided to have the right side removed. Bilateral back YAN drains with the following s/s output per day (right 20 today (56) (45); left 12 today (40) (40)). Prineo still in place. No issues     On exam:    Incisions are intact at the IMF and bilateral groin. No signs of infection, hematoma, seroma, dehiscence  Donor sites incisions intact as well   Bilateral back YAN drains s/s     Assessment:    Stable   Bilateral back YAN drains removed  Prineo tape removed at breast and lower back with suture removed  Replace girdle with abdominal binder padded with ABDs for compression  RTC in 2 weeks    Ant Wisdom MD  [facility]  12/31/2024

## 2025-01-09 ENCOUNTER — PATIENT MESSAGE (OUTPATIENT)
Dept: HEMATOLOGY/ONCOLOGY | Facility: CLINIC | Age: 45
End: 2025-01-09
Payer: COMMERCIAL

## 2025-01-15 ENCOUNTER — PATIENT MESSAGE (OUTPATIENT)
Dept: PLASTIC SURGERY | Facility: CLINIC | Age: 45
End: 2025-01-15

## 2025-01-15 ENCOUNTER — OFFICE VISIT (OUTPATIENT)
Dept: PLASTIC SURGERY | Facility: CLINIC | Age: 45
End: 2025-01-15
Attending: PLASTIC SURGERY
Payer: COMMERCIAL

## 2025-01-15 VITALS
HEIGHT: 61 IN | HEART RATE: 89 BPM | WEIGHT: 108.94 LBS | SYSTOLIC BLOOD PRESSURE: 132 MMHG | DIASTOLIC BLOOD PRESSURE: 83 MMHG | BODY MASS INDEX: 20.57 KG/M2 | RESPIRATION RATE: 18 BRPM

## 2025-01-15 DIAGNOSIS — Z17.0 MALIGNANT NEOPLASM OF LOWER-INNER QUADRANT OF RIGHT BREAST OF FEMALE, ESTROGEN RECEPTOR POSITIVE: Primary | ICD-10-CM

## 2025-01-15 DIAGNOSIS — C50.311 MALIGNANT NEOPLASM OF LOWER-INNER QUADRANT OF RIGHT BREAST OF FEMALE, ESTROGEN RECEPTOR POSITIVE: Primary | ICD-10-CM

## 2025-01-15 NOTE — PROGRESS NOTES
[facility] Progress Note    Pt is s/p bilateral attempted breast reconstruction with lumbar artery  flap.  Unfortunately she lost the left side and decided to have the right side removed.     S  Pt doing well post op  No complaints  Slightly elevated DRVVT screen, but negative lupus anticoagulant      O  Physical exam  General: No acute distress.   Incisions are intact at the IMF and bilateral groin. No signs of infection, hematoma, seroma, dehiscence. Nipples viable.   Donor sites incisions intact     A/P  Tasha Cornejo is a 44 y.o. female stable s/p failed b/l LAP flaps breast recon.     - Discontinue binder  - Slowly ease into normal activities now, full activities at 6 months   - Will message her heme/onc team about her lab results to review  - RTC 2-3 months     Eyal Mosquera MD  [facility]-V  01/15/2025  11:21 AM

## 2025-01-16 ENCOUNTER — LAB VISIT (OUTPATIENT)
Dept: LAB | Facility: HOSPITAL | Age: 45
End: 2025-01-16
Payer: COMMERCIAL

## 2025-01-16 ENCOUNTER — INFUSION (OUTPATIENT)
Dept: INFUSION THERAPY | Facility: HOSPITAL | Age: 45
End: 2025-01-16
Payer: COMMERCIAL

## 2025-01-16 ENCOUNTER — OFFICE VISIT (OUTPATIENT)
Dept: HEMATOLOGY/ONCOLOGY | Facility: CLINIC | Age: 45
End: 2025-01-16
Payer: COMMERCIAL

## 2025-01-16 VITALS — HEART RATE: 71 BPM | SYSTOLIC BLOOD PRESSURE: 132 MMHG | DIASTOLIC BLOOD PRESSURE: 76 MMHG

## 2025-01-16 VITALS
DIASTOLIC BLOOD PRESSURE: 80 MMHG | TEMPERATURE: 97 F | OXYGEN SATURATION: 97 % | RESPIRATION RATE: 17 BRPM | HEIGHT: 60 IN | SYSTOLIC BLOOD PRESSURE: 121 MMHG | WEIGHT: 109.38 LBS | HEART RATE: 78 BPM | BODY MASS INDEX: 21.47 KG/M2

## 2025-01-16 DIAGNOSIS — Z17.0 MALIGNANT NEOPLASM OF LOWER-INNER QUADRANT OF RIGHT BREAST OF FEMALE, ESTROGEN RECEPTOR POSITIVE: Primary | ICD-10-CM

## 2025-01-16 DIAGNOSIS — R11.0 CHEMOTHERAPY-INDUCED NAUSEA: Primary | ICD-10-CM

## 2025-01-16 DIAGNOSIS — Z17.0 MALIGNANT NEOPLASM OF LOWER-INNER QUADRANT OF RIGHT BREAST OF FEMALE, ESTROGEN RECEPTOR POSITIVE: ICD-10-CM

## 2025-01-16 DIAGNOSIS — K30 STOMACH UPSET: ICD-10-CM

## 2025-01-16 DIAGNOSIS — C50.311 MALIGNANT NEOPLASM OF LOWER-INNER QUADRANT OF RIGHT BREAST OF FEMALE, ESTROGEN RECEPTOR POSITIVE: Primary | ICD-10-CM

## 2025-01-16 DIAGNOSIS — C50.311 MALIGNANT NEOPLASM OF LOWER-INNER QUADRANT OF RIGHT BREAST OF FEMALE, ESTROGEN RECEPTOR POSITIVE: ICD-10-CM

## 2025-01-16 DIAGNOSIS — Z15.01 BARD1 GENE MUTATION POSITIVE: ICD-10-CM

## 2025-01-16 DIAGNOSIS — Z79.810 CARE RELATED TO CURRENT TAMOXIFEN USE: ICD-10-CM

## 2025-01-16 DIAGNOSIS — T45.1X5A CHEMOTHERAPY-INDUCED NAUSEA: Primary | ICD-10-CM

## 2025-01-16 LAB — B-HCG UR QL: NEGATIVE

## 2025-01-16 PROCEDURE — 3079F DIAST BP 80-89 MM HG: CPT | Mod: CPTII,S$GLB,, | Performed by: NURSE PRACTITIONER

## 2025-01-16 PROCEDURE — G2211 COMPLEX E/M VISIT ADD ON: HCPCS | Mod: S$GLB,,, | Performed by: NURSE PRACTITIONER

## 2025-01-16 PROCEDURE — 99999 PR PBB SHADOW E&M-EST. PATIENT-LVL III: CPT | Mod: PBBFAC,,, | Performed by: NURSE PRACTITIONER

## 2025-01-16 PROCEDURE — 3008F BODY MASS INDEX DOCD: CPT | Mod: CPTII,S$GLB,, | Performed by: NURSE PRACTITIONER

## 2025-01-16 PROCEDURE — 81025 URINE PREGNANCY TEST: CPT | Performed by: NURSE PRACTITIONER

## 2025-01-16 PROCEDURE — 63600175 PHARM REV CODE 636 W HCPCS: Mod: TB | Performed by: INTERNAL MEDICINE

## 2025-01-16 PROCEDURE — 3074F SYST BP LT 130 MM HG: CPT | Mod: CPTII,S$GLB,, | Performed by: NURSE PRACTITIONER

## 2025-01-16 PROCEDURE — 25000003 PHARM REV CODE 250: Performed by: INTERNAL MEDICINE

## 2025-01-16 PROCEDURE — 96413 CHEMO IV INFUSION 1 HR: CPT

## 2025-01-16 PROCEDURE — 99215 OFFICE O/P EST HI 40 MIN: CPT | Mod: S$GLB,,, | Performed by: NURSE PRACTITIONER

## 2025-01-16 PROCEDURE — 1159F MED LIST DOCD IN RCRD: CPT | Mod: CPTII,S$GLB,, | Performed by: NURSE PRACTITIONER

## 2025-01-16 PROCEDURE — A4216 STERILE WATER/SALINE, 10 ML: HCPCS | Performed by: INTERNAL MEDICINE

## 2025-01-16 RX ORDER — HEPARIN 100 UNIT/ML
500 SYRINGE INTRAVENOUS
Status: CANCELLED | OUTPATIENT
Start: 2025-01-16

## 2025-01-16 RX ORDER — PROCHLORPERAZINE EDISYLATE 5 MG/ML
10 INJECTION INTRAMUSCULAR; INTRAVENOUS ONCE AS NEEDED
Status: DISCONTINUED | OUTPATIENT
Start: 2025-01-16 | End: 2025-01-16 | Stop reason: HOSPADM

## 2025-01-16 RX ORDER — HEPARIN 100 UNIT/ML
500 SYRINGE INTRAVENOUS
Status: DISCONTINUED | OUTPATIENT
Start: 2025-01-16 | End: 2025-01-16 | Stop reason: HOSPADM

## 2025-01-16 RX ORDER — EPINEPHRINE 0.3 MG/.3ML
0.3 INJECTION SUBCUTANEOUS ONCE AS NEEDED
Status: CANCELLED | OUTPATIENT
Start: 2025-01-16

## 2025-01-16 RX ORDER — SODIUM CHLORIDE 0.9 % (FLUSH) 0.9 %
10 SYRINGE (ML) INJECTION
Status: CANCELLED | OUTPATIENT
Start: 2025-01-16

## 2025-01-16 RX ORDER — EPINEPHRINE 0.3 MG/.3ML
0.3 INJECTION SUBCUTANEOUS ONCE AS NEEDED
Status: DISCONTINUED | OUTPATIENT
Start: 2025-01-16 | End: 2025-01-16 | Stop reason: HOSPADM

## 2025-01-16 RX ORDER — DIPHENHYDRAMINE HYDROCHLORIDE 50 MG/ML
50 INJECTION INTRAMUSCULAR; INTRAVENOUS ONCE AS NEEDED
Status: DISCONTINUED | OUTPATIENT
Start: 2025-01-16 | End: 2025-01-16 | Stop reason: HOSPADM

## 2025-01-16 RX ORDER — DICYCLOMINE HYDROCHLORIDE 20 MG/1
20 TABLET ORAL EVERY 6 HOURS
Qty: 40 TABLET | Refills: 0 | Status: SHIPPED | OUTPATIENT
Start: 2025-01-16

## 2025-01-16 RX ORDER — DIPHENHYDRAMINE HYDROCHLORIDE 50 MG/ML
50 INJECTION INTRAMUSCULAR; INTRAVENOUS ONCE AS NEEDED
Status: CANCELLED | OUTPATIENT
Start: 2025-01-16

## 2025-01-16 RX ORDER — PROCHLORPERAZINE EDISYLATE 5 MG/ML
10 INJECTION INTRAMUSCULAR; INTRAVENOUS ONCE AS NEEDED
Status: CANCELLED
Start: 2025-01-16

## 2025-01-16 RX ORDER — SODIUM CHLORIDE 0.9 % (FLUSH) 0.9 %
10 SYRINGE (ML) INJECTION
Status: DISCONTINUED | OUTPATIENT
Start: 2025-01-16 | End: 2025-01-16 | Stop reason: HOSPADM

## 2025-01-16 RX ADMIN — SODIUM CHLORIDE: 9 INJECTION, SOLUTION INTRAVENOUS at 02:01

## 2025-01-16 RX ADMIN — HEPARIN SODIUM (PORCINE) LOCK FLUSH IV SOLN 100 UNIT/ML 500 UNITS: 100 SOLUTION at 04:01

## 2025-01-16 RX ADMIN — TRASTUZUMAB-ANNS 300 MG: 420 INJECTION, POWDER, LYOPHILIZED, FOR SOLUTION INTRAVENOUS at 03:01

## 2025-01-16 RX ADMIN — Medication 10 ML: at 04:01

## 2025-01-16 NOTE — PLAN OF CARE
Pt here for C25 Kanjinti. Labs and clinic visit complete. PAC accessed with positive blood return.     4:15- Pt tolerated treatment.  D/C in stable condition, ambulated independently.

## 2025-01-16 NOTE — PROGRESS NOTES
[facility]   at [facility] Note:      Chief Complaint:   Encounter Diagnoses   Name Primary?    Malignant neoplasm of lower-inner quadrant of right breast of female, estrogen receptor positive Yes    BARD1 gene mutation positive     Care related to current tamoxifen use     Stomach upset       Cancer Staging   Malignant neoplasm of lower-inner quadrant of right breast of female, estrogen receptor positive  Staging form: Breast, AJCC 8th Edition  - Clinical stage from 10/26/2023: Stage IIA (cT2, cN0, cM0, G3, ER+, LA-, HER2+) - Signed by Linda Mcbride MD on 11/6/2023  - Pathologic stage from 12/14/2023: Stage IA (pT1c, pN0, cM0, G2, ER+, LA-, HER2+) - Signed by Linda Mcbride MD on 1/5/2024    HPI:  Tasha Cornejo is a 44 y.o. female who presents today for adjuvant kanjinti. She had reconstruction but required take-down due to clotting at the anastomosis. Recovering well from this. Does not desire and further surgery at this time.  Saw Dr. Damian yesterday  Restarted tamoxifen after surgery on 12/24 and is tolerating well so far    Has had some stomach discomfort over the last week after each time she eats.  Severity is a 4/10.  Tired pepto bismol once. No n/v/d/c associated with this.   No fever or s/s of infection  Taking tamoxifen at night  Eating and drinking fair  Energy level stable  Hot flashes have not returned since restarting the tamoxifen  Feel a little more anxiety recently, but thinks this is because school is starting up    Per Dr. Mcbride's note:  Oncology History  Screening mammogram on 10/5/2023 identified coarse heterogeneous calcifications in a linear distribution seen in the lower outer quadrant of the right breast, spanning 1.5 cm.  Diagnostic mammogram on 10/12/2023 showed coarse heterogeneous calcifications in a regional distribution spanning 3.2 cm long axis   Biopsy 10/26/2023 with pathology revealing  infiltrating ductal carcinoma of the breast, ER 70%/ OK-/HER2 3+; Ki67 60%    Bilateral mastectomy 23: IDC, grade 2, 1.5cm in maximum; 0/2 LN+    Adjuvant TH 24  Adjuvant tamoxifen:  2024 (held Dec 2024 due to surgery)    GYN History:  Age of menarche was 9.   Age of menopause n/a.  Patient denies hormonal therapy.   Patient is .     Patient Active Problem List   Diagnosis    Rhinitis, allergic    Allergic conjunctivitis    Vitamin D deficiency    Malignant neoplasm of lower-inner quadrant of right breast of female, estrogen receptor positive    Chemotherapy-induced nausea    At risk for lymphedema    Stress and adjustment reaction    Physical deconditioning    Immunodeficiency due to drugs    Decreased ROM of right shoulder    Shoulder weakness    Anxiety about health    Palpitations    Chronic right hip pain    Weakness of right lower extremity       Current Outpatient Medications   Medication Instructions    bacitracin 500 unit/gram Oint Topical (Top), 3 times daily    dicyclomine (BENTYL) 20 mg, Oral, Every 6 hours, As needed for stomach discomfort    docusate sodium (COLACE) 250 mg, Daily    fexofenadine (ALLEGRA) 180 mg, Daily    LIDOcaine-prilocaine (EMLA) cream Apply topically to port one hour prior to chemotherapy infusion    multivitamin (THERAGRAN) per tablet 1 tablet, Daily    ondansetron (ZOFRAN-ODT) 4 mg, Oral, 2 times daily    oxyCODONE (ROXICODONE) 10 mg, Oral, Every 4 hours PRN    oxyCODONE-acetaminophen (PERCOCET)  mg per tablet 1 tablet, Oral, Every 4 hours PRN    oxyCODONE-acetaminophen (PERCOCET)  mg per tablet 1 tablet, Oral, Every 4 hours PRN    oxyCODONE-acetaminophen (PERCOCET)  mg per tablet 1 tablet, Oral, Every 4 hours PRN    polyethylene glycol (GLYCOLAX) 17 g, Oral, 2 times daily       Review of Systems:   Review of Systems   Respiratory:  Negative for cough and shortness of breath.    Cardiovascular:  Negative for chest pain.   Gastrointestinal:   Positive for abdominal pain. Negative for blood in stool, constipation, diarrhea, nausea and vomiting.   Genitourinary:  Negative for dysuria and frequency.   Musculoskeletal:  Positive for joint pain. Negative for back pain.   Skin:  Negative for rash.   Neurological:  Negative for headaches.   Endo/Heme/Allergies:         Night sweats   Psychiatric/Behavioral:  The patient is nervous/anxious.    All other systems reviewed and are negative.      PHYSICAL EXAM:  /80   Pulse 78   Temp 97 °F (36.1 °C) (Oral)   Resp 17   Ht 5' (1.524 m)   Wt 49.6 kg (109 lb 5.6 oz)   LMP 12/31/2024   SpO2 97%   BMI 21.36 kg/m²     General Appearance:    Alert, cooperative, no distress, appears stated age   Head:    Normocephalic, without obvious abnormality, atraumatic   Lungs:     Clear to auscultation bilaterally, respirations unlabored    Heart:    Regular rate and rhythm, S1 and S2 normal, no murmur, rub    or gallop   Extremities:   Extremities normal, atraumatic, no cyanosis or edema   Pulses:   2+ and symmetric all extremities   Skin:   Skin color, texture, turgor normal, no rashes or lesions   Neurologic:   CNII-XII intact, normal gait     Pertinent Labs & Imaging:  Pathology Results  (Last 30 days)                 12/17/24 1450  Specimen to Pathology, Surgery Gross only Final result    Narrative:  Pre-op Diagnosis: HX: breast cancer [Z85.3]   Procedure(s):   DEBRIDEMENT, BREAST   REPLACEMENT, IMPLANT, BREAST   Number of specimens: 1   Name of specimens: 1. Left breast implant (Gross ID)   Release to patient->Immediate   Specimen total (fresh, frozen, permanent):->1               No results found for this or any previous visit (from the past 24 hours).        Assessment & Plan:  1. Malignant neoplasm of lower-inner quadrant of right breast of female, estrogen receptor positive  2. BARD1 gene mutation positive  3. Care related to current tamoxifen use    Final pathology T1cN0  Completed taxol/herceptin weekly x  12  Today she is completing her every 3 week trastuzumab  Reviewed Dec echo. ok for treatment today with Kanjinti  Repeat labs Feb 2025  Continue tamoxifen.  Continues to tolerate fairly well after her break  Rtc end of March with Dr. Mcbride, sooner if needed    Will plan to continue on routine follow up.     4. Stomach upset  going on for one week, associated with food, no major n/v/d/c  ok to continue pepto bismol and trial gas ex  will trial bentyl  She will reach out if this does not improve or worsens    - dicyclomine (BENTYL) 20 mg tablet; Take 1 tablet (20 mg total) by mouth every 6 (six) hours. As needed for stomach discomfort  Dispense: 40 tablet; Refill: 0    Route Chart for Scheduling    Med Onc Chart Routing      Follow up with physician . Scheduled with Dr. Mcbride   Follow up with BHARGAVI    Infusion scheduling note    Injection scheduling note    Labs CBC and CMP   Scheduling:  Preferred lab:  Lab interval:  3/28, same day as follow up with Dr. Mcbride   Imaging ECHO   in 3 months   Pharmacy appointment    Other referrals                Treatment Plan Information   OP BREAST TRASTUZUMAB PACLITAXEL WEEKLY Linda Mcbride MD   Associated diagnosis: Chemotherapy-induced nausea   noted on 11/13/2023  Associated diagnosis: Malignant neoplasm of lower-inner quadrant of right breast of female, estrogen receptor positive Stage IIA, Stage IA cT2, cN0, cM0, G3, ER+, LA-, HER2+, pT1c, pN0, cM0, G2, ER+, LA-, HER2+ noted on 11/6/2023   Line of treatment: Adjuvant  Treatment Goal: Curative     Upcoming Treatment Dates - OP BREAST TRASTUZUMAB PACLITAXEL WEEKLY    1/16/2025       Chemotherapy       trastuzumab-anns (KANJINTI) 318 mg in 0.9% NaCl 250 mL chemo infusion    MDM includes  :    - Acute or chronic illness or injury that poses a threat to life or bodily function  - Independent review and explanation of 2 results from unique tests  - Discussion of management and ordering 2 unique tests  - Extensive discussion of  treatment and management  - Prescription drug management  - Drug therapy requiring intensive monitoring for toxicity    Gabrielle Woo, BREN   01/16/2025

## 2025-01-17 ENCOUNTER — PATIENT MESSAGE (OUTPATIENT)
Dept: HEMATOLOGY/ONCOLOGY | Facility: CLINIC | Age: 45
End: 2025-01-17
Payer: COMMERCIAL

## 2025-01-21 DIAGNOSIS — C50.311 MALIGNANT NEOPLASM OF LOWER-INNER QUADRANT OF RIGHT BREAST OF FEMALE, ESTROGEN RECEPTOR POSITIVE: Primary | ICD-10-CM

## 2025-01-21 DIAGNOSIS — Z17.0 MALIGNANT NEOPLASM OF LOWER-INNER QUADRANT OF RIGHT BREAST OF FEMALE, ESTROGEN RECEPTOR POSITIVE: Primary | ICD-10-CM

## 2025-01-21 RX ORDER — TAMOXIFEN CITRATE 20 MG/1
20 TABLET ORAL DAILY
Qty: 90 TABLET | Refills: 3 | Status: SHIPPED | OUTPATIENT
Start: 2025-01-21 | End: 2026-01-21

## 2025-01-29 ENCOUNTER — PATIENT MESSAGE (OUTPATIENT)
Dept: PLASTIC SURGERY | Facility: CLINIC | Age: 45
End: 2025-01-29
Payer: COMMERCIAL

## 2025-01-29 ENCOUNTER — PATIENT MESSAGE (OUTPATIENT)
Dept: HEMATOLOGY/ONCOLOGY | Facility: CLINIC | Age: 45
End: 2025-01-29
Payer: COMMERCIAL

## 2025-01-29 DIAGNOSIS — Z95.828 PORT-A-CATH IN PLACE: Primary | ICD-10-CM

## 2025-01-29 DIAGNOSIS — C50.311 MALIGNANT NEOPLASM OF LOWER-INNER QUADRANT OF RIGHT BREAST OF FEMALE, ESTROGEN RECEPTOR POSITIVE: ICD-10-CM

## 2025-01-29 DIAGNOSIS — Z17.0 MALIGNANT NEOPLASM OF LOWER-INNER QUADRANT OF RIGHT BREAST OF FEMALE, ESTROGEN RECEPTOR POSITIVE: ICD-10-CM

## 2025-01-30 ENCOUNTER — TELEPHONE (OUTPATIENT)
Dept: INTERVENTIONAL RADIOLOGY/VASCULAR | Facility: CLINIC | Age: 45
End: 2025-01-30
Payer: COMMERCIAL

## 2025-01-30 DIAGNOSIS — C50.311 MALIGNANT NEOPLASM OF LOWER-INNER QUADRANT OF RIGHT BREAST OF FEMALE, ESTROGEN RECEPTOR POSITIVE: Primary | ICD-10-CM

## 2025-01-30 DIAGNOSIS — Z17.0 MALIGNANT NEOPLASM OF LOWER-INNER QUADRANT OF RIGHT BREAST OF FEMALE, ESTROGEN RECEPTOR POSITIVE: Primary | ICD-10-CM

## 2025-01-30 DIAGNOSIS — Z95.828 PORT-A-CATH IN PLACE: ICD-10-CM

## 2025-01-31 ENCOUNTER — PATIENT MESSAGE (OUTPATIENT)
Dept: INTERVENTIONAL RADIOLOGY/VASCULAR | Facility: HOSPITAL | Age: 45
End: 2025-01-31
Payer: COMMERCIAL

## 2025-02-03 ENCOUNTER — TELEPHONE (OUTPATIENT)
Dept: HEMATOLOGY/ONCOLOGY | Facility: CLINIC | Age: 45
End: 2025-02-03
Payer: COMMERCIAL

## 2025-02-03 NOTE — TELEPHONE ENCOUNTER
----- Message from "[employer] sent at 2/3/2025  9:26 AM CST -----  Regarding: Consult/Advisory  Contact: Tasha Corneoj     Consult/Advisory     Name Of Caller:Tasha Cornejo         Contact Preference:[phone] (home)       Nature of call:Patient is returning call to schedule . Requesting a call back

## 2025-02-03 NOTE — TELEPHONE ENCOUNTER
Spoke with patient scheduling her a f/u appointment with Sydnee Waddell for 2/19/2025 @ 9am virtual

## 2025-02-05 ENCOUNTER — TELEPHONE (OUTPATIENT)
Dept: INTERVENTIONAL RADIOLOGY/VASCULAR | Facility: HOSPITAL | Age: 45
End: 2025-02-05
Payer: COMMERCIAL

## 2025-02-05 NOTE — NURSING
Pre-procedure call complete.  2 patient identifier used (name and ).   Arrival time 8:30.      No food after midnight.  You may drink clear liquids (sports drinks, clear juices) until 2 hours before arrival time.  Clear liquids include only water, black coffee (no creamer), clear oral rehydration drinks, clear sports drinks and clear fruit juices.  Clear liquids do NOT include anything with pulp or food particles (chicken broth, ice cream, yogurt, jello, etc).  NO orange juice, pulpy juices, or apple cider.  If you can read newsprint through the liquid, it qualifies as clear.  IF UNSURE, drink water instead.      Have NOTHING BY MOUTH 2 hours before arrival time.  Medication the morning of your procedure may be taken with a sip of water.    Patient aware will need someone to provide transport home and monitor pt 8 hours post procedure.  No driving for at least 24 hours after procedure.  Patient reports she  does not take blood pressure, heart medications, seizure and anti-rejection medications.   Do not take sleep medication (including OTC) the night before procedure.  Arrival time and location given.  Expected length of stay reviewed.  Covid screening completed.  Patient verbalized understanding of all pre-procedure instructions.  Written instructions and directions sent to patient in Mychart/portal.

## 2025-02-06 ENCOUNTER — HOSPITAL ENCOUNTER (OUTPATIENT)
Dept: INTERVENTIONAL RADIOLOGY/VASCULAR | Facility: HOSPITAL | Age: 45
Discharge: HOME | End: 2025-02-06
Attending: FAMILY MEDICINE
Payer: COMMERCIAL

## 2025-02-06 VITALS
HEIGHT: 60 IN | RESPIRATION RATE: 14 BRPM | DIASTOLIC BLOOD PRESSURE: 78 MMHG | OXYGEN SATURATION: 100 % | TEMPERATURE: 98 F | WEIGHT: 115 LBS | HEART RATE: 66 BPM | SYSTOLIC BLOOD PRESSURE: 123 MMHG | BODY MASS INDEX: 22.58 KG/M2

## 2025-02-06 DIAGNOSIS — C50.311 MALIGNANT NEOPLASM OF LOWER-INNER QUADRANT OF RIGHT BREAST OF FEMALE, ESTROGEN RECEPTOR POSITIVE: ICD-10-CM

## 2025-02-06 DIAGNOSIS — Z95.828 PORT-A-CATH IN PLACE: ICD-10-CM

## 2025-02-06 DIAGNOSIS — C50.919 BREAST CANCER: ICD-10-CM

## 2025-02-06 DIAGNOSIS — Z17.0 MALIGNANT NEOPLASM OF LOWER-INNER QUADRANT OF RIGHT BREAST OF FEMALE, ESTROGEN RECEPTOR POSITIVE: ICD-10-CM

## 2025-02-06 LAB
B-HCG UR QL: NEGATIVE
CTP QC/QA: YES

## 2025-02-06 PROCEDURE — 94760 N-INVAS EAR/PLS OXIMETRY 1: CPT

## 2025-02-06 PROCEDURE — 63600175 PHARM REV CODE 636 W HCPCS: Performed by: STUDENT IN AN ORGANIZED HEALTH CARE EDUCATION/TRAINING PROGRAM

## 2025-02-06 PROCEDURE — 36590 REMOVAL TUNNELED CV CATH: CPT | Mod: LT | Performed by: STUDENT IN AN ORGANIZED HEALTH CARE EDUCATION/TRAINING PROGRAM

## 2025-02-06 PROCEDURE — 99152 MOD SED SAME PHYS/QHP 5/>YRS: CPT | Mod: ,,, | Performed by: STUDENT IN AN ORGANIZED HEALTH CARE EDUCATION/TRAINING PROGRAM

## 2025-02-06 PROCEDURE — 81025 URINE PREGNANCY TEST: CPT | Performed by: FAMILY MEDICINE

## 2025-02-06 PROCEDURE — 99900035 HC TECH TIME PER 15 MIN (STAT)

## 2025-02-06 PROCEDURE — 99152 MOD SED SAME PHYS/QHP 5/>YRS: CPT | Performed by: STUDENT IN AN ORGANIZED HEALTH CARE EDUCATION/TRAINING PROGRAM

## 2025-02-06 RX ORDER — MIDAZOLAM HYDROCHLORIDE 1 MG/ML
INJECTION, SOLUTION INTRAMUSCULAR; INTRAVENOUS
Status: COMPLETED | OUTPATIENT
Start: 2025-02-06 | End: 2025-02-06

## 2025-02-06 RX ORDER — FENTANYL CITRATE 50 UG/ML
INJECTION, SOLUTION INTRAMUSCULAR; INTRAVENOUS
Status: COMPLETED | OUTPATIENT
Start: 2025-02-06 | End: 2025-02-06

## 2025-02-06 RX ORDER — LIDOCAINE HYDROCHLORIDE 10 MG/ML
1 INJECTION, SOLUTION EPIDURAL; INFILTRATION; INTRACAUDAL; PERINEURAL ONCE
Status: DISCONTINUED | OUTPATIENT
Start: 2025-02-06 | End: 2025-02-07 | Stop reason: HOSPADM

## 2025-02-06 RX ADMIN — MIDAZOLAM HYDROCHLORIDE 3 MG: 1 INJECTION, SOLUTION INTRAMUSCULAR; INTRAVENOUS at 10:02

## 2025-02-06 RX ADMIN — FENTANYL CITRATE 50 MCG: 50 INJECTION, SOLUTION INTRAMUSCULAR; INTRAVENOUS at 10:02

## 2025-02-06 RX ADMIN — MIDAZOLAM HYDROCHLORIDE 1 MG: 1 INJECTION, SOLUTION INTRAMUSCULAR; INTRAVENOUS at 10:02

## 2025-02-06 NOTE — BRIEF OP NOTE
Radiology Post-Procedure Note    Pre Op Diagnosis: indwelling port    Post Op Diagnosis: same    Procedure: port removal     Procedure performed by: Caitlin Garza MD    Written Informed Consent Obtained: Yes    Specimen Removed: YES port    Estimated Blood Loss: Minimal    Findings:   Removal of dwelling port    Patient tolerated procedure well.    Caitlin Garza MD  Interventional Radiology

## 2025-02-06 NOTE — PLAN OF CARE
Pt in preop bay 40, VSS, meds given and IV inserted. Pt denies any open wounds on body or the use of any weight loss injections. Pt needs H&P and procedure consent.

## 2025-02-06 NOTE — DISCHARGE SUMMARY
Prep Survey      Flowsheet Row Responses   [facility] facility patient discharged from? [facility]   Is LACE score < 7 ? No   Eligibility Readm Mgmt   Discharge diagnosis Abdominal pain   Does the patient have one of the following disease processes/diagnoses(primary or secondary)? Other   Does the patient have Home health ordered? No   Is there a DME ordered? No   Medication alerts for this patient see avs for all new and changed meds   Prep survey completed? Yes            Ellen TRINH - Registered Nurse           Radiology Discharge Summary      Hospital Course: No complications    Admit Date: 2/6/2025  Discharge Date: 02/06/2025     Instructions Given to Patient: Yes  Diet: Resume prior diet  Activity: no heavy lifting, pushing, pulling with the implant side for 5 days    Description of Condition on Discharge: Stable  Vital Signs (Most Recent): Temp: 98.6 °F (37 °C) (02/06/25 0859)  Pulse: 83 (02/06/25 0859)  Resp: 16 (02/06/25 0859)  BP: (!) 152/89 (02/06/25 0859)  SpO2: 100 % (02/06/25 0859)    Discharge Disposition: Home    Discharge Diagnosis: h/o indewlling port    Follow up: As scheduled    Caitlin Garza MD  Interventional Radiology

## 2025-02-06 NOTE — DISCHARGE INSTRUCTIONS
Special Instructions   Wound care: The dressing over your port site may be removed after 3-4  days. After removing the dressing, gently wash area daily with mild soap  and water. Pat dry. Cover with sterile dressing or band aid for 7-10 days  or until site is healed.    How do I care for a skin adhesive?  You may have a type a skin adhesive (glue) instead of a bandage. Do not  bandage a wound treated with a skin adhesive. The skin adhesive works like a  bandage.   Do not use antibiotic ointment as it can break down the adhesive.   You can shower while the adhesive is on your skin (same day is ok), but do  not take a bath or soak or scrub the area for 14 days. Dry your skin by  patting it gently with a towel.   The adhesive will peel off on its own, usually in 5 to 10 days. o If it has been  10 days and you still have adhesive on you, you can use antibiotic ointment  or petroleum jelly to gently rub it off.

## 2025-02-06 NOTE — H&P
Interventional Radiology Pre-Procedure History & Physical      Chief Complaint/Reason for Referral: indwelling port    History of Present Illness:  Tasha Cornejo is a 45 y.o. female who presents for port removal    Past Medical History:   Diagnosis Date    Acid reflux     Chalazion     Food allergy     discontinued dairy to help eosinophilic esophagitis    Hx of psychiatric care     Malignant neoplasm of lower-inner quadrant of right breast of female, estrogen receptor positive 2023    Seasonal allergies ongoing/unsure    Therapy     Wears reading eyeglasses      Past Surgical History:   Procedure Laterality Date    ADENOIDECTOMY  1983    BILATERAL MASTECTOMY Bilateral 2023    Procedure: MASTECTOMY, BILATERAL;  Surgeon: Tasha Mcleod MD;  Location: [facility];  Service: General;  Laterality: Bilateral;  3.5 HOURS / EMAIL SENT  @ 1:03 LK    COLONOSCOPY N/A 2023    Procedure: COLONOSCOPY;  Surgeon: Fabio Rice MD;  Location: [address]);  Service: Endoscopy;  Laterality: N/A;  pt confirmed earlier time  EB    DEBRIDEMENT, BREAST Right 2024    Procedure: DEBRIDEMENT, BREAST;  Surgeon: Allen Damian MD;  Location: [facility];  Service: Plastics;  Laterality: Right;  1 HOUR    ESOPHAGOGASTRODUODENOSCOPY N/A 2023    Procedure: EGD (ESOPHAGOGASTRODUODENOSCOPY);  Surgeon: Fabio Rice MD;  Location: [address]);  Service: Endoscopy;  Laterality: N/A;  Procedure Timin-4 weeks    referred by Dr. Rice/Prep instructions handed to patient and to portal.  Patient called did not answer- DB    ESOPHAGOGASTRODUODENOSCOPY N/A 10/18/2023    Procedure: EGD (ESOPHAGOGASTRODUODENOSCOPY);  Surgeon: Fabio Rice MD;  [facility]  Service: Endoscopy;  Laterality: N/A;  ref Dwayne  timeframe 5-12 weeks   Dr. Rice patient  portal instruction and given to patient jm  10/11-precall complete-MS    EXCISION, FREE FLAP, PREVIOUSLY APPLIED Right 2024    Procedure:  EXCISION, FREE FLAP, PREVIOUSLY APPLIED;  Surgeon: Allen Damian MD;  Location: [facility];  Service: Plastics;  Laterality: Right;    EXPLORATION, FLAP OR GRAFT SITE Left 12/14/2024    Procedure: EXPLORATION, FLAP OR GRAFT SITE;  Surgeon: Olga Solitario MD;  Location: [facility];  Service: Plastics;  Laterality: Left;  Flap exploration, washout, revision, possible tissue expander or permanent implant placement (Base width of 12 cm, 250 ml or less implant)  Need the microscope and synovis microset    INJECTION FOR SENTINEL NODE IDENTIFICATION Right 12/14/2023    Procedure: INJECTION, FOR SENTINEL NODE IDENTIFICATION;  Surgeon: Tasha Mcleod MD;  Location: [facility];  Service: General;  Laterality: Right;    INSERTION OF BREAST IMPLANT Left 12/14/2024    Procedure: INSERTION, BREAST IMPLANT;  Surgeon: Olga Solitario MD;  Location: [facility];  Service: Plastics;  Laterality: Left;    INSERTION OF BREAST TISSUE EXPANDER Bilateral 12/14/2023    Procedure: INSERTION, TISSUE EXPANDER, BREAST;  Surgeon: Allen Damian MD;  Location: [facility];  Service: Plastics;  Laterality: Bilateral;  2 HOURS    INSERTION, CATHETER, TUNNELED Left 12/14/2023    Procedure: INSERTION, CATHETER, TUNNELED;  Surgeon: Tasha Mcleod MD;  Location: [facility];  Service: General;  Laterality: Left;    PLACEMENT, MEDIPORT      RECONSTRUCTION OF BREAST WITH DEEP INFERIOR EPIGASTRIC ARTERY  (NATHALIE) FREE FLAP Bilateral 12/11/2024    Procedure: RECONSTRUCTION, BREAST, USING LUMBAR ARTERY FLAP AND INTERPOSITION GRAFT DIEA/DIEV;  Surgeon: Allen Damian MD;  Location: [facility];  Service: Plastics;  Laterality: Bilateral;    REMOVAL OF BREAST IMPLANT Left 12/17/2024    Procedure: REMOVAL, IMPLANT, BREAST;  Surgeon: Allen Damian MD;  Location: [facility];  Service: Plastics;  Laterality: Left;    SENTINEL LYMPH NODE BIOPSY Right 12/14/2023    Procedure: BIOPSY, LYMPH NODE, SENTINEL;  Surgeon: Tasha Mcleod MD;  Location: [facility];   Service: General;  Laterality: Right;    TISSUE EXPANDER REMOVAL Bilateral 12/11/2024    Procedure: REMOVAL, TISSUE EXPANDER;  Surgeon: Allen Damian MD;  Location: [facility];  Service: Plastics;  Laterality: Bilateral;    TYMPANOSTOMY TUBE PLACEMENT  1983       Allergies:   Review of patient's allergies indicates:   Allergen Reactions    Aleve [naproxen sodium] Other (See Comments)     Throat swelling    Bactrim [sulfamethoxazole-trimethoprim] Rash        Home Meds:   Prior to Admission medications    Medication Sig Start Date End Date Taking? Authorizing Provider   fexofenadine (ALLEGRA) 180 MG tablet Take 180 mg by mouth once daily.   Yes Provider, Historical   multivitamin (THERAGRAN) per tablet Take 1 tablet by mouth once daily.   Yes Provider, Historical   tamoxifen (NOLVADEX) 20 MG Tab Take 1 tablet (20 mg total) by mouth once daily. 1/21/25 1/21/26 Yes Linda Mcbride MD   dicyclomine (BENTYL) 20 mg tablet Take 1 tablet (20 mg total) by mouth every 6 (six) hours. As needed for stomach discomfort 1/16/25   Gabrielle Woo NP   LIDOcaine-prilocaine (EMLA) cream Apply topically to port one hour prior to chemotherapy infusion 1/17/24   Gabrielle Woo NP       Anticoagulation/Antiplatelet Meds: no anticoagulation    Review of Systems:   Hematological: no known coagulopathies  Respiratory: no shortness of breath  Cardiovascular: no chest pain  Gastrointestinal: no abdominal pain  Genitourinary: no dysuria  Musculoskeletal: negative  Neurological: no TIA or stroke symptoms     Physical Exam:  Temp: 98.6 °F (37 °C) (02/06/25 0859)  Pulse: 83 (02/06/25 0859)  Resp: 16 (02/06/25 0859)  BP: (!) 152/89 (02/06/25 0859)  SpO2: 100 % (02/06/25 0859)    General: WNWD, NAD  HEENT: Normocephalic, sclera anicteric, oropharynx clear  Neck: Supple, no palpable lymphadenopathy  Heart: RRR  Chest: well healed port in the left chest  Lungs:  breathing unlabored  Abd: NTND, soft  Extremities:  no CCE on exposed  skin  Neuro: Gross nonfocal    Laboratory:  Lab Results   Component Value Date    INR 0.9 12/17/2024       Lab Results   Component Value Date    WBC 4.20 11/21/2024    HGB 13.5 11/21/2024    HCT 42.0 11/21/2024    MCV 92 11/21/2024     11/21/2024      Lab Results   Component Value Date    GLU 88 11/27/2024     11/27/2024    K 4.9 11/27/2024     11/27/2024    CO2 25 11/27/2024    BUN 18 11/27/2024    CREATININE 0.9 11/27/2024    CALCIUM 9.0 11/27/2024    ALT 15 11/27/2024    AST 22 11/27/2024    ALBUMIN 4.1 11/27/2024    BILITOT 0.4 11/27/2024       Imaging:  Port placement was reviewed.    Assessment/Plan:  45 y.o. female with indwelling port. Will undergo port removal today.    Sedation:  Sedation history: have not been any systemic reactions  ASA: 2 / Mallampati: 2  Sedation plan: Up to moderate (Versed, fentanyl)     Risks (including, but not limited to, pain, bleeding, infection, damage to nearby structures, treatment failure/recurrence, and the need for additional procedures), potential benefits, and alternatives were discussed with the patient. All questions were answered to the best of my abilities. The patient wishes to proceed. Written informed consent was obtained.      Caitlin Garza MD

## 2025-02-18 NOTE — PROGRESS NOTES
The patient location is: [place]  The chief complaint leading to consultation is: new breast cancer     Visit type: audiovisual    Face to Face time with patient: 26 minutes   34 minutes of total time spent on the encounter, which includes face to face time and non-face to face time preparing to see the patient (eg, review of tests), Obtaining and/or reviewing separately obtained history, Documenting clinical information in the electronic or other health record, Independently interpreting results (not separately reported) and communicating results to the patient/family/caregiver, or Care coordination (not separately reported).     Each patient to whom he or she provides medical services by telemedicine is:  (1) informed of the relationship between the physician and patient and the respective role of any other health care provider with respect to management of the patient; and (2) notified that he or she may decline to receive medical services by telemedicine and may withdraw from such care at any time.    Oncology Nutrition Assessment for Medical Nutrition Therapy  Follow up Visit    Tasha Cornejo   1980    Referring Provider:  No ref. provider found      Reason for Visit: Pt in for education and nutrition counseling     PMHx:   Past Medical History:   Diagnosis Date    Acid reflux     Chalazion     Food allergy     discontinued dairy to help eosinophilic esophagitis    Hx of psychiatric care     Malignant neoplasm of lower-inner quadrant of right breast of female, estrogen receptor positive 11/06/2023    Seasonal allergies ongoing/unsure    Therapy     Wears reading eyeglasses        Nutrition Assessment    Patient is a 45 y.o.female with newly diagnosed breast cancer and BARD1 mutation s/p bilateral mastectomy and recently completed adjuvant chemotherapy. She is currently on Tamoxifen.   She reports wanting to check in about diet and supplement concerns long term while on Tamoxifen.     Weight:52.2 kg  (115 lb)  Height:5' (1.524 m)  BMI:Body mass index is 22.46 kg/m².   IBW: Ideal body weight: 45.5 kg (100 lb 4.9 oz)  Adjusted ideal body weight: 48.2 kg (106 lb 3 oz)    Allergies: Aleve [naproxen sodium] and Bactrim [sulfamethoxazole-trimethoprim]    Current Medications:    Current Outpatient Medications:     dicyclomine (BENTYL) 20 mg tablet, Take 1 tablet (20 mg total) by mouth every 6 (six) hours. As needed for stomach discomfort, Disp: 40 tablet, Rfl: 0    fexofenadine (ALLEGRA) 180 MG tablet, Take 180 mg by mouth once daily., Disp: , Rfl:     LIDOcaine-prilocaine (EMLA) cream, Apply topically to port one hour prior to chemotherapy infusion, Disp: 30 g, Rfl: 1    multivitamin (THERAGRAN) per tablet, Take 1 tablet by mouth once daily., Disp: , Rfl:     tamoxifen (NOLVADEX) 20 MG Tab, Take 1 tablet (20 mg total) by mouth once daily., Disp: 90 tablet, Rfl: 3  No current facility-administered medications for this visit.    Facility-Administered Medications Ordered in Other Visits:     ceFAZolin 1 g, gentamicin 80 mg in sodium chloride 0.9% 500 mL irrigation, , Irrigation, On Call Procedure, Allen Damian MD    ceFAZolin 1 g, gentamicin 80 mg in sodium chloride 0.9% 500 mL irrigation, , Irrigation, On Call Procedure, Allen Damian MD    Vitamins/Supplements: multivitamin- Centrum women's 50+ (does include vitamin D), valerian root, turmeric      Labs: Reviewed from 12/17/24  Also noted previous history of vitamin D deficiency     Nutrition Diagnosis    Problem: nutrition related knowledge deficit   Etiology (related to): lack of prior need for nutrition education  Signs/Symptoms (as evidenced by): new cancer diagnosis and self reported diet questions/concerns     Nutrition Intervention    Nutrition Prescription   1300 Kcals (25kcal/kg)  52-62 g protein (1g/kg)   5202-7302 mL fluid (25-30mL/kg)    Recommendations:  Plant forward diet   -high fiber to help with gut health   -avoid highly processed foods and  meat     Supplements   -separate from Tamoxifen by at least 4 hours   -increase vitamin D to 2000 IU daily and re check in 3-6 months   -continue multi and turmeric (brands reviewed)    Additional risk reduction:    -Weight bearing exercise at least twice per week   -maintain a healthy weight   -Well visits and labs (CBC, CMB, lipid panel, Vitamin D, A1c, etc)    Nutrition Monitoring and Evaluation    Monitor: energy intake, diet tolerance , and diet education needs     Goals: maintain weight, meet fluid goals, continue to focus on diet quality     Follow up PRN    Communication to referring provider/care team: note available in chart     Counseling time: 30 Minutes    Sydnee Waddell, MPH, RD, , LDN, FAND   [phone]

## 2025-02-19 ENCOUNTER — CLINICAL SUPPORT (OUTPATIENT)
Dept: HEMATOLOGY/ONCOLOGY | Facility: CLINIC | Age: 45
End: 2025-02-19
Payer: COMMERCIAL

## 2025-02-19 VITALS — BODY MASS INDEX: 22.58 KG/M2 | HEIGHT: 60 IN | WEIGHT: 115 LBS

## 2025-02-19 DIAGNOSIS — Z17.0 MALIGNANT NEOPLASM OF LOWER-INNER QUADRANT OF RIGHT BREAST OF FEMALE, ESTROGEN RECEPTOR POSITIVE: ICD-10-CM

## 2025-02-19 DIAGNOSIS — C50.311 MALIGNANT NEOPLASM OF LOWER-INNER QUADRANT OF RIGHT BREAST OF FEMALE, ESTROGEN RECEPTOR POSITIVE: ICD-10-CM

## 2025-02-19 DIAGNOSIS — E55.9 VITAMIN D DEFICIENCY: ICD-10-CM

## 2025-02-19 DIAGNOSIS — Z71.3 NUTRITIONAL COUNSELING: Primary | ICD-10-CM

## 2025-02-19 DIAGNOSIS — Z15.01 BARD1 GENE MUTATION POSITIVE: ICD-10-CM

## 2025-02-25 ENCOUNTER — PATIENT MESSAGE (OUTPATIENT)
Dept: HEMATOLOGY/ONCOLOGY | Facility: CLINIC | Age: 45
End: 2025-02-25
Payer: COMMERCIAL

## 2025-02-28 ENCOUNTER — TELEPHONE (OUTPATIENT)
Dept: HEMATOLOGY/ONCOLOGY | Facility: CLINIC | Age: 45
End: 2025-02-28
Payer: COMMERCIAL

## 2025-02-28 NOTE — TELEPHONE ENCOUNTER
----- Message from Abilio sent at 2/28/2025 12:28 PM CST -----  Contact: [phone]  [email or web address] Patient is calling for Medical Advice regarding: [employer] asking for last office notesPatient wants a call back or thru [facility]: callComments: Fax: [phone] Please advise patient replies from provider may take up to 48 hours.

## 2025-03-03 ENCOUNTER — PATIENT MESSAGE (OUTPATIENT)
Dept: HEMATOLOGY/ONCOLOGY | Facility: CLINIC | Age: 45
End: 2025-03-03
Payer: COMMERCIAL

## 2025-04-04 ENCOUNTER — OFFICE VISIT (OUTPATIENT)
Dept: HEMATOLOGY/ONCOLOGY | Facility: CLINIC | Age: 45
End: 2025-04-04
Payer: COMMERCIAL

## 2025-04-04 ENCOUNTER — LAB VISIT (OUTPATIENT)
Dept: LAB | Facility: HOSPITAL | Age: 45
End: 2025-04-04
Payer: COMMERCIAL

## 2025-04-04 VITALS
WEIGHT: 112.88 LBS | RESPIRATION RATE: 18 BRPM | OXYGEN SATURATION: 100 % | SYSTOLIC BLOOD PRESSURE: 138 MMHG | DIASTOLIC BLOOD PRESSURE: 83 MMHG | TEMPERATURE: 98 F | BODY MASS INDEX: 22.16 KG/M2 | HEART RATE: 87 BPM | HEIGHT: 60 IN

## 2025-04-04 DIAGNOSIS — C50.311 MALIGNANT NEOPLASM OF LOWER-INNER QUADRANT OF RIGHT BREAST OF FEMALE, ESTROGEN RECEPTOR POSITIVE: Primary | ICD-10-CM

## 2025-04-04 DIAGNOSIS — Z17.0 MALIGNANT NEOPLASM OF LOWER-INNER QUADRANT OF RIGHT BREAST OF FEMALE, ESTROGEN RECEPTOR POSITIVE: ICD-10-CM

## 2025-04-04 DIAGNOSIS — C50.311 MALIGNANT NEOPLASM OF LOWER-INNER QUADRANT OF RIGHT BREAST OF FEMALE, ESTROGEN RECEPTOR POSITIVE: ICD-10-CM

## 2025-04-04 DIAGNOSIS — Z17.0 MALIGNANT NEOPLASM OF LOWER-INNER QUADRANT OF RIGHT BREAST OF FEMALE, ESTROGEN RECEPTOR POSITIVE: Primary | ICD-10-CM

## 2025-04-04 DIAGNOSIS — Z15.01 BARD1 GENE MUTATION POSITIVE: ICD-10-CM

## 2025-04-04 DIAGNOSIS — Z79.810 CARE RELATED TO CURRENT TAMOXIFEN USE: ICD-10-CM

## 2025-04-04 LAB
ABSOLUTE NEUTROPHIL COUNT (OHS): 3.54 K/UL (ref 1.8–7.7)
ALBUMIN SERPL BCP-MCNC: 3.8 G/DL (ref 3.5–5.2)
ALP SERPL-CCNC: 43 UNIT/L (ref 40–150)
ALT SERPL W/O P-5'-P-CCNC: 10 UNIT/L (ref 10–44)
ANION GAP (OHS): 6 MMOL/L (ref 8–16)
AST SERPL-CCNC: 14 UNIT/L (ref 11–45)
BILIRUB SERPL-MCNC: 0.2 MG/DL (ref 0.1–1)
BUN SERPL-MCNC: 17 MG/DL (ref 6–20)
CALCIUM SERPL-MCNC: 9 MG/DL (ref 8.7–10.5)
CHLORIDE SERPL-SCNC: 108 MMOL/L (ref 95–110)
CO2 SERPL-SCNC: 27 MMOL/L (ref 23–29)
CREAT SERPL-MCNC: 0.7 MG/DL (ref 0.5–1.4)
ERYTHROCYTE [DISTWIDTH] IN BLOOD BY AUTOMATED COUNT: 12.3 % (ref 11.5–14.5)
GFR SERPLBLD CREATININE-BSD FMLA CKD-EPI: >60 ML/MIN/1.73/M2
GLUCOSE SERPL-MCNC: 88 MG/DL (ref 70–110)
HCT VFR BLD AUTO: 41.1 % (ref 37–48.5)
HGB BLD-MCNC: 13.4 GM/DL (ref 12–16)
IMM GRANULOCYTES # BLD AUTO: 0.01 K/UL (ref 0–0.04)
MCH RBC QN AUTO: 29.6 PG (ref 27–31)
MCHC RBC AUTO-ENTMCNC: 32.6 G/DL (ref 32–36)
MCV RBC AUTO: 91 FL (ref 82–98)
PLATELET # BLD AUTO: 291 K/UL (ref 150–450)
PMV BLD AUTO: 9.4 FL (ref 9.2–12.9)
POTASSIUM SERPL-SCNC: 4.5 MMOL/L (ref 3.5–5.1)
PROT SERPL-MCNC: 7.4 GM/DL (ref 6–8.4)
RBC # BLD AUTO: 4.53 M/UL (ref 4–5.4)
SODIUM SERPL-SCNC: 141 MMOL/L (ref 136–145)
WBC # BLD AUTO: 6.16 K/UL (ref 3.9–12.7)

## 2025-04-04 PROCEDURE — 36415 COLL VENOUS BLD VENIPUNCTURE: CPT

## 2025-04-04 PROCEDURE — 85027 COMPLETE CBC AUTOMATED: CPT

## 2025-04-04 PROCEDURE — 80053 COMPREHEN METABOLIC PANEL: CPT

## 2025-04-04 PROCEDURE — 99999 PR PBB SHADOW E&M-EST. PATIENT-LVL IV: CPT | Mod: PBBFAC,,, | Performed by: INTERNAL MEDICINE

## 2025-04-04 NOTE — PROGRESS NOTES
[facility]/ [facility]   at [facility] Note:      Chief Complaint:   Encounter Diagnoses   Name Primary?    Malignant neoplasm of lower-inner quadrant of right breast of female, estrogen receptor positive Yes    BARD1 gene mutation positive     Care related to current tamoxifen use         Cancer Staging   Malignant neoplasm of lower-inner quadrant of right breast of female, estrogen receptor positive  Staging form: Breast, AJCC 8th Edition  - Clinical stage from 10/26/2023: Stage IIA (cT2, cN0, cM0, G3, ER+, KY-, HER2+) - Signed by Linda Mcbride MD on 2023  - Pathologic stage from 2023: Stage IA (pT1c, pN0, cM0, G2, ER+, KY-, HER2+) - Signed by Linda Mcbride MD on 2024    HPI:  Tasha Cornejo is a 45 y.o. female who presents today for follow up on tamoxifen. She is have a lot of concentration and memory issues as well as increased word finding difficulties. Additionally, she is having a lot of fatigue. She has been on Tamoxifen 20mg daily, resumed it in December and has noticed these symptoms since that time       Oncology History  Screening mammogram on 10/5/2023 identified coarse heterogeneous calcifications in a linear distribution seen in the lower outer quadrant of the right breast, spanning 1.5 cm.  Diagnostic mammogram on 10/12/2023 showed coarse heterogeneous calcifications in a regional distribution spanning 3.2 cm long axis   Biopsy 10/26/2023 with pathology revealing infiltrating ductal carcinoma of the breast, ER 70%/ KY-/HER2 3+; Ki67 60%    Bilateral mastectomy 23: IDC, grade 2, 1.5cm in maximum; 0/2 LN+    Adjuvant TH 24  Adjuvant tamoxifen:  2024 (held Dec 2024 due to surgery)    GYN History:  Age of menarche was 9.   Age of menopause n/a.  Patient denies hormonal therapy.   Patient is .     Patient Active Problem List   Diagnosis    Rhinitis, allergic    Allergic conjunctivitis    Vitamin D  deficiency    Malignant neoplasm of lower-inner quadrant of right breast of female, estrogen receptor positive    Chemotherapy-induced nausea    At risk for lymphedema    Stress and adjustment reaction    Physical deconditioning    Immunodeficiency due to drugs    Decreased ROM of right shoulder    Shoulder weakness    Anxiety about health    Palpitations    Chronic right hip pain    Weakness of right lower extremity       Current Outpatient Medications   Medication Instructions    dicyclomine (BENTYL) 20 mg, Oral, Every 6 hours, As needed for stomach discomfort    fexofenadine (ALLEGRA) 180 mg, Daily    LIDOcaine-prilocaine (EMLA) cream Apply topically to port one hour prior to chemotherapy infusion    multivitamin (THERAGRAN) per tablet 1 tablet, Daily    tamoxifen (NOLVADEX) 20 mg, Oral, Daily       Review of Systems:   Review of Systems   Respiratory:  Negative for cough and shortness of breath.    Cardiovascular:  Negative for chest pain.   Gastrointestinal:  Positive for abdominal pain. Negative for blood in stool, constipation, diarrhea, nausea and vomiting.   Genitourinary:  Negative for dysuria and frequency.   Musculoskeletal:  Positive for joint pain. Negative for back pain.   Skin:  Negative for rash.   Neurological:  Negative for headaches.   Endo/Heme/Allergies:         Night sweats   Psychiatric/Behavioral:  The patient is nervous/anxious.    All other systems reviewed and are negative.      PHYSICAL EXAM:  /83   Pulse 87   Temp 98 °F (36.7 °C) (Oral)   Resp 18   Ht 5' (1.524 m)   Wt 51.2 kg (112 lb 14 oz)   SpO2 100%   BMI 22.04 kg/m²     General Appearance:    Alert, cooperative, no distress, appears stated age   Head:    Normocephalic, without obvious abnormality, atraumatic   Lungs:     Clear to auscultation bilaterally, respirations unlabored    Heart:    Regular rate and rhythm, S1 and S2 normal, no murmur, rub    or gallop   Breast:   Bilateral mastectomy    Extremities:    Extremities normal, atraumatic, no cyanosis or edema   Pulses:   2+ and symmetric all extremities   Skin:   Skin color, texture, turgor normal, no rashes or lesions   Neurologic:   CNII-XII intact, normal gait     Pertinent Labs & Imaging:  Pathology Results  (Last 30 days)      None            Recent Results (from the past 24 hours)   CBC Oncology    Collection Time: 04/04/25 10:52 AM   Result Value Ref Range    WBC 6.16 3.90 - 12.70 K/uL    RBC 4.53 4.00 - 5.40 M/uL    HGB 13.4 12.0 - 16.0 gm/dL    HCT 41.1 37.0 - 48.5 %    MCV 91 82 - 98 fL    MCH 29.6 27.0 - 31.0 pg    MCHC 32.6 32.0 - 36.0 g/dL    RDW 12.3 11.5 - 14.5 %    Platelet Count 291 150 - 450 K/uL    MPV 9.4 9.2 - 12.9 fL    Neut # 3.54 1.8 - 7.7 K/uL    Imm Grans # 0.01 0.00 - 0.04 K/uL   Comprehensive Metabolic Panel    Collection Time: 04/04/25 10:52 AM   Result Value Ref Range    Sodium 141 136 - 145 mmol/L    Potassium 4.5 3.5 - 5.1 mmol/L    Chloride 108 95 - 110 mmol/L    CO2 27 23 - 29 mmol/L    Glucose 88 70 - 110 mg/dL    BUN 17 6 - 20 mg/dL    Creatinine 0.7 0.5 - 1.4 mg/dL    Calcium 9.0 8.7 - 10.5 mg/dL    Protein Total 7.4 6.0 - 8.4 gm/dL    Albumin 3.8 3.5 - 5.2 g/dL    Bilirubin Total 0.2 0.1 - 1.0 mg/dL    ALP 43 40 - 150 unit/L    AST 14 11 - 45 unit/L    ALT 10 10 - 44 unit/L    Anion Gap 6 (L) 8 - 16 mmol/L    eGFR >60 >60 mL/min/1.73/m2           Assessment & Plan:  1. Malignant neoplasm of lower-inner quadrant of right breast of female, estrogen receptor positive  - Ambulatory referral/consult to [facility]; Future    2. BARD1 gene mutation positive    3. Care related to current tamoxifen use  - Ambulatory referral/consult to [facility]; Future    Patient with ER+/HER2+ breast cancer s/p adjuvant TH and on tamoxifen   She has been having side effects to the tamoxifen including fatigue, decreased concentration and word finding difficulties  Recommend split dose of tamoxifen to 10mg  BID  Will refer to [facility] for discussion with Dr Garza/ Dorita Gipson for survivorship care discussion and side effect management   Patient agrees to this plan   Follow up 3 mos     Route Chart for Scheduling    Med Onc Chart Routing      Follow up with physician 6 months.   Follow up with BHARGAVI 3 months.   Infusion scheduling note    Injection scheduling note    Labs    Imaging    Pharmacy appointment    Other referrals                     MDM includes  :    - Acute or chronic illness or injury that poses a threat to life or bodily function  - Independent review and explanation of 2 results from unique tests  - Extensive discussion of treatment and management  - Prescription drug management  - Drug therapy requiring intensive monitoring for toxicity    Visit today included increased complexity associated with the care of the episodic problem breast cancer addressed and managing the longitudinal care of the patient due to serious and/or complex managed problem(s).      Linda Mcbride MD   04/04/2025

## 2025-04-04 NOTE — Clinical Note
Maikol Kevin,  I put in a consult for this patient. She's young, on tamoxifen, and having a lot of side effects. I'm changing her dose to 10mg BID in hopes this helps some, but I think she would benefit from seeing y'all  Thanks Linda

## 2025-04-07 ENCOUNTER — TELEPHONE (OUTPATIENT)
Dept: OBSTETRICS AND GYNECOLOGY | Facility: CLINIC | Age: 45
End: 2025-04-07
Payer: COMMERCIAL

## 2025-04-07 ENCOUNTER — PATIENT MESSAGE (OUTPATIENT)
Dept: HEMATOLOGY/ONCOLOGY | Facility: CLINIC | Age: 45
End: 2025-04-07
Payer: COMMERCIAL

## 2025-04-07 NOTE — TELEPHONE ENCOUNTER
----- Message from Dorita Gipson PA-C sent at 4/7/2025  9:49 AM CDT -----  Is there anywhere to put this patient for 30 minute survivorship visit in the next month? Me or Kayla? Thanks!  ----- Message -----  From: Linda Mcbride MD  Sent: 4/4/2025   1:00 PM CDT  To: ANGEL Merchant,    I put in a consult for this patient. She's young, on tamoxifen, and having a lot of side effects. I'm changing her dose to 10mg BID in hopes this helps some, but I think she would benefit from seeing y'all    Thanks  Linda

## 2025-04-07 NOTE — TELEPHONE ENCOUNTER
LVM for patient regarding scheduling an appointment to discuss side effects of Tamoxifen. Dr. Garza is available on 05/15.

## 2025-04-07 NOTE — TELEPHONE ENCOUNTER
----- Message from Tatyana sent at 4/7/2025 10:52 AM CDT -----  Pt called said she missed a call from the office about scheduling she can be reached at [phone]

## 2025-04-08 ENCOUNTER — PATIENT MESSAGE (OUTPATIENT)
Dept: OBSTETRICS AND GYNECOLOGY | Facility: CLINIC | Age: 45
End: 2025-04-08
Payer: COMMERCIAL

## 2025-04-11 ENCOUNTER — CLINICAL SUPPORT (OUTPATIENT)
Dept: OBSTETRICS AND GYNECOLOGY | Facility: CLINIC | Age: 45
End: 2025-04-11
Payer: COMMERCIAL

## 2025-04-11 DIAGNOSIS — N95.1 MENOPAUSAL SYMPTOMS: Primary | ICD-10-CM

## 2025-04-16 ENCOUNTER — OFFICE VISIT (OUTPATIENT)
Dept: PLASTIC SURGERY | Facility: CLINIC | Age: 45
End: 2025-04-16
Attending: PLASTIC SURGERY
Payer: COMMERCIAL

## 2025-04-16 VITALS — DIASTOLIC BLOOD PRESSURE: 80 MMHG | HEART RATE: 98 BPM | SYSTOLIC BLOOD PRESSURE: 125 MMHG

## 2025-04-16 DIAGNOSIS — Z17.0 MALIGNANT NEOPLASM OF LOWER-INNER QUADRANT OF RIGHT BREAST OF FEMALE, ESTROGEN RECEPTOR POSITIVE: Primary | ICD-10-CM

## 2025-04-16 DIAGNOSIS — C50.311 MALIGNANT NEOPLASM OF LOWER-INNER QUADRANT OF RIGHT BREAST OF FEMALE, ESTROGEN RECEPTOR POSITIVE: Primary | ICD-10-CM

## 2025-04-16 NOTE — PROGRESS NOTES
Plastic & Reconstructive Surgery clinic    Tasha Cornejo is a 45 year old female s/p bilateral LAP flap breast reconstruction c/b L breast flap loss, elective R flap removal here for follow up. The skin redundancy to the breasts around the NAC does bother the pt, but otherwise doing well, no major complaints.     Exam:  Gen: AO, NAD  Breasts: incisions well healed, breast skin soft and healthy, soft tissue stepoff from mastectomy flap irregularity w/ skin redundancy around NAC bilaterally. no wounds/collections, no signs of infection.      Tasha Cornejo is a 45 y.o. female s/p bilateral LAP flap breast reconstruction c/b L breast flap loss, elective R flap removal, doing well post op.  -discussed addressing the skin redundancy and soft tissue stepoff with fat grafting as a minor revision to address pt's concerns. Will allow further healing and re-discuss at next visit.  -follow up 3 months    Dat Chu MD  Plastic & Reconstructive Surgery  [phone]

## 2025-04-21 ENCOUNTER — PATIENT MESSAGE (OUTPATIENT)
Dept: HEMATOLOGY/ONCOLOGY | Facility: CLINIC | Age: 45
End: 2025-04-21
Payer: COMMERCIAL

## 2025-04-21 DIAGNOSIS — C50.311 MALIGNANT NEOPLASM OF LOWER-INNER QUADRANT OF RIGHT BREAST OF FEMALE, ESTROGEN RECEPTOR POSITIVE: Primary | ICD-10-CM

## 2025-04-21 DIAGNOSIS — Z17.0 MALIGNANT NEOPLASM OF LOWER-INNER QUADRANT OF RIGHT BREAST OF FEMALE, ESTROGEN RECEPTOR POSITIVE: Primary | ICD-10-CM

## 2025-04-21 RX ORDER — TAMOXIFEN CITRATE 10 MG/1
10 TABLET ORAL 2 TIMES DAILY
Qty: 60 TABLET | Refills: 11 | Status: SHIPPED | OUTPATIENT
Start: 2025-04-21 | End: 2026-04-21

## 2025-04-22 ENCOUNTER — TELEPHONE (OUTPATIENT)
Dept: SURGERY | Facility: CLINIC | Age: 45
End: 2025-04-22
Payer: COMMERCIAL

## 2025-05-15 ENCOUNTER — OFFICE VISIT (OUTPATIENT)
Dept: OBSTETRICS AND GYNECOLOGY | Facility: CLINIC | Age: 45
End: 2025-05-15
Attending: OBSTETRICS & GYNECOLOGY
Payer: COMMERCIAL

## 2025-05-15 VITALS
BODY MASS INDEX: 22.19 KG/M2 | HEART RATE: 70 BPM | WEIGHT: 113 LBS | DIASTOLIC BLOOD PRESSURE: 90 MMHG | HEIGHT: 60 IN | SYSTOLIC BLOOD PRESSURE: 138 MMHG

## 2025-05-15 DIAGNOSIS — C50.311 MALIGNANT NEOPLASM OF LOWER-INNER QUADRANT OF RIGHT BREAST OF FEMALE, ESTROGEN RECEPTOR POSITIVE: ICD-10-CM

## 2025-05-15 DIAGNOSIS — Z17.0 MALIGNANT NEOPLASM OF LOWER-INNER QUADRANT OF RIGHT BREAST OF FEMALE, ESTROGEN RECEPTOR POSITIVE: ICD-10-CM

## 2025-05-15 DIAGNOSIS — Z79.810 CARE RELATED TO CURRENT TAMOXIFEN USE: ICD-10-CM

## 2025-05-15 PROCEDURE — 99999 PR PBB SHADOW E&M-EST. PATIENT-LVL III: CPT | Mod: PBBFAC,,, | Performed by: OBSTETRICS & GYNECOLOGY

## 2025-05-15 PROCEDURE — 3075F SYST BP GE 130 - 139MM HG: CPT | Mod: CPTII,S$GLB,, | Performed by: OBSTETRICS & GYNECOLOGY

## 2025-05-15 PROCEDURE — 3008F BODY MASS INDEX DOCD: CPT | Mod: CPTII,S$GLB,, | Performed by: OBSTETRICS & GYNECOLOGY

## 2025-05-15 PROCEDURE — 99203 OFFICE O/P NEW LOW 30 MIN: CPT | Mod: S$GLB,,, | Performed by: OBSTETRICS & GYNECOLOGY

## 2025-05-15 PROCEDURE — 1159F MED LIST DOCD IN RCRD: CPT | Mod: CPTII,S$GLB,, | Performed by: OBSTETRICS & GYNECOLOGY

## 2025-05-15 PROCEDURE — 3080F DIAST BP >= 90 MM HG: CPT | Mod: CPTII,S$GLB,, | Performed by: OBSTETRICS & GYNECOLOGY

## 2025-05-15 RX ORDER — ESTRADIOL 10 UG/1
10 INSERT VAGINAL
Qty: 8 EACH | Refills: 11 | Status: SHIPPED | OUTPATIENT
Start: 2025-05-15

## 2025-05-15 RX ORDER — TURMERIC 400 MG
CAPSULE ORAL
COMMUNITY

## 2025-05-15 NOTE — PROGRESS NOTES
"SUBJECTIVE:   45 y.o. female   presents today to talk about side effects related to TamoxifenPatient's last menstrual period was 2025..  She was on Tamoxifen 20mg daily then decreased to10mg daily for 2 weeks and has now increased to 10mg bid split dosing. Her work schedule lightened some and she reprots feeling better that she had been  Sleep is better- was interupted - fan has helped- less night sweats- sleeping well lately  She reports hot flashes have diminshed and night seats have as well. She reports "feels always on edge"  She has taken 25 years ago _ took Paxil and Wellbutrin- did not like how she felt- "really drousy".      She reports that she walks for exercise as well as some strength training.  She plans to start running again.  She reports brain fog as the side effect of her cancer treatment.  She feels that she moves at a slower pace.  She reports it is difficult to work under pressure.  Past Medical History:   Diagnosis Date    Abnormal Pap smear of cervix     8years ago    Acid reflux     Chalazion     Food allergy     discontinued dairy to help eosinophilic esophagitis    Hx of psychiatric care     Malignant neoplasm of lower-inner quadrant of right breast of female, estrogen receptor positive 2023    Seasonal allergies ongoing/unsure    Therapy     Wears reading eyeglasses      Past Surgical History:   Procedure Laterality Date    ADENOIDECTOMY      BILATERAL MASTECTOMY Bilateral 2023    Procedure: MASTECTOMY, BILATERAL;  Surgeon: Tasha Mcleod MD;  Location: [facility];  Service: General;  Laterality: Bilateral;  3.5 HOURS / EMAIL SENT - @ 1:03 LK    COLONOSCOPY N/A 2023    Procedure: COLONOSCOPY;  Surgeon: Fabio Rice MD;  Location: [address]);  Service: Endoscopy;  Laterality: N/A;  pt confirmed earlier time  EB    COLPOSCOPY      DEBRIDEMENT, BREAST Right 2024    Procedure: DEBRIDEMENT, BREAST;  Surgeon: Allen Damian MD;  Location: " [facility] OR;  Service: Plastics;  Laterality: Right;  1 HOUR    ESOPHAGOGASTRODUODENOSCOPY N/A 2023    Procedure: EGD (ESOPHAGOGASTRODUODENOSCOPY);  Surgeon: Fabio Rice MD;  Location: [facility] (4TH FLR);  Service: Endoscopy;  Laterality: N/A;  Procedure Timin-4 weeks    referred by Dr. Rice/Prep instructions handed to patient and to portal.  Patient called did not answer- DB    ESOPHAGOGASTRODUODENOSCOPY N/A 10/18/2023    Procedure: EGD (ESOPHAGOGASTRODUODENOSCOPY);  Surgeon: Fabio Rice MD;  Location: [facility] (4TH FLR);  Service: Endoscopy;  Laterality: N/A;  ref Ray  timeframe 5-12 weeks   Dr. Rice patient  portal instruction and given to patient jm  10/11-precall complete-MS    EXCISION, FREE FLAP, PREVIOUSLY APPLIED Right 2024    Procedure: EXCISION, FREE FLAP, PREVIOUSLY APPLIED;  Surgeon: Allen Damian MD;  Location: [facility] OR;  Service: Plastics;  Laterality: Right;    EXPLORATION, FLAP OR GRAFT SITE Left 2024    Procedure: EXPLORATION, FLAP OR GRAFT SITE;  Surgeon: Olga Solitario MD;  Location: [facility] OR;  Service: Plastics;  Laterality: Left;  Flap exploration, washout, revision, possible tissue expander or permanent implant placement (Base width of 12 cm, 250 ml or less implant)  Need the microscope and synovis microset    INJECTION FOR SENTINEL NODE IDENTIFICATION Right 2023    Procedure: INJECTION, FOR SENTINEL NODE IDENTIFICATION;  Surgeon: Tasha Mcleod MD;  Location: [facility] OR;  Service: General;  Laterality: Right;    INSERTION OF BREAST IMPLANT Left 2024    Procedure: INSERTION, BREAST IMPLANT;  Surgeon: Olga Solitario MD;  Location: [facility] OR;  Service: Plastics;  Laterality: Left;    INSERTION OF BREAST TISSUE EXPANDER Bilateral 2023    Procedure: INSERTION, TISSUE EXPANDER, BREAST;  Surgeon: Allen Damian MD;  Location: [facility] OR;  Service: Plastics;  Laterality: Bilateral;  2 HOURS    INSERTION, CATHETER, TUNNELED Left 2023    Procedure:  INSERTION, CATHETER, TUNNELED;  Surgeon: Tasha Mcleod MD;  Location: [facility];  Service: General;  Laterality: Left;    PLACEMENT, MEDIPORT      RECONSTRUCTION OF BREAST WITH DEEP INFERIOR EPIGASTRIC ARTERY  (NATHALIE) FREE FLAP Bilateral 12/11/2024    Procedure: RECONSTRUCTION, BREAST, USING LUMBAR ARTERY FLAP AND INTERPOSITION GRAFT DIEA/DIEV;  Surgeon: Allen Damian MD;  Location: [facility] [place] OR;  Service: Plastics;  Laterality: Bilateral;    REMOVAL OF BREAST IMPLANT Left 12/17/2024    Procedure: REMOVAL, IMPLANT, BREAST;  Surgeon: Allen Damian MD;  Location: [facility];  Service: Plastics;  Laterality: Left;    SENTINEL LYMPH NODE BIOPSY Right 12/14/2023    Procedure: BIOPSY, LYMPH NODE, SENTINEL;  Surgeon: Tasha Mcleod MD;  Location: [facility];  Service: General;  Laterality: Right;    TISSUE EXPANDER REMOVAL Bilateral 12/11/2024    Procedure: REMOVAL, TISSUE EXPANDER;  Surgeon: Allen Damian MD;  Location: [facility];  Service: Plastics;  Laterality: Bilateral;    TYMPANOSTOMY TUBE PLACEMENT  1983     Social History[1]  Family History   Problem Relation Name Age of Onset    Solid tumor Mother Father         uterine polyps    Cancer Mother Father     Hypertension Mother Father     Hypertension Father Fahad     Prostate cancer Father Fahad 67        tx: XRT seeds    Pancreatic cancer Paternal Uncle Vern 68        subtype unk; tx: unk    Cancer Maternal Grandmother Kasandra 68        mother thinks that a cancer dx was on death cert.    Cancer Maternal Grandfather Maternal Grandfather         esophageal (abn. cells at 54, tumor at 62)    Heart failure Paternal Grandmother Paternal Grandmother     Albinism Paternal Grandmother Paternal Grandmother     Alzheimer's disease Paternal Grandmother Paternal Grandmother     Diabetes Paternal Grandfather Paternal Grandfather     Heart failure Paternal Grandfather Paternal Grandfather     Stroke Paternal Grandfather Paternal Grandfather     Breast cancer Other  Johanna         dx.late 50s or 60s (tx: unk)    Cancer Other Nichole         cervical or ovarian, dx.mid-60s (tx: unk)    Cancer Other E.J.         lung, dx.mid-60s    Cancer Other Luke III         lung, dx. early 60s    Solid tumor Other          stomach tumor (noncancerous)    Cataracts Neg Hx      Glaucoma Neg Hx      Macular degeneration Neg Hx      Colon cancer Neg Hx       OB History    Para Term  AB Living   0 0 0 0 0 0   SAB IAB Ectopic Multiple Live Births   0 0 0 0 0           Current Medications[2]  Allergies: Aleve [naproxen sodium] and Bactrim [sulfamethoxazole-trimethoprim]     The ASCVD Risk score (Maggie CARL, et al., 2019) failed to calculate for the following reasons:    The valid total cholesterol range is 130 to 320 mg/dL      ROS:  Constitutional: no weight loss, weight gain, fever, fatigue  Eyes:  No vision changes, glasses/contacts  ENT/Mouth: No ulcers, sinus problems, ears ringing, headache  Cardiovascular: No inability to lie flat, chest pain, exercise intolerance, swelling, heart palpitations  Respiratory: No wheezing, coughing blood, shortness of breath, or cough  Gastrointestinal: No diarrhea, bloody stool, nausea/vomiting, constipation, gas, hemorrhoids  Genitourinary: No blood in urine, painful urination, urgency of urination, frequency of urination, incomplete emptying, incontinence, abnormal bleeding, painful periods, heavy periods, vaginal discharge, vaginal odor, painful intercourse, sexual problems, bleeding after intercourse.  Musculoskeletal: No muscle weakness  Skin/Breast: No painful breasts, nipple discharge, masses, rash, ulcers  Neurological: No passing out, seizures, numbness, headache  Endocrine: No diabetes, hypothyroid, hyperthyroid, hot flashes, hair loss, abnormal hair growth, acne  Psychiatric: No depression, crying, +anxiety  Hematologic: No bruises, bleeding, swollen lymph nodes, anemia.      Physical Exam    deferred  ASSESSMENT:   1. Malignant neoplasm  of lower-inner quadrant of right breast of female, estrogen receptor positive  Ambulatory referral/consult to [facility]      2. Care related to current tamoxifen use  Ambulatory referral/consult to [facility]            PLAN:   Counseled her on the safety and efficacy of intravaginal estrogen  Counseled her on the use of creatine start with 2.5 g per day that increased to 5 g per day.  This has been shown to help with brain fog as well as improve muscle and strength  Counseled her on the use of lines main to help with cognition  Encouraged adequate protein intake  Counseled her on various therapies we have at the cancer center to help with anxiety symptoms.     Vaginal Estrogen Therapy Use and Survival in Females With Breast Cancer.Megan L, Jolie AM, Ana Cristina CAC, Dandre B, Dacia C, Brian Ú, Kavon SA, Aaron P, Felicia DOWNING.    ANDREW Oncol. 2024 Jan 1;10(1):103-108. doi: [email or web address].  PMID: [id number]         [1]   Social History  Socioeconomic History    Marital status:      Spouse name: Oseas   Occupational History    Occupation:    Tobacco Use    Smoking status: Never     Passive exposure: Never    Smokeless tobacco: Never   Substance and Sexual Activity    Alcohol use: Not Currently     Comment: Less than a drink/monthly    Drug use: Not Currently    Sexual activity: Yes     Partners: Male     Birth control/protection: None     Social Drivers of Health     Financial Resource Strain: Low Risk  (12/12/2024)    Overall Financial Resource Strain (CARDIA)     Difficulty of Paying Living Expenses: Not hard at all   Food Insecurity: No Food Insecurity (12/12/2024)    Hunger Vital Sign     Worried About Running Out of Food in the Last Year: Never true     Ran Out of Food in the Last Year: Never true   Transportation Needs: No Transportation Needs (12/12/2024)    TRANSPORTATION NEEDS     Transportation : No   Physical  Activity: Sufficiently Active (12/12/2024)    Exercise Vital Sign     Days of Exercise per Week: 4 days     Minutes of Exercise per Session: 60 min   Stress: Stress Concern Present (12/12/2024)    [employer] - Occupational Stress Questionnaire     Feeling of Stress : Rather much   Housing Stability: Unknown (12/12/2024)    Housing Stability Vital Sign     Unable to Pay for Housing in the Last Year: No     Homeless in the Last Year: No   [2]   Current Outpatient Medications   Medication Sig Dispense Refill    ergocalciferol, vitamin D2, (VITAMIN D ORAL) Take by mouth.      fexofenadine (ALLEGRA) 180 MG tablet Take 180 mg by mouth once daily.      MAGNESIUM GLYCINATE ORAL       multivitamin (THERAGRAN) per tablet Take 1 tablet by mouth once daily.      tamoxifen (NOLVADEX) 10 MG Tab Take 1 tablet (10 mg total) by mouth 2 (two) times a day. 60 tablet 11    turmeric 400 mg Cap        No current facility-administered medications for this visit.     Facility-Administered Medications Ordered in Other Visits   Medication Dose Route Frequency Provider Last Rate Last Admin    ceFAZolin 1 g, gentamicin 80 mg in sodium chloride 0.9% 500 mL irrigation   Irrigation On Call Procedure Allen Damian MD        ceFAZolin 1 g, gentamicin 80 mg in sodium chloride 0.9% 500 mL irrigation   Irrigation On Call Procedure Allen Damian MD

## 2025-05-21 ENCOUNTER — LAB VISIT (OUTPATIENT)
Dept: LAB | Facility: HOSPITAL | Age: 45
End: 2025-05-21
Payer: COMMERCIAL

## 2025-05-21 ENCOUNTER — OFFICE VISIT (OUTPATIENT)
Dept: INTERNAL MEDICINE | Facility: CLINIC | Age: 45
End: 2025-05-21
Payer: COMMERCIAL

## 2025-05-21 VITALS
SYSTOLIC BLOOD PRESSURE: 120 MMHG | WEIGHT: 115.31 LBS | OXYGEN SATURATION: 100 % | BODY MASS INDEX: 22.64 KG/M2 | DIASTOLIC BLOOD PRESSURE: 75 MMHG | HEIGHT: 60 IN | HEART RATE: 73 BPM

## 2025-05-21 DIAGNOSIS — E55.9 VITAMIN D DEFICIENCY: ICD-10-CM

## 2025-05-21 DIAGNOSIS — F41.1 GENERALIZED ANXIETY DISORDER: ICD-10-CM

## 2025-05-21 DIAGNOSIS — Z00.00 ANNUAL PHYSICAL EXAM: Primary | ICD-10-CM

## 2025-05-21 DIAGNOSIS — Z00.00 ANNUAL PHYSICAL EXAM: ICD-10-CM

## 2025-05-21 LAB
25(OH)D3+25(OH)D2 SERPL-MCNC: 43 NG/ML (ref 30–96)
ABSOLUTE EOSINOPHIL (OHS): 0.42 K/UL
ABSOLUTE MONOCYTE (OHS): 0.52 K/UL (ref 0.3–1)
ABSOLUTE NEUTROPHIL COUNT (OHS): 3.53 K/UL (ref 1.8–7.7)
ALBUMIN SERPL BCP-MCNC: 3.8 G/DL (ref 3.5–5.2)
ALP SERPL-CCNC: 41 UNIT/L (ref 40–150)
ALT SERPL W/O P-5'-P-CCNC: 12 UNIT/L (ref 10–44)
ANION GAP (OHS): 9 MMOL/L (ref 8–16)
AST SERPL-CCNC: 23 UNIT/L (ref 11–45)
BASOPHILS # BLD AUTO: 0.04 K/UL
BASOPHILS NFR BLD AUTO: 0.7 %
BILIRUB SERPL-MCNC: 0.3 MG/DL (ref 0.1–1)
BUN SERPL-MCNC: 16 MG/DL (ref 6–20)
CALCIUM SERPL-MCNC: 8.5 MG/DL (ref 8.7–10.5)
CHLORIDE SERPL-SCNC: 107 MMOL/L (ref 95–110)
CHOLEST SERPL-MCNC: 132 MG/DL (ref 120–199)
CHOLEST/HDLC SERPL: 2.7 {RATIO} (ref 2–5)
CO2 SERPL-SCNC: 25 MMOL/L (ref 23–29)
CREAT SERPL-MCNC: 0.7 MG/DL (ref 0.5–1.4)
EAG (OHS): 100 MG/DL (ref 68–131)
ERYTHROCYTE [DISTWIDTH] IN BLOOD BY AUTOMATED COUNT: 12.9 % (ref 11.5–14.5)
GFR SERPLBLD CREATININE-BSD FMLA CKD-EPI: >60 ML/MIN/1.73/M2
GLUCOSE SERPL-MCNC: 80 MG/DL (ref 70–110)
HBA1C MFR BLD: 5.1 % (ref 4–5.6)
HCT VFR BLD AUTO: 42.8 % (ref 37–48.5)
HCV AB SERPL QL IA: NORMAL
HDLC SERPL-MCNC: 49 MG/DL (ref 40–75)
HDLC SERPL: 37.1 % (ref 20–50)
HGB BLD-MCNC: 13.2 GM/DL (ref 12–16)
HIV 1+2 AB+HIV1 P24 AG SERPL QL IA: NORMAL
IMM GRANULOCYTES # BLD AUTO: 0.02 K/UL (ref 0–0.04)
IMM GRANULOCYTES NFR BLD AUTO: 0.3 % (ref 0–0.5)
LDLC SERPL CALC-MCNC: 70.8 MG/DL (ref 63–159)
LYMPHOCYTES # BLD AUTO: 1.42 K/UL (ref 1–4.8)
MCH RBC QN AUTO: 28.9 PG (ref 27–31)
MCHC RBC AUTO-ENTMCNC: 30.8 G/DL (ref 32–36)
MCV RBC AUTO: 94 FL (ref 82–98)
NONHDLC SERPL-MCNC: 83 MG/DL
NUCLEATED RBC (/100WBC) (OHS): 0 /100 WBC
PLATELET # BLD AUTO: 247 K/UL (ref 150–450)
PMV BLD AUTO: 10.8 FL (ref 9.2–12.9)
POTASSIUM SERPL-SCNC: 4.3 MMOL/L (ref 3.5–5.1)
PROT SERPL-MCNC: 6.9 GM/DL (ref 6–8.4)
RBC # BLD AUTO: 4.57 M/UL (ref 4–5.4)
RELATIVE EOSINOPHIL (OHS): 7.1 %
RELATIVE LYMPHOCYTE (OHS): 23.9 % (ref 18–48)
RELATIVE MONOCYTE (OHS): 8.7 % (ref 4–15)
RELATIVE NEUTROPHIL (OHS): 59.3 % (ref 38–73)
SODIUM SERPL-SCNC: 141 MMOL/L (ref 136–145)
TRIGL SERPL-MCNC: 61 MG/DL (ref 30–150)
TSH SERPL-ACNC: 1.57 UIU/ML (ref 0.4–4)
WBC # BLD AUTO: 5.95 K/UL (ref 3.9–12.7)

## 2025-05-21 PROCEDURE — 86803 HEPATITIS C AB TEST: CPT

## 2025-05-21 PROCEDURE — 87389 HIV-1 AG W/HIV-1&-2 AB AG IA: CPT

## 2025-05-21 PROCEDURE — 36415 COLL VENOUS BLD VENIPUNCTURE: CPT

## 2025-05-21 PROCEDURE — 83036 HEMOGLOBIN GLYCOSYLATED A1C: CPT

## 2025-05-21 PROCEDURE — 99396 PREV VISIT EST AGE 40-64: CPT | Mod: S$GLB,,, | Performed by: INTERNAL MEDICINE

## 2025-05-21 PROCEDURE — 99999 PR PBB SHADOW E&M-EST. PATIENT-LVL III: CPT | Mod: PBBFAC,,, | Performed by: INTERNAL MEDICINE

## 2025-05-21 PROCEDURE — 3008F BODY MASS INDEX DOCD: CPT | Mod: CPTII,S$GLB,, | Performed by: INTERNAL MEDICINE

## 2025-05-21 PROCEDURE — 80061 LIPID PANEL: CPT

## 2025-05-21 PROCEDURE — 3078F DIAST BP <80 MM HG: CPT | Mod: CPTII,S$GLB,, | Performed by: INTERNAL MEDICINE

## 2025-05-21 PROCEDURE — 82306 VITAMIN D 25 HYDROXY: CPT

## 2025-05-21 PROCEDURE — 84443 ASSAY THYROID STIM HORMONE: CPT

## 2025-05-21 PROCEDURE — 3074F SYST BP LT 130 MM HG: CPT | Mod: CPTII,S$GLB,, | Performed by: INTERNAL MEDICINE

## 2025-05-21 PROCEDURE — 85025 COMPLETE CBC W/AUTO DIFF WBC: CPT

## 2025-05-21 PROCEDURE — 82040 ASSAY OF SERUM ALBUMIN: CPT

## 2025-05-21 PROCEDURE — 3044F HG A1C LEVEL LT 7.0%: CPT | Mod: CPTII,S$GLB,, | Performed by: INTERNAL MEDICINE

## 2025-05-21 NOTE — PROGRESS NOTES
Subjective:       Patient ID: Tasha Cornejo is a 45 y.o. female.    Chief Complaint: Establish Care    HPI    Ms. Cornejo presents today to establish care as a new patient and for annual checkup    BREAST CANCER HISTORY:  Biopsy 10/26/2023 with pathology revealing infiltrating ductal carcinoma of the breast, ER 70%/ CT-/HER2 3+; Ki67 60%     Bilateral mastectomy 12/14/23: IDC, grade 2, 1.5cm in maximum; 0/2 LN+     Adjuvant TH 2/1/24  Adjuvant tamoxifen:  June 2024 (held Dec 2024 due to surgery)    She is currently on Tamoxifen with dose adjustments due to side effects. Initial dosing of Tamoxifen 20mg daily was split into twice daily dosing, then reduced to 10mg daily for two weeks, and now back to twice daily dosing. She reports brain fog, fatigue, and difficulty with thought processing and concentration.    MENTAL HEALTH:  She reports persistently elevated levels of stress and tension. She received counseling for anxiety and stress management during chemotherapy treatment.     TACHYCARDIA/PALPITATIONS:   She was evaluated by cardiology last year for elevated heart rate. Testing was normal.     MEDICATIONS AND SUPPLEMENTS:  She takes a multivitamin and additional vitamin D supplementation to address vitamin D deficiency identified at last annual checkup.    FAMILY HISTORY:  She has significant family history of cardiovascular disease and neurological conditions. Grandmother had coronary heart disease and dementia, and her grandfather had diabetes and heart disease.    SOCIAL HISTORY:  She denies smoking history. Works as      Health Maintenance:  Colon Cancer Screening: normal colonoscopy in 2023. Repeat in 2030   Mammogram: as above.   HIV: negative 2025   Hep C: negative 2025   Lipids: order today   Pap Smear: normal May 2024   Vaccines: up to date       ROS:  General: -fever, -chills, +fatigue, -weight gain, -weight loss  Eyes: -vision changes, -redness, -discharge  ENT: -ear pain, -nasal  congestion, -sore throat  Cardiovascular: -chest pain, -palpitations, -lower extremity edema, +feelings of fast heart rate  Respiratory: -cough, -shortness of breath  Gastrointestinal: -abdominal pain, -nausea, -vomiting, -diarrhea, -constipation, -blood in stool  Genitourinary: -dysuria, -hematuria, -frequency  Musculoskeletal: -joint pain, -muscle pain  Skin: -rash, -lesion  Neurological: -headache, -dizziness, -numbness, -tingling, +confusion or disorientation, +lack of focus/concentration  Psychiatric: +anxiety, -depression, -sleep difficulty                  Past Medical History:   Diagnosis Date    Abnormal Pap smear of cervix     8years ago    Acid reflux     Chalazion     Food allergy     discontinued dairy to help eosinophilic esophagitis    Hx of psychiatric care     Malignant neoplasm of lower-inner quadrant of right breast of female, estrogen receptor positive 2023    Seasonal allergies ongoing/unsure    Therapy     Wears reading eyeglasses      Past Surgical History:   Procedure Laterality Date    ADENOIDECTOMY  1983    BILATERAL MASTECTOMY Bilateral 2023    Procedure: MASTECTOMY, BILATERAL;  Surgeon: Tasha Mcleod MD;  Location: [facility];  Service: General;  Laterality: Bilateral;  3.5 HOURS / EMAIL SENT  @ 1:03 LK    COLONOSCOPY N/A 2023    Procedure: COLONOSCOPY;  Surgeon: Fabio Rice MD;  Location: [facility] ([address] [place] [address]);  Service: Endoscopy;  Laterality: N/A;  pt confirmed earlier time  EB    COLPOSCOPY      DEBRIDEMENT, BREAST Right 2024    Procedure: DEBRIDEMENT, BREAST;  Surgeon: Allen Damian MD;  Location: [facility];  Service: Plastics;  Laterality: Right;  1 HOUR    ESOPHAGOGASTRODUODENOSCOPY N/A 2023    Procedure: EGD (ESOPHAGOGASTRODUODENOSCOPY);  Surgeon: Fabio Rice MD;  Location: [facility] ([facility]);  Service: Endoscopy;  Laterality: N/A;  Procedure Timin-4 weeks    referred by Dr. Rice/Prep instructions handed to patient and to  portal.  Patient called did not answer- DB    ESOPHAGOGASTRODUODENOSCOPY N/A 10/18/2023    Procedure: EGD (ESOPHAGOGASTRODUODENOSCOPY);  Surgeon: Fabio Rice MD;  Location: [address]);  Service: Endoscopy;  Laterality: N/A;  ref Ray  timeframe 5-12 weeks   Dr. Rice patient  portal instruction and given to patient jm  10/11-precall complete-MS    EXCISION, FREE FLAP, PREVIOUSLY APPLIED Right 12/17/2024    Procedure: EXCISION, FREE FLAP, PREVIOUSLY APPLIED;  Surgeon: Allen Damian MD;  Location: [facility];  Service: Plastics;  Laterality: Right;    EXPLORATION, FLAP OR GRAFT SITE Left 12/14/2024    Procedure: EXPLORATION, FLAP OR GRAFT SITE;  Surgeon: Olga Solitario MD;  Location: [facility];  Service: Plastics;  Laterality: Left;  Flap exploration, washout, revision, possible tissue expander or permanent implant placement (Base width of 12 cm, 250 ml or less implant)  Need the microscope and synovis microset    INJECTION FOR SENTINEL NODE IDENTIFICATION Right 12/14/2023    Procedure: INJECTION, FOR SENTINEL NODE IDENTIFICATION;  Surgeon: Tasha Mcleod MD;  Location: [facility];  Service: General;  Laterality: Right;    INSERTION OF BREAST IMPLANT Left 12/14/2024    Procedure: INSERTION, BREAST IMPLANT;  Surgeon: Olga Solitario MD;  Location: [facility];  Service: Plastics;  Laterality: Left;    INSERTION OF BREAST TISSUE EXPANDER Bilateral 12/14/2023    Procedure: INSERTION, TISSUE EXPANDER, BREAST;  Surgeon: Allen Damian MD;  Location: [facility];  Service: Plastics;  Laterality: Bilateral;  2 HOURS    INSERTION, CATHETER, TUNNELED Left 12/14/2023    Procedure: INSERTION, CATHETER, TUNNELED;  Surgeon: Tasha Mcleod MD;  Location: [facility];  Service: General;  Laterality: Left;    PLACEMENT, MEDIPORT      RECONSTRUCTION OF BREAST WITH DEEP INFERIOR EPIGASTRIC ARTERY  (NATHALIE) FREE FLAP Bilateral 12/11/2024    Procedure: RECONSTRUCTION, BREAST, USING LUMBAR ARTERY FLAP AND INTERPOSITION GRAFT  DIEA/DIEV;  Surgeon: Allen Damian MD;  Location: [facility];  Service: Plastics;  Laterality: Bilateral;    REMOVAL OF BREAST IMPLANT Left 12/17/2024    Procedure: REMOVAL, IMPLANT, BREAST;  Surgeon: Allen Damian MD;  Location: [facility] OR;  Service: Plastics;  Laterality: Left;    SENTINEL LYMPH NODE BIOPSY Right 12/14/2023    Procedure: BIOPSY, LYMPH NODE, SENTINEL;  Surgeon: Tasha Mcleod MD;  Location: [facility] OR;  Service: General;  Laterality: Right;    TISSUE EXPANDER REMOVAL Bilateral 12/11/2024    Procedure: REMOVAL, TISSUE EXPANDER;  Surgeon: Allen Damian MD;  Location: [facility] OR;  Service: Plastics;  Laterality: Bilateral;    TYMPANOSTOMY TUBE PLACEMENT  1983      Problem List[1]     Objective:      Physical Exam  Constitutional:       Appearance: Normal appearance.   HENT:      Head: Normocephalic.   Cardiovascular:      Rate and Rhythm: Normal rate and regular rhythm.      Pulses: Normal pulses.      Heart sounds: Normal heart sounds.   Pulmonary:      Effort: Pulmonary effort is normal.      Breath sounds: Normal breath sounds.   Abdominal:      General: Abdomen is flat. Bowel sounds are normal.      Palpations: Abdomen is soft.   Musculoskeletal:         General: Normal range of motion.      Cervical back: Normal range of motion and neck supple.   Skin:     General: Skin is warm and dry.   Neurological:      General: No focal deficit present.      Mental Status: She is alert and oriented to person, place, and time.   Psychiatric:         Mood and Affect: Mood normal.         Assessment:       Problem List Items Addressed This Visit          Endocrine    Vitamin D deficiency    Relevant Orders    Vitamin D 25 hydroxy     Other Visit Diagnoses         Annual physical exam    -  Primary    Relevant Orders    Comprehensive Metabolic Panel    CBC Auto Differential    Lipid Panel    Hemoglobin A1C    TSH    HIV 1/2 Ag/Ab (4th Gen)    Hepatitis C antibody            Plan:         Tasha was seen today  for establish care.    Diagnoses and all orders for this visit:    Annual physical exam  Colon Cancer Screening: normal colonoscopy in 2023. Repeat in 2030   Mammogram: as above.   HIV: negative 2025   Hep C: negative 2025   Lipids: order today   Pap Smear: normal May 2024   Vaccines: up to date   Annual wellness exam completed.     All medications, histories, and concerns reviewed, reconciled, and addressed.     Appropriate Screenings per pt's sex and age have been reviewed and discussed with pt.       Vitamin D deficiency  -     Vitamin D 25 hydroxy; Future  - Evaluated patient with low vitamin D levels since last annual checkup.  - Ms. Cornejo has started taking a multivitamin and vitamin D supplements.  - Plan to check vitamin D levels with annual labs.     GENERALIZED ANXIETY DISORDER:  - Evaluated patient who experiences elevated heart rate, stress, and tension most of the time.  - Ms. Cornejo has undergone talk therapy for anxiety and stress management.  - Discussed that SSRIs are first-line treatment for generalized anxiety disorder, specifically suggesting Lexapro if patient decides to pursue medication.  - Informed patient that Lexapro typically takes about 4 weeks to show noticeable effects.  She would like to hold off on medication for now.                  This note was generated with the assistance of ambient listening technology. Verbal consent was obtained by the patient and accompanying visitor(s) for the recording of patient appointment to facilitate this note. I attest to having reviewed and edited the generated note for accuracy, though some syntax or spelling errors may persist. Please contact the author of this note for any clarification.       Chiquita Pascal MD   Internal Medicine   Primary Care           [1]   Patient Active Problem List  Diagnosis    Rhinitis, allergic    Allergic conjunctivitis    Vitamin D deficiency    Malignant neoplasm of lower-inner quadrant of right breast of  female, estrogen receptor positive    Chemotherapy-induced nausea    At risk for lymphedema    Stress and adjustment reaction    Physical deconditioning    Immunodeficiency due to drugs    Decreased ROM of right shoulder    Shoulder weakness    Anxiety about health    Palpitations    Chronic right hip pain    Weakness of right lower extremity

## 2025-05-22 ENCOUNTER — RESULTS FOLLOW-UP (OUTPATIENT)
Dept: INTERNAL MEDICINE | Facility: CLINIC | Age: 45
End: 2025-05-22

## 2025-05-28 ENCOUNTER — PATIENT MESSAGE (OUTPATIENT)
Dept: SURGERY | Facility: CLINIC | Age: 45
End: 2025-05-28
Payer: COMMERCIAL

## 2025-06-16 ENCOUNTER — TELEPHONE (OUTPATIENT)
Dept: PSYCHIATRY | Facility: CLINIC | Age: 45
End: 2025-06-16
Payer: COMMERCIAL

## 2025-06-16 DIAGNOSIS — Z17.0 MALIGNANT NEOPLASM OF LOWER-INNER QUADRANT OF RIGHT BREAST OF FEMALE, ESTROGEN RECEPTOR POSITIVE: Primary | ICD-10-CM

## 2025-06-16 DIAGNOSIS — C50.311 MALIGNANT NEOPLASM OF LOWER-INNER QUADRANT OF RIGHT BREAST OF FEMALE, ESTROGEN RECEPTOR POSITIVE: Primary | ICD-10-CM

## 2025-06-17 ENCOUNTER — TELEPHONE (OUTPATIENT)
Dept: PSYCHIATRY | Facility: CLINIC | Age: 45
End: 2025-06-17
Payer: COMMERCIAL

## 2025-06-25 ENCOUNTER — OFFICE VISIT (OUTPATIENT)
Dept: SURGERY | Facility: CLINIC | Age: 45
End: 2025-06-25
Payer: COMMERCIAL

## 2025-06-25 ENCOUNTER — OFFICE VISIT (OUTPATIENT)
Dept: PLASTIC SURGERY | Facility: CLINIC | Age: 45
End: 2025-06-25
Attending: PLASTIC SURGERY
Payer: COMMERCIAL

## 2025-06-25 VITALS
BODY MASS INDEX: 22.58 KG/M2 | HEIGHT: 60 IN | DIASTOLIC BLOOD PRESSURE: 81 MMHG | HEART RATE: 76 BPM | SYSTOLIC BLOOD PRESSURE: 126 MMHG | WEIGHT: 115 LBS

## 2025-06-25 DIAGNOSIS — Z12.39 SCREENING BREAST EXAMINATION: ICD-10-CM

## 2025-06-25 DIAGNOSIS — Z90.13 S/P MASTECTOMY, BILATERAL: ICD-10-CM

## 2025-06-25 DIAGNOSIS — Z17.0 MALIGNANT NEOPLASM OF LOWER-INNER QUADRANT OF RIGHT BREAST OF FEMALE, ESTROGEN RECEPTOR POSITIVE: Primary | ICD-10-CM

## 2025-06-25 DIAGNOSIS — C50.311 MALIGNANT NEOPLASM OF LOWER-INNER QUADRANT OF RIGHT BREAST OF FEMALE, ESTROGEN RECEPTOR POSITIVE: Primary | ICD-10-CM

## 2025-06-25 DIAGNOSIS — Z15.01 BARD1 GENE MUTATION POSITIVE: ICD-10-CM

## 2025-06-25 DIAGNOSIS — Z85.3 PERSONAL HISTORY OF BREAST CANCER: Primary | ICD-10-CM

## 2025-06-25 PROCEDURE — 3074F SYST BP LT 130 MM HG: CPT | Mod: CPTII,S$GLB,, | Performed by: PHYSICIAN ASSISTANT

## 2025-06-25 PROCEDURE — 99999 PR PBB SHADOW E&M-EST. PATIENT-LVL III: CPT | Mod: PBBFAC,,, | Performed by: PHYSICIAN ASSISTANT

## 2025-06-25 PROCEDURE — 99203 OFFICE O/P NEW LOW 30 MIN: CPT | Mod: S$GLB,,, | Performed by: PHYSICIAN ASSISTANT

## 2025-06-25 PROCEDURE — 3079F DIAST BP 80-89 MM HG: CPT | Mod: CPTII,S$GLB,, | Performed by: PHYSICIAN ASSISTANT

## 2025-06-25 PROCEDURE — 3008F BODY MASS INDEX DOCD: CPT | Mod: CPTII,S$GLB,, | Performed by: PHYSICIAN ASSISTANT

## 2025-06-25 PROCEDURE — 3044F HG A1C LEVEL LT 7.0%: CPT | Mod: CPTII,S$GLB,, | Performed by: PHYSICIAN ASSISTANT

## 2025-06-25 NOTE — PROGRESS NOTES
Patient presents for follow-up.    She is still contemplating bilateral fat grafting to the chest    On exam:  All incisions are well healed     There is some excess skin in the lv areolar area    Good laxity of the skin soft tissue envelope     Assessment:  Stable     Plan she will see Dr. Solitario to discuss possible fat grafting to the chest in order to improve contour and eliminate the excess skin appearance

## 2025-06-25 NOTE — PROGRESS NOTES
[facility]  Department of Surgery      REFERRING PROVIDER: No referring provider defined for this encounter.    Chief Complaint: 1 Year CBE      DIAGNOSIS:   This is a 45 y.o. 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     TREATMENT:   Bilateral nipple sparing mastectomy and sentinel lymph node biopsy 12/14/2023, Dr. Mcleod   [facility], Dr. Mcbride.  Flap reconstruction with Dr. Damian and Dr. Solitario, ultimately flap was taken down, December 2024. Now without reconstruction.     HISTORY OF PRESENT ILLNESS:   Tasha Cornejo is a 45 y.o. female comes in for oncological follow up. Notes some anxiety related to her health and current options for reconstruction. Planning to follow up with Dr. Damian today to discuss options in more detail. She is established with Dr. Wright. She denies change in her breast self-exam specifically denying new masses, skin or nipple changes, or nipple discharge. Past medical and surgical history is updated with no new changes. There have been no changes to family history.     IMAGING:   NA    PHYSICAL EXAM:   /81   Pulse 76   Ht 5' (1.524 m)   Wt 52.2 kg (115 lb)   BMI 22.46 kg/m²   Physical Exam   Vitals reviewed.  Constitutional: She is oriented to person, place, and time.   HENT:   Head: Normocephalic and atraumatic.   Nose: Nose normal.   Eyes: Pupils are equal, round, and reactive to light. Right eye exhibits no discharge. Left eye exhibits no discharge.   Pulmonary/Chest: Effort normal and breath sounds normal. No stridor. No respiratory distress. She exhibits no mass, no tenderness and no edema. Right breast exhibits no inverted nipple, no mass, no nipple discharge, no skin change and no tenderness. Left breast exhibits no inverted nipple, no mass, no nipple discharge, no skin change and no tenderness. No breast swelling or bleeding. Breasts are symmetrical.    Abdominal: Normal appearance.   Genitourinary: No breast swelling or bleeding.   Neurological: She is alert and oriented to person, place, and time.   Skin: Skin is warm and dry.     Psychiatric: Her behavior is normal. Mood, judgment and thought content normal.        ASSESSMENT:   This is a 45 y.o. female without evidence of recurrence by exam, history or imaging.       PLAN:   1. Continue monthly self breast exams and call the clinic with any changes or problems.  2. No standard imaging recommended in setting of bilateral mastectomy.  3. Return to clinic in 6 months.    The patient is in agreement with the plan. Questions were encouraged and answered to patient's satisfaction. Tasha will call our office with any questions or concerns.

## 2025-07-03 ENCOUNTER — OFFICE VISIT (OUTPATIENT)
Dept: PSYCHIATRY | Facility: CLINIC | Age: 45
End: 2025-07-03
Payer: COMMERCIAL

## 2025-07-03 ENCOUNTER — PATIENT MESSAGE (OUTPATIENT)
Dept: PSYCHIATRY | Facility: CLINIC | Age: 45
End: 2025-07-03
Payer: COMMERCIAL

## 2025-07-03 DIAGNOSIS — Z17.0 MALIGNANT NEOPLASM OF LOWER-INNER QUADRANT OF RIGHT BREAST OF FEMALE, ESTROGEN RECEPTOR POSITIVE: ICD-10-CM

## 2025-07-03 DIAGNOSIS — R45.89 ANXIETY ABOUT HEALTH: ICD-10-CM

## 2025-07-03 DIAGNOSIS — F40.11 GENERALIZED SOCIAL PHOBIA: Primary | ICD-10-CM

## 2025-07-03 DIAGNOSIS — C50.311 MALIGNANT NEOPLASM OF LOWER-INNER QUADRANT OF RIGHT BREAST OF FEMALE, ESTROGEN RECEPTOR POSITIVE: ICD-10-CM

## 2025-07-03 NOTE — LETTER
July 3, 2025        Linda Mcbride MD  [address]  [facility] [address]             [facility] - Psychiatry  [address]  [facility] [address]  Phone: [phone]  Fax: [phone]   Patient: Tasha Cornejo   MR Number: [id number]   YOB: 1980   Date of Visit: 7/3/2025       Dear Dr. Mcbride:    Thank you for referring Tasha Cornejo to me for evaluation. Below are the relevant portions of my assessment and plan of care.    Tasha Cornejo is struggling to adapt to her cancer survivorship. She is very self-conscious about changes in her appearance and this has exacerbated her pre-existing social fears and decreased sexual engagement. She reports having been encouraged by several providers to consider antidepressants and is planning to pursue that option when she becomes more comfortable with her tamoxifen. She was provided with information about non-pharmacologic treatment for social anxiety and dysthymia. She will consider the options and re-contact me, as needed.    If you have questions, please do not hesitate to call me. I look forward to following Tasha along with you.    Sincerely,      River Meadows, PhD           CC  No Recipients

## 2025-07-03 NOTE — PROGRESS NOTES
Telemedicine PSYCHO-ONCOLOGY NOTE/ Individual Psychotherapy     Consultation started: 7/3/2025 at 10:01 AM   The patient location is: Patient home in [place], [place] (Provider licensed in [place], [place], [place])  Virtual visit with synchronous audio and video  Patient alone at the time of consultation    Crisis Disclaimer: Patient was informed that due to the virtual nature of the visit, that if a crisis develops, protocols will be implemented to ensure patient safety, including but not limited to: 1) Initiating a welfare check with local Law Enforcement, 2) Calling 1/National Crisis Hotline, and/or 3) Initiating PEC/CEC procedures.     Initially referred by: Rae    Each patient provided medical services by telemedicine is:  (1) informed of the relationship between the physician and patient and the respective role of any other health care provider with respect to management of the patient; and (2) notified that he or she may decline to receive medical services by telemedicine and may withdraw from such care at any time.    Date: 7/3/2025   [facility] of therapist:  [facility]          Therapeutic Intervention: Met with patient.    Outpatient - Behavior modifying psychotherapy 60 min - CPT code 26253  The patient's last visit with me was on 6/12/2024.    Problem list  Patient Active Problem List   Diagnosis    Rhinitis, allergic    Allergic conjunctivitis    Vitamin D deficiency    Malignant neoplasm of lower-inner quadrant of right breast of female, estrogen receptor positive    Chemotherapy-induced nausea    At risk for lymphedema    Stress and adjustment reaction    Physical deconditioning    Immunodeficiency due to drugs    Decreased ROM of right shoulder    Shoulder weakness    Anxiety about health    Palpitations    Chronic right hip pain    Weakness of right lower extremity       Chief complaint/reason for encounter: anxiety   Met with patient to evaluate psychosocial adaptation to diagnosis/treatment/survivorship of  breast cancer    Current Medications  Current Outpatient Medications   Medication    ergocalciferol, vitamin D2, (VITAMIN D ORAL)    estradioL (IMVEXXY MAINTENANCE PACK) 10 mcg Inst    fexofenadine (ALLEGRA) 180 MG tablet    MAGNESIUM GLYCINATE ORAL    multivitamin (THERAGRAN) per tablet    tamoxifen (NOLVADEX) 10 MG Tab    turmeric 400 mg Cap     No current facility-administered medications for this visit.     Facility-Administered Medications Ordered in Other Visits   Medication Frequency    ceFAZolin 1 g, gentamicin 80 mg in sodium chloride 0.9% 500 mL irrigation On Call Procedure    ceFAZolin 1 g, gentamicin 80 mg in sodium chloride 0.9% 500 mL irrigation On Call Procedure       Objective:  Tasha Cornejo arrived promptly for the session.   Ms. Cornejo was independently ambulatory at the time of session. The patient was fully cooperative throughout the session.  Appearance: age appropriate, casually  dressed, well groomed  Behavior/Cooperation: friendly and cooperative  Speech: normal in rate, volume, and tone and appropriate quality, quantity and organization of sentences  Mood: anxious, dysthymic  Affect: constricted, tearful  Thought Process: goal-directed, logical  Thought Content: normal,  No delusions or paranoia; did not appear to be responding to internal stimuli during the session  Orientation: grossly intact  Memory: Grossly intact  Attention Span/Concentration: Attends to session without distraction; reports no difficulty  Fund of Knowledge: average  Estimate of Intelligence: above average from verbal skills and history  Cognition: grossly intact  Insight: patient has awareness of illness; good insight into own behavior and behavior of others  Judgment: the patient's behavior is adequate to circumstances    Interval history and content of current session: Patient discussed events and activities since the time of last visit. Discussed current adaptation to disease and treatment status. Patient  "underwent reconstruction which led to the loss of her left flap and a decision to remove her right flap and have a flat closure. (She does not want artificial implants due to the need for future surgeries to maintain them.) She is also now on Tamoxifen. Reports to be coping with significant difficulty. Evaluated cognitive response, paying particular attention to negative intrusive thoughts of a persistent and detrimental nature. Thoughts of this type are in evidence with significant distress, detailed below.    Social Anxiety: Significant fear of negative evaluation by others. Feels she looks "a mess" and looks "weird." She "feel(s) like a failure." She does not feel comfortable with her short hair (has had it professionally cut and styled, but just awkward in transition). She has prostheses, but does not wear them regularly (only when meetings at work with people she doesn't know). She does not like how they feel or how she believes she looks in them. She prefers "how it feels to the aesthetics" but is still bothered by the aesthetics. Feels others  her ("because they don't know my back story"). She does not wear them when exercising. She has been avoiding meeting new people or going in social situations. 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 She feels awkward when she does go into situations with others (family parties, group exercise classes). She does not "know how to interact with people."  She struggles to set limits in work situations, but has improved with guidance by supervisor.    Sexual: 1x intercourse since completed surgeries. SHe does have interest. Distracted by her changes in appearance. Worried about 's response (though he has reassured her). Prior low baseline desire (no change with tamoxifen). SHe worries he will leave her for someone else due to her change in appearance ("I wouldn't blame him."). Sex was painful on " "tamoxifen. SHe is also very worried about unplanned pregnancy due to tamoxifen side effects (does use condoms).  fearful of vasectomy. SHe has not considered other birth control options.     Provided cognitive behavioral therapy to address negative cognitions. Discussed her interactions with others in social situations.  Discussed possible treatments for anxiety/dysphoria (individual CBT, self-guided exposure therapy, medications). She will think on them and re-contact. She is considering medications, but wishes to get to a point of stability with tamoxifen first (so she does not conflate side effects).  Prior medications Zoloft- left her feeling "like a zombie" Wellbutrin- increased mood swings and caused irritability.       Risk parameters:   Patient reports no suicidal ideation  Patient reports no homicidal ideation  Patient reports no self-injurious behavior  Patient reports no violent behavior   Safety needs:  None at this time      Verbal deficits: None     Patient's response to intervention:The patient's response to intervention is accepting.     Progress toward goals: Progress as Expected        Patient reported outcomes:      Distress thermometer:       7/1/2025     3:16 PM 4/4/2025    11:22 AM 4/3/2025     4:01 PM 1/9/2025     9:24 AM 12/26/2024    10:26 AM 12/23/2024    10:51 AM 11/21/2024     9:08 AM   DISTRESS SCREENING   Distress Score 0 - No Distress 0 - No Distress 0 - No Distress 0 - No Distress 0 - No Distress 0 - No Distress 5   Practical Concerns None of these  Treatment decisions None of these   None of these  None of these    Social Concerns Relationship with spouse or partner;Relationship with family members;Relationship with friends or coworkers  None of these Relationship with friends or coworkers  Relationship with family members;Relationship with friends or coworkers None of these   Emotional Concerns Loss of interest or enjoyment;Changes in appearance  Worry or anxiety Worry or " anxiety;Sadness or depression;Changes in appearance  Sadness or depression;Loneliness;Changes in appearance None of these   Spiritual or Rastafari Concerns None of these  None of these None of these  None of these    Physical Concerns None of these  Fatigue;Memory or concentration;Sexual health None of these  None of these None of these   Other Problems       Always feeling tense       Data saved with a previous flowsheet row definition           PHQ-9= Initial visit: not meeting screener  PHQ ANSWERS    Q1. Little interest or pleasure in doing things: Several days (07/01/25 1519)  Q2. Feeling down, depressed, or hopeless: Not at all (07/01/25 1519)  Q3. Trouble falling or staying asleep, or sleeping too much: Not at all (07/01/25 1519)  Q4. Feeling tired or having little energy: Not at all (07/01/25 1519)  Q5. Poor appetite or overeating: Not at all (07/01/25 1519)  Q6. Feeling bad about yourself - or that you are a failure or have let yourself or your family down: Several days (07/01/25 1519)  Q7. Trouble concentrating on things, such as reading the newspaper or watching television: Not at all (07/01/25 1519)  Q8. Moving or speaking so slowly that other people could have noticed. Or the opposite - being so fidgety or restless that you have been moving around a lot more than usual: Not at all (07/01/25 1519)  Q9.      PHQ8 Score : 2 (07/01/25 1519)  PHQ-9 Total Score: 2 (07/01/25 1519)   absent     CHEVY-7= Initial visit:11       7/1/2025     3:17 PM 6/12/2024     8:42 AM 4/25/2024     5:07 PM   GAD7   1. Feeling nervous, anxious, or on edge? 0 0 3   2. Not being able to stop or control worrying? 0 0 0   3. Worrying too much about different things? 0 0 1   4. Trouble relaxing? 0 0 1   5. Being so restless that it is hard to sit still? 0 0 0   6. Becoming easily annoyed or irritable? 0 0 1   7. Feeling afraid as if something awful might happen? 0 0 1   8. If you checked off any problems, how difficult have these  problems made it for you to do your work, take care of things at home, or get along with other people? 0     CHEVY-7 Score 0 0 7      mild      Client Strengths: verbal, intelligent, successful, good social support, good insight, commitment to wellness      Treatment Plan:individual psychotherapy  Target symptoms: anxiety   Why chosen therapy is appropriate versus another modality: patient responds to this modality  Outcome monitoring methods: self-report, checklist/rating scale  Therapeutic intervention type: behavior modifying psychotherapy  Prognosis: Good    Goals from last visit: Met   Behavioral goals prior to next visit:    Exercise: continue    Stress management: discuss birth control options with gyn   Social engagement:  limit avoidance    Possible support group participation?    Allow others to know about her cancer to decrease fear of their speculation   Nutrition:   Smoking Cessation:   Therapy: Medication discussion with PCP, onc, or gyn        Summary:  Tasha Cornejo is struggling to adapt to her cancer survivorship. She is very self-conscious about changes in her appearance and this has exacerbated her pre-existing social fears and decreased sexual engagement. She reports having been encouraged by several providers to consider antidepressants and is planning to pursue that option when she becomes more comfortable with her tamoxifen. She was provided with information about non-pharmacologic treatment for social anxiety and dysthymia. She will consider the options and re-contact me, as needed.    Return to clinic: as needed     Length of Service (minutes direct face-to-face contact): 60    Diagnosis:     ICD-10-CM ICD-9-CM   1. Anxiety about health  R45.89 799.29   2. Malignant neoplasm of lower-inner quadrant of right breast of female, estrogen receptor positive  C50.311 174.3    Z17.0 V86.0         River Meadows, PhD  [place] License #[id number]  [place] License #[id number]

## 2025-07-11 ENCOUNTER — OFFICE VISIT (OUTPATIENT)
Dept: HEMATOLOGY/ONCOLOGY | Facility: CLINIC | Age: 45
End: 2025-07-11
Payer: COMMERCIAL

## 2025-07-11 VITALS
TEMPERATURE: 98 F | HEIGHT: 60 IN | DIASTOLIC BLOOD PRESSURE: 80 MMHG | HEART RATE: 68 BPM | RESPIRATION RATE: 12 BRPM | WEIGHT: 118.06 LBS | SYSTOLIC BLOOD PRESSURE: 125 MMHG | OXYGEN SATURATION: 98 % | BODY MASS INDEX: 23.18 KG/M2

## 2025-07-11 DIAGNOSIS — C50.311 MALIGNANT NEOPLASM OF LOWER-INNER QUADRANT OF RIGHT BREAST OF FEMALE, ESTROGEN RECEPTOR POSITIVE: Primary | ICD-10-CM

## 2025-07-11 DIAGNOSIS — N95.9 PREMENOPAUSAL PATIENT: ICD-10-CM

## 2025-07-11 DIAGNOSIS — R19.03 RIGHT LOWER QUADRANT ABDOMINAL SWELLING: ICD-10-CM

## 2025-07-11 DIAGNOSIS — Z17.0 MALIGNANT NEOPLASM OF LOWER-INNER QUADRANT OF RIGHT BREAST OF FEMALE, ESTROGEN RECEPTOR POSITIVE: Primary | ICD-10-CM

## 2025-07-11 DIAGNOSIS — Z79.810 SERM USE (SELECTIVE ESTROGEN RECEPTOR MODULATOR): ICD-10-CM

## 2025-07-11 DIAGNOSIS — R19.04 LEFT LOWER QUADRANT ABDOMINAL SWELLING: ICD-10-CM

## 2025-07-11 PROCEDURE — 99999 PR PBB SHADOW E&M-EST. PATIENT-LVL IV: CPT | Mod: PBBFAC,,, | Performed by: NURSE PRACTITIONER

## 2025-07-11 NOTE — PROGRESS NOTES
[facility]/ [facility]   at [facility] Note:      Chief Complaint:   Encounter Diagnoses   Name Primary?    Malignant neoplasm of lower-inner quadrant of right breast of female, estrogen receptor positive Yes    SERM use (selective estrogen receptor modulator)     Premenopausal patient     Left lower quadrant abdominal swelling     Right lower quadrant abdominal swelling       Cancer Staging   Malignant neoplasm of lower-inner quadrant of right breast of female, estrogen receptor positive  Staging form: Breast, AJCC 8th Edition  - Clinical stage from 10/26/2023: Stage IIA (cT2, cN0, cM0, G3, ER+, IN-, HER2+) - Signed by Linda Mcbride MD on 11/6/2023  - Pathologic stage from 12/14/2023: Stage IA (pT1c, pN0, cM0, G2, ER+, IN-, HER2+) - Signed by Linda Mcbride MD on 1/5/2024    HPI:  Tasha Cornejo is a 45 y.o. female who presents today for follow up on tamoxifen. She is feeling generally well today.  Now taking tamoxifen 10 mg bid and feels this has helped with energy levels.    Exercising more recently  Now taking lions chari and creatine for brain fog and this has been helpful  Appetite is a little less than what it has been. Weight is up a little  Energy level is good  Bowels normal  Minimal and manageable hot flashes (mostly at night)   Has noticed some lower abdominal swelling since May, no major abdominal pain, no n/v, has irregular menstrual cycles    Per Dr. Mcbride's note:  Oncology History  Screening mammogram on 10/5/2023 identified coarse heterogeneous calcifications in a linear distribution seen in the lower outer quadrant of the right breast, spanning 1.5 cm.  Diagnostic mammogram on 10/12/2023 showed coarse heterogeneous calcifications in a regional distribution spanning 3.2 cm long axis   Biopsy 10/26/2023 with pathology revealing infiltrating ductal carcinoma of the breast, ER 70%/ IN-/HER2 3+; Ki67 60%    Bilateral mastectomy 12/14/23:  IDC, grade 2, 1.5cm in maximum; 0/2 LN+    Adjuvant TH 24  Adjuvant tamoxifen:  2024 (held Dec 2024 due to surgery)    GYN History:  Age of menarche was 9.   Age of menopause n/a.  Patient denies hormonal therapy.   Patient is .     Patient Active Problem List   Diagnosis    Rhinitis, allergic    Allergic conjunctivitis    Vitamin D deficiency    Malignant neoplasm of lower-inner quadrant of right breast of female, estrogen receptor positive    Chemotherapy-induced nausea    At risk for lymphedema    Stress and adjustment reaction    Physical deconditioning    Immunodeficiency due to drugs    Decreased ROM of right shoulder    Shoulder weakness    Anxiety about health    Palpitations    Chronic right hip pain    Weakness of right lower extremity       Current Outpatient Medications   Medication Instructions    ergocalciferol, vitamin D2, (VITAMIN D ORAL) Take by mouth.    fexofenadine (ALLEGRA) 180 mg, Daily    IMVEXXY MAINTENANCE PACK 10 mcg, Vaginal, Twice weekly    MAGNESIUM GLYCINATE ORAL     multivitamin (THERAGRAN) per tablet 1 tablet, Daily    tamoxifen (NOLVADEX) 10 mg, Oral, 2 times daily    turmeric 400 mg Cap        Review of Systems:   Review of Systems   Respiratory:  Negative for cough and shortness of breath.    Cardiovascular:  Negative for chest pain.   Gastrointestinal:  Negative for abdominal pain, blood in stool, constipation, diarrhea, nausea and vomiting.        Abdominal distention   Genitourinary: Negative.  Negative for dysuria and frequency.   Musculoskeletal:  Positive for joint pain. Negative for back pain.   Skin:  Negative for rash.   Neurological:  Negative for headaches.        Brain fog   Endo/Heme/Allergies:         Night sweats   Psychiatric/Behavioral:  The patient is nervous/anxious.    All other systems reviewed and are negative.      PHYSICAL EXAM:  /80 (BP Location: Right arm, Patient Position: Sitting)   Pulse 68   Temp 98.4 °F (36.9 °C) (Oral)   Resp 12    Ht 5' (1.524 m)   Wt 53.5 kg (118 lb 0.9 oz)   SpO2 98%   BMI 23.06 kg/m²     Physical Exam  Constitutional:       General: She is not in acute distress.     Appearance: Normal appearance. She is not ill-appearing.   HENT:      Head: Normocephalic and atraumatic.   Eyes:      Extraocular Movements: Extraocular movements intact.   Cardiovascular:      Rate and Rhythm: Normal rate and regular rhythm.      Pulses: Normal pulses.      Heart sounds: Normal heart sounds.   Pulmonary:      Effort: Pulmonary effort is normal. No respiratory distress.      Breath sounds: Normal breath sounds.   Abdominal:      General: Bowel sounds are normal. There is no distension.      Palpations: Abdomen is soft. There is no mass.      Comments: No major swelling or tenderness noted on exam today   Lymphadenopathy:      Cervical: No cervical adenopathy.   Skin:     General: Skin is warm and dry.   Neurological:      General: No focal deficit present.      Mental Status: She is alert and oriented to person, place, and time.       Pertinent Labs & Imaging:  Pathology Results  (Last 30 days)      None            No results found for this or any previous visit (from the past 24 hours).      Assessment & Plan:  1. Malignant neoplasm of lower-inner quadrant of right breast of female, estrogen receptor positive  - CBC Oncology; Future  - Comprehensive Metabolic Panel; Future  - Vitamin D; Future  - CT Abdomen Pelvis W Wo Contrast; Future    2. SERM use (selective estrogen receptor modulator)  - DXA Bone Density Axial Skeleton 1 or more sites; Future    3. Premenopausal patient  - DXA Bone Density Axial Skeleton 1 or more sites; Future    4. Left lower quadrant abdominal swelling  - CT Abdomen Pelvis W Wo Contrast; Future    5. Right lower quadrant abdominal swelling  - CT Abdomen Pelvis W Wo Contrast; Future      Patient with ER+/HER2+ breast cancer s/p adjuvant TH and on tamoxifen   Fatigue and side effects have moderately improved with  splitting dose of tamoxifen to 10mg BID  Continue to follow with Dr. Garza in [place].  Keep visit with Dr. Garza for gyn visit (she will try to get in sooner)  Will get bone density given she is premenopausal and on a serm, recommend ca and vit D  Recheck ca and vit D levels in 3 months (calcium was low on last check)  No major abdominal swelling noted on exam today, but pt has noticed swelling.  Will order CT abdomen pelvis  Patient agrees to this plan   Follow up 3 mos     Route Chart for Scheduling    Med Onc Chart Routing  Urgent    Follow up with physician 3 months. with Dr. Mcbride   Follow up with BHARGAVI    Infusion scheduling note    Injection scheduling note    Labs CBC and CMP   Scheduling:  Preferred lab:  Lab interval:  and Vit D in 3 months   Imaging DXA scan and CT chest abdomen pelvis   dexa and CT scan now   Pharmacy appointment    Other referrals                   Total time of this visit, including time spent face to face with patient and/or via video/audio, and also in preparing for today's visit for MDM and documentation. (Medical Decision Making, including consideration of possible diagnoses, management options, complex medical record review, review of diagnostic tests and information, consideration and discussion of significant complications based on comorbidities, and discussion with providers involved with the care of the patient) is 30 minutes. Greater than 50% was spent face to face with the patient counseling and coordinating care.     code applied: patient requires or will require a continuous, longitudinal, and active collaborative plan of care related to this patient's health condition, breast cancer -the management of which requires the direction of a practitioner with specialized clinical knowledge, skill, and expertise.     Visit today included increased complexity associated with the care of the episodic problem breast cancer addressed and managing the longitudinal care of the  patient due to serious and/or complex managed problem(s).    Gabrielle Woo NP   07/11/2025    Answers submitted by the patient for this visit:  Review of Systems Questionnaire (Submitted on 7/10/2025)  appetite change : No  unexpected weight change: Yes  mouth sores: No  visual disturbance: No  adenopathy: No

## 2025-07-29 ENCOUNTER — PATIENT MESSAGE (OUTPATIENT)
Dept: HEMATOLOGY/ONCOLOGY | Facility: CLINIC | Age: 45
End: 2025-07-29
Payer: COMMERCIAL

## 2025-08-01 ENCOUNTER — HOSPITAL ENCOUNTER (OUTPATIENT)
Dept: RADIOLOGY | Facility: HOSPITAL | Age: 45
Discharge: HOME | End: 2025-08-01
Attending: NURSE PRACTITIONER
Payer: COMMERCIAL

## 2025-08-01 DIAGNOSIS — Z17.0 MALIGNANT NEOPLASM OF LOWER-INNER QUADRANT OF RIGHT BREAST OF FEMALE, ESTROGEN RECEPTOR POSITIVE: ICD-10-CM

## 2025-08-01 DIAGNOSIS — R19.04 LEFT LOWER QUADRANT ABDOMINAL SWELLING: ICD-10-CM

## 2025-08-01 DIAGNOSIS — R19.03 RIGHT LOWER QUADRANT ABDOMINAL SWELLING: ICD-10-CM

## 2025-08-01 DIAGNOSIS — C50.311 MALIGNANT NEOPLASM OF LOWER-INNER QUADRANT OF RIGHT BREAST OF FEMALE, ESTROGEN RECEPTOR POSITIVE: ICD-10-CM

## 2025-08-01 PROCEDURE — 74177 CT ABD & PELVIS W/CONTRAST: CPT | Mod: TC

## 2025-08-01 PROCEDURE — 25500020 PHARM REV CODE 255: Performed by: NURSE PRACTITIONER

## 2025-08-01 PROCEDURE — 74177 CT ABD & PELVIS W/CONTRAST: CPT | Mod: 26,,, | Performed by: RADIOLOGY

## 2025-08-01 PROCEDURE — A9698 NON-RAD CONTRAST MATERIALNOC: HCPCS | Performed by: NURSE PRACTITIONER

## 2025-08-01 RX ADMIN — BARIUM SULFATE 450 ML: 20 SUSPENSION ORAL at 08:08

## 2025-08-01 RX ADMIN — IOHEXOL 75 ML: 350 INJECTION, SOLUTION INTRAVENOUS at 09:08

## 2025-08-06 ENCOUNTER — RESULTS FOLLOW-UP (OUTPATIENT)
Dept: HEMATOLOGY/ONCOLOGY | Facility: CLINIC | Age: 45
End: 2025-08-06
Payer: COMMERCIAL

## 2025-08-06 DIAGNOSIS — R16.0 HEPATOMEGALY: Primary | ICD-10-CM

## 2025-08-06 NOTE — PROGRESS NOTES
Called pt and results discussed.    Dorita Vásquez RN: can we move up her labs to now.  Cbc, cmp and Vit D and try to get her in with hepatology    Dorita and Dr. Garza's team: can we get her in sooner than October for a visit with Dorita or Dr. Garza for enlarging fibroids and ovarian cyst.  Thank you.     Gabrielle

## 2025-08-07 ENCOUNTER — PATIENT MESSAGE (OUTPATIENT)
Dept: HEMATOLOGY/ONCOLOGY | Facility: CLINIC | Age: 45
End: 2025-08-07
Payer: COMMERCIAL

## 2025-08-14 ENCOUNTER — OFFICE VISIT (OUTPATIENT)
Dept: OBSTETRICS AND GYNECOLOGY | Facility: CLINIC | Age: 45
End: 2025-08-14
Attending: OBSTETRICS & GYNECOLOGY
Payer: COMMERCIAL

## 2025-08-14 VITALS
SYSTOLIC BLOOD PRESSURE: 125 MMHG | BODY MASS INDEX: 22.58 KG/M2 | DIASTOLIC BLOOD PRESSURE: 86 MMHG | HEIGHT: 60 IN | HEART RATE: 73 BPM | WEIGHT: 115 LBS

## 2025-08-14 DIAGNOSIS — N92.1 MENORRHAGIA WITH IRREGULAR CYCLE: ICD-10-CM

## 2025-08-14 DIAGNOSIS — R10.2 PELVIC PRESSURE IN FEMALE: ICD-10-CM

## 2025-08-14 DIAGNOSIS — Z17.0 MALIGNANT NEOPLASM OF LOWER-INNER QUADRANT OF RIGHT BREAST OF FEMALE, ESTROGEN RECEPTOR POSITIVE: ICD-10-CM

## 2025-08-14 DIAGNOSIS — Z12.4 SCREENING FOR MALIGNANT NEOPLASM OF THE CERVIX: Primary | ICD-10-CM

## 2025-08-14 DIAGNOSIS — C50.311 MALIGNANT NEOPLASM OF LOWER-INNER QUADRANT OF RIGHT BREAST OF FEMALE, ESTROGEN RECEPTOR POSITIVE: ICD-10-CM

## 2025-08-14 DIAGNOSIS — Z79.810 USE OF TAMOXIFEN (NOLVADEX): ICD-10-CM

## 2025-08-14 DIAGNOSIS — Z01.419 ENCOUNTER FOR GYNECOLOGICAL EXAMINATION WITHOUT ABNORMAL FINDING: ICD-10-CM

## 2025-08-14 PROCEDURE — 87624 HPV HI-RISK TYP POOLED RSLT: CPT | Performed by: OBSTETRICS & GYNECOLOGY

## 2025-08-14 PROCEDURE — 99999 PR PBB SHADOW E&M-EST. PATIENT-LVL III: CPT | Mod: PBBFAC,,, | Performed by: OBSTETRICS & GYNECOLOGY

## 2025-08-14 RX ORDER — PSYLLIUM HUSK 0.4 G
1 CAPSULE ORAL 2 TIMES DAILY WITH MEALS
COMMUNITY

## 2025-08-15 ENCOUNTER — LAB VISIT (OUTPATIENT)
Dept: LAB | Facility: HOSPITAL | Age: 45
End: 2025-08-15
Payer: COMMERCIAL

## 2025-08-15 DIAGNOSIS — Z17.0 MALIGNANT NEOPLASM OF LOWER-INNER QUADRANT OF RIGHT BREAST OF FEMALE, ESTROGEN RECEPTOR POSITIVE: ICD-10-CM

## 2025-08-15 DIAGNOSIS — C50.311 MALIGNANT NEOPLASM OF LOWER-INNER QUADRANT OF RIGHT BREAST OF FEMALE, ESTROGEN RECEPTOR POSITIVE: ICD-10-CM

## 2025-08-15 LAB
25(OH)D3+25(OH)D2 SERPL-MCNC: 48 NG/ML (ref 30–96)
ABSOLUTE NEUTROPHIL COUNT (OHS): 6.4 K/UL (ref 1.8–7.7)
ALBUMIN SERPL BCP-MCNC: 4.1 G/DL (ref 3.5–5.2)
ALP SERPL-CCNC: 43 UNIT/L (ref 40–150)
ALT SERPL W/O P-5'-P-CCNC: 17 UNIT/L (ref 0–55)
ANION GAP (OHS): 9 MMOL/L (ref 8–16)
AST SERPL-CCNC: 23 UNIT/L (ref 0–50)
BILIRUB SERPL-MCNC: 0.4 MG/DL (ref 0.1–1)
BUN SERPL-MCNC: 19 MG/DL (ref 6–20)
CALCIUM SERPL-MCNC: 9.2 MG/DL (ref 8.7–10.5)
CHLORIDE SERPL-SCNC: 109 MMOL/L (ref 95–110)
CO2 SERPL-SCNC: 22 MMOL/L (ref 23–29)
CREAT SERPL-MCNC: 1.1 MG/DL (ref 0.5–1.4)
ERYTHROCYTE [DISTWIDTH] IN BLOOD BY AUTOMATED COUNT: 12.8 % (ref 11.5–14.5)
GFR SERPLBLD CREATININE-BSD FMLA CKD-EPI: >60 ML/MIN/1.73/M2
GLUCOSE SERPL-MCNC: 87 MG/DL (ref 70–110)
HCT VFR BLD AUTO: 41 % (ref 37–48.5)
HGB BLD-MCNC: 13.2 GM/DL (ref 12–16)
IMM GRANULOCYTES # BLD AUTO: 0.03 K/UL (ref 0–0.04)
MCH RBC QN AUTO: 29.7 PG (ref 27–31)
MCHC RBC AUTO-ENTMCNC: 32.2 G/DL (ref 32–36)
MCV RBC AUTO: 92 FL (ref 82–98)
PLATELET # BLD AUTO: 236 K/UL (ref 150–450)
PMV BLD AUTO: 11.2 FL (ref 9.2–12.9)
POTASSIUM SERPL-SCNC: 5.2 MMOL/L (ref 3.5–5.1)
PROT SERPL-MCNC: 7.1 GM/DL (ref 6–8.4)
RBC # BLD AUTO: 4.45 M/UL (ref 4–5.4)
SODIUM SERPL-SCNC: 140 MMOL/L (ref 136–145)
WBC # BLD AUTO: 8.86 K/UL (ref 3.9–12.7)

## 2025-08-15 PROCEDURE — 85027 COMPLETE CBC AUTOMATED: CPT

## 2025-08-15 PROCEDURE — 82306 VITAMIN D 25 HYDROXY: CPT

## 2025-08-15 PROCEDURE — 82040 ASSAY OF SERUM ALBUMIN: CPT

## 2025-08-15 PROCEDURE — 36415 COLL VENOUS BLD VENIPUNCTURE: CPT | Mod: PO

## 2025-08-25 ENCOUNTER — OFFICE VISIT (OUTPATIENT)
Dept: HEPATOLOGY | Facility: CLINIC | Age: 45
End: 2025-08-25
Payer: COMMERCIAL

## 2025-08-25 VITALS — BODY MASS INDEX: 22.59 KG/M2 | HEIGHT: 60 IN | WEIGHT: 115.06 LBS

## 2025-08-25 DIAGNOSIS — R16.0 HEPATOMEGALY: Primary | ICD-10-CM

## 2025-08-25 DIAGNOSIS — K76.9 LIVER LESION, LEFT LOBE: ICD-10-CM

## 2025-08-25 DIAGNOSIS — K76.9 LIVER LESION, RIGHT LOBE: ICD-10-CM

## 2025-08-25 PROCEDURE — 3008F BODY MASS INDEX DOCD: CPT | Mod: CPTII,S$GLB,,

## 2025-08-25 PROCEDURE — 99214 OFFICE O/P EST MOD 30 MIN: CPT | Mod: S$GLB,,,

## 2025-08-25 PROCEDURE — 3044F HG A1C LEVEL LT 7.0%: CPT | Mod: CPTII,S$GLB,,

## 2025-08-25 PROCEDURE — 1160F RVW MEDS BY RX/DR IN RCRD: CPT | Mod: CPTII,S$GLB,,

## 2025-08-25 PROCEDURE — 99999 PR PBB SHADOW E&M-EST. PATIENT-LVL IV: CPT | Mod: PBBFAC,,,

## 2025-08-25 PROCEDURE — 1159F MED LIST DOCD IN RCRD: CPT | Mod: CPTII,S$GLB,,

## 2025-08-26 ENCOUNTER — HOSPITAL ENCOUNTER (OUTPATIENT)
Dept: RADIOLOGY | Facility: OTHER | Age: 45
Discharge: HOME | End: 2025-08-26
Attending: NURSE PRACTITIONER
Payer: COMMERCIAL

## 2025-08-26 ENCOUNTER — TELEPHONE (OUTPATIENT)
Dept: OBSTETRICS AND GYNECOLOGY | Facility: CLINIC | Age: 45
End: 2025-08-26
Payer: COMMERCIAL

## 2025-08-26 ENCOUNTER — HOSPITAL ENCOUNTER (OUTPATIENT)
Dept: RADIOLOGY | Facility: OTHER | Age: 45
Discharge: HOME | End: 2025-08-26
Attending: OBSTETRICS & GYNECOLOGY
Payer: COMMERCIAL

## 2025-08-26 DIAGNOSIS — Z17.0 MALIGNANT NEOPLASM OF LOWER-INNER QUADRANT OF RIGHT BREAST OF FEMALE, ESTROGEN RECEPTOR POSITIVE: Primary | ICD-10-CM

## 2025-08-26 DIAGNOSIS — C50.311 MALIGNANT NEOPLASM OF LOWER-INNER QUADRANT OF RIGHT BREAST OF FEMALE, ESTROGEN RECEPTOR POSITIVE: Primary | ICD-10-CM

## 2025-08-26 DIAGNOSIS — R10.2 PELVIC PRESSURE IN FEMALE: ICD-10-CM

## 2025-08-26 DIAGNOSIS — M85.80 OSTEOPENIA, UNSPECIFIED LOCATION: ICD-10-CM

## 2025-08-26 DIAGNOSIS — Z79.810 SERM USE (SELECTIVE ESTROGEN RECEPTOR MODULATOR): ICD-10-CM

## 2025-08-26 PROCEDURE — 76856 US EXAM PELVIC COMPLETE: CPT | Mod: TC

## 2025-08-26 PROCEDURE — 76856 US EXAM PELVIC COMPLETE: CPT | Mod: 26,,, | Performed by: RADIOLOGY

## 2025-08-26 PROCEDURE — 77080 DXA BONE DENSITY AXIAL: CPT | Mod: 26,,, | Performed by: RADIOLOGY

## 2025-08-26 PROCEDURE — 76830 TRANSVAGINAL US NON-OB: CPT | Mod: 26,,, | Performed by: RADIOLOGY

## 2025-08-26 PROCEDURE — 77080 DXA BONE DENSITY AXIAL: CPT | Mod: TC

## 2025-08-27 ENCOUNTER — TELEPHONE (OUTPATIENT)
Dept: HEMATOLOGY/ONCOLOGY | Facility: CLINIC | Age: 45
End: 2025-08-27
Payer: COMMERCIAL

## 2025-08-29 ENCOUNTER — CLINICAL SUPPORT (OUTPATIENT)
Dept: HEMATOLOGY/ONCOLOGY | Facility: CLINIC | Age: 45
End: 2025-08-29
Payer: COMMERCIAL

## 2025-08-29 ENCOUNTER — PATIENT MESSAGE (OUTPATIENT)
Dept: HEMATOLOGY/ONCOLOGY | Facility: CLINIC | Age: 45
End: 2025-08-29

## 2025-08-29 DIAGNOSIS — Z17.0 MALIGNANT NEOPLASM OF LOWER-INNER QUADRANT OF RIGHT BREAST OF FEMALE, ESTROGEN RECEPTOR POSITIVE: ICD-10-CM

## 2025-08-29 DIAGNOSIS — Z79.810 SERM USE (SELECTIVE ESTROGEN RECEPTOR MODULATOR): ICD-10-CM

## 2025-08-29 DIAGNOSIS — C50.311 MALIGNANT NEOPLASM OF LOWER-INNER QUADRANT OF RIGHT BREAST OF FEMALE, ESTROGEN RECEPTOR POSITIVE: ICD-10-CM

## 2025-08-29 DIAGNOSIS — M85.80 OSTEOPENIA, UNSPECIFIED LOCATION: ICD-10-CM

## 2025-08-29 DIAGNOSIS — Z71.3 NUTRITIONAL COUNSELING: Primary | ICD-10-CM

## (undated) DEVICE — Device

## (undated) DEVICE — DRAPE THREE-QTR REINF 53X77IN

## (undated) DEVICE — NDL HYPO REG 25G X 1 1/2

## (undated) DEVICE — SYS CLSR DERMABOND PRINEO 22CM

## (undated) DEVICE — SOL BETADINE 5%

## (undated) DEVICE — SPONGE LAP 18X18 PREWASHED

## (undated) DEVICE — ELECTRODE REM PLYHSV RETURN 9

## (undated) DEVICE — SYR SALINE FLSH PRFL STRL 10ML

## (undated) DEVICE — MATERIAL GREEN BKGRND 50X2X1MM

## (undated) DEVICE — SUT VICRYL PLUS 2-0 CT1 18

## (undated) DEVICE — GEL AQUASONIC 100 STERILE20GM

## (undated) DEVICE — GLOVE BIOGEL SKINSENSE PI 7.0

## (undated) DEVICE — BLADE ELECTRO EDGE INSULATED

## (undated) DEVICE — GOWN ECLIPSE REINF LV4 XLNG XL

## (undated) DEVICE — CLIPPER BLADE MOD 4406 (CAREF)

## (undated) DEVICE — SUT 3-0 ETHILON 18 FS-1

## (undated) DEVICE — PACK ECLIPSE UNIVERSAL STERILE

## (undated) DEVICE — GLOVE BIOGEL SKINSENSE PI 6.5

## (undated) DEVICE — TOWEL OR DISP STRL BLUE 4/PK

## (undated) DEVICE — POSITIONER HEEL FOAM CONVOLTD

## (undated) DEVICE — NDL ECLIPSE SAFETY 23G 1.5IN

## (undated) DEVICE — SYS CLSR DERMABOND PRINEO 42CM

## (undated) DEVICE — GOWN NONREINF SET-IN SLV XL

## (undated) DEVICE — PROBE FLOW DOPPLER

## (undated) DEVICE — SYR ONLY LUER LOCK 20CC

## (undated) DEVICE — SOL POVIDONE SCRUB IODINE 4 OZ

## (undated) DEVICE — STAPLER SKIN ROTATING HEAD

## (undated) DEVICE — TRAY CATH 1-LYR URIMTR 16FR

## (undated) DEVICE — ELECTRODE BLD EXT INSUL 1

## (undated) DEVICE — DRESSING XEROFORM NONADH 1X8IN

## (undated) DEVICE — SUT PDS II 0 CT VIL MONO 36

## (undated) DEVICE — RETRACTOR RADIALUX LIGHTED

## (undated) DEVICE — SOL NACL .9P 500ML

## (undated) DEVICE — MICRO CLIP

## (undated) DEVICE — PENCIL ELECTROSURG HOLST W/BLD

## (undated) DEVICE — SUT VICRYL PLUS 3-0 SH 18IN

## (undated) DEVICE — PROTECTOR HEEL POSITIONING

## (undated) DEVICE — MARKER SKIN STND TIP BLUE BARR

## (undated) DEVICE — DRAIN CHANNEL ROUND 19FR

## (undated) DEVICE — DRESSING CHG FOAM 4MM 1IN

## (undated) DEVICE — BANDAGE ROLL COTTN 4.5INX4.1YD

## (undated) DEVICE — DRESSING MEPILEX FLEX 4X4IN

## (undated) DEVICE — SUT 9/0 5IN ETHILON BLK MON

## (undated) DEVICE — SUT 2/0 27IN PDS II VIO MO

## (undated) DEVICE — DRESSING TRANS 4X10 TEGADERM

## (undated) DEVICE — SUT MCRYL PLUS 3-0 PS2 27IN

## (undated) DEVICE — ELECTRODE BLADE INSULATED 1 IN

## (undated) DEVICE — DRAPE STERI INSTRUMENT 1018

## (undated) DEVICE — SOL 0.9% NACL IRRI.IN STERIL

## (undated) DEVICE — BLANKET HYPER ADULT 24X60IN

## (undated) DEVICE — EVACUATOR WOUND BULB 100CC

## (undated) DEVICE — APPLICATOR CHLORAPREP ORN 26ML

## (undated) DEVICE — SUT PROLENE 4-0 MONO 18IN

## (undated) DEVICE — EVACUATOR PENCIL SMOKE NEPTUNE

## (undated) DEVICE — SUT 2/0 30IN SILK BLK BRAI

## (undated) DEVICE — APPLIER CLIP LIAGCLIP 9.375IN

## (undated) DEVICE — HOLDER DRAIN POUCH PINK

## (undated) DEVICE — SOL VASHE INSTILLATION WND 16

## (undated) DEVICE — SYR BULB 60CC IRRIGATION

## (undated) DEVICE — POSITIONER IV ARMBOARD FOAM

## (undated) DEVICE — GLOVE BIOGEL SKINSENSE PI 8.0

## (undated) DEVICE — ADHESIVE DERMABOND ADVANCED

## (undated) DEVICE — DRAIN CHANNEL ROUND 15FR

## (undated) DEVICE — SCRUB HIBICLENS 4% CHG 4OZ

## (undated) DEVICE — SOL NORMAL USPCA 0.9%

## (undated) DEVICE — GLOVE BIOGEL SKINSENSE PI 7.5

## (undated) DEVICE — CLAMP SINGLE MICRO.

## (undated) DEVICE — SUT VICRYL PLUS 4-0 PS2 27

## (undated) DEVICE — COVER HANDLE UNIVERSAL 6X14

## (undated) DEVICE — CABLE EXT DOPPLER FLOW PROBE

## (undated) DEVICE — PAD ABDOMINAL STERILE 8X10IN

## (undated) DEVICE — SUT 5/0 18IN PROLENE BL MO

## (undated) DEVICE — SPONGE BULKEE II ABSRB 6X6.75

## (undated) DEVICE — SUT STRATAFIX PGAPCL 3 FS-2

## (undated) DEVICE — SYR 10CC LUER LOCK

## (undated) DEVICE — SYR 50ML CATH TIP

## (undated) DEVICE — DRAPE CORETEMP FLD WRM 56X62IN

## (undated) DEVICE — DRESSING GAUZE XEROFORM 5X9

## (undated) DEVICE — CONTAINER SPEC OR STRL 4.5OZ

## (undated) DEVICE — CORD BIPOLAR 12 FOOT

## (undated) DEVICE — SOL IRRI STRL WATER 1000ML

## (undated) DEVICE — UNDERGLOVES BIOGEL PI SZ 7 LF

## (undated) DEVICE — CLIP DOUBLE MICRO.

## (undated) DEVICE — APPLIER LIGACLIP LG 13IN

## (undated) DEVICE — DRESSING ADAPTIC N ADH 3X8IN

## (undated) DEVICE — DRESSING PICO 7 SINGLE 15X15CM

## (undated) DEVICE — SUT VICRYL 3-0 27 SH